# Patient Record
Sex: FEMALE | Race: WHITE | NOT HISPANIC OR LATINO | Employment: FULL TIME | URBAN - METROPOLITAN AREA
[De-identification: names, ages, dates, MRNs, and addresses within clinical notes are randomized per-mention and may not be internally consistent; named-entity substitution may affect disease eponyms.]

---

## 2017-02-23 ENCOUNTER — DOCUMENTATION ONLY (OUTPATIENT)
Dept: FAMILY MEDICINE | Facility: CLINIC | Age: 47
End: 2017-02-23

## 2017-02-23 NOTE — PROGRESS NOTES
Pre-Visit Chart Review  For Appointment Scheduled on 3-9-17    Health Maintenance Due   Topic Date Due    Foot Exam  10/08/1978    TETANUS VACCINE  10/08/1986    Hemoglobin A1c  03/16/2016    Influenza Vaccine  08/01/2016

## 2017-03-09 ENCOUNTER — OFFICE VISIT (OUTPATIENT)
Dept: FAMILY MEDICINE | Facility: CLINIC | Age: 47
End: 2017-03-09
Payer: COMMERCIAL

## 2017-03-09 VITALS
DIASTOLIC BLOOD PRESSURE: 78 MMHG | RESPIRATION RATE: 16 BRPM | WEIGHT: 167.56 LBS | SYSTOLIC BLOOD PRESSURE: 115 MMHG | OXYGEN SATURATION: 97 % | HEIGHT: 67 IN | BODY MASS INDEX: 26.3 KG/M2 | HEART RATE: 84 BPM | TEMPERATURE: 98 F

## 2017-03-09 DIAGNOSIS — G47.00 WAKEFULNESS: ICD-10-CM

## 2017-03-09 DIAGNOSIS — G47.429 NARCOLEPSY DUE TO UNDERLYING CONDITION WITHOUT CATAPLEXY: ICD-10-CM

## 2017-03-09 DIAGNOSIS — I10 GOOD HYPERTENSION CONTROL: ICD-10-CM

## 2017-03-09 DIAGNOSIS — Z76.89 ESTABLISHING CARE WITH NEW DOCTOR, ENCOUNTER FOR: Primary | ICD-10-CM

## 2017-03-09 DIAGNOSIS — E11.9 CONTROLLED TYPE 2 DIABETES MELLITUS WITHOUT COMPLICATION, WITHOUT LONG-TERM CURRENT USE OF INSULIN: ICD-10-CM

## 2017-03-09 DIAGNOSIS — T78.2XXS ANAPHYLAXIS, SEQUELA: ICD-10-CM

## 2017-03-09 DIAGNOSIS — E78.5 HYPERLIPIDEMIA, UNSPECIFIED HYPERLIPIDEMIA TYPE: ICD-10-CM

## 2017-03-09 PROCEDURE — 3074F SYST BP LT 130 MM HG: CPT | Mod: S$GLB,,, | Performed by: FAMILY MEDICINE

## 2017-03-09 PROCEDURE — 99203 OFFICE O/P NEW LOW 30 MIN: CPT | Mod: S$GLB,,, | Performed by: FAMILY MEDICINE

## 2017-03-09 PROCEDURE — 3046F HEMOGLOBIN A1C LEVEL >9.0%: CPT | Mod: S$GLB,,, | Performed by: FAMILY MEDICINE

## 2017-03-09 PROCEDURE — 3078F DIAST BP <80 MM HG: CPT | Mod: S$GLB,,, | Performed by: FAMILY MEDICINE

## 2017-03-09 PROCEDURE — 2022F DILAT RTA XM EVC RTNOPTHY: CPT | Mod: S$GLB,,, | Performed by: FAMILY MEDICINE

## 2017-03-09 PROCEDURE — 3060F POS MICROALBUMINURIA REV: CPT | Mod: S$GLB,,, | Performed by: FAMILY MEDICINE

## 2017-03-09 PROCEDURE — 99999 PR PBB SHADOW E&M-EST. PATIENT-LVL III: CPT | Mod: PBBFAC,,, | Performed by: FAMILY MEDICINE

## 2017-03-09 PROCEDURE — 1160F RVW MEDS BY RX/DR IN RCRD: CPT | Mod: S$GLB,,, | Performed by: FAMILY MEDICINE

## 2017-03-09 RX ORDER — EPINEPHRINE 0.3 MG/.3ML
1 INJECTION SUBCUTANEOUS ONCE
Qty: 2 EACH | Refills: 11 | Status: SHIPPED | OUTPATIENT
Start: 2017-03-09 | End: 2017-09-18 | Stop reason: SDUPTHER

## 2017-03-09 RX ORDER — CYCLOBENZAPRINE HCL 10 MG
10 TABLET ORAL 2 TIMES DAILY
Refills: 2 | COMMUNITY
Start: 2017-03-02 | End: 2017-05-31

## 2017-03-09 RX ORDER — MULTIVITAMIN
1 TABLET ORAL DAILY PRN
COMMUNITY
End: 2018-09-12

## 2017-03-09 RX ORDER — IBUPROFEN AND FAMOTIDINE 800; 26.6 MG/1; MG/1
1 TABLET, COATED ORAL 2 TIMES DAILY
COMMUNITY
Start: 2017-02-07 | End: 2017-05-31

## 2017-03-09 RX ORDER — OXYCODONE AND ACETAMINOPHEN 7.5; 325 MG/1; MG/1
TABLET ORAL
Refills: 0 | COMMUNITY
Start: 2017-02-20 | End: 2017-05-31

## 2017-03-09 RX ORDER — USTEKINUMAB 45 MG/.5ML
45 INJECTION, SOLUTION SUBCUTANEOUS
COMMUNITY
Start: 2016-12-12 | End: 2019-10-09

## 2017-03-09 RX ORDER — CEPHALEXIN 500 MG/1
500 CAPSULE ORAL 2 TIMES DAILY
Refills: 0 | COMMUNITY
Start: 2017-02-20 | End: 2017-03-11

## 2017-03-09 RX ORDER — ARMODAFINIL 250 MG/1
250 TABLET ORAL 2 TIMES DAILY
Qty: 60 TABLET | Refills: 5 | Status: SHIPPED | OUTPATIENT
Start: 2017-03-09 | End: 2017-09-18 | Stop reason: SDUPTHER

## 2017-03-09 RX ORDER — ONDANSETRON 4 MG/1
TABLET, ORALLY DISINTEGRATING ORAL
Refills: 0 | COMMUNITY
Start: 2017-02-20 | End: 2017-05-31

## 2017-03-09 NOTE — PROGRESS NOTES
"Subjective:       Patient ID: Holly Chicas is a 46 y.o. female.    Chief Complaint: Establish Care    HPI Comments: 46 year old female coming in to [facility].  She has a history of diabetes, hypertension, hyperlipidemia, neuropathy, sleep apnea, and narcolepsy.  She is s/p gastric bypass in  following which she lost 70 lb and after which she has had a tummy tuck, thigh lifts, liposuction, brazillian butt lift, and breast augmentation with the breast augmentation, butt lift, and thigh lift done one week ago.  She is unable to sit or lie down in the office due to removal of sutures earlier today.  She has essentially cured her diabetes, hypertension and hyperlipidemia and no longer needs to use medications for that or use bipap for sleep apnea.   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    Past Medical History:  : Abnormal Pap smear      Comment: "pre-cancer cells post hysterectomy"  No date: Anxiety  No date: Arthritis  No date: Depression  No date: Diabetes mellitus  No date: Diabetes mellitus, type 2  No date: Hyperlipidemia  No date: Hypertension  No date: Hypertriglyceridemia  No date: Meningitis  No date: Narcolepsy  No date: ALESIA (obstructive sleep apnea)  2014: Peripheral neuropathy  No date: Psoriasis  No date: Tuberculosis    Past Surgical History:  2016: BREAST SURGERY      Comment: aumentation/ reduction   2017: BREAST SURGERY      Comment: augmentation   2017: brizilian butt lift   No date:  SECTION  No date: FOOT SURGERY  2015: GASTRIC BYPASS  No date: HERNIA REPAIR  No date: HYSTERECTOMY  No date: KNEE SURGERY Left  : SHOULDER SURGERY      Comment: right should surgery   2017: SHOULDER SURGERY      Comment: left shoulder / torn labrium   2017: THIGH LIFT  No date: TONSILLECTOMY  2016: tummy tuck     Review of patient's family history indicates:    Cancer                         Mother   "                    Comment: breast bilateral/ skin     Breast cancer                  Mother                    Skin cancer                    Mother                    Diabetes                       Father                    Heart disease                  Father                      Comment: CABG    Hypertension                   Father                    Cancer                         Father                      Comment: skin    Skin cancer                    Father                    Gout                           Father                    Gout                           Brother                   No Known Problems              Daughter                  No Known Problems              Son                       Cancer                         Maternal Aunt               Comment: lung, pancrease - smoker , breast    Lung cancer                    Maternal Aunt               Comment: x2    Pancreatic cancer              Maternal Aunt             Breast cancer                  Maternal Aunt             No Known Problems              Maternal Uncle            No Known Problems              Paternal Uncle            Cancer                         Maternal Grandmother        Comment: pancrease    Pancreatic cancer              Maternal Grandmother      No Known Problems              Maternal Grandfather      Diabetes                       Paternal Grandmother      Heart disease                  Paternal Grandfather      Colon cancer                   Neg Hx                    Ovarian cancer                 Neg Hx                    Social History    Marital status:              Spouse name:                       Years of education:                 Number of children:               Social History Main Topics    Smoking status: Never Smoker                                                                Smokeless status: Never Used                        Alcohol use: No                 Comment: ALLERGIC TO  ALCOHOL(DRINKING) PER PATIENT    Drug use: No              Sexual activity: Yes               Partners with: Male       Birth control/protection: Surgical      Current Outpatient Prescriptions:     cephALEXin (KEFLEX) 500 MG capsule, Take 500 mg by mouth 2 (two) times daily., Disp: , Rfl: 0    cyclobenzaprine (FLEXERIL) 10 MG tablet, Take 10 mg by mouth 2 (two) times daily., Disp: , Rfl: 2    DUEXIS 800-26.6 mg Tab, Take 1 tablet by mouth 2 (two) times daily. , Disp: , Rfl:     epinephrine (EPIPEN) 0.3 mg/0.3 mL AtIn, Inject 0.3 mLs (0.3 mg total) into the muscle once. As directed PRN, Disp: 2 each, Rfl: 11    multivitamin (ONE DAILY MULTIVITAMIN) per tablet, Take 1 tablet by mouth once daily., Disp: , Rfl:     ondansetron (ZOFRAN-ODT) 4 MG TbDL, LET 1 TABLET DISSOLVE ON THE TONGUE EVERY 4-6 HOURS, Disp: , Rfl: 0    oxycodone-acetaminophen (PERCOCET) 7.5-325 mg per tablet, TK 1 T PO Q 4 TO 6 H, Disp: , Rfl: 0    STELARA 45 mg/0.5 mL Syrg syringe, Inject 45 mg into the muscle every 3 (three) months. , Disp: , Rfl:     armodafinil (NUVIGIL) 250 mg tablet, Take 1 tablet (250 mg total) by mouth 2 (two) times daily. Twice a day, Disp: 60 tablet, Rfl: 5    Foot Exam due on 10/08/1978  Eye Exam due on 10/08/1978-done one month ago at [facility]  TETANUS VACCINE due on 10/08/1986-DECLINES  Hemoglobin A1c due on 06/16/2016  Influenza Vaccine due on 08/01/2016-DECLINES  Lipid Panel due on 12/16/2016      Review of Systems   Constitutional: Negative for chills, diaphoresis, fatigue, fever and unexpected weight change.   HENT: Negative for congestion, ear pain, hearing loss, postnasal drip, sinus pressure, sneezing, sore throat, tinnitus and trouble swallowing.    Eyes: Negative for itching and visual disturbance.   Respiratory: Negative for cough, chest tightness, shortness of breath and wheezing.    Cardiovascular: Negative for chest pain, palpitations and leg swelling.   Gastrointestinal: Negative for  "abdominal pain, blood in stool, constipation, diarrhea, nausea and vomiting.   Genitourinary: Negative for dysuria, frequency, hematuria, menstrual problem, pelvic pain, vaginal bleeding and vaginal discharge.   Musculoskeletal: Positive for myalgias (secondary to surgery last week). Negative for arthralgias, back pain and joint swelling.   Skin: Negative for color change, pallor and rash.   Neurological: Negative for dizziness and headaches.   Hematological: Negative for adenopathy.   Psychiatric/Behavioral: Negative for sleep disturbance. The patient is not nervous/anxious.        Objective:      Physical Exam   Constitutional: She is oriented to person, place, and time. She appears well-developed and well-nourished. No distress.   -------------------------------                 03/09/17                          1513           -------------------------------   BP:            115/78           BP Location:      Left arm          Patient Position:      Standing          BP Method:      Automatic         Pulse:           84             Resp:            16             Temp:     97.9 °F (36.6 °C)     TempSrc:        Oral            SpO2:            97%            Weight: 76 kg (167 lb 8.8 oz)   Height:    5' 7" (1.702 m)     -------------------------------  Good blood pressure control  Patient is overweight with bmi of 26.2.      HENT:   Head: Normocephalic and atraumatic.   Right Ear: External ear normal.   Left Ear: External ear normal.   Nose: Nose normal.   Mouth/Throat: Oropharynx is clear and moist. No oropharyngeal exudate.   Eyes: Conjunctivae and EOM are normal. Pupils are equal, round, and reactive to light. Right eye exhibits no discharge. Left eye exhibits no discharge. No scleral icterus.   Neck: Normal range of motion. Neck supple. No JVD present. No tracheal deviation present. No thyromegaly present.   Cardiovascular: Normal rate, regular rhythm and normal " heart sounds.  Exam reveals no gallop and no friction rub.    No murmur heard.  Pulses:       Dorsalis pedis pulses are 2+ on the right side, and 2+ on the left side.        Posterior tibial pulses are 2+ on the right side, and 2+ on the left side.   Pulmonary/Chest: Effort normal and breath sounds normal. No respiratory distress. She has no wheezes. She has no rales. She exhibits no tenderness.   Abdominal: Soft. Bowel sounds are normal. She exhibits no distension and no mass. There is no tenderness. There is no rebound and no guarding. No hernia.   Musculoskeletal: Normal range of motion. She exhibits no edema, tenderness or deformity.        Right foot: There is normal range of motion. Deformity: mild bunion first mp joint with no overriding.        Left foot: There is normal range of motion and no deformity.   Feet:   Right Foot:   Protective Sensation: 10 sites tested. 10 sites sensed.   Skin Integrity: Negative for ulcer, blister, skin breakdown, erythema, warmth, callus or dry skin.   Left Foot:   Protective Sensation: 10 sites tested. 10 sites sensed.   Skin Integrity: Negative for ulcer, blister, skin breakdown, erythema, warmth, callus or dry skin.   Lymphadenopathy:     She has no cervical adenopathy.   Neurological: She is alert and oriented to person, place, and time. She has normal reflexes. She displays normal reflexes. No cranial nerve deficit. She exhibits normal muscle tone.   Skin: Skin is warm and dry. No rash noted. She is not diaphoretic. No erythema. No pallor.   Psychiatric: She has a normal mood and affect. Her behavior is normal. Judgment and thought content normal.   Nursing note and vitals reviewed.      Assessment:       1. Establishing care with new doctor, encounter for    2. Controlled type 2 diabetes mellitus without complication, without long-term current use of insulin    3. Good hypertension control    4. Hyperlipidemia, unspecified hyperlipidemia type    5. Narcolepsy due to  underlying condition without cataplexy    6. Wakefulness    7. Anaphylaxis, sequela    8. BMI 26.0-26.9,adult        Plan:       1. Establishing care with new doctor, encounter for    2. Controlled type 2 diabetes mellitus without complication, without long-term current use of insulin  On no meds at present  - Hemoglobin A1c; Future  - Lipid panel; Future  - Basic metabolic panel; Future  - Microalbumin/creatinine urine ratio; Future    3. Good hypertension control  On no meds, well controlled with weight loss alone  - Basic metabolic panel; Future  - CBC auto differential; Future    4. Hyperlipidemia, unspecified hyperlipidemia type  On no meds, lab ordered    5. Narcolepsy due to underlying condition without cataplexy  Refill nuvigil    6. Wakefulness  - armodafinil (NUVIGIL) 250 mg tablet; Take 1 tablet (250 mg total) by mouth 2 (two) times daily. Twice a day  Dispense: 60 tablet; Refill: 5    7. Anaphylaxis, sequela  - epinephrine (EPIPEN) 0.3 mg/0.3 mL AtIn; Inject 0.3 mLs (0.3 mg total) into the muscle once. As directed PRN  Dispense: 2 each; Refill: 11    8. BMI 26.0-26.9,adult

## 2017-03-09 NOTE — MR AVS SNAPSHOT
[facility]  [address]  [place] [address]  Phone: [phone]  Fax: [phone]                  Holly Chicas   3/9/2017 3:20 PM   Office Visit    Description:  Female : 1970   Provider:  Ben Tracy MD   Department:  [facility]           Reason for Visit     Establish Care           Diagnoses this Visit        Comments    Establishing care with new doctor, encounter for    -  Primary     Controlled type 2 diabetes mellitus without complication, without long-term current use of insulin         Good hypertension control         Hyperlipidemia, unspecified hyperlipidemia type         Narcolepsy due to underlying condition without cataplexy         Wakefulness         Anaphylaxis, sequela                To Do List           Future Appointments        Provider Department Dept Phone    3/10/2017 9:00 AM [facility] - Lab [phone]    3/10/2017 9:15 AM [facility], [facility] - Lab [phone]      Goals (5 Years of Data)     None       These Medications        Disp Refills Start End    epinephrine (EPIPEN) 0.3 mg/0.3 mL AtIn 2 each 11 3/9/2017 3/9/2017    Inject 0.3 mLs (0.3 mg total) into the muscle once. As directed PRN - Intramuscular    Pharmacy: [facility]/pharmacy #[id number] - [place] [address] Ph #: [phone]       armodafinil (NUVIGIL) 250 mg tablet 60 tablet 5 3/9/2017     Take 1 tablet (250 mg total) by mouth 2 (two) times daily. Twice a day - Oral    Pharmacy: [facility]/pharmacy #[id number] - [place] - [address]. Ph #: [phone]         [facility] On Call     [facility] On Call Nurse Care Line -  Assistance  Registered nurses in the [facility] provide clinical advisement, health education, appointment booking, and other advisory services.  Call for this free service at [phone].             Medications           Message regarding Medications     Verify the changes and/or additions to your  medication regime listed below are the same as discussed with your clinician today.  If any of these changes or additions are incorrect, please notify your healthcare provider.        START taking these NEW medications        Refills    armodafinil (NUVIGIL) 250 mg tablet 5    Sig: Take 1 tablet (250 mg total) by mouth 2 (two) times daily. Twice a day    Class: Normal    Route: Oral      CHANGE how you are taking these medications     Start Taking Instead of    epinephrine (EPIPEN) 0.3 mg/0.3 mL AtIn epinephrine (EPIPEN) 0.3 mg/0.3 mL PnIj    Dosage:  Inject 0.3 mLs (0.3 mg total) into the muscle once. As directed PRN Dosage:  once. As directed PRN    Reason for Change:  Reorder       STOP taking these medications     STELARA 90 mg/mL Syrg syringe Once every 3 months    blood sugar diagnostic Strp Test blood sugar 4 times daily    clotrimazole-betamethasone 1-0.05% (LOTRISONE) cream Apply topically 2 (two) times daily.    gentamicin (GARAMYCIN) 0.1 % ointment     lancets (ONE TOUCH DELICA LANCETS) 33 gauge Misc 1 lancet by Misc.(Non-Drug; Combo Route) route 4 (four) times daily.    TACLONEX external suspension            Verify that the below list of medications is an accurate representation of the medications you are currently taking.  If none reported, the list may be blank. If incorrect, please contact your healthcare provider. Carry this list with you in case of emergency.           Current Medications     cephALEXin (KEFLEX) 500 MG capsule Take 500 mg by mouth 2 (two) times daily.    cyclobenzaprine (FLEXERIL) 10 MG tablet Take 10 mg by mouth 2 (two) times daily.    DUEXIS 800-26.6 mg Tab Take 1 tablet by mouth 2 (two) times daily.     epinephrine (EPIPEN) 0.3 mg/0.3 mL AtIn Inject 0.3 mLs (0.3 mg total) into the muscle once. As directed PRN    multivitamin (ONE DAILY MULTIVITAMIN) per tablet Take 1 tablet by mouth once daily.    ondansetron (ZOFRAN-ODT) 4 MG TbDL LET 1 TABLET DISSOLVE ON THE TONGUE EVERY 4-6  "HOURS    oxycodone-acetaminophen (PERCOCET) 7.5-325 mg per tablet TK 1 T PO Q 4 TO 6 H    STELARA 45 mg/0.5 mL Syrg syringe Inject 45 mg into the muscle every 3 (three) months.     armodafinil (NUVIGIL) 250 mg tablet Take 1 tablet (250 mg total) by mouth 2 (two) times daily. Twice a day           Clinical Reference Information           Your Vitals Were     BP Pulse Temp Resp Height Weight    115/78 (BP Location: Left arm, Patient Position: Standing, BP Method: Automatic) 84 97.9 °F (36.6 °C) (Oral) 16 5' 7" (1.702 m) 76 kg (167 lb 8.8 oz)    SpO2 BMI             97% 26.24 kg/m2         Blood Pressure          Most Recent Value    BP  115/78      Allergies as of 3/9/2017     Alcohol      Immunizations Administered on Date of Encounter - 3/9/2017     None      Orders Placed During Today's Visit     Future Labs/Procedures Expected by Expires    Basic metabolic panel  3/9/2017 3/10/2018    CBC auto differential  3/9/2017 3/10/2018    Hemoglobin A1c  3/9/2017 5/8/2018    Lipid panel  3/9/2017 5/8/2018    Microalbumin/creatinine urine ratio  3/9/2017 3/9/2018      Language Assistance Services     ATTENTION: Language assistance services are available, free of charge. Please call [phone].      ATENCIÓN: Si habla español, tiene a zhong disposición servicios gratuitos de asistencia lingüística. Llame al [phone].     CHÚ Ý: N?u b?n nói Ti?ng Vi?t, có các d?ch v? h? tr? ngôn ng? mi?n phí dành cho b?n. G?i s? [phone].         [facility] complies with applicable Federal civil rights laws and does not discriminate on the basis of race, color, national origin, age, disability, or sex.        "

## 2017-03-10 ENCOUNTER — TELEPHONE (OUTPATIENT)
Dept: FAMILY MEDICINE | Facility: CLINIC | Age: 47
End: 2017-03-10

## 2017-03-29 ENCOUNTER — HOSPITAL ENCOUNTER (OUTPATIENT)
Dept: RADIOLOGY | Facility: CLINIC | Age: 47
Discharge: HOME | End: 2017-03-29
Attending: PODIATRIST
Payer: COMMERCIAL

## 2017-03-29 ENCOUNTER — PATIENT MESSAGE (OUTPATIENT)
Dept: FAMILY MEDICINE | Facility: CLINIC | Age: 47
End: 2017-03-29

## 2017-03-29 ENCOUNTER — OFFICE VISIT (OUTPATIENT)
Dept: PODIATRY | Facility: CLINIC | Age: 47
End: 2017-03-29
Payer: COMMERCIAL

## 2017-03-29 VITALS — HEIGHT: 67 IN | BODY MASS INDEX: 26.02 KG/M2 | WEIGHT: 165.81 LBS

## 2017-03-29 DIAGNOSIS — M79.674 TOE PAIN, RIGHT: Primary | ICD-10-CM

## 2017-03-29 DIAGNOSIS — S92.501A FRACTURE OF FIFTH TOE, RIGHT, CLOSED, INITIAL ENCOUNTER: ICD-10-CM

## 2017-03-29 DIAGNOSIS — S90.121A CONTUSION OF LESSER TOE OF RIGHT FOOT WITHOUT DAMAGE TO NAIL, INITIAL ENCOUNTER: ICD-10-CM

## 2017-03-29 DIAGNOSIS — M79.674 TOE PAIN, RIGHT: ICD-10-CM

## 2017-03-29 PROCEDURE — 28510 TREATMENT OF TOE FRACTURE: CPT | Mod: RT,S$GLB,, | Performed by: PODIATRIST

## 2017-03-29 PROCEDURE — 73660 X-RAY EXAM OF TOE(S): CPT | Mod: 26,,, | Performed by: RADIOLOGY

## 2017-03-29 PROCEDURE — 1160F RVW MEDS BY RX/DR IN RCRD: CPT | Mod: S$GLB,,, | Performed by: PODIATRIST

## 2017-03-29 PROCEDURE — 99999 PR PBB SHADOW E&M-EST. PATIENT-LVL III: CPT | Mod: PBBFAC,,, | Performed by: PODIATRIST

## 2017-03-29 PROCEDURE — 73660 X-RAY EXAM OF TOE(S): CPT | Mod: TC,PO

## 2017-03-29 PROCEDURE — 99203 OFFICE O/P NEW LOW 30 MIN: CPT | Mod: 57,S$GLB,, | Performed by: PODIATRIST

## 2017-03-29 RX ORDER — LIDOCAINE HYDROCHLORIDE 20 MG/ML
JELLY TOPICAL
Qty: 30 ML | Refills: 2 | Status: SHIPPED | OUTPATIENT
Start: 2017-03-29 | End: 2017-08-09

## 2017-03-29 RX ORDER — ARMODAFINIL 250 MG/1
TABLET ORAL
Refills: 5 | COMMUNITY
Start: 2017-03-10 | End: 2017-05-31 | Stop reason: SDUPTHER

## 2017-03-29 NOTE — PROGRESS NOTES
Subjective:      Patient ID: Holly Chicas is a 46 y.o. female.    Chief Complaint: Foot Pain (right)    Sharp deep pain/swelling right 5th toe.  Sudden onset stubbing yesterday afternoon without improvement since, aggravated by increased weight bearing, shoe gear, pressure.  No previous medical treatment.  OTC pain med not helping.  Current pain med from recent plastic surgery covers toe pain.      Review of Systems   Constitution: Negative for chills, diaphoresis, fever, malaise/fatigue and night sweats.   Cardiovascular: Negative for claudication, cyanosis, leg swelling and syncope.   Skin: Negative for color change, dry skin, nail changes, rash, suspicious lesions and unusual hair distribution.   Musculoskeletal: Positive for joint pain and joint swelling. Negative for falls, muscle cramps, muscle weakness and stiffness.   Gastrointestinal: Negative for constipation, diarrhea, nausea and vomiting.   Neurological: Negative for brief paralysis, disturbances in coordination, focal weakness, numbness, paresthesias, sensory change and tremors.           Objective:      Physical Exam   Constitutional: She appears well-developed and well-nourished. She is cooperative. No distress.   Cardiovascular:   Pulses:       Popliteal pulses are 2+ on the right side, and 2+ on the left side.        Dorsalis pedis pulses are 2+ on the right side, and 2+ on the left side.        Posterior tibial pulses are 2+ on the right side, and 2+ on the left side.   Capillary refill 3 seconds all toes/distal feet, all toes/both feet warm to touch.      Negative lymphadenopathy bilateral popliteal fossa and tarsal tunnel.      Negavie lower extremity edema bilateral.     Musculoskeletal:        Right ankle: Normal. She exhibits normal range of motion, no swelling, no ecchymosis, no deformity, no laceration and normal pulse. Achilles tendon normal. Achilles tendon exhibits no pain, no defect and normal Champion's test results.   Pain and  swelling right 5th toe and mtpj with ecchymosis but without deformity or loss of function.    Otherwise, Normal angle, base, station of gait. All ten toes without clubbing, cyanosis, or signs of ischemia.  No pain to palpation bilateral lower extremities.  Range of motion, stability, muscle strength, and muscle tone normal bilateral feet and legs.     Lymphadenopathy: No inguinal adenopathy noted on the right or left side.   Negative lymphadenopathy bilateral popliteal fossa and tarsal tunnel.   Neurological: She is alert. She has normal strength. She displays no atrophy and no tremor. No sensory deficit. She exhibits normal muscle tone. She displays no seizure activity. Gait normal.   Reflex Scores:       Patellar reflexes are 2+ on the right side and 2+ on the left side.       Achilles reflexes are 2+ on the right side and 2+ on the left side.  Negative tinel sign to percussion sural, superficial peroneal, deep peroneal, saphenous, and posterior tibial nerves right and left ankles and feet.      Negative moulder sign/click bilateral all intermetatarsal spaces.     Skin: Skin is warm, dry and intact. Ecchymosis noted. No abrasion, no bruising, no burn, no laceration, no lesion and no rash noted. She is not diaphoretic. No cyanosis or erythema. No pallor. Nails show no clubbing.   Ecchymosis right 5th toe pipj  without ulceration, drainage, pus, tracking, fluctuance, malodor, or cardinal signs infection.    Otherwise, Skin is normal age and health appropriate color, turgor, texture, and temperature bilateral lower extremities without ulceration, hyperpigmentation, discoloration, masses nodules or cords palpated.  No ecchymosis, erythema, edema, or cardinal signs of infection bilateral lower extremities.               Assessment:       Encounter Diagnoses   Name Primary?    Toe pain, right Yes    Contusion of lesser toe of right foot without damage to nail, initial encounter          Plan:       Holly was seen  today for foot pain.    Diagnoses and all orders for this visit:    Toe pain, right  -     X-Ray Toe 2 View; Future    Contusion of lesser toe of right foot without damage to nail, initial encounter  -     X-Ray Toe 2 View; Future    Other orders  -     lidocaine HCL 2% (XYLOCAINE) 2 % jelly; Apply topically as needed. Apply topically once nightly to affected portion right foot/toe.      I counseled the patient on her conditions, their implications and medical management.        xrays right toes    Dispense fx boot right ambulate to tolerance - wean to shoes when symptoms allow.    RIICE  protect skin from thermal damage.    rtc 5 weeks, sooner prn.    Discussed conservative treatment with shoes of adequate dimensions, material, and style to alleviate symptoms and delay or prevent surgical intervention.           No Follow-up on file.

## 2017-03-29 NOTE — MR AVS SNAPSHOT
[facility]  [address]  [place] [address]  Phone: [phone]                  Holly Chicas   3/29/2017 11:00 AM   Office Visit    Description:  Female : 1970   Provider:  Krishan Denson DPM   Department:  [facility]           Reason for Visit     Foot Pain           Diagnoses this Visit        Comments    Toe pain, right    -  Primary     Contusion of lesser toe of right foot without damage to nail, initial encounter                To Do List           Future Appointments        Provider Department Dept Phone    3/29/2017 11:30 AM LAB, [facility] Lab [phone]    3/29/2017 12:15 PM SLIC XR1 [facility] X-Ray [phone]    5/3/2017 9:00 AM Krishan Denson DPM [facility] [phone]      Goals (5 Years of Data)     None       These Medications        Disp Refills Start End    lidocaine HCL 2% (XYLOCAINE) 2 % jelly 30 mL 2 3/29/2017     Apply topically as needed. Apply topically once nightly to affected portion right foot/toe. - Topical (Top)    Pharmacy: [facility] #[id number] - [place] - [address]  #: [phone]         [facility]     [facility] On Call Nurse Care Line -  Assistance  Registered nurses in the [facility] On Call Center provide clinical advisement, health education, appointment booking, and other advisory services.  Call for this free service at [phone].             Medications           Message regarding Medications     Verify the changes and/or additions to your medication regime listed below are the same as discussed with your clinician today.  If any of these changes or additions are incorrect, please notify your healthcare provider.        START taking these NEW medications        Refills    lidocaine HCL 2% (XYLOCAINE) 2 % jelly 2    Sig: Apply topically as needed. Apply topically once nightly to affected portion right foot/toe.    Class: Normal    Route: Topical (Top)           Verify that  "the below list of medications is an accurate representation of the medications you are currently taking.  If none reported, the list may be blank. If incorrect, please contact your healthcare provider. Carry this list with you in case of emergency.           Current Medications     armodafinil (NUVIGIL) 250 mg tablet Take 1 tablet (250 mg total) by mouth 2 (two) times daily. Twice a day    cyclobenzaprine (FLEXERIL) 10 MG tablet Take 10 mg by mouth 2 (two) times daily.    DUEXIS 800-26.6 mg Tab Take 1 tablet by mouth 2 (two) times daily.     multivitamin (ONE DAILY MULTIVITAMIN) per tablet Take 1 tablet by mouth once daily.    ondansetron (ZOFRAN-ODT) 4 MG TbDL LET 1 TABLET DISSOLVE ON THE TONGUE EVERY 4-6 HOURS    oxycodone-acetaminophen (PERCOCET) 7.5-325 mg per tablet TK 1 T PO Q 4 TO 6 H    STELARA 45 mg/0.5 mL Syrg syringe Inject 45 mg into the muscle every 3 (three) months.     armodafinil (NUVIGIL) 250 mg tablet TAKE 1 TABLET (250 MG TOTAL) BY MOUTH 2 (TWO) TIMES DAILY.    epinephrine (EPIPEN) 0.3 mg/0.3 mL AtIn Inject 0.3 mLs (0.3 mg total) into the muscle once. As directed PRN    lidocaine HCL 2% (XYLOCAINE) 2 % jelly Apply topically as needed. Apply topically once nightly to affected portion right foot/toe.           Clinical Reference Information           Your Vitals Were     Height Weight BMI          5' 7" (1.702 m) 75.2 kg (165 lb 12.6 oz) 25.97 kg/m2        Allergies as of 3/29/2017     Alcohol      Immunizations Administered on Date of Encounter - 3/29/2017     None      Orders Placed During Today's Visit     Future Labs/Procedures Expected by Expires    X-Ray Toe 2 View  3/29/2017 3/29/2018      Language Assistance Services     ATTENTION: Language assistance services are available, free of charge. Please call [phone].      ATENCIÓN: Si habla tylersamuel, tiene a zhong disposición servicios gratuitos de asistencia lingüística. Llame al [phone].     CHÚ Ý: N?u b?n nói Ti?ng Vi?t, có các d?ch " v? h? tr? ngôn ng? mi?n phí aníbalh cho b?n. G?i s? [phone].         [facility] complies with applicable Federal civil rights laws and does not discriminate on the basis of race, color, national origin, age, disability, or sex.

## 2017-03-30 ENCOUNTER — PATIENT MESSAGE (OUTPATIENT)
Dept: FAMILY MEDICINE | Facility: CLINIC | Age: 47
End: 2017-03-30

## 2017-05-03 ENCOUNTER — OFFICE VISIT (OUTPATIENT)
Dept: PODIATRY | Facility: CLINIC | Age: 47
End: 2017-05-03
Payer: COMMERCIAL

## 2017-05-03 VITALS — HEIGHT: 67 IN | BODY MASS INDEX: 25.88 KG/M2 | WEIGHT: 164.88 LBS

## 2017-05-03 DIAGNOSIS — S92.501D FRACTURE OF FIFTH TOE, RIGHT, CLOSED, WITH ROUTINE HEALING, SUBSEQUENT ENCOUNTER: ICD-10-CM

## 2017-05-03 DIAGNOSIS — M79.671 FOOT PAIN, RIGHT: ICD-10-CM

## 2017-05-03 DIAGNOSIS — M20.10 HALLUX ABDUCTO VALGUS, UNSPECIFIED LATERALITY: Primary | ICD-10-CM

## 2017-05-03 DIAGNOSIS — M21.611 BUNION, RIGHT: ICD-10-CM

## 2017-05-03 PROCEDURE — 99213 OFFICE O/P EST LOW 20 MIN: CPT | Mod: 24,25,S$GLB, | Performed by: PODIATRIST

## 2017-05-03 PROCEDURE — 1160F RVW MEDS BY RX/DR IN RCRD: CPT | Mod: S$GLB,,, | Performed by: PODIATRIST

## 2017-05-03 PROCEDURE — 99999 PR PBB SHADOW E&M-EST. PATIENT-LVL III: CPT | Mod: PBBFAC,,, | Performed by: PODIATRIST

## 2017-05-03 PROCEDURE — 29540 STRAPPING ANKLE &/FOOT: CPT | Mod: 79,RT,S$GLB, | Performed by: PODIATRIST

## 2017-05-03 NOTE — PROGRESS NOTES
Subjective:      Patient ID: Holly Chicas is a 46 y.o. female.    Chief Complaint: Foot Injury    Sharp deep pain right 1st mtpj indicated with index finger.  Gradual onset, worsening over past several weeks, aggravated by increased weight bearing, shoe gear, pressure.  No previous medical treatment.  OTC pain med not helping.  Denies trauma since toe injury 5 right.    Broken 5th toe right improving well symptomatically in shoes and with rest.  Lidocaine gel gives relief at night.      Review of Systems   Constitution: Negative for chills, diaphoresis, fever, malaise/fatigue and night sweats.   Cardiovascular: Negative for claudication, cyanosis, leg swelling and syncope.   Skin: Negative for color change, dry skin, nail changes, rash, suspicious lesions and unusual hair distribution.   Musculoskeletal: Positive for joint pain. Negative for falls, joint swelling, muscle cramps, muscle weakness and stiffness.   Gastrointestinal: Negative for constipation, diarrhea, nausea and vomiting.   Neurological: Negative for brief paralysis, disturbances in coordination, focal weakness, numbness, paresthesias, sensory change and tremors.           Objective:      Physical Exam   Constitutional: She appears well-developed and well-nourished. She is cooperative. No distress.   Cardiovascular:   Pulses:       Popliteal pulses are 2+ on the right side, and 2+ on the left side.        Dorsalis pedis pulses are 2+ on the right side, and 2+ on the left side.        Posterior tibial pulses are 2+ on the right side, and 2+ on the left side.   Capillary refill 3 seconds all toes/distal feet, all toes/both feet warm to touch.      Negative lymphadenopathy bilateral popliteal fossa and tarsal tunnel.      Negavie lower extremity edema bilateral.     Musculoskeletal:        Right ankle: Normal. She exhibits normal range of motion, no swelling, no ecchymosis, no deformity, no laceration and normal pulse. Achilles tendon normal. Achilles  tendon exhibits no pain, no defect and normal Champion's test results.   Visible and palpable bunion with pain at dorsomedial 1st metatarsal head right.  Hallux abducted right partially reducible, tracks laterally without being track bound.  No ecchymosis, erythema, edema, or cardinal signs infection or signs of trauma same foot.    Ankle dorsiflexion decreased at <10 degrees bilateral with moderate increase with knee flexion bilateral.    Minimal pain to deep palpation and range of motion right 5th toe proximal end of proximal phalanx without deformity or loss of function.   Lymphadenopathy: No inguinal adenopathy noted on the right or left side.   Negative lymphadenopathy bilateral popliteal fossa and tarsal tunnel.   Neurological: She is alert. She has normal strength. She displays no atrophy and no tremor. No sensory deficit. She exhibits normal muscle tone. She displays no seizure activity. Gait normal.   Reflex Scores:       Patellar reflexes are 2+ on the right side and 2+ on the left side.       Achilles reflexes are 2+ on the right side and 2+ on the left side.  Negative tinel sign to percussion sural, superficial peroneal, deep peroneal, saphenous, and posterior tibial nerves right and left ankles and feet.    Negative allodynia both feet.   Skin: Skin is warm, dry and intact. No abrasion, no bruising, no burn, no ecchymosis, no laceration, no lesion and no rash noted. She is not diaphoretic. No cyanosis or erythema. No pallor. Nails show no clubbing.     Skin is normal age and health appropriate color, turgor, texture, and temperature bilateral lower extremities without ulceration, hyperpigmentation, discoloration, masses nodules or cords palpated.  No ecchymosis, erythema, edema, or cardinal signs of infection bilateral lower extremities.               Assessment:       Encounter Diagnoses   Name Primary?    Hallux abducto valgus, unspecified laterality Yes    Bunion, right     Fracture of fifth toe,  right, closed, with routine healing, subsequent encounter     Foot pain, right          Plan:       Holly was seen today for foot injury.    Diagnoses and all orders for this visit:    Hallux abducto valgus, unspecified laterality  -     X-Ray Foot Complete Right; Future    Bunion, right  -     X-Ray Foot Complete Right; Future    Fracture of fifth toe, right, closed, with routine healing, subsequent encounter  -     X-Ray Foot Complete Right; Future    Foot pain, right  -     X-Ray Foot Complete Right; Future      I counseled the patient on her conditions, their implications and medical management.        Patient will stretch the tendo achilles complex three times daily as demonstrated in the office.  Literature was dispensed illustrating proper stretching technique.    I applied a plantar rest strapping to the patient's right foot to offload symptomatic area, support the arch, and relieve pain.    Patient will obtain over the counter arch supports and wear them in shoes whenever possible.  Athletic shoes intended for walking or running are usually best.    The patient was advised that NSAID-type medications have two very important potential side effects: gastrointestinal irritation including hemorrhage and renal injuries. She was asked to take the medication with food and to stop if she experiences any GI upset. I asked her to call for vomiting, abdominal pain or black/bloody stools. The patient expresses understanding of these issues and questions were answered.    Discussed conservative treatment with shoes of adequate dimensions, material, and style to alleviate symptoms and delay or prevent surgical intervention.    Continue lidocaine gel prn.  Rx - xrays - clinic machine down today.    Return to clinic one month, sooner prn.            No Follow-up on file.

## 2017-05-03 NOTE — MR AVS SNAPSHOT
[facility]  [address] [place] [address]  Phone: [phone]                  Holly Chicas   5/3/2017 9:00 AM   Office Visit    Description:  Female : 1970   Provider:  Krishan Denson DPM   Department:  [facility]           Reason for Visit     Foot Injury           Diagnoses this Visit        Comments    Hallux abducto valgus, unspecified laterality    -  Primary     Bunion, right         Fracture of fifth toe, right, closed, with routine healing, subsequent encounter         Foot pain, right                To Do List           Future Appointments        Provider Department Dept Phone    2017 9:00 AM Krishan Denson DPM [facility] [phone]      Goals (5 Years of Data)     None      [facility]     [facility] On Call Nurse Care Line -  Assistance  Unless otherwise directed by your provider, please contact [facility], our nurse care line that is available for  assistance.     Registered nurses in the [facility] On [facility] Center provide: appointment scheduling, clinical advisement, health education, and other advisory services.  Call: [phone] (toll free)               Medications           Message regarding Medications     Verify the changes and/or additions to your medication regime listed below are the same as discussed with your clinician today.  If any of these changes or additions are incorrect, please notify your healthcare provider.             Verify that the below list of medications is an accurate representation of the medications you are currently taking.  If none reported, the list may be blank. If incorrect, please contact your healthcare provider. Carry this list with you in case of emergency.           Current Medications     armodafinil (NUVIGIL) 250 mg tablet Take 1 tablet (250 mg total) by mouth 2 (two) times daily. Twice a day    armodafinil (NUVIGIL) 250 mg tablet TAKE 1 TABLET (250 MG TOTAL) BY MOUTH 2 (TWO) TIMES DAILY.  "   cyclobenzaprine (FLEXERIL) 10 MG tablet Take 10 mg by mouth 2 (two) times daily.    DUEXIS 800-26.6 mg Tab Take 1 tablet by mouth 2 (two) times daily.     lidocaine HCL 2% (XYLOCAINE) 2 % jelly Apply topically as needed. Apply topically once nightly to affected portion right foot/toe.    multivitamin (ONE DAILY MULTIVITAMIN) per tablet Take 1 tablet by mouth once daily.    ondansetron (ZOFRAN-ODT) 4 MG TbDL LET 1 TABLET DISSOLVE ON THE TONGUE EVERY 4-6 HOURS    oxycodone-acetaminophen (PERCOCET) 7.5-325 mg per tablet TK 1 T PO Q 4 TO 6 H    STELARA 45 mg/0.5 mL Syrg syringe Inject 45 mg into the muscle every 3 (three) months.     epinephrine (EPIPEN) 0.3 mg/0.3 mL AtIn Inject 0.3 mLs (0.3 mg total) into the muscle once. As directed PRN           Clinical Reference Information           Your Vitals Were     Height Weight BMI          5' 7" (1.702 m) 74.8 kg (164 lb 14.5 oz) 25.83 kg/m2        Allergies as of 5/3/2017     Alcohol      Immunizations Administered on Date of Encounter - 5/3/2017     None      Orders Placed During Today's Visit     Future Labs/Procedures Expected by Expires    X-Ray Foot Complete Right  5/3/2017 5/3/2018      Language Assistance Services     ATTENTION: Language assistance services are available, free of charge. Please call [phone].      ATENCIÓN: Si habla español, tiene a zhong disposición servicios gratuitos de asistencia lingüística. Llame al [phone].     [facility] Ý: N?u b?n nói Ti?ng Vi?t, có các d?ch v? h? tr? ngôn ng? mi?n phí dành cho b?n. G?i s? [phone].         [facility] complies with applicable Federal civil rights laws and does not discriminate on the basis of race, color, national origin, age, disability, or sex.        "

## 2017-05-04 ENCOUNTER — PATIENT MESSAGE (OUTPATIENT)
Dept: PODIATRY | Facility: CLINIC | Age: 47
End: 2017-05-04

## 2017-05-05 ENCOUNTER — HOSPITAL ENCOUNTER (OUTPATIENT)
Dept: RADIOLOGY | Facility: CLINIC | Age: 47
Discharge: HOME | End: 2017-05-05
Attending: PODIATRIST
Payer: COMMERCIAL

## 2017-05-05 DIAGNOSIS — M20.10 HALLUX ABDUCTO VALGUS, UNSPECIFIED LATERALITY: ICD-10-CM

## 2017-05-05 DIAGNOSIS — M79.671 FOOT PAIN, RIGHT: ICD-10-CM

## 2017-05-05 DIAGNOSIS — M21.611 BUNION, RIGHT: ICD-10-CM

## 2017-05-05 DIAGNOSIS — S92.501D FRACTURE OF FIFTH TOE, RIGHT, CLOSED, WITH ROUTINE HEALING, SUBSEQUENT ENCOUNTER: ICD-10-CM

## 2017-05-05 PROCEDURE — 73630 X-RAY EXAM OF FOOT: CPT | Mod: 26,RT,S$GLB, | Performed by: RADIOLOGY

## 2017-05-05 PROCEDURE — 73630 X-RAY EXAM OF FOOT: CPT | Mod: TC,PO,RT

## 2017-05-31 ENCOUNTER — OFFICE VISIT (OUTPATIENT)
Dept: PODIATRY | Facility: CLINIC | Age: 47
End: 2017-05-31
Payer: COMMERCIAL

## 2017-05-31 ENCOUNTER — PATIENT MESSAGE (OUTPATIENT)
Dept: FAMILY MEDICINE | Facility: CLINIC | Age: 47
End: 2017-05-31

## 2017-05-31 VITALS — WEIGHT: 166.25 LBS | BODY MASS INDEX: 26.09 KG/M2 | HEIGHT: 67 IN

## 2017-05-31 DIAGNOSIS — M20.10 HALLUX ABDUCTO VALGUS, UNSPECIFIED LATERALITY: Primary | ICD-10-CM

## 2017-05-31 DIAGNOSIS — S92.501D FRACTURE OF FIFTH TOE, RIGHT, CLOSED, WITH ROUTINE HEALING, SUBSEQUENT ENCOUNTER: ICD-10-CM

## 2017-05-31 DIAGNOSIS — M79.671 FOOT PAIN, RIGHT: ICD-10-CM

## 2017-05-31 DIAGNOSIS — M21.611 BUNION, RIGHT: ICD-10-CM

## 2017-05-31 PROCEDURE — 99999 PR PBB SHADOW E&M-EST. PATIENT-LVL III: CPT | Mod: PBBFAC,,, | Performed by: PODIATRIST

## 2017-05-31 PROCEDURE — 99024 POSTOP FOLLOW-UP VISIT: CPT | Mod: S$GLB,,, | Performed by: PODIATRIST

## 2017-05-31 NOTE — PROGRESS NOTES
Subjective:      Patient ID: Holly Chicas is a 46 y.o. female.    Chief Complaint: Foot Pain (follow up right)    Sharp deep pain right 1st mtpj indicated with index finger.  Improved with careful shoe selection, activity change, nsaid and rest.  Xray show mild bunion and great toe deformity witout acute injury.  Broken 5th toe right without symptom in shoes and with rest.       Review of Systems   Constitution: Negative for chills, diaphoresis, fever, malaise/fatigue and night sweats.   Cardiovascular: Negative for claudication, cyanosis, leg swelling and syncope.   Skin: Negative for color change, dry skin, nail changes, rash, suspicious lesions and unusual hair distribution.   Musculoskeletal: Positive for joint pain. Negative for falls, joint swelling, muscle cramps, muscle weakness and stiffness.   Gastrointestinal: Negative for constipation, diarrhea, nausea and vomiting.   Neurological: Negative for brief paralysis, disturbances in coordination, focal weakness, numbness, paresthesias, sensory change and tremors.           Objective:      Physical Exam   Constitutional: She appears well-developed and well-nourished. She is cooperative. No distress.   Cardiovascular:   Pulses:       Popliteal pulses are 2+ on the right side, and 2+ on the left side.        Dorsalis pedis pulses are 2+ on the right side, and 2+ on the left side.        Posterior tibial pulses are 2+ on the right side, and 2+ on the left side.   Capillary refill 3 seconds all toes/distal feet, all toes/both feet warm to touch.      Negative lymphadenopathy bilateral popliteal fossa and tarsal tunnel.      Negavie lower extremity edema bilateral.     Musculoskeletal:        Right ankle: Normal. She exhibits normal range of motion, no swelling, no ecchymosis, no deformity, no laceration and normal pulse. Achilles tendon normal. Achilles tendon exhibits no pain, no defect and normal Champion's test results.   Visible and palpable bunion with pain  at dorsomedial 1st metatarsal head right.  Hallux abducted right partially reducible, tracks laterally without being track bound.  No ecchymosis, erythema, edema, or cardinal signs infection or signs of trauma same foot.    Ankle dorsiflexion decreased at <10 degrees bilateral with moderate increase with knee flexion bilateral.    No pain to deep palpation and range of motion right 5th toe proximal end of proximal phalanx without deformity or loss of function.   Lymphadenopathy:   Negative lymphadenopathy bilateral popliteal fossa and tarsal tunnel.   Neurological: She is alert. She has normal strength. She displays no atrophy and no tremor. No sensory deficit. She exhibits normal muscle tone. She displays no seizure activity. Gait normal.   Reflex Scores:       Patellar reflexes are 2+ on the right side and 2+ on the left side.       Achilles reflexes are 2+ on the right side and 2+ on the left side.  Negative tinel sign to percussion sural, superficial peroneal, deep peroneal, saphenous, and posterior tibial nerves right and left ankles and feet.    Negative allodynia both feet.   Skin: Skin is warm, dry and intact. No abrasion, no bruising, no burn, no ecchymosis, no laceration, no lesion and no rash noted. She is not diaphoretic. No cyanosis or erythema. No pallor. Nails show no clubbing.     Skin is normal age and health appropriate color, turgor, texture, and temperature bilateral lower extremities without ulceration, hyperpigmentation, discoloration, masses nodules or cords palpated.  No ecchymosis, erythema, edema, or cardinal signs of infection bilateral lower extremities.               Assessment:       Encounter Diagnoses   Name Primary?    Hallux abducto valgus, unspecified laterality Yes    Bunion, right     Fracture of fifth toe, right, closed, with routine healing, subsequent encounter     Foot pain, right          Plan:       Holly was seen today for foot pain.    Diagnoses and all orders for  this visit:    Hallux abducto valgus, unspecified laterality    Bunion, right    Fracture of fifth toe, right, closed, with routine healing, subsequent encounter    Foot pain, right      I counseled the patient on her conditions, their implications and medical management.        Continue shoes and activity to tolerance.    Counseled on conservative treatments including shoe and activity change, physical therapy, antiinflammatory, and pain medication, and sometimes injections for symptomatic relief.    Counseled on surgical correction, distal metatarsal osteotomy for bunionectomy - risks and benefits, including, but not limited to arthritis, recurrence, infection, pain, scar, poor cosmesis, loss of function, nerve pain, nerve damage, numbness, syndromes (RSD), need for future surgical procedures, and or long term use of orthotics, blood clots of leg, lung, heart, brain, death.    Consider carefully, appoint with pcp for surgical clearance, follow here prn as symptoms dictate for consent, post op Rx, and scheduling.            Return if symptoms worsen or fail to improve, for bunoin surg discussion.

## 2017-07-13 ENCOUNTER — PATIENT MESSAGE (OUTPATIENT)
Dept: OBSTETRICS AND GYNECOLOGY | Facility: CLINIC | Age: 47
End: 2017-07-13

## 2017-07-13 DIAGNOSIS — Z12.31 VISIT FOR SCREENING MAMMOGRAM: Primary | ICD-10-CM

## 2017-07-17 ENCOUNTER — TELEPHONE (OUTPATIENT)
Dept: RADIOLOGY | Facility: HOSPITAL | Age: 47
End: 2017-07-17

## 2017-07-17 ENCOUNTER — HOSPITAL ENCOUNTER (OUTPATIENT)
Dept: RADIOLOGY | Facility: CLINIC | Age: 47
Discharge: HOME | End: 2017-07-17
Attending: OBSTETRICS & GYNECOLOGY
Payer: COMMERCIAL

## 2017-07-17 DIAGNOSIS — Z12.31 VISIT FOR SCREENING MAMMOGRAM: ICD-10-CM

## 2017-07-18 ENCOUNTER — TELEPHONE (OUTPATIENT)
Dept: OBSTETRICS AND GYNECOLOGY | Facility: CLINIC | Age: 47
End: 2017-07-18

## 2017-07-18 ENCOUNTER — HOSPITAL ENCOUNTER (OUTPATIENT)
Dept: RADIOLOGY | Facility: HOSPITAL | Age: 47
Discharge: HOME | End: 2017-07-18
Attending: OBSTETRICS & GYNECOLOGY
Payer: COMMERCIAL

## 2017-07-18 VITALS — HEIGHT: 67 IN | BODY MASS INDEX: 26.06 KG/M2 | WEIGHT: 166 LBS

## 2017-07-18 DIAGNOSIS — N63.10 BREAST MASS, RIGHT: ICD-10-CM

## 2017-07-18 PROCEDURE — 77062 BREAST TOMOSYNTHESIS BI: CPT | Mod: 26,,, | Performed by: RADIOLOGY

## 2017-07-18 PROCEDURE — 77066 DX MAMMO INCL CAD BI: CPT | Mod: TC

## 2017-07-18 PROCEDURE — 77066 DX MAMMO INCL CAD BI: CPT | Mod: 26,,, | Performed by: RADIOLOGY

## 2017-07-18 PROCEDURE — 76642 ULTRASOUND BREAST LIMITED: CPT | Mod: 26,50,, | Performed by: RADIOLOGY

## 2017-07-18 PROCEDURE — 76642 ULTRASOUND BREAST LIMITED: CPT | Mod: TC,50

## 2017-07-18 PROCEDURE — 76642 ULTRASOUND BREAST LIMITED: CPT | Mod: TC,RT

## 2017-07-20 ENCOUNTER — TELEPHONE (OUTPATIENT)
Dept: RADIOLOGY | Facility: HOSPITAL | Age: 47
End: 2017-07-20

## 2017-07-20 NOTE — TELEPHONE ENCOUNTER
..Patient notified of diagnostic mammogram results Needs  Right breast biopsy appointment is scheduled with Dr. Layton on 7/27/2017.

## 2017-07-21 ENCOUNTER — PATIENT MESSAGE (OUTPATIENT)
Dept: OBSTETRICS AND GYNECOLOGY | Facility: CLINIC | Age: 47
End: 2017-07-21

## 2017-07-25 ENCOUNTER — OFFICE VISIT (OUTPATIENT)
Dept: UROLOGY | Facility: CLINIC | Age: 47
End: 2017-07-25
Payer: COMMERCIAL

## 2017-07-25 VITALS
SYSTOLIC BLOOD PRESSURE: 147 MMHG | HEIGHT: 67 IN | WEIGHT: 165.81 LBS | TEMPERATURE: 99 F | HEART RATE: 69 BPM | BODY MASS INDEX: 26.02 KG/M2 | DIASTOLIC BLOOD PRESSURE: 98 MMHG

## 2017-07-25 DIAGNOSIS — N39.0 RECURRENT UTI: ICD-10-CM

## 2017-07-25 DIAGNOSIS — R82.998 OTHER CELLS AND CASTS IN URINE: Primary | ICD-10-CM

## 2017-07-25 LAB
BILIRUB SERPL-MCNC: ABNORMAL MG/DL
BLOOD URINE, POC: ABNORMAL
COLOR, POC UA: ABNORMAL
GLUCOSE UR QL STRIP: ABNORMAL
KETONES UR QL STRIP: ABNORMAL
LEUKOCYTE ESTERASE URINE, POC: ABNORMAL
NITRITE, POC UA: ABNORMAL
PH, POC UA: 7
PROTEIN, POC: ABNORMAL
SPECIFIC GRAVITY, POC UA: 1010
UROBILINOGEN, POC UA: ABNORMAL

## 2017-07-25 PROCEDURE — 87086 URINE CULTURE/COLONY COUNT: CPT

## 2017-07-25 PROCEDURE — 99999 PR PBB SHADOW E&M-EST. PATIENT-LVL III: CPT | Mod: PBBFAC,,, | Performed by: UROLOGY

## 2017-07-25 PROCEDURE — 81002 URINALYSIS NONAUTO W/O SCOPE: CPT | Mod: S$GLB,,, | Performed by: UROLOGY

## 2017-07-25 PROCEDURE — 99244 OFF/OP CNSLTJ NEW/EST MOD 40: CPT | Mod: 25,S$GLB,, | Performed by: UROLOGY

## 2017-07-25 RX ORDER — CIPROFLOXACIN 500 MG/1
500 TABLET ORAL 2 TIMES DAILY
Qty: 14 TABLET | Refills: 0 | Status: SHIPPED | OUTPATIENT
Start: 2017-07-25 | End: 2017-08-01

## 2017-07-25 RX ORDER — IBUPROFEN AND FAMOTIDINE 800; 26.6 MG/1; MG/1
1 TABLET, COATED ORAL 3 TIMES DAILY
COMMUNITY
Start: 2017-07-03 | End: 2023-08-29 | Stop reason: SDUPTHER

## 2017-07-25 RX ORDER — NITROFURANTOIN (MACROCRYSTALS) 100 MG/1
100 CAPSULE ORAL ONCE AS NEEDED
Qty: 30 CAPSULE | Refills: 2 | Status: SHIPPED | OUTPATIENT
Start: 2017-07-25 | End: 2017-07-25

## 2017-07-25 NOTE — PROGRESS NOTES
"[facility] [place]  Staff: MD Rush    Referring provider and please cc: Dr.Adele Luna  PCP: Dr.Robert Taylor [facility]    Chief Complaint: uti, new    Subjective:        HPI: Holly Chicas is a 46 y.o. female presents with     Recurrent uti  She was going to see  for uti who then referred her to me via portal. Sx include dysuria, frequency and urgency. Sx started a month ago and sx have been present the whole time. No gross hematuria. Had a bso and hysterectomy for cysts. Not on hormone replacement. + atrophic vaginitis. +sexually active - could be related? Center 1x a week.     Last uti was about 10 years ago. +diarrhea since gastric bypass. Diarrhea a couple times day.     oab and mixed incontinence  +incontinence and plan was for "bladder lift". No pads. + chuyita and +uui, but UUI is worse. Drinks 1 cup of coffee in the day and decaf tea. Voids about 12x a day. Nocturia x 1. occ urgency.   Not that bothersome to her now. Does kegels.     Had gastric bypass 2 years ago and no longer diabetic.     ECOG Status: 0    G3, P 3,   Gross Hematuria: no  History of UTI: Yes   Sexually active?: Yes - 1x a week. Painful to have intercourse bc of dryness.    REVIEW OF SYSTEMS:  General ROS: no fevers, no chills  Psychological ROS: no depression  Endocrine ROS: no heat or cold  Respiratory ROS: no SOB  Cardiovascular ROS: no CP  Gastrointestinal ROS: no abdominal pain, no constipation, + diarrhea, noBRBPR  Musculoskeletal ROS: no muscle pain  Neurological ROS: no  headaches  Dermatological ROS: no rashes  HEENT: + glasses, no sinus   ROS: per HPI     PMHx:  Abnormal pap smear prior to hysterectomy but 2 years ago was normal  Abnormal mammogram  Kidney stones: No    PSHx:  Past Surgical History:   Procedure Laterality Date    BREAST SURGERY      aumentation/ reduction     BREAST SURGERY  2017    augmentation     brizilian butt lift   " 2017     SECTION      FOOT SURGERY      GASTRIC BYPASS  2015    HERNIA REPAIR      HYSTERECTOMY      KNEE SURGERY Left     SHOULDER SURGERY      right should surgery     SHOULDER SURGERY  2017    left shoulder / torn labrium     THIGH LIFT  2017    TONSILLECTOMY      tummy tuck   2016   knee  Urologic or Gynecologic Surgery: hysterectomy done for b9 condition    Stents/Valves/Foreign Bodies: No  Cardiac Evaluation: No    Screening Studies  Colonoscopy: none    Fam Hx:   malignancies: No , gyn malignancies: Yes - mother and grandmother with breast cancer . Mother alive a 60s, father alive a 60s  kidney stones: Yes - father     Soc Hx:  No tobacco.   No alcohol - allergic alcohol  Lives in [place]  :yes  Children: 3  Occupation:retired from taking care of in-laws, retired hairdresser    Allergies:  Alcohol    Medications: reviewed   Anticoagulation: No    Objective:     Vitals:    17 1137   BP: (!) 147/98   Pulse: 69   Temp: 98.6 °F (37 °C)       General:WDWN in NAD  Eyes: PERRLA, normal conjunctiva  Respiratory: no increased work on breathing, clear to auscultation  Cardiovascular: regular rate and rhythm. No obvious extremity edema.  GI: no palpation of masses. No tenderness. No hepatosplenomegaly to palpation.  Musculoskeletal: normal range of motion of bilateral upper extremities. Normal muscle strength and tone.  Skin: no obvious rashes or lesions. No tightening of skin noted.  Neurologic: CN grossly normal. Normal sensation.   Psychiatric: awake, alert and oriented x 3. Mood and affect normal. Cooperative.    Pelvic exam  deferred    LABS REVIEW:  UA today: 1.010/7/1+leukocyte/tr protein/tr blood - send urine for culture, sx, voided, start cipro  UCx:   12 Multiple organisms    Cr:   Lab Results   Component Value Date    CREATININE 0.7 2017       PATHOLOGY REVIEW:  none    RADIOGRAPHIC REVIEW:  ctrss 12  No stones  No  "hydro        Assessment:       1. Other cells and casts in urine    2. Recurrent UTI          Plan:     Sending culture, pt has symptoms of uti  If comes back "multiple organisms' need cath urine cultures in the futrue    Start cipro 500 twice a day for 7 days  If still having sx return for urine sample (maybe catheterized depending on culture)    macrobid 1 post intercourse or heavy bouts of diarrhea     Start a probiotic.     May need vaginal exam, stress test and estrace if sx or uti's persists (if breast bx negative, strong family hx of breast cancer)    Given info sheet today    F/u 3 months    I have routed a letter back to Dr.Adele Luna for the consult on date of service 07/25/17.       Kiana Beverly MD  "

## 2017-07-25 NOTE — LETTER
July 25, 2017      Alina Luna MD  [address] [place] [address]   [address]  [place] [address]           [place] [facility] - Urology  [address]  [place] [address]  Phone: [phone]          Patient: Holly Chicas   MR Number: [id number]   YOB: 1970   Date of Visit: 7/25/2017       Dear Dr. Alina Luna:    Thank you for referring Holly Chicas to me for evaluation. Attached you will find relevant portions of my assessment and plan of care.    If you have questions, please do not hesitate to call me. I look forward to following Holly Chicas along with you.    Sincerely,    Kiana Beverly MD    Enclosure  CC:  No Recipients    If you would like to receive this communication electronically, please contact [email or web address] or [phone] to request more information on [employer] access.    For providers and/or their staff who would like to refer a patient to [facility], please contact us through our one-stop-shop provider referral line, [facility], at [phone].    If you feel you have received this communication in error or would no longer like to receive these types of communications, please e-mail [email or web address]

## 2017-07-25 NOTE — PATIENT INSTRUCTIONS
"Sending culture  If comes back "multiple organisms' need cath urine cultures in the futrue    Start cipro 500 twice a day for 7 days  If still having sx return for urine sample (maybe catheterized)    macrobid 1 post intercourse or heavy bouts of diarrhea    Start a probiotic.   "

## 2017-07-27 ENCOUNTER — OFFICE VISIT (OUTPATIENT)
Dept: SURGERY | Facility: CLINIC | Age: 47
End: 2017-07-27
Payer: COMMERCIAL

## 2017-07-27 VITALS
HEART RATE: 68 BPM | SYSTOLIC BLOOD PRESSURE: 130 MMHG | DIASTOLIC BLOOD PRESSURE: 86 MMHG | WEIGHT: 167.56 LBS | BODY MASS INDEX: 26.24 KG/M2

## 2017-07-27 DIAGNOSIS — R92.1 BREAST CALCIFICATIONS: Primary | ICD-10-CM

## 2017-07-27 LAB — BACTERIA UR CULT: NORMAL

## 2017-07-27 PROCEDURE — 99243 OFF/OP CNSLTJ NEW/EST LOW 30: CPT | Mod: S$GLB,,, | Performed by: SURGERY

## 2017-07-27 PROCEDURE — 99999 PR PBB SHADOW E&M-EST. PATIENT-LVL III: CPT | Mod: PBBFAC,,, | Performed by: SURGERY

## 2017-07-27 NOTE — LETTER
August 3, 2017      Alina Luna MD  [address] [place] [address] Dr  [address]  [facility] [address]           [facility] - General Surgery  [address]  [facility] [address]  Phone: [phone]          Patient: Holly Chicas   MR Number: [id number]   YOB: 1970   Date of Visit: 7/27/2017       Dear Dr. Alina Luna:    Thank you for referring Holly Chicas to me for evaluation. Attached you will find relevant portions of my assessment and plan of care.    If you have questions, please do not hesitate to call me. I look forward to following Holly Chicas along with you.    Sincerely,    Yaron Layton MD    Enclosure  CC:  No Recipients    If you would like to receive this communication electronically, please contact [email or web address] or [phone] to request more information on [employer] Link access.    For providers and/or their staff who would like to refer a patient to [facility], please contact us through our one-stop-shop provider referral line, [facility], at [phone].    If you feel you have received this communication in error or would no longer like to receive these types of communications, please e-mail [email or web address]

## 2017-07-31 ENCOUNTER — PATIENT MESSAGE (OUTPATIENT)
Dept: SURGERY | Facility: CLINIC | Age: 47
End: 2017-07-31

## 2017-08-03 ENCOUNTER — HOSPITAL ENCOUNTER (OUTPATIENT)
Dept: RADIOLOGY | Facility: HOSPITAL | Age: 47
Discharge: HOME | End: 2017-08-03
Attending: SURGERY
Payer: COMMERCIAL

## 2017-08-03 DIAGNOSIS — R92.8 ABNORMAL MAMMOGRAM OF LEFT BREAST: Primary | ICD-10-CM

## 2017-08-03 DIAGNOSIS — R92.1 BREAST CALCIFICATIONS: ICD-10-CM

## 2017-08-03 DIAGNOSIS — R92.8 ABNORMAL MAMMOGRAM OF RIGHT BREAST: ICD-10-CM

## 2017-08-03 PROCEDURE — 88305 TISSUE EXAM BY PATHOLOGIST: CPT | Performed by: PATHOLOGY

## 2017-08-03 PROCEDURE — 19081 BX BREAST 1ST LESION STRTCTC: CPT | Mod: RT,,, | Performed by: SURGERY

## 2017-08-03 PROCEDURE — 76098 X-RAY EXAM SURGICAL SPECIMEN: CPT | Mod: TC

## 2017-08-03 PROCEDURE — A4648 IMPLANTABLE TISSUE MARKER: HCPCS

## 2017-08-03 NOTE — PROGRESS NOTES
Subjective:       Patient ID: Holly Chicas is a 46 y.o. female.    Chief Complaint: Consult (bilateral abnormal mammo)    HPI 47 yo female referred for eval of microcalcifications in right breast. No signidicant family history. Pt denies symptoms.     Review of Systems   Constitutional: Negative for activity change, chills, fever and unexpected weight change.   HENT: Negative for congestion, sore throat, trouble swallowing and voice change.    Eyes: Negative for redness and visual disturbance.   Respiratory: Negative for cough, shortness of breath and wheezing.    Cardiovascular: Negative for chest pain and palpitations.   Gastrointestinal: Negative for abdominal pain, blood in stool, nausea and vomiting.   Endocrine: Negative.    Genitourinary: Negative for dysuria, frequency and hematuria.   Musculoskeletal: Negative for arthralgias, back pain and neck pain.   Skin: Negative for rash and wound.   Allergic/Immunologic: Negative.    Neurological: Negative for dizziness, weakness and headaches.   Hematological: Negative for adenopathy.   Psychiatric/Behavioral: Negative for agitation and dysphoric mood. The patient is not nervous/anxious.      Objective:     Physical Exam   Constitutional: She is oriented to person, place, and time. She appears well-developed and well-nourished. No distress.   HENT:   Head: Normocephalic and atraumatic.   Mouth/Throat: Oropharynx is clear and moist. No oropharyngeal exudate.   Eyes: Conjunctivae and EOM are normal. Pupils are equal, round, and reactive to light. No scleral icterus.   Neck: Normal range of motion. No thyromegaly present.   Cardiovascular: Normal rate and regular rhythm.    No murmur heard.  Pulmonary/Chest: Effort normal and breath sounds normal. She has no wheezes. She has no rales.   Right Breast Exam:  There are no palpable dominant masses.  There are no overlying skin changes.  There is no contour change of the breast.  There is no nipple discharge.  There is no  significant breast tenderness.  There is no palpable axillary or supraclavicular adenopathy.    There is no abnormality detected on physical exam of the contralateral breast.   Abdominal: Soft. Bowel sounds are normal. She exhibits no distension and no mass. There is no tenderness. No hernia.   Musculoskeletal: Normal range of motion. She exhibits no edema.   Lymphadenopathy:     She has no cervical adenopathy.   Neurological: She is alert and oriented to person, place, and time. No cranial nerve deficit.   Skin: Skin is warm and dry. No rash noted. No erythema.   Psychiatric: She has a normal mood and affect. Her behavior is normal.     Assessment:     Encounter Diagnosis   Name Primary?    Breast calcifications Yes       Plan:      1. Plan sterotactic biopsy of right breast microcalcifications.  2. Risks and benefits of the planned procedure were discussed at length with the patient.  Risks and benefits of not proceeding with the procedure were discussed as well. All questions were answered. The patient expressed clear understanding and would like to proceed with the procedure as discussed.

## 2017-08-03 NOTE — NURSING
Patient tolerated procedure well.   Puncture site good steristrip applied.   Discharge instruction given.   Ambulatory to home

## 2017-08-07 ENCOUNTER — TELEPHONE (OUTPATIENT)
Dept: RADIOLOGY | Facility: HOSPITAL | Age: 47
End: 2017-08-07

## 2017-08-07 NOTE — TELEPHONE ENCOUNTER
..Patient notified of breast biopsy results.     FINAL PATHOLOGIC DIAGNOSIS  Breast, right with 8 clip, biopsy:  Focal foreign body giant cell reaction with associated microcalcifications surrounding small fragments of polarizable  foreign material.  Background fibrosis and fibroadenomatoid and fibrocystic change.  Negative for atypia and malignancy.    Instructed to repeat diagnostic mammogram in one  month.   Diagnostic mammogram scheduled on 9/7/2017.

## 2017-08-08 ENCOUNTER — PATIENT MESSAGE (OUTPATIENT)
Dept: OBSTETRICS AND GYNECOLOGY | Facility: CLINIC | Age: 47
End: 2017-08-08

## 2017-08-09 ENCOUNTER — OFFICE VISIT (OUTPATIENT)
Dept: OBSTETRICS AND GYNECOLOGY | Facility: CLINIC | Age: 47
End: 2017-08-09
Payer: COMMERCIAL

## 2017-08-09 VITALS
HEIGHT: 67 IN | BODY MASS INDEX: 26.47 KG/M2 | HEART RATE: 60 BPM | WEIGHT: 168.63 LBS | DIASTOLIC BLOOD PRESSURE: 88 MMHG | SYSTOLIC BLOOD PRESSURE: 129 MMHG

## 2017-08-09 DIAGNOSIS — Z12.31 ENCOUNTER FOR SCREENING MAMMOGRAM FOR BREAST CANCER: ICD-10-CM

## 2017-08-09 DIAGNOSIS — Z01.419 WELL WOMAN EXAM WITH ROUTINE GYNECOLOGICAL EXAM: Primary | ICD-10-CM

## 2017-08-09 DIAGNOSIS — N95.2 ATROPHIC VAGINITIS: ICD-10-CM

## 2017-08-09 PROCEDURE — 99999 PR PBB SHADOW E&M-EST. PATIENT-LVL III: CPT | Mod: PBBFAC,,, | Performed by: OBSTETRICS & GYNECOLOGY

## 2017-08-09 PROCEDURE — 99396 PREV VISIT EST AGE 40-64: CPT | Mod: S$GLB,,, | Performed by: OBSTETRICS & GYNECOLOGY

## 2017-08-09 RX ORDER — ESTRADIOL 0.1 MG/G
CREAM VAGINAL
Qty: 42.5 G | Refills: 6 | Status: SHIPPED | OUTPATIENT
Start: 2017-08-09 | End: 2018-03-03

## 2017-08-21 NOTE — PROGRESS NOTES
"SUBJECTIVE:   46 y.o. female   for annual routine Pap and checkup. No LMP recorded. Patient has had a hysterectomy..  She complains of painful intercourse.        Past Medical History:   Diagnosis Date    Abnormal Pap smear     "pre-cancer cells post hysterectomy"    Anxiety     Arthritis     Depression     Hyperlipidemia     Hypertension     Hypertriglyceridemia     Meningitis     Narcolepsy     ALESIA (obstructive sleep apnea)     Peripheral neuropathy 2014    Psoriasis     Tuberculosis      Past Surgical History:   Procedure Laterality Date    BREAST SURGERY      aumentation/ reduction     BREAST SURGERY  2017    augmentation     brizilian butt lift   2017     SECTION      FOOT SURGERY      GASTRIC BYPASS  2015    GASTRIC BYPASS      HERNIA REPAIR      HYSTERECTOMY      KNEE SURGERY Left     SHOULDER SURGERY      right should surgery     SHOULDER SURGERY  2017    left shoulder / torn labrium     THIGH LIFT  2017    TONSILLECTOMY      tummy tuck   2016     Social History     Social History    Marital status:      Spouse name: N/A    Number of children: N/A    Years of education: N/A     Occupational History    Not on file.     Social History Main Topics    Smoking status: Never Smoker    Smokeless tobacco: Never Used    Alcohol use No      Comment: ALLERGIC TO ALCOHOL(DRINKING) PER PATIENT    Drug use: No    Sexual activity: Yes     Partners: Male     Birth control/ protection: Surgical      Comment: SO     Other Topics Concern    Not on file     Social History Narrative    No narrative on file     Family History   Problem Relation Age of Onset    Cancer Mother      breast bilateral/ skin     Breast cancer Mother     Skin cancer Mother     Diabetes Father     Heart disease Father 65     CABG    Hypertension Father     Cancer Father      skin    Skin cancer Father     Gout Father     Gout Brother  "    No Known Problems Daughter     No Known Problems Son     Cancer Maternal Aunt      lung, pancrease - smoker , breast    Lung cancer Maternal Aunt      x2    Pancreatic cancer Maternal Aunt     Breast cancer Maternal Aunt     No Known Problems Maternal Uncle     No Known Problems Paternal Uncle     Cancer Maternal Grandmother      pancrease    Pancreatic cancer Maternal Grandmother     Breast cancer Maternal Grandmother     No Known Problems Maternal Grandfather     Diabetes Paternal Grandmother     Heart disease Paternal Grandfather     Colon cancer Neg Hx     Ovarian cancer Neg Hx      OB History    Para Term  AB Living   3 3 3 0 0 3   SAB TAB Ectopic Multiple Live Births   0 0 0 0 3      # Outcome Date GA Lbr Aris/2nd Weight Sex Delivery Anes PTL Lv   3 Term      CS-LTranv   YUMIKO   2 Term      CS-LTranv   YUMIKO   1 Term      CS-LTranv   YUMIKO            Current Outpatient Prescriptions   Medication Sig Dispense Refill    armodafinil (NUVIGIL) 250 mg tablet Take 1 tablet (250 mg total) by mouth 2 (two) times daily. Twice a day 60 tablet 5    DUEXIS 800-26.6 mg Tab       epinephrine (EPIPEN) 0.3 mg/0.3 mL AtIn Inject 0.3 mLs (0.3 mg total) into the muscle once. As directed PRN 2 each 11    multivitamin (ONE DAILY MULTIVITAMIN) per tablet Take 1 tablet by mouth once daily.      STELARA 45 mg/0.5 mL Syrg syringe Inject 45 mg into the muscle every 3 (three) months.       estradiol (ESTRACE) 0.01 % (0.1 mg/gram) vaginal cream Place a pea-sized amount inside vagina every night for 2 weeks, and then 2-3 times a week. 42.5 g 6     No current facility-administered medications for this visit.      Allergies: Alcohol     ROS:  Constitutional: no weight loss, weight gain, fever, fatigue  Eyes:  No vision changes, glasses/contacts  ENT/Mouth: No ulcers, sinus problems, ears ringing, headache  Cardiovascular: No inability to lie flat, chest pain, exercise intolerance, swelling, heart  "palpitations  Respiratory: No wheezing, coughing blood, shortness of breath, or cough  Gastrointestinal: No diarrhea, bloody stool, nausea/vomiting, constipation, gas, hemorrhoids  Genitourinary: No blood in urine, painful urination, urgency of urination, frequency of urination, incomplete emptying, incontinence, abnormal bleeding, painful periods, heavy periods, vaginal discharge, vaginal odor, painful intercourse, sexual problems, bleeding after intercourse.  Musculoskeletal: No muscle weakness  Skin/Breast: No painful breasts, nipple discharge, masses, rash, ulcers  Neurological: No passing out, seizures, numbness, headache  Endocrine: No diabetes, hypothyroid, hyperthyroid, hot flashes, hair loss, abnormal hair growth, ance  Psychiatric: No depression, crying  Hematologic: No bruises, bleeding, swollen lymph nodes, anemia.      OBJECTIVE:   The patient appears well, alert, oriented x 3, in no distress.  /88   Pulse 60   Ht 5' 7" (1.702 m)   Wt 76.5 kg (168 lb 10.4 oz)   BMI 26.41 kg/m²   NECK: no thyromegaly, trachea midline  SKIN: no acne, striae, hirsutism  BREAST EXAM: breasts appear normal, no suspicious masses, no skin or nipple changes or axillary nodes  ABDOMEN: no hernias, masses, or hepatosplenomegaly  GENITALIA: normal external genitalia, no erythema, no discharge  URETHRA: normal urethra, normal urethral meatus  VAGINA: mucosal atrophy  CERVIX:  absent  UTERUS: uterus absent  ADNEXA: no mass, fullness, tenderness    ASSESSMENT and PLAN    Well woman exam with routine gynecological exam    Atrophic vaginitis    Encounter for screening mammogram for breast cancer    Other orders  -     estradiol (ESTRACE) 0.01 % (0.1 mg/gram) vaginal cream; Place a pea-sized amount inside vagina every night for 2 weeks, and then 2-3 times a week.  Dispense: 42.5 g; Refill: 6      "

## 2017-09-07 ENCOUNTER — HOSPITAL ENCOUNTER (OUTPATIENT)
Dept: RADIOLOGY | Facility: HOSPITAL | Age: 47
Discharge: HOME | End: 2017-09-07
Attending: SURGERY
Payer: COMMERCIAL

## 2017-09-07 DIAGNOSIS — R92.8 ABNORMAL MAMMOGRAM OF RIGHT BREAST: ICD-10-CM

## 2017-09-07 PROCEDURE — 77061 BREAST TOMOSYNTHESIS UNI: CPT | Mod: TC

## 2017-09-07 PROCEDURE — 77061 BREAST TOMOSYNTHESIS UNI: CPT | Mod: 26,,, | Performed by: SURGERY

## 2017-09-07 PROCEDURE — 77065 DX MAMMO INCL CAD UNI: CPT | Mod: 26,,, | Performed by: SURGERY

## 2017-09-18 ENCOUNTER — PATIENT MESSAGE (OUTPATIENT)
Dept: SURGERY | Facility: CLINIC | Age: 47
End: 2017-09-18

## 2017-09-18 DIAGNOSIS — G47.00 WAKEFULNESS: ICD-10-CM

## 2017-09-18 DIAGNOSIS — T78.2XXS ANAPHYLAXIS, SEQUELA: ICD-10-CM

## 2017-09-18 RX ORDER — ARMODAFINIL 250 MG/1
250 TABLET ORAL 2 TIMES DAILY
Qty: 60 TABLET | Refills: 5 | OUTPATIENT
Start: 2017-09-18

## 2017-09-18 RX ORDER — ARMODAFINIL 250 MG/1
TABLET ORAL
Qty: 60 TABLET | Refills: 5 | Status: SHIPPED | OUTPATIENT
Start: 2017-09-18 | End: 2018-04-05 | Stop reason: SDUPTHER

## 2017-09-18 RX ORDER — EPINEPHRINE 0.3 MG/.3ML
1 INJECTION SUBCUTANEOUS ONCE
Qty: 2 EACH | Refills: 11 | Status: SHIPPED | OUTPATIENT
Start: 2017-09-18 | End: 2018-11-30 | Stop reason: SDUPTHER

## 2017-09-19 ENCOUNTER — PATIENT MESSAGE (OUTPATIENT)
Dept: SURGERY | Facility: CLINIC | Age: 47
End: 2017-09-19

## 2017-11-01 DIAGNOSIS — G47.00 WAKEFULNESS: ICD-10-CM

## 2017-11-01 RX ORDER — ARMODAFINIL 250 MG/1
TABLET ORAL
Qty: 60 TABLET | OUTPATIENT
Start: 2017-11-01

## 2017-11-27 ENCOUNTER — PATIENT MESSAGE (OUTPATIENT)
Dept: UROLOGY | Facility: CLINIC | Age: 47
End: 2017-11-27

## 2018-01-13 ENCOUNTER — HOSPITAL ENCOUNTER (OUTPATIENT)
Dept: RADIOLOGY | Facility: CLINIC | Age: 48
Discharge: HOME | End: 2018-01-13
Attending: OBSTETRICS & GYNECOLOGY
Payer: COMMERCIAL

## 2018-01-13 DIAGNOSIS — Z01.419 WELL WOMAN EXAM WITH ROUTINE GYNECOLOGICAL EXAM: ICD-10-CM

## 2018-01-13 DIAGNOSIS — Z12.31 ENCOUNTER FOR SCREENING MAMMOGRAM FOR BREAST CANCER: ICD-10-CM

## 2018-01-15 ENCOUNTER — OFFICE VISIT (OUTPATIENT)
Dept: PODIATRY | Facility: CLINIC | Age: 48
End: 2018-01-15
Payer: COMMERCIAL

## 2018-01-15 VITALS — BODY MASS INDEX: 26.44 KG/M2 | WEIGHT: 168.44 LBS | HEIGHT: 67 IN

## 2018-01-15 DIAGNOSIS — M79.671 FOOT PAIN, RIGHT: ICD-10-CM

## 2018-01-15 DIAGNOSIS — E11.49 TYPE II DIABETES MELLITUS WITH NEUROLOGICAL MANIFESTATIONS: Primary | ICD-10-CM

## 2018-01-15 DIAGNOSIS — M20.10 HALLUX ABDUCTO VALGUS, UNSPECIFIED LATERALITY: ICD-10-CM

## 2018-01-15 PROCEDURE — 99213 OFFICE O/P EST LOW 20 MIN: CPT | Mod: S$GLB,,, | Performed by: PODIATRIST

## 2018-01-15 PROCEDURE — 99999 PR PBB SHADOW E&M-EST. PATIENT-LVL III: CPT | Mod: PBBFAC,,, | Performed by: PODIATRIST

## 2018-01-15 RX ORDER — LIDOCAINE HYDROCHLORIDE 20 MG/ML
JELLY TOPICAL
Qty: 30 ML | Refills: 2 | Status: SHIPPED | OUTPATIENT
Start: 2018-01-15 | End: 2018-09-12

## 2018-01-15 NOTE — PROGRESS NOTES
Subjective:      Patient ID: Holly Chicas is a 47 y.o. female.    Chief Complaint: Bunions (f/u painful bunion)    Sharp deep pain right 1st mtpj indicated with index finger.  Improved with careful shoe selection, activity change, nsaid and rest.  Xray show mild bunion and great toe deformity witout acute injury.  xrays 5/3/17 negative acutei njury.      Review of Systems   Constitution: Negative for chills, diaphoresis, fever, malaise/fatigue and night sweats.   Cardiovascular: Negative for claudication, cyanosis, leg swelling and syncope.   Skin: Negative for color change, dry skin, nail changes, rash, suspicious lesions and unusual hair distribution.   Musculoskeletal: Positive for joint pain. Negative for falls, joint swelling, muscle cramps, muscle weakness and stiffness.   Gastrointestinal: Negative for constipation, diarrhea, nausea and vomiting.   Neurological: Negative for brief paralysis, disturbances in coordination, focal weakness, numbness, paresthesias, sensory change and tremors.           Objective:      Physical Exam   Constitutional: She appears well-developed and well-nourished. She is cooperative. No distress.   Cardiovascular:   Pulses:       Popliteal pulses are 2+ on the right side, and 2+ on the left side.        Dorsalis pedis pulses are 2+ on the right side, and 2+ on the left side.        Posterior tibial pulses are 2+ on the right side, and 2+ on the left side.   Capillary refill 3 seconds all toes/distal feet, all toes/both feet warm to touch.      Negative lymphadenopathy bilateral popliteal fossa and tarsal tunnel.      Negavie lower extremity edema bilateral.     Musculoskeletal:        Right ankle: Normal. She exhibits normal range of motion, no swelling, no ecchymosis, no deformity, no laceration and normal pulse. Achilles tendon normal. Achilles tendon exhibits no pain, no defect and normal Champion's test results.   Visible and palpable bunion with pain at dorsomedial 1st  metatarsal head right.  Hallux abducted right partially reducible, tracks laterally very minimally without being track bound.  No ecchymosis, erythema, edema, or cardinal signs infection or signs of trauma same foot.    Ankle dorsiflexion decreased at <10 degrees bilateral with moderate increase with knee flexion bilateral.       Lymphadenopathy:   Negative lymphadenopathy bilateral popliteal fossa and tarsal tunnel.   Neurological: She is alert. She has normal strength. She displays no atrophy and no tremor. A sensory deficit is present. She exhibits normal muscle tone. She displays no seizure activity. Gait normal.   Reflex Scores:       Patellar reflexes are 2+ on the right side and 2+ on the left side.       Achilles reflexes are 2+ on the right side and 2+ on the left side.  Negative tinel sign to percussion sural, superficial peroneal, deep peroneal, saphenous, and posterior tibial nerves right and left ankles and feet.    Negative allodynia both feet.    Decreased/absent vibratory sensation bilateral feet to 128Hz tuning fork.     Skin: Skin is warm, dry and intact. No abrasion, no bruising, no burn, no ecchymosis, no laceration, no lesion and no rash noted. She is not diaphoretic. No cyanosis or erythema. No pallor. Nails show no clubbing.     Skin is normal age and health appropriate color, turgor, texture, and temperature bilateral lower extremities without ulceration, hyperpigmentation, discoloration, masses nodules or cords palpated.  No ecchymosis, erythema, edema, or cardinal signs of infection bilateral lower extremities.               Assessment:       Encounter Diagnoses   Name Primary?    Type II diabetes mellitus with neurological manifestations Yes    Hallux abducto valgus, unspecified laterality     Foot pain, right          Plan:       Holly was seen today for bunions.    Diagnoses and all orders for this visit:    Type II diabetes mellitus with neurological manifestations  -     X-Ray Foot  Complete Right; Future  -     Ambulatory consult to Physical Therapy  -     ORTHOTIC DEVICE (DME)    Hallux abducto valgus, unspecified laterality  -     X-Ray Foot Complete Right; Future  -     Ambulatory consult to Physical Therapy  -     ORTHOTIC DEVICE (DME)    Foot pain, right  -     X-Ray Foot Complete Right; Future  -     Ambulatory consult to Physical Therapy  -     ORTHOTIC DEVICE (DME)    Other orders  -     lidocaine HCL 2% (XYLOCAINE) 2 % jelly; Apply topically as needed. Apply topically once nightly to affected area right foot.      I counseled the patient on her conditions, their implications and medical management.    The patient has received literature on basic diabetic foot care.  Patient will inspect feet daily, wear protective shoe gear when ambulatory, and apply moisturizer to skin as needed to maintain elasticity and help prevent ulceration.     Continue shoes and activity to tolerance.    Counseled on conservative treatments including shoe and activity change, physical therapy, antiinflammatory, and pain medication, and sometimes injections for symptomatic relief.    Counseled on surgical correction, distal metatarsal osteotomy for bunionectomy right - risks and benefits, including, but not limited to arthritis, recurrence, infection, pain, scar, poor cosmesis, loss of function, nerve pain, nerve damage, numbness, syndromes (RSD), need for future surgical procedures, and or long term use of orthotics, blood clots of leg, lung, heart, brain, death.    Consider carefully, appoint with pcp for surgical clearance, follow here prn as symptoms dictate for consent, post op Rx, and scheduling.            Follow-up in about 6 weeks (around 2/26/2018).

## 2018-01-18 DIAGNOSIS — E11.9 TYPE 2 DIABETES MELLITUS WITHOUT COMPLICATION: ICD-10-CM

## 2018-01-29 ENCOUNTER — HOSPITAL ENCOUNTER (OUTPATIENT)
Dept: RADIOLOGY | Facility: HOSPITAL | Age: 48
Discharge: HOME | End: 2018-01-29
Attending: PODIATRIST
Payer: COMMERCIAL

## 2018-01-29 ENCOUNTER — HOSPITAL ENCOUNTER (OUTPATIENT)
Dept: RADIOLOGY | Facility: HOSPITAL | Age: 48
Discharge: HOME | End: 2018-01-29
Attending: SURGERY
Payer: COMMERCIAL

## 2018-01-29 DIAGNOSIS — R92.8 ABNORMAL MAMMOGRAM OF RIGHT BREAST: ICD-10-CM

## 2018-01-29 DIAGNOSIS — R92.8 ABNORMAL MAMMOGRAM: ICD-10-CM

## 2018-01-29 PROCEDURE — 77065 DX MAMMO INCL CAD UNI: CPT | Mod: TC

## 2018-01-29 PROCEDURE — 77061 BREAST TOMOSYNTHESIS UNI: CPT | Mod: TC

## 2018-01-29 PROCEDURE — 76642 ULTRASOUND BREAST LIMITED: CPT | Mod: 26,RT,, | Performed by: RADIOLOGY

## 2018-01-29 PROCEDURE — 77061 BREAST TOMOSYNTHESIS UNI: CPT | Mod: 26,,, | Performed by: RADIOLOGY

## 2018-01-29 PROCEDURE — 77065 DX MAMMO INCL CAD UNI: CPT | Mod: 26,,, | Performed by: RADIOLOGY

## 2018-01-29 PROCEDURE — 76642 ULTRASOUND BREAST LIMITED: CPT | Mod: TC,RT

## 2018-02-27 ENCOUNTER — PATIENT MESSAGE (OUTPATIENT)
Dept: PODIATRY | Facility: CLINIC | Age: 48
End: 2018-02-27

## 2018-02-28 ENCOUNTER — DOCUMENTATION ONLY (OUTPATIENT)
Dept: FAMILY MEDICINE | Facility: CLINIC | Age: 48
End: 2018-02-28

## 2018-03-01 DIAGNOSIS — Z13.5 DIABETIC RETINOPATHY SCREENING: ICD-10-CM

## 2018-03-03 ENCOUNTER — HOSPITAL ENCOUNTER (OUTPATIENT)
Dept: RADIOLOGY | Facility: CLINIC | Age: 48
Discharge: HOME | End: 2018-03-03
Attending: PODIATRIST
Payer: COMMERCIAL

## 2018-03-03 ENCOUNTER — OFFICE VISIT (OUTPATIENT)
Dept: FAMILY MEDICINE | Facility: CLINIC | Age: 48
End: 2018-03-03
Attending: FAMILY MEDICINE
Payer: COMMERCIAL

## 2018-03-03 VITALS
RESPIRATION RATE: 12 BRPM | DIASTOLIC BLOOD PRESSURE: 80 MMHG | BODY MASS INDEX: 26.57 KG/M2 | HEART RATE: 56 BPM | TEMPERATURE: 98 F | HEIGHT: 67 IN | WEIGHT: 169.31 LBS | SYSTOLIC BLOOD PRESSURE: 126 MMHG | OXYGEN SATURATION: 96 %

## 2018-03-03 DIAGNOSIS — Z98.84 S/P GASTRIC BYPASS: ICD-10-CM

## 2018-03-03 DIAGNOSIS — R53.83 FATIGUE, UNSPECIFIED TYPE: ICD-10-CM

## 2018-03-03 DIAGNOSIS — E11.49 TYPE II DIABETES MELLITUS WITH NEUROLOGICAL MANIFESTATIONS: ICD-10-CM

## 2018-03-03 DIAGNOSIS — F32.A DEPRESSION, UNSPECIFIED DEPRESSION TYPE: ICD-10-CM

## 2018-03-03 DIAGNOSIS — G60.9 IDIOPATHIC PERIPHERAL NEUROPATHY: Primary | ICD-10-CM

## 2018-03-03 DIAGNOSIS — M79.671 FOOT PAIN, RIGHT: ICD-10-CM

## 2018-03-03 DIAGNOSIS — E11.40 TYPE 2 DIABETES, CONTROLLED, WITH NEUROPATHY: ICD-10-CM

## 2018-03-03 DIAGNOSIS — R14.0 ABDOMINAL DISTENSION: ICD-10-CM

## 2018-03-03 DIAGNOSIS — G47.429 NARCOLEPSY DUE TO UNDERLYING CONDITION WITHOUT CATAPLEXY: ICD-10-CM

## 2018-03-03 DIAGNOSIS — M20.10 HALLUX ABDUCTO VALGUS, UNSPECIFIED LATERALITY: ICD-10-CM

## 2018-03-03 DIAGNOSIS — K90.9 INTESTINAL MALABSORPTION, UNSPECIFIED TYPE: ICD-10-CM

## 2018-03-03 PROCEDURE — 99215 OFFICE O/P EST HI 40 MIN: CPT | Mod: S$GLB,,, | Performed by: FAMILY MEDICINE

## 2018-03-03 PROCEDURE — 73630 X-RAY EXAM OF FOOT: CPT | Mod: TC,FY,PO,RT

## 2018-03-03 PROCEDURE — 99999 PR PBB SHADOW E&M-EST. PATIENT-LVL III: CPT | Mod: PBBFAC,,, | Performed by: FAMILY MEDICINE

## 2018-03-03 PROCEDURE — 73630 X-RAY EXAM OF FOOT: CPT | Mod: 26,RT,S$GLB, | Performed by: RADIOLOGY

## 2018-03-03 RX ORDER — DULOXETIN HYDROCHLORIDE 30 MG/1
30 CAPSULE, DELAYED RELEASE ORAL DAILY
Qty: 30 CAPSULE | Refills: 11 | Status: SHIPPED | OUTPATIENT
Start: 2018-03-03 | End: 2018-09-12

## 2018-03-03 NOTE — PROGRESS NOTES
"Subjective:       Patient ID: Holly Chicas is a 47 y.o. female.    Chief Complaint: Abdominal Pain (gas) and Fatigue    47-year-old female with a history of diabetes with neuropathy that was corrected by gastric bypass surgery, [id number], comes in with a number of problems.  Her feet feel cold and hot she has painful neuropathy which had improved after her weight loss.  She's complaining of chronic fatigue.  She's complaining of abdominal distention with large amounts of flatus.  She's had some diarrhea worse when she has milk products which is very rare but has the distention and diarrhea at all times.  She has not had any extensive workup since she had her gastric bypass surgery.  In addition to the diabetes she has a history of hypertension, hyperlipidemia, anxiety and depression, arthritis, meningitis, narcolepsy, has been treated for TB and has had psoriasis.    Past Medical History:  : Abnormal Pap smear      Comment: "pre-cancer cells post hysterectomy"  No date: Anxiety  No date: Arthritis  No date: Depression  No date: Hyperlipidemia  No date: Hypertension  No date: Hypertriglyceridemia  No date: Meningitis  No date: Narcolepsy  No date: ALESIA (obstructive sleep apnea)  2014: Peripheral neuropathy  No date: Psoriasis  No date: Tuberculosis    Past Surgical History:  2016: BREAST SURGERY      Comment: aumentation/ reduction   2017: BREAST SURGERY      Comment: augmentation   2017: brizilian butt lift   No date:  SECTION  No date: FOOT SURGERY  2015: GASTRIC BYPASS  2015: GASTRIC BYPASS  No date: HERNIA REPAIR  No date: HYSTERECTOMY  No date: KNEE SURGERY Left  : SHOULDER SURGERY      Comment: right should surgery   2017: SHOULDER SURGERY      Comment: left shoulder / torn labrium   2017: THIGH LIFT  No date: TONSILLECTOMY  2016: tummy tuck       Current Outpatient Prescriptions:     armodafinil (NUVIGIL) 250 mg tablet, TAKE 1 TABLET (250 MG TOTAL) BY MOUTH " 2 (TWO) TIMES DAILY., Disp: 60 tablet, Rfl: 5    DUEXIS 800-26.6 mg Tab, Take 1 tablet by mouth 2 (two) times daily. , Disp: , Rfl:     lidocaine HCL 2% (XYLOCAINE) 2 % jelly, Apply topically as needed. Apply topically once nightly to affected area right foot., Disp: 30 mL, Rfl: 2    multivitamin (ONE DAILY MULTIVITAMIN) per tablet, Take 1 tablet by mouth daily as needed. , Disp: , Rfl:     STELARA 45 mg/0.5 mL Syrg syringe, Inject 45 mg into the muscle every 3 (three) months. , Disp: , Rfl:       Eye Exam due on 10/08/1978  TETANUS VACCINE due on 10/08/1986  Low Dose Statin due on 10/08/1989  Urine Microalbumin due on 02/28/2015  Influenza Vaccine due on 08/01/2017  Hemoglobin A1c due on 09/29/2017        Review of Systems   Constitutional: Positive for fatigue. Negative for activity change and unexpected weight change.   HENT: Negative for hearing loss, rhinorrhea and trouble swallowing.    Eyes: Positive for visual disturbance. Negative for discharge.   Respiratory: Negative for chest tightness and wheezing.    Cardiovascular: Negative for chest pain and palpitations.   Gastrointestinal: Positive for diarrhea. Negative for blood in stool, constipation and vomiting.   Endocrine: Negative for polydipsia and polyuria.   Genitourinary: Negative for difficulty urinating, dysuria, hematuria and menstrual problem.   Musculoskeletal: Positive for arthralgias. Negative for joint swelling and neck pain.   Neurological: Positive for weakness and numbness (Painful neuropathy). Negative for headaches.   Psychiatric/Behavioral: Positive for dysphoric mood. Negative for confusion.       Objective:      Physical Exam   Constitutional: She is oriented to person, place, and time. She appears well-developed and well-nourished. No distress.   Good blood pressure control  Mildly overweight with BMI 26.5 she is up 1.8 pounds since her last visit March 9, 2017   HENT:   Head: Normocephalic and atraumatic.   Right Ear: External ear  normal.   Left Ear: External ear normal.   Nose: Nose normal.   Mouth/Throat: Oropharynx is clear and moist. No oropharyngeal exudate.   Eyes: Conjunctivae are normal. Pupils are equal, round, and reactive to light. Right eye exhibits no discharge. Left eye exhibits no discharge. No scleral icterus.   Neck: Normal range of motion. Neck supple. No JVD present. No tracheal deviation present. No thyromegaly present.   Cardiovascular: Normal rate, regular rhythm and normal heart sounds.  Exam reveals no gallop and no friction rub.    No murmur heard.  Pulses:       Dorsalis pedis pulses are 2+ on the right side, and 2+ on the left side.        Posterior tibial pulses are 2+ on the right side, and 2+ on the left side.   Pulmonary/Chest: Effort normal and breath sounds normal. No respiratory distress. She has no wheezes. She has no rales. She exhibits no tenderness.   Abdominal: Soft. Bowel sounds are normal. She exhibits no distension and no mass. There is no tenderness. There is no rebound and no guarding.   Musculoskeletal: Normal range of motion. She exhibits no edema or tenderness.        Right foot: There is normal range of motion and no deformity.        Left foot: There is normal range of motion and no deformity.   Feet:   Right Foot:   Protective Sensation: 8 sites tested. 8 sites sensed.   Skin Integrity: Negative for ulcer, blister, skin breakdown, erythema, warmth, callus or dry skin.   Left Foot:   Protective Sensation: 8 sites tested. 8 sites sensed.   Skin Integrity: Negative for ulcer, blister, skin breakdown, erythema, warmth, callus or dry skin.   Lymphadenopathy:     She has no cervical adenopathy.   Neurological: She is alert and oriented to person, place, and time. She has normal reflexes. She displays normal reflexes. No cranial nerve deficit or sensory deficit. She exhibits normal muscle tone.   Skin: Skin is warm and dry. No rash noted. She is not diaphoretic. No erythema.   Psychiatric: Her speech  is normal and behavior is normal. Judgment and thought content normal. Her mood appears not anxious. Cognition and memory are normal. She exhibits a depressed mood. She is attentive.   Nursing note and vitals reviewed.      Assessment:       1. Idiopathic peripheral neuropathy    2. Abdominal distension    3. Intestinal malabsorption, unspecified type    4. Fatigue, unspecified type    5. S/P gastric bypass    6. Depression, unspecified depression type    7. 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 Type 2 diabetes, controlled, with neuropathy    9. BMI 26.0-26.9,adult        Plan:       1. Idiopathic peripheral neuropathy  - DULoxetine (CYMBALTA) 30 MG capsule; Take 1 capsule (30 mg total) by mouth once daily.  Dispense: 30 capsule; Refill: 11    2. Abdominal distension  - Amylase; Future    3. Intestinal malabsorption, unspecified type  - Comprehensive metabolic panel; Future  - CBC auto differential; Future  - Iron and TIBC; Future  - Vitamin B12; Future  - Folate; Future  - VITAMIN B1; Future  - Ferritin; Future  - Amylase; Future  - Vitamin D; Future  - lipase-protease-amylase 24,000-76,000-120,000 units (PANLIPASE) 24,000-76,000 -120,000 unit capsule; Take 1 capsule by mouth 3 (three) times daily with meals.  Dispense: 90 capsule; Refill: 11    4. Fatigue, unspecified type  - TSH; Future  - Lipid panel; Future    5. S/P gastric bypass    6. Depression, unspecified depression type  - DULoxetine (CYMBALTA) 30 MG capsule; Take 1 capsule (30 mg total) by mouth once daily.  Dispense: 30 capsule; Refill: 11    7. 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 Type 2 diabetes, controlled, with neuropathy  - Comprehensive metabolic panel; Future  - Lipid panel; Future  - Hemoglobin A1c; Future  - Microalbumin/creatinine urine ratio; Future    9. BMI 26.0-26.9,adult

## 2018-03-05 ENCOUNTER — PATIENT MESSAGE (OUTPATIENT)
Dept: FAMILY MEDICINE | Facility: CLINIC | Age: 48
End: 2018-03-05

## 2018-03-05 DIAGNOSIS — K80.13 CALCULUS OF GALLBLADDER WITH ACUTE ON CHRONIC CHOLECYSTITIS WITH OBSTRUCTION: Primary | ICD-10-CM

## 2018-03-06 ENCOUNTER — PATIENT MESSAGE (OUTPATIENT)
Dept: FAMILY MEDICINE | Facility: CLINIC | Age: 48
End: 2018-03-06

## 2018-03-06 NOTE — TELEPHONE ENCOUNTER
Yes, stay on duloxetine and pancreatic supplement.  Order in for ultrasound of liver/gallbladder area

## 2018-03-07 ENCOUNTER — HOSPITAL ENCOUNTER (OUTPATIENT)
Dept: RADIOLOGY | Facility: HOSPITAL | Age: 48
Discharge: HOME | End: 2018-03-07
Attending: FAMILY MEDICINE
Payer: COMMERCIAL

## 2018-03-07 DIAGNOSIS — K80.13 CALCULUS OF GALLBLADDER WITH ACUTE ON CHRONIC CHOLECYSTITIS WITH OBSTRUCTION: ICD-10-CM

## 2018-03-07 PROCEDURE — 76705 ECHO EXAM OF ABDOMEN: CPT | Mod: TC

## 2018-03-07 PROCEDURE — 76705 ECHO EXAM OF ABDOMEN: CPT | Mod: 26,,, | Performed by: RADIOLOGY

## 2018-03-09 ENCOUNTER — PATIENT MESSAGE (OUTPATIENT)
Dept: FAMILY MEDICINE | Facility: CLINIC | Age: 48
End: 2018-03-09

## 2018-04-02 ENCOUNTER — OFFICE VISIT (OUTPATIENT)
Dept: FAMILY MEDICINE | Facility: CLINIC | Age: 48
End: 2018-04-02
Payer: COMMERCIAL

## 2018-04-02 VITALS
DIASTOLIC BLOOD PRESSURE: 80 MMHG | HEIGHT: 67 IN | BODY MASS INDEX: 27.31 KG/M2 | TEMPERATURE: 98 F | HEART RATE: 55 BPM | SYSTOLIC BLOOD PRESSURE: 122 MMHG | WEIGHT: 174 LBS

## 2018-04-02 DIAGNOSIS — Z23 NEED FOR VACCINE FOR DT (DIPHTHERIA-TETANUS): ICD-10-CM

## 2018-04-02 DIAGNOSIS — S71.051A DOG BITE OF RIGHT HIP, INITIAL ENCOUNTER: Primary | ICD-10-CM
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
## 2018-04-02 DIAGNOSIS — R14.3 FLATULENCE: ICD-10-CM

## 2018-04-02 DIAGNOSIS — Z98.84 HISTORY OF BARIATRIC SURGERY: ICD-10-CM

## 2018-04-02 PROCEDURE — 99213 OFFICE O/P EST LOW 20 MIN: CPT | Mod: 25,S$GLB,, | Performed by: NURSE PRACTITIONER

## 2018-04-02 PROCEDURE — 90714 TD VACC NO PRESV 7 YRS+ IM: CPT | Mod: AT,S$GLB,, | Performed by: FAMILY MEDICINE

## 2018-04-02 PROCEDURE — 90471 IMMUNIZATION ADMIN: CPT | Mod: AT,S$GLB,, | Performed by: FAMILY MEDICINE

## 2018-04-02 PROCEDURE — 99999 PR PBB SHADOW E&M-EST. PATIENT-LVL IV: CPT | Mod: PBBFAC,,, | Performed by: NURSE PRACTITIONER

## 2018-04-02 RX ORDER — AMOXICILLIN AND CLAVULANATE POTASSIUM 875; 125 MG/1; MG/1
1 TABLET, FILM COATED ORAL 2 TIMES DAILY WITH MEALS
Qty: 14 TABLET | Refills: 0 | Status: SHIPPED | OUTPATIENT
Start: 2018-04-02 | End: 2018-05-10 | Stop reason: ALTCHOICE

## 2018-04-02 RX ORDER — SIMETHICONE 125 MG
125 TABLET,CHEWABLE ORAL EVERY 6 HOURS PRN
Refills: 0 | COMMUNITY
Start: 2018-04-02 | End: 2018-09-12

## 2018-04-02 NOTE — PATIENT INSTRUCTIONS
Dog Bite  A dog bite can cause a wound deep enough to break the skin. In such cases, the wound is cleaned and sometimes closed. If the wound is closed, it is usually not completely closed. This is so that fluid can drain if the wound becomes infected. Often, wounds will be left open to heal. In addition to wound care, a tetanus shot may be given, if needed.    Home care  · Wash your hands well with soap and warm water before and after caring for the wound. This helps lower the risk of infection.  · Care for the wound as directed. If a dressing was applied to the wound, be sure to change it as directed.  · If the wound bleeds, place a clean, soft cloth on the wound. Then firmly apply pressure until the bleeding stops. This may take up to 5 minutes. Do not release the pressure and look at the wound during this time.  · Most wounds heal within 10 days. But an infection can occur even with proper treatment. So be sure to check the wound daily for signs of infection (see below).  · Antibiotics may be prescribed. These help prevent or treat infection. If youre given antibiotics, take them as directed. Also be sure to complete the medicines.  Rabies prevention  Rabies is a virus that can be carried in certain animals. These can include domestic animals such as dogs and cats. Pets fully vaccinated against rabies (2 shots) are at very low risk of infection. But because human rabies is almost always fatal, any biting pet should be confined for 10 days as an extra precaution. In general, if there is a risk for rabies, the following steps may need to be taken:  · If someones pet dog has bitten you, it should be kept in a secure area for the next 10 days to watch for signs of illness. (If the pet owner wont allow this, contact your local animal control center.) If the dog becomes ill or dies during that time, contact your local animal control center at once so the animal may be tested for rabies. If the dog stays healthy  for the next 10 days, there is no danger of rabies in the animal or you.  ¨ If a stray dog bit you, contact your local animal control center. They can give information on capture, quarantine, and animal rabies testing.  ¨ If you cant find the animal that bit you in the next 2 days, and if rabies exists in your area, you may need to receive the rabies vaccine series. Call your healthcare provider right away. Or, return to the emergency department promptly.  ¨ All animal bites should be reported to the local animal control center. If you were not given a form to fill out, you can report this yourself.  Follow-up care  Follow up with your healthcare provider, or as directed.  When to seek medical advice  Call your healthcare provider right away if any of these occur:  · Signs of infection:  ¨ Spreading redness or warmth from the wound  ¨ Increased pain or swelling  ¨ Fever of 100.4ºF (38ºC) or higher, or as directed by your healthcare provider  ¨ Colored fluid or pus draining from the wound  · Signs of rabies infection:  ¨ Headache  ¨ Confusion  ¨ Strange behavior  ¨ Increased salivating and drooling  ¨ Seizure  · Decreased ability to move any body part near the wound  · Bleeding that can't be stopped after 5 minutes of firm pressure  Date Last Reviewed: 3/1/2017  © 7109-9761 [employer]. [address] [place] [address], [place] [address]. All rights reserved. This information is not intended as a substitute for professional medical care. Always follow your healthcare professional's instructions.

## 2018-04-02 NOTE — PROGRESS NOTES
"Subjective:       Patient ID: Holly Chicas is a 47 y.o. female.    Chief Complaint: Animal Bite    HPI   Chief Complaint  Chief Complaint   Patient presents with    Animal Bite       HPI  Holly Chicas is a 47 y.o. female with medical diagnoses as listed in the medical history and problem list that presents with complaints of dog bite.  States was running in neighborhood when a 61 pound dog ran up and bit her on the right hip.  Spoke with owner of the dog and was able to see proof of immunization for the dog.  Patient has some pain and bruising to right lateral hip and 2 areas where teeth punctured skin, no local cellulitis noted, no drainage from puncture wounds. Patient cleaned area with hydrogen peroxide and applied neosporin ointment.  Established patient with last clinic appointment 3/3/2018.        PAST MEDICAL HISTORY:  Past Medical History:   Diagnosis Date    Abnormal Pap smear     "pre-cancer cells post hysterectomy"    Anxiety     Arthritis     Depression     Hyperlipidemia     Hypertension     Hypertriglyceridemia     Meningitis     Narcolepsy     ALESIA (obstructive sleep apnea)     Peripheral neuropathy 2014    Psoriasis     Tuberculosis        PAST SURGICAL HISTORY:  Past Surgical History:   Procedure Laterality Date    BREAST SURGERY      aumentation/ reduction     BREAST SURGERY  2017    augmentation     brizilian butt lift   2017     SECTION      FOOT SURGERY      GASTRIC BYPASS  2015    GASTRIC BYPASS  2015    HERNIA REPAIR      HYSTERECTOMY      KNEE SURGERY Left     SHOULDER SURGERY      right should surgery     SHOULDER SURGERY  2017    left shoulder / torn labrium     THIGH LIFT  2017    TONSILLECTOMY      tummy tuck   2016       SOCIAL HISTORY:  Social History     Social History    Marital status:      Spouse name: N/A    Number of children: N/A    Years of education: N/A     Occupational History    " Not on file.     Social History Main Topics    Smoking status: Never Smoker    Smokeless tobacco: Never Used    Alcohol use No      Comment: ALLERGIC TO ALCOHOL(DRINKING) PER PATIENT    Drug use: No    Sexual activity: Yes     Partners: Male     Birth control/ protection: Surgical      Comment: SO     Other Topics Concern    Not on file     Social History Narrative    No narrative on file       FAMILY HISTORY:  Family History   Problem Relation Age of Onset    Cancer Mother      breast bilateral/ skin     Breast cancer Mother     Skin cancer Mother     Diabetes Father     Heart disease Father 65     CABG    Hypertension Father     Cancer Father      skin    Skin cancer Father     Gout Father     Gout Brother     No Known Problems Daughter     No Known Problems Son     Cancer Maternal Aunt      lung, pancrease - smoker , breast    Lung cancer Maternal Aunt      x2    Pancreatic cancer Maternal Aunt     Breast cancer Maternal Aunt     No Known Problems Maternal Uncle     No Known Problems Paternal Uncle     Cancer Maternal Grandmother      pancrease    Pancreatic cancer Maternal Grandmother     Breast cancer Maternal Grandmother     No Known Problems Maternal Grandfather     Diabetes Paternal Grandmother     Heart disease Paternal Grandfather     Colon cancer Neg Hx     Ovarian cancer Neg Hx        ALLERGIES AND MEDICATIONS: updated and reviewed.  Review of patient's allergies indicates:   Allergen Reactions    Alcohol      Other reaction(s): Unknown     Current Outpatient Prescriptions   Medication Sig Dispense Refill    armodafinil (NUVIGIL) 250 mg tablet TAKE 1 TABLET (250 MG TOTAL) BY MOUTH 2 (TWO) TIMES DAILY. 60 tablet 5    DUEXIS 800-26.6 mg Tab Take 1 tablet by mouth 2 (two) times daily.       DULoxetine (CYMBALTA) 30 MG capsule Take 1 capsule (30 mg total) by mouth once daily. 30 capsule 11    epinephrine (EPIPEN) 0.3 mg/0.3 mL AtIn Inject 0.3 mLs (0.3 mg total) into the  muscle once. As directed PRN 2 each 11    lidocaine HCL 2% (XYLOCAINE) 2 % jelly Apply topically as needed. Apply topically once nightly to affected area right foot. 30 mL 2    lipase-protease-amylase 24,000-76,000-120,000 units (PANLIPASE) 24,000-76,000 -120,000 unit capsule Take 1 capsule by mouth 3 (three) times daily with meals. 90 capsule 11    multivitamin (ONE DAILY MULTIVITAMIN) per tablet Take 1 tablet by mouth daily as needed.       STELARA 45 mg/0.5 mL Syrg syringe Inject 45 mg into the muscle every 3 (three) months.       amoxicillin-clavulanate 875-125mg (AUGMENTIN) 875-125 mg per tablet Take 1 tablet by mouth 2 (two) times daily with meals. 14 tablet 0    simethicone (MYLICON) 125 MG chewable tablet Take 1 tablet (125 mg total) by mouth every 6 (six) hours as needed for Flatulence.  0     No current facility-administered medications for this visit.      Review of Systems   Constitutional: Positive for activity change and unexpected weight change.        Patient has been exercising more, running 2 miles per day, has gained 5 pounds in a month   HENT: Negative for hearing loss, rhinorrhea and trouble swallowing.    Eyes: Negative for discharge and visual disturbance.   Respiratory: Negative for chest tightness and wheezing.    Cardiovascular: Negative for chest pain and palpitations.   Gastrointestinal: Positive for abdominal pain. Negative for blood in stool, constipation, diarrhea and vomiting.        Stools have been yuriy colored, recent US of gallbladder with sludge, dilated common bile duct, occasional abdominal pain, frequent flatulence   Endocrine: Negative for polydipsia and polyuria.   Genitourinary: Negative for difficulty urinating, dysuria, hematuria and menstrual problem.   Musculoskeletal: Negative for arthralgias, joint swelling and neck pain.   Skin: Positive for wound.        Dog bite to right hip   Neurological: Negative for weakness and headaches.   Psychiatric/Behavioral:  "Negative for confusion and dysphoric mood.       Objective:       Vitals:    04/02/18 1653   BP: 122/80   BP Location: Right arm   Patient Position: Sitting   BP Method: Large (Automatic)   Pulse: (!) 55   Temp: 98 °F (36.7 °C)   TempSrc: Oral   Weight: 78.9 kg (174 lb)   Height: 5' 7" (1.702 m)     Physical Exam   Constitutional: She is oriented to person, place, and time. Vital signs are normal. She appears well-developed and well-nourished. No distress.   HENT:   Head: Normocephalic and atraumatic.   Eyes: Conjunctivae and lids are normal. Right conjunctiva is not injected. Left conjunctiva is not injected.   Neck: Normal carotid pulses present. Carotid bruit is not present.   Cardiovascular: Normal rate, regular rhythm, S1 normal, S2 normal, normal heart sounds, intact distal pulses and normal pulses.    No murmur heard.  Pulses:       Carotid pulses are 2+ on the right side, and 2+ on the left side.  Pulmonary/Chest: Effort normal and breath sounds normal. No respiratory distress. She has no decreased breath sounds. She has no wheezes. She has no rhonchi. She has no rales.   Musculoskeletal: Normal range of motion.   Neurological: She is alert and oriented to person, place, and time. She has normal strength.   Skin: Skin is warm and dry. Lesion noted. She is not diaphoretic. No pallor.        Psychiatric: She has a normal mood and affect. Her speech is normal and behavior is normal. Thought content normal. Cognition and memory are normal.   Nursing note and vitals reviewed.      Assessment:       1. Dog bite of right hip, initial encounter    2. Need for vaccine for DT (diphtheria-tetanus)    3. History of bariatric surgery    4. Flatulence    5. BMI 26.0-26.9,adult        Plan:   Holly was seen today for animal bite.    Diagnoses and all orders for this visit:    Dog bite of right hip, initial encounter  -     amoxicillin-clavulanate 875-125mg (AUGMENTIN) 875-125 mg per tablet; Take 1 tablet by mouth 2 (two) " times daily with meals.  - Educational handouts provided. Dog bite  - Instructed to monitor bite for signs and symptoms of infection and report immediately: redness, warmth, swelling, drainage, fever >100.4, chills    Need for vaccine for DT (diphtheria-tetanus)  -     (In Office Administered) Td Vaccine - Preservative Free    History of bariatric surgery   Patient encouraged to take vitamins as recommended and to schedule appointment with bariatric surgeon to discuss recent US of abdomen results   Flatulence   simethicone (MYLICON) 125 MG chewable tablet; Take 1 tablet (125 mg total) by mouth every 6 (six) hours as needed for Flatulence.  BMI 26.0-26.9,adult   Slight overweight, exercising and eating healthy diet post bariatric surgery.    Follow-up if symptoms worsen or fail to improve.

## 2018-04-05 DIAGNOSIS — G47.00 WAKEFULNESS: ICD-10-CM

## 2018-04-06 RX ORDER — ARMODAFINIL 250 MG/1
TABLET ORAL
Qty: 60 TABLET | Refills: 5 | Status: SHIPPED | OUTPATIENT
Start: 2018-04-06 | End: 2018-09-19

## 2018-04-06 NOTE — TELEPHONE ENCOUNTER
The  (Prescription Monitoring Program) website was checked with no inappropriate activity found.

## 2018-04-10 ENCOUNTER — TELEPHONE (OUTPATIENT)
Dept: FAMILY MEDICINE | Facility: CLINIC | Age: 48
End: 2018-04-10

## 2018-04-13 ENCOUNTER — PATIENT MESSAGE (OUTPATIENT)
Dept: FAMILY MEDICINE | Facility: CLINIC | Age: 48
End: 2018-04-13

## 2018-04-16 ENCOUNTER — PATIENT MESSAGE (OUTPATIENT)
Dept: FAMILY MEDICINE | Facility: CLINIC | Age: 48
End: 2018-04-16

## 2018-04-16 DIAGNOSIS — B37.31 VAGINAL CANDIDA: Primary | ICD-10-CM

## 2018-04-16 RX ORDER — FLUCONAZOLE 150 MG/1
150 TABLET ORAL DAILY
Qty: 1 TABLET | Refills: 0 | Status: SHIPPED | OUTPATIENT
Start: 2018-04-16 | End: 2018-04-17

## 2018-04-16 NOTE — TELEPHONE ENCOUNTER
If the cymbalta is helping at all we can increase the dose to 60mg.  I'd suggest a gi follow up for the cramps since she has had bariatric surgery we could be dealing with obstruction or adhesions

## 2018-05-10 ENCOUNTER — OFFICE VISIT (OUTPATIENT)
Dept: FAMILY MEDICINE | Facility: CLINIC | Age: 48
End: 2018-05-10
Payer: COMMERCIAL

## 2018-05-10 VITALS
SYSTOLIC BLOOD PRESSURE: 139 MMHG | BODY MASS INDEX: 26.21 KG/M2 | WEIGHT: 167 LBS | TEMPERATURE: 98 F | HEART RATE: 71 BPM | DIASTOLIC BLOOD PRESSURE: 85 MMHG | HEIGHT: 67 IN

## 2018-05-10 DIAGNOSIS — E11.9 TYPE 2 DIABETES MELLITUS WITHOUT COMPLICATION, WITHOUT LONG-TERM CURRENT USE OF INSULIN: ICD-10-CM

## 2018-05-10 DIAGNOSIS — H65.92 LEFT NON-SUPPURATIVE OTITIS MEDIA: Primary | ICD-10-CM

## 2018-05-10 DIAGNOSIS — R59.1 LYMPHADENOPATHY: ICD-10-CM

## 2018-05-10 DIAGNOSIS — J02.9 ACUTE PHARYNGITIS, UNSPECIFIED ETIOLOGY: ICD-10-CM

## 2018-05-10 DIAGNOSIS — R05.9 COUGH: ICD-10-CM

## 2018-05-10 DIAGNOSIS — Z98.84 STATUS POST GASTRIC BYPASS FOR OBESITY: ICD-10-CM

## 2018-05-10 DIAGNOSIS — L40.9 PSORIASIS: ICD-10-CM

## 2018-05-10 PROCEDURE — 99214 OFFICE O/P EST MOD 30 MIN: CPT | Mod: S$GLB,,, | Performed by: NURSE PRACTITIONER

## 2018-05-10 PROCEDURE — 99999 PR PBB SHADOW E&M-EST. PATIENT-LVL IV: CPT | Mod: PBBFAC,,, | Performed by: NURSE PRACTITIONER

## 2018-05-10 RX ORDER — AZITHROMYCIN 250 MG/1
TABLET, FILM COATED ORAL
Qty: 6 TABLET | Refills: 0 | Status: SHIPPED | OUTPATIENT
Start: 2018-05-10 | End: 2018-09-12

## 2018-05-10 NOTE — PROGRESS NOTES
"Subjective:       Patient ID: Holly Chicas is a 47 y.o. female.    Chief Complaint: Sinus Problem and Cough    HPI   Chief Complaint  Chief Complaint   Patient presents with    Sinus Problem    Cough       HPI  Holly Chicas is a 47 y.o. female with medical diagnoses as listed in the medical history and problem list that presents with c/o sinus problem, cough, and sore throat.  States started with a sore throat last Friday and then other symptoms developed and worsened.  Has had fever to 102, some chills.  C/O dry cough, sneezing, some nasal congestion, runny nose. States feels very fatigued and drained.  Has tried some throat lozenges and took duexis for fever. No aggravating factors.  Symptoms are constant.  BMI today is 26.16 and patient has lost 7 pounds since last visit. Established patient with last clinic appointment 2018.        PAST MEDICAL HISTORY:  Past Medical History:   Diagnosis Date    Abnormal Pap smear     "pre-cancer cells post hysterectomy"    Anxiety     Arthritis     Depression     Hyperlipidemia     Hypertension     Hypertriglyceridemia     Meningitis     Narcolepsy     ALESIA (obstructive sleep apnea)     Peripheral neuropathy 2014    Psoriasis     Psoriasis 5/10/2018    Tuberculosis        PAST SURGICAL HISTORY:  Past Surgical History:   Procedure Laterality Date    BREAST SURGERY      aumentation/ reduction     BREAST SURGERY  2017    augmentation     brizilian butt lift   2017     SECTION      FOOT SURGERY      GASTRIC BYPASS  2015    GASTRIC BYPASS      HERNIA REPAIR      HYSTERECTOMY      KNEE SURGERY Left     SHOULDER SURGERY      right should surgery     SHOULDER SURGERY  2017    left shoulder / torn labrium     THIGH LIFT  2017    TONSILLECTOMY      tummy tuck   2016       SOCIAL HISTORY:  Social History     Social History    Marital status:      Spouse name: N/A    Number of " children: N/A    Years of education: N/A     Occupational History    Not on file.     Social History Main Topics    Smoking status: Never Smoker    Smokeless tobacco: Never Used    Alcohol use No      Comment: ALLERGIC TO ALCOHOL(DRINKING) PER PATIENT    Drug use: No    Sexual activity: Yes     Partners: Male     Birth control/ protection: Surgical      Comment: SO     Other Topics Concern    Not on file     Social History Narrative    No narrative on file       FAMILY HISTORY:  Family History   Problem Relation Age of Onset    Cancer Mother         breast bilateral/ skin     Breast cancer Mother     Skin cancer Mother     Diabetes Father     Heart disease Father 65        CABG    Hypertension Father     Cancer Father         skin    Skin cancer Father     Gout Father     Gout Brother     No Known Problems Daughter     No Known Problems Son     Cancer Maternal Aunt         lung, pancrease - smoker , breast    Lung cancer Maternal Aunt         x2    Pancreatic cancer Maternal Aunt     Breast cancer Maternal Aunt     No Known Problems Maternal Uncle     No Known Problems Paternal Uncle     Cancer Maternal Grandmother         pancrease    Pancreatic cancer Maternal Grandmother     Breast cancer Maternal Grandmother     No Known Problems Maternal Grandfather     Diabetes Paternal Grandmother     Heart disease Paternal Grandfather     Colon cancer Neg Hx     Ovarian cancer Neg Hx        ALLERGIES AND MEDICATIONS: updated and reviewed.  Review of patient's allergies indicates:   Allergen Reactions    Alcohol      Other reaction(s): Unknown     Current Outpatient Prescriptions   Medication Sig Dispense Refill    armodafinil (NUVIGIL) 250 mg tablet TAKE 1 TABLET (250 MG TOTAL) BY MOUTH 2 (TWO) TIMES DAILY. 60 tablet 5    DUEXIS 800-26.6 mg Tab Take 1 tablet by mouth 2 (two) times daily.       DULoxetine (CYMBALTA) 30 MG capsule Take 1 capsule (30 mg total) by mouth once daily. 30  capsule 11    epinephrine (EPIPEN) 0.3 mg/0.3 mL AtIn Inject 0.3 mLs (0.3 mg total) into the muscle once. As directed PRN 2 each 11    lidocaine HCL 2% (XYLOCAINE) 2 % jelly Apply topically as needed. Apply topically once nightly to affected area right foot. 30 mL 2    lipase-protease-amylase 24,000-76,000-120,000 units (PANLIPASE) 24,000-76,000 -120,000 unit capsule Take 1 capsule by mouth 3 (three) times daily with meals. 90 capsule 11    multivitamin (ONE DAILY MULTIVITAMIN) per tablet Take 1 tablet by mouth daily as needed.       simethicone (MYLICON) 125 MG chewable tablet Take 1 tablet (125 mg total) by mouth every 6 (six) hours as needed for Flatulence.  0    STELARA 45 mg/0.5 mL Syrg syringe Inject 45 mg into the muscle every 3 (three) months.       azithromycin (Z-ANGI) 250 MG tablet Take 2 tablets by mouth on day 1; Take 1 tablet by mouth on days 2-5 6 tablet 0     No current facility-administered medications for this visit.    I have reviewed the patient's medical history in detail and updated the computerized patient record.  Review of Systems   Constitutional: Positive for appetite change, chills, fatigue and fever.        Decreased appetite, chills, fever, fatigue   HENT: Positive for congestion, ear pain, sneezing, sore throat and trouble swallowing. Negative for drooling, ear discharge, rhinorrhea, sinus pain and sinus pressure.         Some nasal congestion, bilateral ear aches, sneezing, very sore throat, pain to throat, trouble swallowing due to pain   Eyes: Negative for visual disturbance.   Respiratory: Positive for cough and shortness of breath. Negative for stridor.         Dry cough, occasional feeling of air hunger   Cardiovascular: Negative for chest pain and palpitations.   Gastrointestinal: Positive for abdominal pain. Negative for diarrhea and vomiting.        LLQ abdominal pain that comes and goes   Genitourinary: Negative for difficulty urinating.   Musculoskeletal: Positive for  "neck pain.        Left sided neck pain off and on   Skin: Negative for rash and wound.   Neurological: Positive for headaches.        Headaches to different areas of head that is relieved with duexis   Hematological: Negative for adenopathy.   Psychiatric/Behavioral: Negative for sleep disturbance.       Objective:       Vitals:    05/10/18 1630   BP: 139/85   BP Location: Right arm   Patient Position: Sitting   BP Method: Large (Automatic)   Pulse: 71   Temp: 98 °F (36.7 °C)   TempSrc: Oral   Weight: 75.8 kg (167 lb)   Height: 5' 7" (1.702 m)     Physical Exam   Constitutional: She is oriented to person, place, and time. Vital signs are normal. She appears well-developed and well-nourished. No distress.   HENT:   Head: Normocephalic and atraumatic.   Right Ear: Tympanic membrane, external ear and ear canal normal.   Left Ear: External ear normal. Tympanic membrane is erythematous and bulging.   Nose: Nose normal. Right sinus exhibits no maxillary sinus tenderness and no frontal sinus tenderness. Left sinus exhibits no maxillary sinus tenderness and no frontal sinus tenderness.   Mouth/Throat: Mucous membranes are normal. Posterior oropharyngeal edema and posterior oropharyngeal erythema present.   Left TM with bulging and erythema, posterior pharynx beefy red with cobblestoning and edema noted    Eyes: Conjunctivae and lids are normal. Right conjunctiva is not injected. Left conjunctiva is not injected.   Neck: Normal range of motion. Neck supple. Normal carotid pulses present. No muscular tenderness present. Carotid bruit is not present.   Full active ROM of neck without pain   Cardiovascular: Normal rate, regular rhythm, S1 normal, S2 normal, normal heart sounds, intact distal pulses and normal pulses.    No murmur heard.  Pulses:       Carotid pulses are 2+ on the right side, and 2+ on the left side.       Radial pulses are 2+ on the right side, and 2+ on the left side.        Posterior tibial pulses are 2+ on " the right side, and 2+ on the left side.   Pulmonary/Chest: Effort normal and breath sounds normal. No respiratory distress. She has no decreased breath sounds. She has no wheezes. She has no rhonchi. She has no rales.   Musculoskeletal: Normal range of motion.   Lymphadenopathy:        Head (right side): Submandibular adenopathy present. No submental, no tonsillar, no preauricular, no posterior auricular and no occipital adenopathy present.        Head (left side): Submandibular adenopathy present. No submental, no tonsillar, no preauricular, no posterior auricular and no occipital adenopathy present.     She has no cervical adenopathy.   Bilateral submandibular lymphadenopathy   Neurological: She is alert and oriented to person, place, and time. She has normal strength.   Skin: Skin is warm, dry and intact. Capillary refill takes less than 2 seconds. She is not diaphoretic. No pallor.   Psychiatric: She has a normal mood and affect. Her speech is normal and behavior is normal. Judgment and thought content normal. Cognition and memory are normal.   Nursing note and vitals reviewed.      Assessment:       1. Left non-suppurative otitis media    2. Acute pharyngitis, unspecified etiology    3. Cough    4. Lymphadenopathy    5. Psoriasis    6. Type 2 diabetes mellitus without complication, without long-term current use of insulin    7. BMI 26.0-26.9,adult    8. Status post gastric bypass for obesity        Plan:   Holly was seen today for sinus problem and cough.    Diagnoses and all orders for this visit:    Left non-suppurative otitis media  -     azithromycin (Z-ANGI) 250 MG tablet; Take 2 tablets by mouth on day 1; Take 1 tablet by mouth on days 2-5  Acute pharyngitis, unspecified etiology   Continue throat lozenges   Warm salt water gargles for comfort  Cough   Continue throat lozenges  Lymphadenopathy   azithromycin (Z-ANGI) 250 MG tablet; Take 2 tablets by mouth on day 1; Take 1 tablet by mouth on days  2-5  Psoriasis   In remission, continue Stelara as prescribed  Type 2 diabetes mellitus without complication, without long-term current use of insulin   A1c 5.2% on 3/3/18   Patient is post gastric bypass, diet controlled diabetes  BMI 26.0-26.9,adult   Post gastric bypass   BMI 26.16   Patient has lost 7 pounds since last visit 4/2/2018   Patient exercises daily, runs  Status post gastric bypass for obesity    Stable   Having some LLQ abdominal pain off and on and plans to follow up with surgeon    Follow-up if symptoms worsen or fail to improve.

## 2018-05-14 ENCOUNTER — PATIENT MESSAGE (OUTPATIENT)
Dept: FAMILY MEDICINE | Facility: CLINIC | Age: 48
End: 2018-05-14

## 2018-08-07 ENCOUNTER — PATIENT MESSAGE (OUTPATIENT)
Dept: FAMILY MEDICINE | Facility: CLINIC | Age: 48
End: 2018-08-07

## 2018-08-07 DIAGNOSIS — N94.10 DYSPAREUNIA IN FEMALE: Primary | ICD-10-CM

## 2018-08-07 DIAGNOSIS — N95.2 ATROPHIC VAGINITIS: ICD-10-CM

## 2018-08-07 RX ORDER — ESTRADIOL 0.1 MG/G
1 CREAM VAGINAL DAILY
Qty: 30 G | Refills: 5 | Status: SHIPPED | OUTPATIENT
Start: 2018-08-07 | End: 2019-10-09

## 2018-08-15 ENCOUNTER — TELEPHONE (OUTPATIENT)
Dept: SURGERY | Facility: CLINIC | Age: 48
End: 2018-08-15

## 2018-08-15 DIAGNOSIS — R92.8 ABNORMAL MAMMOGRAM OF RIGHT BREAST: Primary | ICD-10-CM

## 2018-08-15 NOTE — TELEPHONE ENCOUNTER
----- Message from Joshua Gaviria sent at 8/15/2018  3:10 PM CDT -----  Contact: patient  Type: Needs Medical Advice    Who Called:  patient  Symptoms (please be specific):    How long has patient had these symptoms:    Pharmacy name and phone #:    Best Call Back Number: [phone]  Additional Information: requesting order for mammogram,stated  wanted her to have it done every six months

## 2018-08-27 ENCOUNTER — HOSPITAL ENCOUNTER (OUTPATIENT)
Dept: RADIOLOGY | Facility: HOSPITAL | Age: 48
Discharge: HOME | End: 2018-08-27
Attending: SURGERY
Payer: COMMERCIAL

## 2018-08-27 DIAGNOSIS — R92.8 ABNORMAL MAMMOGRAM OF RIGHT BREAST: ICD-10-CM

## 2018-08-27 PROCEDURE — 77066 DX MAMMO INCL CAD BI: CPT | Mod: 26,,, | Performed by: RADIOLOGY

## 2018-08-27 PROCEDURE — 77066 DX MAMMO INCL CAD BI: CPT | Mod: TC

## 2018-08-27 PROCEDURE — 77062 BREAST TOMOSYNTHESIS BI: CPT | Mod: 26,,, | Performed by: RADIOLOGY

## 2018-09-11 ENCOUNTER — PATIENT MESSAGE (OUTPATIENT)
Dept: FAMILY MEDICINE | Facility: CLINIC | Age: 48
End: 2018-09-11

## 2018-09-11 DIAGNOSIS — R51.9 NONINTRACTABLE HEADACHE, UNSPECIFIED CHRONICITY PATTERN, UNSPECIFIED HEADACHE TYPE: Primary | ICD-10-CM

## 2018-09-12 ENCOUNTER — OFFICE VISIT (OUTPATIENT)
Dept: FAMILY MEDICINE | Facility: CLINIC | Age: 48
End: 2018-09-12
Attending: FAMILY MEDICINE
Payer: COMMERCIAL

## 2018-09-12 ENCOUNTER — DOCUMENTATION ONLY (OUTPATIENT)
Dept: FAMILY MEDICINE | Facility: CLINIC | Age: 48
End: 2018-09-12

## 2018-09-12 VITALS
OXYGEN SATURATION: 98 % | WEIGHT: 177.25 LBS | DIASTOLIC BLOOD PRESSURE: 80 MMHG | BODY MASS INDEX: 27.82 KG/M2 | HEIGHT: 67 IN | TEMPERATURE: 98 F | SYSTOLIC BLOOD PRESSURE: 120 MMHG | HEART RATE: 65 BPM | RESPIRATION RATE: 16 BRPM

## 2018-09-12 DIAGNOSIS — G44.84 PRIMARY EXERTIONAL HEADACHE: Primary | ICD-10-CM

## 2018-09-12 DIAGNOSIS — G47.30 SLEEP APNEA, UNSPECIFIED TYPE: Chronic | ICD-10-CM

## 2018-09-12 DIAGNOSIS — I10 ACCELERATED HYPERTENSION: ICD-10-CM

## 2018-09-12 PROCEDURE — 99214 OFFICE O/P EST MOD 30 MIN: CPT | Mod: S$GLB,,, | Performed by: FAMILY MEDICINE

## 2018-09-12 PROCEDURE — 99999 PR PBB SHADOW E&M-EST. PATIENT-LVL IV: CPT | Mod: PBBFAC,,, | Performed by: FAMILY MEDICINE

## 2018-09-12 RX ORDER — AMLODIPINE BESYLATE 5 MG/1
5 TABLET ORAL DAILY
Qty: 30 TABLET | Refills: 5 | Status: SHIPPED | OUTPATIENT
Start: 2018-09-12 | End: 2019-06-24

## 2018-09-12 RX ORDER — BUTALBITAL, ACETAMINOPHEN AND CAFFEINE 300; 40; 50 MG/1; MG/1; MG/1
2 CAPSULE ORAL 4 TIMES DAILY PRN
COMMUNITY
End: 2019-02-04

## 2018-09-12 RX ORDER — AMLODIPINE BESYLATE 5 MG/1
5 TABLET ORAL DAILY
COMMUNITY
Start: 2018-09-10 | End: 2018-09-12 | Stop reason: SDUPTHER

## 2018-09-12 NOTE — PROGRESS NOTES
"Subjective:       Patient ID: Holly Chicas is a 47 y.o. female.    Chief Complaint: Headache    47-year-old female with history of hypertension, diabetes, hyperlipidemia all of which resolved after gastric bypass in 2015.  The patient has not been on any antihypertensive medications for several years.  Recently she began running for exercise in on to occurrences shortly after starting her run and shortly after taking Duexa for knee pain at the start of the run she was hit with a sudden onset excruciating pain in the frontal area and temples.  She actually used the term tight hat to describe the pain. After the 2nd occurrence she went to the emergency room at [facility] where she had blood pressure of 223/114 gradually coming down to 150/82 by the time she left.  The ER note describes the headache as gradual onset of a pounding pain so it does not match her to description at this time.  The patient has not run since the 2nd episode out of fear of causing the headaches.  The emergency room gave her Fioricet for the headaches and put her on amlodipine 5 mg for the blood pressure.  Her blood pressure today is good she has not had any headaches since the 2nd run.  They did do CT scan chest x-ray and blood work all of which were normal with no sign of any intracranial pathology.  There was no sign of any bleeding.  She saw Dr. Mike today who follows her for neuropathy.  He suggested that we worked her up for pheochromocytoma and renal artery stenosis and recommended a change in her fear is set to eliminate the caffeine but did not give it to her him self.  She is asymptomatic at this time    Past Medical History:  1996: Abnormal Pap smear      Comment:  "pre-cancer cells post hysterectomy"  No date: Anxiety  No date: Arthritis  No date: Depression  No date: Hyperlipidemia  No date: Hypertension  No date: Hypertriglyceridemia  No date: Meningitis  No date: Narcolepsy  No date: ALESIA (obstructive sleep apnea)  9/12/2014: " Peripheral neuropathy  No date: Psoriasis  5/10/2018: Psoriasis  No date: Tuberculosis    Past Surgical History:  2016: BREAST SURGERY      Comment:  aumentation/ reduction   2017: BREAST SURGERY      Comment:  augmentation   2017: brizilian butt lift   No date:  SECTION  2018: CHOLECYSTECTOMY      Comment:  Dr SEAN [facility]  No date: FOOT SURGERY  2015: GASTRIC BYPASS  2015: GASTRIC BYPASS  No date: HERNIA REPAIR  No date: HYSTERECTOMY  No date: KNEE SURGERY; Left  : SHOULDER SURGERY      Comment:  right should surgery   2017: SHOULDER SURGERY      Comment:  left shoulder / torn labrium   2017: THIGH LIFT  No date: TONSILLECTOMY  2016: tummy tuck     Current Outpatient Medications on File Prior to Visit:  armodafinil (NUVIGIL) 250 mg tablet, TAKE 1 TABLET (250 MG TOTAL) BY MOUTH 2 (TWO) TIMES DAILY., Disp: 60 tablet, Rfl: 5  butalbital-acetaminophen-caff (FIORICET) -40 mg Cap, Take 2 capsules by mouth 4 (four) times daily as needed., Disp: , Rfl:   DUEXIS 800-26.6 mg Tab, Take 1 tablet by mouth 3 (three) times daily. , Disp: , Rfl:   estradiol (ESTRACE) 0.01 % (0.1 mg/gram) vaginal cream, Place 1 g vaginally once daily. For 7 days, then 1 gram 2-3 days per week, Disp: 30 g, Rfl: 5  STELARA 45 mg/0.5 mL Syrg syringe, Inject 45 mg into the muscle every 3 (three) months. , Disp: , Rfl:   (DISCONTINUED) amLODIPine (NORVASC) 5 MG tablet, Take 5 mg by mouth once daily. , Disp: , Rfl:   epinephrine (EPIPEN) 0.3 mg/0.3 mL AtIn, Inject 0.3 mLs (0.3 mg total) into the muscle once. As directed PRN, Disp: 2 each, Rfl: 11  (DISCONTINUED) azithromycin (Z-ANGI) 250 MG tablet, Take 2 tablets by mouth on day 1; Take 1 tablet by mouth on days 2-5, Disp: 6 tablet, Rfl: 0  (DISCONTINUED) DULoxetine (CYMBALTA) 30 MG capsule, Take 1 capsule (30 mg total) by mouth once daily., Disp: 30 capsule, Rfl: 11  (DISCONTINUED) lidocaine HCL 2% (XYLOCAINE) 2 % jelly, Apply topically as  needed. Apply topically once nightly to affected area right foot., Disp: 30 mL, Rfl: 2  (DISCONTINUED) lipase-protease-amylase 24,000-76,000-120,000 units (PANLIPASE) 24,000-76,000 -120,000 unit capsule, Take 1 capsule by mouth 3 (three) times daily with meals., Disp: 90 capsule, Rfl: 11  (DISCONTINUED) multivitamin (ONE DAILY MULTIVITAMIN) per tablet, Take 1 tablet by mouth daily as needed. , Disp: , Rfl:   (DISCONTINUED) simethicone (MYLICON) 125 MG chewable tablet, Take 1 tablet (125 mg total) by mouth every 6 (six) hours as needed for Flatulence., Disp: , Rfl: 0    No current facility-administered medications on file prior to visit.             Hypertension   This is a chronic problem. The current episode started more than 1 year ago. The problem has been rapidly worsening since onset. The problem is resistant. Associated symptoms include blurred vision, headaches, orthopnea and shortness of breath. Pertinent negatives include no anxiety, chest pain, malaise/fatigue, neck pain, palpitations, peripheral edema, PND or sweats. There are no associated agents to hypertension. Risk factors for coronary artery disease include diabetes mellitus, dyslipidemia, family history and stress. Past treatments include calcium channel blockers. The current treatment provides mild improvement. There are no compliance problems.      Review of Systems   Constitutional: Negative for malaise/fatigue.   Eyes: Positive for blurred vision.   Respiratory: Positive for shortness of breath.    Cardiovascular: Positive for orthopnea. Negative for chest pain, palpitations and PND.   Musculoskeletal: Negative for neck pain.   Neurological: Positive for headaches. Negative for dizziness and light-headedness.       Objective:      Physical Exam   Constitutional: She is oriented to person, place, and time. She appears well-developed. No distress.   Good blood pressure control  Overweight with a BMI of 27.8 she is up 8 lb from her March 3, 2018  visit.       HENT:   Head: Normocephalic and atraumatic.   Right Ear: External ear normal.   Left Ear: External ear normal.   Nose: Nose normal.   Mouth/Throat: Oropharynx is clear and moist. No oropharyngeal exudate.   Eyes: EOM are normal. Pupils are equal, round, and reactive to light. No scleral icterus.   Neck: Normal range of motion. Neck supple. No JVD present. No tracheal deviation present. No thyromegaly present.   Cardiovascular: Normal rate, regular rhythm and normal heart sounds. Exam reveals no gallop and no friction rub.   No murmur heard.  Pulmonary/Chest: Effort normal and breath sounds normal. No stridor. No respiratory distress. She has no wheezes. She has no rales. She exhibits no tenderness.   Abdominal: Soft. Bowel sounds are normal. She exhibits no distension and no mass. There is no tenderness. There is no guarding.   No intra abdominal bruit audible   Lymphadenopathy:     She has no cervical adenopathy.   Neurological: She is alert and oriented to person, place, and time. She displays normal reflexes. No cranial nerve deficit or sensory deficit. She exhibits normal muscle tone. Coordination normal.   Skin: Skin is warm and dry. No rash noted. She is not diaphoretic. No erythema.   Psychiatric: She has a normal mood and affect. Her behavior is normal. Judgment and thought content normal.   Nursing note and vitals reviewed.      Assessment:       1. Primary exertional headache    2. Accelerated hypertension    3. Sleep apnea, unspecified type        Plan:       1. Primary exertional headache  I prefer to avoid if you're is set as much as possible due to the problem with withdrawal headaches.  I would like to have her see Dr. Shannon but unfortunately Dr. Shannon is not taking new patients at this time  - US Renal Artery Stenosis Hyperten (xpd); Future  - Catecholamines, fractionated, Urine 24 Hours; Future    2. Accelerated hypertension  Continue amlodipine as ordered which seems to be  controlling her blood pressure at rest.  Check renal artery ultrasound and Doppler and 24 hr urine for catecholamines  - US Renal Artery Stenosis Hyperten (xpd); Future  - Catecholamines, fractionated, Urine 24 Hours; Future  - amLODIPine (NORVASC) 5 MG tablet; Take 1 tablet (5 mg total) by mouth once daily.  Dispense: 30 tablet; Refill: 5    3. Sleep apnea, unspecified type  Her sleep study was scheduled for the time when her headache workup was being done and she did not think she would be able to sleep under the circumstances so she postponed until the end of next month.

## 2018-09-12 NOTE — PROGRESS NOTES
Pre-Visit Chart Review  For Appointment Scheduled on 9-12-18    Health Maintenance Due   Topic Date Due    Eye Exam  10[birth year]08/1978    Influenza Vaccine  08/01/2018    Hemoglobin A1c  09/03/2018

## 2018-09-14 ENCOUNTER — HOSPITAL ENCOUNTER (OUTPATIENT)
Dept: RADIOLOGY | Facility: CLINIC | Age: 48
Discharge: HOME | End: 2018-09-14
Attending: FAMILY MEDICINE
Payer: COMMERCIAL

## 2018-09-14 ENCOUNTER — PATIENT MESSAGE (OUTPATIENT)
Dept: FAMILY MEDICINE | Facility: CLINIC | Age: 48
End: 2018-09-14

## 2018-09-14 DIAGNOSIS — I10 ACCELERATED HYPERTENSION: ICD-10-CM

## 2018-09-14 DIAGNOSIS — G44.84 PRIMARY EXERTIONAL HEADACHE: ICD-10-CM

## 2018-09-14 PROCEDURE — 76770 US EXAM ABDO BACK WALL COMP: CPT | Mod: 26,59,, | Performed by: RADIOLOGY

## 2018-09-14 PROCEDURE — 93975 VASCULAR STUDY: CPT | Mod: TC,PO

## 2018-09-14 PROCEDURE — 93975 VASCULAR STUDY: CPT | Mod: 26,,, | Performed by: RADIOLOGY

## 2018-09-17 ENCOUNTER — PATIENT MESSAGE (OUTPATIENT)
Dept: FAMILY MEDICINE | Facility: CLINIC | Age: 48
End: 2018-09-17

## 2018-09-17 ENCOUNTER — PATIENT MESSAGE (OUTPATIENT)
Dept: UROLOGY | Facility: CLINIC | Age: 48
End: 2018-09-17

## 2018-09-17 DIAGNOSIS — E11.9 TYPE 2 DIABETES MELLITUS WITHOUT COMPLICATION, WITHOUT LONG-TERM CURRENT USE OF INSULIN: Primary | ICD-10-CM

## 2018-09-17 DIAGNOSIS — I12.9 HYPERTENSIVE RENAL DISEASE: Primary | ICD-10-CM

## 2018-09-18 NOTE — TELEPHONE ENCOUNTER
I put in an order for nephrology for hypertensive renal disease.  The 24 hour urine catecholamines was negative except for a mild increase in dopamine-levels less than twice the maximum normal level are considered to be negative for a neuroendocrine tumor and more likely due to other stimuli but its something to look into.  Dopamine can increase blood pressure.

## 2018-09-19 ENCOUNTER — PATIENT MESSAGE (OUTPATIENT)
Dept: FAMILY MEDICINE | Facility: CLINIC | Age: 48
End: 2018-09-19

## 2018-09-19 ENCOUNTER — TELEPHONE (OUTPATIENT)
Dept: FAMILY MEDICINE | Facility: CLINIC | Age: 48
End: 2018-09-19

## 2018-09-19 NOTE — TELEPHONE ENCOUNTER
----- Message from Ben Tracy MD sent at 9/18/2018  8:26 PM CDT -----  The dopamine level is elevated about 50% over normal.  The rest of the catecholamines are normal.  For the test to indicate a neuroendocrine tumor it should be 300% or more over the maximum level.  Below that it tends to be due to drugs or testing errors.  Nuvigil increases dopamine levels and is likely the cause of the abnormal test.  nuvigil is rarely linked to high blood pressure so I'd suggest she stop it immediately.    Results sent by email

## 2018-10-11 DIAGNOSIS — Z12.11 COLON CANCER SCREENING: ICD-10-CM

## 2018-11-30 DIAGNOSIS — T78.2XXS ANAPHYLAXIS, SEQUELA: ICD-10-CM

## 2018-11-30 RX ORDER — EPINEPHRINE 0.3 MG/.3ML
1 INJECTION SUBCUTANEOUS ONCE
Qty: 2 EACH | Refills: 11 | Status: SHIPPED | OUTPATIENT
Start: 2018-11-30 | End: 2019-03-11

## 2019-02-04 ENCOUNTER — OFFICE VISIT (OUTPATIENT)
Dept: ORTHOPEDICS | Facility: CLINIC | Age: 49
End: 2019-02-04
Payer: COMMERCIAL

## 2019-02-04 VITALS
HEART RATE: 82 BPM | WEIGHT: 170 LBS | HEIGHT: 67 IN | DIASTOLIC BLOOD PRESSURE: 88 MMHG | SYSTOLIC BLOOD PRESSURE: 140 MMHG | BODY MASS INDEX: 26.68 KG/M2

## 2019-02-04 DIAGNOSIS — M25.532 ACUTE PAIN OF LEFT WRIST: ICD-10-CM

## 2019-02-04 DIAGNOSIS — S62.002A CLOSED NONDISPLACED FRACTURE OF SCAPHOID OF LEFT WRIST, UNSPECIFIED PORTION OF SCAPHOID, INITIAL ENCOUNTER: Primary | ICD-10-CM

## 2019-02-04 PROCEDURE — 99213 PR OFFICE/OUTPT VISIT, EST, LEVL III, 20-29 MIN: ICD-10-PCS | Mod: 25,,, | Performed by: ORTHOPAEDIC SURGERY

## 2019-02-04 PROCEDURE — 73110 PR  X-RAY WRIST 3+ VW: ICD-10-PCS | Mod: LT,,, | Performed by: ORTHOPAEDIC SURGERY

## 2019-02-04 PROCEDURE — 99213 OFFICE O/P EST LOW 20 MIN: CPT | Mod: 25,,, | Performed by: ORTHOPAEDIC SURGERY

## 2019-02-04 PROCEDURE — 73110 X-RAY EXAM OF WRIST: CPT | Mod: LT,,, | Performed by: ORTHOPAEDIC SURGERY

## 2019-02-04 RX ORDER — DIAZEPAM 5 MG/1
5 TABLET ORAL
Qty: 2 TABLET | Refills: 0 | Status: SHIPPED | OUTPATIENT
Start: 2019-02-04 | End: 2019-02-15 | Stop reason: ALTCHOICE

## 2019-02-04 RX ORDER — DEXTROAMPHETAMINE SACCHARATE, AMPHETAMINE ASPARTATE, DEXTROAMPHETAMINE SULFATE AND AMPHETAMINE SULFATE 5; 5; 5; 5 MG/1; MG/1; MG/1; MG/1
TABLET ORAL
Refills: 0 | COMMUNITY
Start: 2018-12-13 | End: 2019-06-24

## 2019-02-04 RX ORDER — TOPIRAMATE 100 MG/1
1 CAPSULE, EXTENDED RELEASE ORAL DAILY
Refills: 5 | COMMUNITY
Start: 2019-01-11 | End: 2019-10-09

## 2019-02-04 RX ORDER — ARMODAFINIL 250 MG/1
250 TABLET ORAL 2 TIMES DAILY
Refills: 2 | COMMUNITY
Start: 2018-11-28 | End: 2021-11-16 | Stop reason: SDUPTHER

## 2019-02-04 NOTE — PROGRESS NOTES
"[facility], formerly [facility]    Subjective:     Chief Complaint:   Chief Complaint   Patient presents with    Left Wrist - Pain     Left wrist pain x 3 weeks. States that the pain started in the wrist and states that the pain is now radiating to the forearm. States that she fell and landed in her hands pain is worse in the left.        Past Medical History:   Diagnosis Date    Abnormal Pap smear     "pre-cancer cells post hysterectomy"    Anxiety     Arthritis     Depression     Hyperlipidemia     Hypertension     Hypertriglyceridemia     Meningitis     Narcolepsy     ALESIA (obstructive sleep apnea)     Peripheral neuropathy 2014    Psoriasis     Psoriasis 5/10/2018    Tuberculosis        Past Surgical History:   Procedure Laterality Date    BREAST SURGERY      aumentation/ reduction     BREAST SURGERY  2017    augmentation     brizilian butt lift   2017     SECTION      CHOLECYSTECTOMY  2018    Dr SEAN [facility]    FOOT SURGERY      GASTRIC BYPASS  2015    GASTRIC BYPASS      HERNIA REPAIR      HYSTERECTOMY      KNEE SURGERY Left     SHOULDER SURGERY      right should surgery     SHOULDER SURGERY  2017    left shoulder / torn labrium     THIGH LIFT  2017    TONSILLECTOMY      tummy tuck   2016       Current Outpatient Medications   Medication Sig    amLODIPine (NORVASC) 5 MG tablet Take 1 tablet (5 mg total) by mouth once daily.    armodafinil (NUVIGIL) 250 mg tablet Take 250 mg by mouth 2 (two) times daily.    dextroamphetamine-amphetamine (ADDERALL) 20 mg tablet TAKE 1 TABLET (20 MG) BY MOUTH ONCE DAILY BEFORE BREAKFAST    DUEXIS 800-26.6 mg Tab Take 1 tablet by mouth 3 (three) times daily.     estradiol (ESTRACE) 0.01 % (0.1 mg/gram) vaginal cream Place 1 g vaginally once daily. For 7 days, then 1 gram 2-3 days per week    STELARA 45 mg/0.5 mL Syrg syringe Inject 45 mg into the muscle every 3 (three) months.  "    TROKENDI  mg Cp24 Take 1 capsule by mouth once daily.    EPINEPHrine (EPIPEN) 0.3 mg/0.3 mL AtIn Inject 0.3 mLs (0.3 mg total) into the muscle once. As directed PRN for 1 dose     No current facility-administered medications for this visit.        Review of patient's allergies indicates:   Allergen Reactions    Alcohol      Other reaction(s): Unknown       Family History   Problem Relation Age of Onset    Cancer Mother         breast bilateral/ skin     Breast cancer Mother     Skin cancer Mother     Diabetes Father     Heart disease Father 65        CABG    Hypertension Father     Cancer Father         skin    Skin cancer Father     Gout Father     Gout Brother     No Known Problems Daughter     No Known Problems Son     Cancer Maternal Aunt         lung, pancrease - smoker , breast    Lung cancer Maternal Aunt         x2    Pancreatic cancer Maternal Aunt     Breast cancer Maternal Aunt     No Known Problems Maternal Uncle     No Known Problems Paternal Uncle     Cancer Maternal Grandmother         pancrease    Pancreatic cancer Maternal Grandmother     Breast cancer Maternal Grandmother     Diabetes Paternal Grandmother     Heart disease Paternal Grandfather     Colon cancer Neg Hx     Ovarian cancer Neg Hx        Social History     Socioeconomic History    Marital status:      Spouse name: Not on file    Number of children: Not on file    Years of education: Not on file    Highest education level: Not on file   Social Needs    Financial resource strain: Not on file    Food insecurity - worry: Not on file    Food insecurity - inability: Not on file    Transportation needs - medical: Not on file    Transportation needs - non-medical: Not on file   Occupational History    Not on file   Tobacco Use    Smoking status: Never Smoker    Smokeless tobacco: Never Used   Substance and Sexual Activity    Alcohol use: No     Comment: ALLERGIC TO ALCOHOL(DRINKING) PER  PATIENT    Drug use: No    Sexual activity: Yes     Partners: Male     Birth control/protection: Surgical     Comment: SO   Other Topics Concern    Not on file   Social History Narrative    Not on file       History of present illness: This lady fell about 3 weeks ago while she was running and injured her left wrist as well as her left knee. She had an abrasion left knee which is since healing up fine and she seems recovering from that but left wrist continues to hurt.      Review of Systems:    Constitution: Negative for chills, fever, and sweats.  Negative for unexplained weight loss.    HENT:  Negative for headaches and blurry vision.    Cardiovascular:Negative for chest pain or irregular heart beat. Negative for hypertension.    Respiratory:  Negative for cough and shortness of breath.    Gastrointestinal: Negative for abdominal pain, heartburn, melena, nausea, and vomitting.    Genitourinary:  Negative bladder incontinence and dysuria.    Musculoskeletal:  See HPI for details.     Neurological: Negative for numbness.    Psychiatric/Behavioral: Negative for depression.  The patient is not nervous/anxious.      Endocrine: Negative for polyuria    Hematologic/Lymphatic: Negative for bleeding problem.  Does not bruise/bleed easily.    Skin: Negative for poor would healing and rash    Objective:      Physical Examination:    Vital Signs:    Vitals:    02/04/19 1452   BP: (!) 140/88   Pulse: 82       Body mass index is 26.63 kg/m².    This a well-developed, well nourished patient in no acute distress.  They are alert and oriented and cooperative to examination.        So physical exam left wrist shows that she is not tender over the distal radius she is tender over the scaphoid. Not tender at the base of the thumb no grind test no deformity of the base of the thumb. Not much in the way of swelling. Mild loss of motion of the wrist really pain many axial load the wrist.  Pertinent New Results:    XRAY Report /  Interpretation:   AP lateral oblique of the left wrist shows a no acute changes in the scaphoid or any carpal bones. No fracture of the distal radius. Ulnar negative variant. No acute changes visible.Electronically Signed By Gilbert Velazquez JR, MD    Assessment/Plan:      So my impression is occult navicular fracture left wrist. Our plan is MRI left wrist looking for the navicular fracture. In the meantime a simple brace to cover any lesser injuries. She is right handed work not an issue. We will call her once we have MRI results to see if we need to treat an occult navicular fracture      This note was created using Dragon voice recognition software that occasionally misinterpreted phrases or words.

## 2019-02-15 ENCOUNTER — OFFICE VISIT (OUTPATIENT)
Dept: FAMILY MEDICINE | Facility: CLINIC | Age: 49
End: 2019-02-15
Payer: COMMERCIAL

## 2019-02-15 DIAGNOSIS — R05.9 COUGH: ICD-10-CM

## 2019-02-15 DIAGNOSIS — I15.2 HYPERTENSION ASSOCIATED WITH DIABETES: ICD-10-CM

## 2019-02-15 DIAGNOSIS — E78.5 HYPERLIPIDEMIA ASSOCIATED WITH TYPE 2 DIABETES MELLITUS: ICD-10-CM

## 2019-02-15 DIAGNOSIS — H66.93 BILATERAL OTITIS MEDIA, UNSPECIFIED OTITIS MEDIA TYPE: Primary | ICD-10-CM

## 2019-02-15 DIAGNOSIS — E11.59 HYPERTENSION ASSOCIATED WITH DIABETES: ICD-10-CM

## 2019-02-15 DIAGNOSIS — H92.03 EAR PAIN, BILATERAL: ICD-10-CM

## 2019-02-15 DIAGNOSIS — E11.9 TYPE 2 DIABETES MELLITUS WITHOUT COMPLICATION, WITHOUT LONG-TERM CURRENT USE OF INSULIN: ICD-10-CM

## 2019-02-15 DIAGNOSIS — E11.69 HYPERLIPIDEMIA ASSOCIATED WITH TYPE 2 DIABETES MELLITUS: ICD-10-CM

## 2019-02-15 PROCEDURE — 99214 PR OFFICE/OUTPT VISIT, EST, LEVL IV, 30-39 MIN: ICD-10-PCS | Mod: S$GLB,,, | Performed by: NURSE PRACTITIONER

## 2019-02-15 PROCEDURE — 99999 PR PBB SHADOW E&M-EST. PATIENT-LVL IV: ICD-10-PCS | Mod: PBBFAC,,, | Performed by: NURSE PRACTITIONER

## 2019-02-15 PROCEDURE — 99999 PR PBB SHADOW E&M-EST. PATIENT-LVL IV: CPT | Mod: PBBFAC,,, | Performed by: NURSE PRACTITIONER

## 2019-02-15 PROCEDURE — 99214 OFFICE O/P EST MOD 30 MIN: CPT | Mod: S$GLB,,, | Performed by: NURSE PRACTITIONER

## 2019-02-15 RX ORDER — AMOXICILLIN AND CLAVULANATE POTASSIUM 875; 125 MG/1; MG/1
1 TABLET, FILM COATED ORAL 2 TIMES DAILY WITH MEALS
Qty: 14 TABLET | Refills: 0 | Status: SHIPPED | OUTPATIENT
Start: 2019-02-15 | End: 2019-02-26 | Stop reason: ALTCHOICE

## 2019-02-15 RX ORDER — PROMETHAZINE HYDROCHLORIDE AND DEXTROMETHORPHAN HYDROBROMIDE 6.25; 15 MG/5ML; MG/5ML
5 SYRUP ORAL EVERY 6 HOURS PRN
Qty: 240 ML | Refills: 0 | Status: SHIPPED | OUTPATIENT
Start: 2019-02-15 | End: 2019-02-26 | Stop reason: SDUPTHER

## 2019-02-15 RX ORDER — FLUCONAZOLE 150 MG/1
150 TABLET ORAL EVERY OTHER DAY
Qty: 2 TABLET | Refills: 0 | Status: SHIPPED | OUTPATIENT
Start: 2019-02-15 | End: 2019-02-18

## 2019-02-15 NOTE — PROGRESS NOTES
"Subjective:       Patient ID: Holly Chicas is a 48 y.o. female.    Chief Complaint: Cough    Chief Complaint  Chief Complaint   Patient presents with    Cough       HPI  Holly Chicas is a 48 y.o. female with medical diagnoses as listed in the medical history and problem list that presents for c/o of cough. Patient states cough started 3 weeks ago. Cough is productive of sputum. Reports nothing has made it worse. Cough drops have mildly relieved symptoms. Reports taking mucinex, cough syrup, and cough drops with minimal relief.  Patient is regularly treated for Diabetes type 2, hypertension, psoriasis, hyperlipidemia, narcolepsy, and sleep apnea. Patient scheduled for a short visit today and needs to come back for a follow up appointment for her diabetes and hypertension. Her blood pressure today is 146/96 right arm, and 156/96 on her left arm. Blood pressure retaken at the end of the visit 128/80. BMI today is 26.47. Patient has lost 8lbs since 18.  Established patient with last clinic appointment 2018.        PAST MEDICAL HISTORY:  Past Medical History:   Diagnosis Date    Abnormal Pap smear     "pre-cancer cells post hysterectomy"    Anxiety     Arthritis     Depression     Good hypertension control 2014    Hyperlipidemia     Hypertension     Hypertriglyceridemia     Meningitis     Narcolepsy     ALESIA (obstructive sleep apnea)     Peripheral neuropathy 2014    Psoriasis     Psoriasis 5/10/2018    Tuberculosis        PAST SURGICAL HISTORY:  Past Surgical History:   Procedure Laterality Date    BREAST SURGERY  2016    aumentation/ reduction     BREAST SURGERY  2017    augmentation     brizilian butt lift   2017     SECTION      CHOLECYSTECTOMY  2018    Dr SEAN Christopher [facility]    FOOT SURGERY      GASTRIC BYPASS  2015    GASTRIC BYPASS  2015    HERNIA REPAIR      HYSTERECTOMY      KNEE SURGERY Left     SHOULDER SURGERY      " right should surgery     SHOULDER SURGERY  01/2017    left shoulder / torn labrium     THIGH LIFT  03/03/2017    TONSILLECTOMY      tummy tuck   02/28/2016       SOCIAL HISTORY:  Social History     Socioeconomic History    Marital status:      Spouse name: Not on file    Number of children: Not on file    Years of education: Not on file    Highest education level: Not on file   Social Needs    Financial resource strain: Not on file    Food insecurity - worry: Not on file    Food insecurity - inability: Not on file    Transportation needs - medical: Not on file    Transportation needs - non-medical: Not on file   Occupational History    Not on file   Tobacco Use    Smoking status: Never Smoker    Smokeless tobacco: Never Used   Substance and Sexual Activity    Alcohol use: No     Comment: ALLERGIC TO ALCOHOL(DRINKING) PER PATIENT    Drug use: No    Sexual activity: Yes     Partners: Male     Birth control/protection: Surgical     Comment: SO   Other Topics Concern    Not on file   Social History Narrative    Not on file       FAMILY HISTORY:  Family History   Problem Relation Age of Onset    Cancer Mother         breast bilateral/ skin     Breast cancer Mother     Skin cancer Mother     Diabetes Father     Heart disease Father 65        CABG    Hypertension Father     Cancer Father         skin    Skin cancer Father     Gout Father     Gout Brother     No Known Problems Daughter     No Known Problems Son     Cancer Maternal Aunt         lung, pancrease - smoker , breast    Lung cancer Maternal Aunt         x2    Pancreatic cancer Maternal Aunt     Breast cancer Maternal Aunt     No Known Problems Maternal Uncle     No Known Problems Paternal Uncle     Cancer Maternal Grandmother         pancrease    Pancreatic cancer Maternal Grandmother     Breast cancer Maternal Grandmother     Diabetes Paternal Grandmother     Heart disease Paternal Grandfather     Colon cancer  Neg Hx     Ovarian cancer Neg Hx        ALLERGIES AND MEDICATIONS: updated and reviewed.  Review of patient's allergies indicates:   Allergen Reactions    Alcohol      Other reaction(s): Unknown     Current Outpatient Medications   Medication Sig Dispense Refill    amLODIPine (NORVASC) 5 MG tablet Take 1 tablet (5 mg total) by mouth once daily. 30 tablet 5    armodafinil (NUVIGIL) 250 mg tablet Take 250 mg by mouth 2 (two) times daily.  2    dextroamphetamine-amphetamine (ADDERALL) 20 mg tablet TAKE 1 TABLET (20 MG) BY MOUTH ONCE DAILY BEFORE BREAKFAST  0    DUEXIS 800-26.6 mg Tab Take 1 tablet by mouth 3 (three) times daily.       EPINEPHrine (EPIPEN) 0.3 mg/0.3 mL AtIn Inject 0.3 mLs (0.3 mg total) into the muscle once. As directed PRN for 1 dose 2 each 11    estradiol (ESTRACE) 0.01 % (0.1 mg/gram) vaginal cream Place 1 g vaginally once daily. For 7 days, then 1 gram 2-3 days per week 30 g 5    STELARA 45 mg/0.5 mL Syrg syringe Inject 45 mg into the muscle every 3 (three) months.       TROKENDI  mg Cp24 Take 1 capsule by mouth once daily.  5    amoxicillin-clavulanate 875-125mg (AUGMENTIN) 875-125 mg per tablet Take 1 tablet by mouth 2 (two) times daily with meals. 14 tablet 0    fluconazole (DIFLUCAN) 150 MG Tab Take 1 tablet (150 mg total) by mouth every other day. for 2 doses 2 tablet 0    promethazine-dextromethorphan (PROMETHAZINE-DM) 6.25-15 mg/5 mL Syrp Take 5 mLs by mouth every 6 (six) hours as needed (cough). 240 mL 0     No current facility-administered medications for this visit.        I have reviewed the patient's medical history in detail and updated the computerized patient record.    Review of Systems   Constitutional: Positive for chills and fever. Negative for activity change, appetite change and fatigue.        Reports 2 nights ago had chills during the night and night sweats, thought she had fever but did not check temperature.   HENT: Positive for congestion, ear pain,  "sinus pressure and sinus pain. Negative for postnasal drip, rhinorrhea and sore throat.         Bilateral ear pain for 3 weeks.   Eyes: Negative for pain, itching and visual disturbance.   Respiratory: Positive for cough and shortness of breath. Negative for wheezing.         Reports SOB from coughing after running every other day.   Cardiovascular: Negative for chest pain, palpitations and leg swelling.   Gastrointestinal: Negative for abdominal pain, constipation, diarrhea, nausea and vomiting.   Endocrine: Negative for heat intolerance.   Genitourinary: Negative for difficulty urinating and dysuria.   Musculoskeletal: Positive for myalgias and neck pain.        Reports she was running and fell forward. Reports overall muscle pains, right wrist pain and fingers. Followed by an orthopedist.   Skin: Negative for rash and wound.   Allergic/Immunologic: Negative for environmental allergies.   Neurological: Positive for headaches. Negative for dizziness, light-headedness and numbness.        Reports history of chronic migraines, currently taking Trokendi with full relief of headaches. Followed by a neurologist.   Hematological: Does not bruise/bleed easily.   Psychiatric/Behavioral: Negative for dysphoric mood and sleep disturbance. The patient is not nervous/anxious.          Objective:      Vitals:    02/15/19 1648 02/15/19 1651 02/15/19 1716   BP: (!) 146/96 (!) 156/96 128/80   BP Location: Right arm Left arm Right arm   Patient Position: Sitting Sitting Sitting   BP Method: Large (Automatic)  Large (Manual)   Pulse: 98     Temp: 98.2 °F (36.8 °C)     TempSrc: Oral     Weight: 76.7 kg (169 lb)     Height: 5' 7" (1.702 m)       Physical Exam   Constitutional: She is oriented to person, place, and time. Vital signs are normal. She appears well-developed and well-nourished. No distress.   Blood pressure recheck at end of visit 128/80   HENT:   Head: Normocephalic and atraumatic.   Right Ear: Hearing, external ear and " ear canal normal. Tympanic membrane is erythematous and bulging.   Left Ear: Hearing, external ear and ear canal normal. Tympanic membrane is erythematous and bulging.   Nose: Mucosal edema present. No rhinorrhea. Right sinus exhibits no maxillary sinus tenderness and no frontal sinus tenderness. Left sinus exhibits no maxillary sinus tenderness and no frontal sinus tenderness.   Mouth/Throat: Uvula is midline, oropharynx is clear and moist and mucous membranes are normal. No posterior oropharyngeal edema or posterior oropharyngeal erythema. Tonsils are 1+ on the right. Tonsils are 1+ on the left. No tonsillar exudate.   Bulging and erythema of the TM noted bilaterally. Mucosal edema noted to both nostrils.   Eyes: Conjunctivae and lids are normal. Pupils are equal, round, and reactive to light. Right eye exhibits no discharge. Left eye exhibits no discharge.   Neck: Normal range of motion. Neck supple. Normal carotid pulses present. Carotid bruit is not present.   Cardiovascular: Normal rate, regular rhythm, S1 normal, S2 normal, normal heart sounds, intact distal pulses and normal pulses.   No murmur heard.  Pulses:       Carotid pulses are 2+ on the right side, and 2+ on the left side.       Radial pulses are 2+ on the right side, and 2+ on the left side.        Posterior tibial pulses are 2+ on the right side, and 2+ on the left side.   No edema   Pulmonary/Chest: Effort normal and breath sounds normal. No respiratory distress. She has no decreased breath sounds. She has no wheezes. She has no rhonchi. She has no rales.   Musculoskeletal: Normal range of motion.   Lymphadenopathy:        Head (right side): No submental, no submandibular, no tonsillar, no preauricular, no posterior auricular and no occipital adenopathy present.        Head (left side): No submental, no submandibular, no tonsillar, no preauricular, no posterior auricular and no occipital adenopathy present.     She has no cervical adenopathy.         Right: No supraclavicular adenopathy present.        Left: No supraclavicular adenopathy present.   Neurological: She is alert and oriented to person, place, and time. She has normal strength.   Skin: Skin is warm, dry and intact. She is not diaphoretic. No pallor.   Psychiatric: She has a normal mood and affect. Her speech is normal and behavior is normal. Judgment and thought content normal. Cognition and memory are normal.   Nursing note and vitals reviewed.        Assessment:       1. Bilateral otitis media, unspecified otitis media type    2. Ear pain, bilateral    3. Cough    4. Hyperlipidemia associated with type 2 diabetes mellitus    5. Hypertension associated with diabetes    6. Type 2 diabetes mellitus without complication, without long-term current use of insulin    7. BMI 26.0-26.9,adult          Plan:       Holly was seen today for cough.    Diagnoses and all orders for this visit:      Bilateral otitis media, unspecified otitis media type  -     amoxicillin-clavulanate 875-125mg (AUGMENTIN) 875-125 mg per tablet; Take 1 tablet by mouth 2 (two) times daily with meals.  - Patient should take antibiotics as instructed and complete regimen even if she feels better.    Ear pain, bilateral        - Patient given Augmentin for ear infection.        - OTC pain medicine as needed for pain.    Cough  -     promethazine-dextromethorphan (PROMETHAZINE-DM) 6.25-15 mg/5 mL Syrp; Take 5 mLs by mouth every 6 (six) hours as needed (cough).    Hyperlipemia associated with type 2 diabetes mellitus  Lab Results   Component Value Date    CHOL 236 (H) 03/03/2018    CHOL 254 (H) 03/29/2017    CHOL 200 (H) 12/16/2015     Lab Results   Component Value Date    HDL 54 03/03/2018    HDL 46 03/29/2017    HDL 50 12/16/2015     Lab Results   Component Value Date    LDLCALC 153.0 03/03/2018    LDLCALC 177.0 (H) 03/29/2017    LDLCALC 128.4 12/16/2015     Lab Results   Component Value Date    TRIG 145 03/03/2018    TRIG 155 (H)  03/29/2017    TRIG 108 12/16/2015     Lab Results   Component Value Date    CHOLHDL 22.9 03/03/2018    CHOLHDL 18.1 (L) 03/29/2017    CHOLHDL 25.0 12/16/2015    - Patient to return to the clinic for a follow up in 4 weeks.      Hypertension associated with diabetes        - Blood pressure 128/80 at then end of the visit. Continue medication regimen.         - Follow up in 4 weeks for HTN and DM follow up.    Type 2 diabetes mellitus without complication, without long-term current use of insulin     Lab Results   Component Value Date    HGBA1C 5.2 03/03/2018    diabetes well controlled, continue current medications and diabetes management plan, diabetes follow-up appointment scheduled for four weeks   Patient is due for foot exam eye exam and blood work and this will be addressed at her follow-up visit    BMI 26.0-26.9,adult        - Patient has lost 8lbs. Continue lifestyle modifications.    Other orders  -     fluconazole (DIFLUCAN) 150 MG Tab; Take 1 tablet (150 mg total) by mouth every other day. for 2 doses  - Patient reports yeast infections after antibiotic use, Fluconazole given. Instructed to take only if symptoms of yeast infection develops.    Patient readiness: acceptance and barriers:none    During the course of the visit the patient was educated and counseled about the following:     Diabetes:  Discussed general issues about diabetes pathophysiology and management.  Addressed ADA diet.  Suggested low cholesterol diet.  Encouraged aerobic exercise.  Hypertension:   Regular aerobic exercise.  Check blood pressures daily and record.    Goals: Diabetes: Maintain Hemoglobin A1C below 7 and Hypertension: Reduce Blood Pressure    Did patient meet goals/outcomes: Yes    The following self management tools provided: declined    Patient Instructions (the written plan) was given to the patient/family.     Time spent with patient: 45 minutes    Barriers to medications present (no )    Adverse reactions to current  medications (no)    Over the counter medications reviewed (Yes)        Follow-up in about 4 weeks (around 3/15/2019) for regular F/U DM HTN 40 min.        Answers for HPI/ROS submitted by the patient on 2/15/2019   Cough  Chronicity: chronic  Onset: more than 1 month ago  Progression since onset: gradually worsening  Frequency: every few minutes  Cough characteristics: productive of sputum  ear congestion: No  heartburn: No  hemoptysis: No  nasal congestion: No  sweats: Yes  weight loss: Yes  Aggravated by: nothing  asthma: Yes  bronchiectasis: No  bronchitis: Yes  COPD: No  emphysema: No  pneumonia: Yes  Treatments tried: OTC cough suppressant, body position changes, oral steroids, rest  Improvement on treatment: no relief

## 2019-02-16 VITALS
HEIGHT: 67 IN | SYSTOLIC BLOOD PRESSURE: 128 MMHG | DIASTOLIC BLOOD PRESSURE: 80 MMHG | HEART RATE: 98 BPM | BODY MASS INDEX: 26.53 KG/M2 | TEMPERATURE: 98 F | WEIGHT: 169 LBS

## 2019-02-16 PROBLEM — G47.411 PRIMARY NARCOLEPSY WITH CATAPLEXY: Status: ACTIVE | Noted: 2019-02-16

## 2019-02-18 ENCOUNTER — OFFICE VISIT (OUTPATIENT)
Dept: ORTHOPEDICS | Facility: CLINIC | Age: 49
End: 2019-02-18
Payer: COMMERCIAL

## 2019-02-18 VITALS
HEART RATE: 61 BPM | DIASTOLIC BLOOD PRESSURE: 76 MMHG | WEIGHT: 169 LBS | BODY MASS INDEX: 26.53 KG/M2 | SYSTOLIC BLOOD PRESSURE: 120 MMHG | HEIGHT: 67 IN

## 2019-02-18 DIAGNOSIS — M25.532 ACUTE PAIN OF LEFT WRIST: ICD-10-CM

## 2019-02-18 DIAGNOSIS — S62.175A CLOSED NONDISPLACED FRACTURE OF TRAPEZIUM OF LEFT WRIST, INITIAL ENCOUNTER: Primary | ICD-10-CM

## 2019-02-18 PROCEDURE — 99213 OFFICE O/P EST LOW 20 MIN: CPT | Mod: ,,, | Performed by: ORTHOPAEDIC SURGERY

## 2019-02-18 PROCEDURE — 99213 PR OFFICE/OUTPT VISIT, EST, LEVL III, 20-29 MIN: ICD-10-PCS | Mod: ,,, | Performed by: ORTHOPAEDIC SURGERY

## 2019-02-18 NOTE — PROGRESS NOTES
"[facility]    Subjective:     Chief Complaint:   Chief Complaint   Patient presents with    Left Wrist - Pain     Left wrist pain/MRI results f/u States that the wrist feels a little bit better. States that at times her thumb and index finger feels hot and numb.        Past Medical History:   Diagnosis Date    Abnormal Pap smear     "pre-cancer cells post hysterectomy"    Anxiety     Arthritis     Depression     Good hypertension control 2014    Hyperlipidemia     Hypertension     Hypertriglyceridemia     Meningitis     Narcolepsy     ALESIA (obstructive sleep apnea)     Peripheral neuropathy 2014    Psoriasis     Psoriasis 5/10/2018    Tuberculosis        Past Surgical History:   Procedure Laterality Date    BREAST SURGERY      aumentation/ reduction     BREAST SURGERY  2017    augmentation     brizilian butt lift   2017     SECTION      CHOLECYSTECTOMY  2018    Dr [facility]    FOOT SURGERY      GASTRIC BYPASS  2015    GASTRIC BYPASS      HERNIA REPAIR      HYSTERECTOMY      KNEE SURGERY Left     SHOULDER SURGERY      right should surgery     SHOULDER SURGERY  2017    left shoulder / torn labrium     THIGH LIFT  2017    TONSILLECTOMY      tummy tuck   2016       Current Outpatient Medications   Medication Sig    amLODIPine (NORVASC) 5 MG tablet Take 1 tablet (5 mg total) by mouth once daily.    amoxicillin-clavulanate 875-125mg (AUGMENTIN) 875-125 mg per tablet Take 1 tablet by mouth 2 (two) times daily with meals.    armodafinil (NUVIGIL) 250 mg tablet Take 250 mg by mouth 2 (two) times daily.    dextroamphetamine-amphetamine (ADDERALL) 20 mg tablet TAKE 1 TABLET (20 MG) BY MOUTH ONCE DAILY BEFORE BREAKFAST    DUEXIS 800-26.6 mg Tab Take 1 tablet by mouth 3 (three) times daily.     EPINEPHrine (EPIPEN) 0.3 mg/0.3 mL AtIn Inject 0.3 mLs (0.3 mg total) into the muscle once. As directed " PRN for 1 dose    estradiol (ESTRACE) 0.01 % (0.1 mg/gram) vaginal cream Place 1 g vaginally once daily. For 7 days, then 1 gram 2-3 days per week    fluconazole (DIFLUCAN) 150 MG Tab Take 1 tablet (150 mg total) by mouth every other day. for 2 doses    promethazine-dextromethorphan (PROMETHAZINE-DM) 6.25-15 mg/5 mL Syrp Take 5 mLs by mouth every 6 (six) hours as needed (cough).    STELARA 45 mg/0.5 mL Syrg syringe Inject 45 mg into the muscle every 3 (three) months.     TROKENDI  mg Cp24 Take 1 capsule by mouth once daily.     No current facility-administered medications for this visit.        Review of patient's allergies indicates:   Allergen Reactions    Alcohol      Other reaction(s): Unknown       Family History   Problem Relation Age of Onset    Cancer Mother         breast bilateral/ skin     Breast cancer Mother     Skin cancer Mother     Diabetes Father     Heart disease Father 65        CABG    Hypertension Father     Cancer Father         skin    Skin cancer Father     Gout Father     Gout Brother     No Known Problems Daughter     No Known Problems Son     Cancer Maternal Aunt         lung, pancrease - smoker , breast    Lung cancer Maternal Aunt         x2    Pancreatic cancer Maternal Aunt     Breast cancer Maternal Aunt     No Known Problems Maternal Uncle     No Known Problems Paternal Uncle     Cancer Maternal Grandmother         pancrease    Pancreatic cancer Maternal Grandmother     Breast cancer Maternal Grandmother     Diabetes Paternal Grandmother     Heart disease Paternal Grandfather     Colon cancer Neg Hx     Ovarian cancer Neg Hx        Social History     Socioeconomic History    Marital status:      Spouse name: Not on file    Number of children: Not on file    Years of education: Not on file    Highest education level: Not on file   Social Needs    Financial resource strain: Not on file    Food insecurity - worry: Not on file    Food  insecurity - inability: Not on file    Transportation needs - medical: Not on file    Transportation needs - non-medical: Not on file   Occupational History    Not on file   Tobacco Use    Smoking status: Never Smoker    Smokeless tobacco: Never Used   Substance and Sexual Activity    Alcohol use: No     Comment: ALLERGIC TO ALCOHOL(DRINKING) PER PATIENT    Drug use: No    Sexual activity: Yes     Partners: Male     Birth control/protection: Surgical     Comment: SO   Other Topics Concern    Not on file   Social History Narrative    Not on file       History of present illness: We have a left wrist MRI. We are looking for navicular fracture and we can see that there is probably a minimal fracture of the proximal volar corner of the trapezium. It does not appear to be a significant scaphoid fracture. There is not significant CMC arthritis. We are now about 5 the story he wearing a simple wrist brace which doesn't help that much but that would be explained with a trapezium injury.      Review of Systems:    Constitution: Negative for chills, fever, and sweats.  Negative for unexplained weight loss.    HENT:  Negative for headaches and blurry vision.    Cardiovascular:Negative for chest pain or irregular heart beat. Negative for hypertension.    Respiratory:  Negative for cough and shortness of breath.    Gastrointestinal: Negative for abdominal pain, heartburn, melena, nausea, and vomitting.    Genitourinary:  Negative bladder incontinence and dysuria.    Musculoskeletal:  See HPI for details.     Neurological: Negative for numbness.    Psychiatric/Behavioral: Negative for depression.  The patient is not nervous/anxious.      Endocrine: Negative for polyuria    Hematologic/Lymphatic: Negative for bleeding problem.  Does not bruise/bleed easily.    Skin: Negative for poor would healing and rash    Objective:      Physical Examination:    Vital Signs:    Vitals:    02/18/19 1525   BP: 120/76   Pulse: 61        Body mass index is 26.47 kg/m².    This a well-developed, well nourished patient in no acute distress.  They are alert and oriented and cooperative to examination.        So physical exam shows she has mild tenderness at the base of the thumb. Mild pain with grind test. No subluxation of the base of the thumb. Good range of motion of the wrist minimally tenderness snuffbox area.  Pertinent New Results:    XRAY Report / Interpretation:       Assessment/Plan:      So we have a minimal trapezius fracture trapezium not a scaphoid injury. 5 weeks into the story. I believe the wrist brace is optional not going to help that much. Did not seem like she had enough symptoms to require a thumb spica brace. 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 Brace to be optional so we left it off at this point it should improve over time and should heal fine. I don't think is any reason to expect long-term issues. She is right handed and is working. Return when necessary      This note was created using Dragon voice recognition software that occasionally misinterpreted phrases or words.

## 2019-02-19 ENCOUNTER — PATIENT MESSAGE (OUTPATIENT)
Dept: FAMILY MEDICINE | Facility: CLINIC | Age: 49
End: 2019-02-19

## 2019-02-26 ENCOUNTER — OFFICE VISIT (OUTPATIENT)
Dept: FAMILY MEDICINE | Facility: CLINIC | Age: 49
End: 2019-02-26
Payer: COMMERCIAL

## 2019-02-26 VITALS
HEART RATE: 64 BPM | BODY MASS INDEX: 26.53 KG/M2 | HEIGHT: 67 IN | TEMPERATURE: 98 F | SYSTOLIC BLOOD PRESSURE: 130 MMHG | WEIGHT: 169 LBS | DIASTOLIC BLOOD PRESSURE: 80 MMHG | OXYGEN SATURATION: 97 %

## 2019-02-26 DIAGNOSIS — R05.3 CHRONIC COUGH: Primary | ICD-10-CM

## 2019-02-26 DIAGNOSIS — E11.59 HYPERTENSION ASSOCIATED WITH DIABETES: Chronic | ICD-10-CM

## 2019-02-26 DIAGNOSIS — J40 BRONCHITIS: ICD-10-CM

## 2019-02-26 DIAGNOSIS — I15.2 HYPERTENSION ASSOCIATED WITH DIABETES: Chronic | ICD-10-CM

## 2019-02-26 DIAGNOSIS — R50.9 FEVER, UNSPECIFIED FEVER CAUSE: ICD-10-CM

## 2019-02-26 PROCEDURE — 99214 PR OFFICE/OUTPT VISIT, EST, LEVL IV, 30-39 MIN: ICD-10-PCS | Mod: S$GLB,,, | Performed by: NURSE PRACTITIONER

## 2019-02-26 PROCEDURE — 99999 PR PBB SHADOW E&M-EST. PATIENT-LVL V: ICD-10-PCS | Mod: PBBFAC,,, | Performed by: NURSE PRACTITIONER

## 2019-02-26 PROCEDURE — 99999 PR PBB SHADOW E&M-EST. PATIENT-LVL V: CPT | Mod: PBBFAC,,, | Performed by: NURSE PRACTITIONER

## 2019-02-26 PROCEDURE — 99214 OFFICE O/P EST MOD 30 MIN: CPT | Mod: S$GLB,,, | Performed by: NURSE PRACTITIONER

## 2019-02-26 RX ORDER — FLUTICASONE PROPIONATE AND SALMETEROL 100; 50 UG/1; UG/1
1 POWDER RESPIRATORY (INHALATION) 2 TIMES DAILY
Qty: 60 EACH | Refills: 0 | Status: SHIPPED | OUTPATIENT
Start: 2019-02-26 | End: 2019-06-24

## 2019-02-26 RX ORDER — PROMETHAZINE HYDROCHLORIDE AND DEXTROMETHORPHAN HYDROBROMIDE 6.25; 15 MG/5ML; MG/5ML
5 SYRUP ORAL EVERY 6 HOURS PRN
Qty: 240 ML | Refills: 0 | Status: SHIPPED | OUTPATIENT
Start: 2019-02-26 | End: 2019-02-28 | Stop reason: RX

## 2019-02-27 ENCOUNTER — HOSPITAL ENCOUNTER (OUTPATIENT)
Dept: RADIOLOGY | Facility: CLINIC | Age: 49
Discharge: HOME | End: 2019-02-27
Attending: NURSE PRACTITIONER
Payer: COMMERCIAL

## 2019-02-27 DIAGNOSIS — R50.9 FEVER, UNSPECIFIED FEVER CAUSE: ICD-10-CM

## 2019-02-27 DIAGNOSIS — R05.3 CHRONIC COUGH: ICD-10-CM

## 2019-02-27 PROCEDURE — 71046 X-RAY EXAM CHEST 2 VIEWS: CPT | Mod: 26,,, | Performed by: RADIOLOGY

## 2019-02-27 PROCEDURE — 71046 XR CHEST PA AND LATERAL: ICD-10-PCS | Mod: 26,,, | Performed by: RADIOLOGY

## 2019-02-27 PROCEDURE — 71046 X-RAY EXAM CHEST 2 VIEWS: CPT | Mod: TC,FY,PO

## 2019-02-27 NOTE — PATIENT INSTRUCTIONS
Step-by-Step  Using a Dry-Powder Diskus Inhaler    Date Last Reviewed: 3/1/2017  © 6233-8875 [email or web address]. [address] [place] [address], [place] [address]. All rights reserved. This information is not intended as a substitute for professional medical care. Always follow your healthcare professional's instructions.

## 2019-02-27 NOTE — PROGRESS NOTES
"Subjective:       Patient ID: Holly Chicas is a 48 y.o. female.    Chief Complaint: Cough    Chief Complaint  Chief Complaint   Patient presents with    Cough       HPI  Holly Chicas is a 48 y.o. female with medical diagnoses as listed in the medical history and problem list that presents continued complaints of cough.  Patient was seen in the office on 2/15/2019 and was treated at that visit for bilateral otitis media with Augmentin 875/125 mg one tablet 2 times daily for seven days.  Promethazine DM cough medicine was also provided at that visit.  Patient has finished her antibiotics.  Patient is regularly treated for hypertension, type 2 diabetes, and hyperlipidemia and is scheduled for regular diabetes follow-up visit on 3/11/2019 when her health maintenance needs will be addressed.  Blood pressure is elevated today 148/88 on the right 152/96 on the left, down to 130/80 with repeat measure at end of visit.  BMI is 26.47 weight is unchanged at 169 lb since last visit.  Established patient with last clinic appointment 2/15/2019.        PAST MEDICAL HISTORY:  Past Medical History:   Diagnosis Date    Abnormal Pap smear     "pre-cancer cells post hysterectomy"    Anxiety     Arthritis     Depression     Good hypertension control 2014    Hyperlipidemia     Hypertension     Hypertriglyceridemia     Meningitis     Narcolepsy     ALESIA (obstructive sleep apnea)     Peripheral neuropathy 2014    Psoriasis     Psoriasis 5/10/2018    Tuberculosis        PAST SURGICAL HISTORY:  Past Surgical History:   Procedure Laterality Date    BREAST SURGERY  2016    aumentation/ reduction     BREAST SURGERY  2017    augmentation     brizilian butt lift   2017     SECTION      CHOLECYSTECTOMY  2018    Dr [facility]    FOOT SURGERY      GASTRIC BYPASS  2015    GASTRIC BYPASS  2015    HERNIA REPAIR      HYSTERECTOMY      KNEE SURGERY Left     SHOULDER " SURGERY  2000    right should surgery     SHOULDER SURGERY  01/2017    left shoulder / torn labrium     THIGH LIFT  03/03/2017    TONSILLECTOMY      tummy tuck   02/28/2016       SOCIAL HISTORY:  Social History     Socioeconomic History    Marital status:      Spouse name: Not on file    Number of children: Not on file    Years of education: Not on file    Highest education level: Not on file   Social Needs    Financial resource strain: Not on file    Food insecurity - worry: Not on file    Food insecurity - inability: Not on file    Transportation needs - medical: Not on file    Transportation needs - non-medical: Not on file   Occupational History    Not on file   Tobacco Use    Smoking status: Never Smoker    Smokeless tobacco: Never Used   Substance and Sexual Activity    Alcohol use: No     Comment: ALLERGIC TO ALCOHOL(DRINKING) PER PATIENT    Drug use: No    Sexual activity: Yes     Partners: Male     Birth control/protection: Surgical     Comment: SO   Other Topics Concern    Not on file   Social History Narrative    Not on file       FAMILY HISTORY:  Family History   Problem Relation Age of Onset    Cancer Mother         breast bilateral/ skin     Breast cancer Mother     Skin cancer Mother     Diabetes Father     Heart disease Father 65        CABG    Hypertension Father     Cancer Father         skin    Skin cancer Father     Gout Father     Gout Brother     No Known Problems Daughter     No Known Problems Son     Cancer Maternal Aunt         lung, pancrease - smoker , breast    Lung cancer Maternal Aunt         x2    Pancreatic cancer Maternal Aunt     Breast cancer Maternal Aunt     No Known Problems Maternal Uncle     No Known Problems Paternal Uncle     Cancer Maternal Grandmother         pancrease    Pancreatic cancer Maternal Grandmother     Breast cancer Maternal Grandmother     Diabetes Paternal Grandmother     Heart disease Paternal Grandfather      Colon cancer Neg Hx     Ovarian cancer Neg Hx        ALLERGIES AND MEDICATIONS: updated and reviewed.  Review of patient's allergies indicates:   Allergen Reactions    Alcohol      Other reaction(s): Unknown     Current Outpatient Medications   Medication Sig Dispense Refill    amLODIPine (NORVASC) 5 MG tablet Take 1 tablet (5 mg total) by mouth once daily. (Patient taking differently: Take 10 mg by mouth once daily. ) 30 tablet 5    armodafinil (NUVIGIL) 250 mg tablet Take 250 mg by mouth 2 (two) times daily.  2    dextroamphetamine-amphetamine (ADDERALL) 20 mg tablet TAKE 1 TABLET (20 MG) BY MOUTH ONCE DAILY BEFORE BREAKFAST  0    DUEXIS 800-26.6 mg Tab Take 1 tablet by mouth 3 (three) times daily.       EPINEPHrine (EPIPEN) 0.3 mg/0.3 mL AtIn Inject 0.3 mLs (0.3 mg total) into the muscle once. As directed PRN for 1 dose 2 each 11    estradiol (ESTRACE) 0.01 % (0.1 mg/gram) vaginal cream Place 1 g vaginally once daily. For 7 days, then 1 gram 2-3 days per week 30 g 5    promethazine-dextromethorphan (PROMETHAZINE-DM) 6.25-15 mg/5 mL Syrp Take 5 mLs by mouth every 6 (six) hours as needed (cough). 240 mL 0    STELARA 45 mg/0.5 mL Syrg syringe Inject 45 mg into the muscle every 3 (three) months.       TROKENDI  mg Cp24 Take 1 capsule by mouth once daily.  5    fluticasone-salmeterol 100-50 mcg/dose (ADVAIR) 100-50 mcg/dose diskus inhaler Inhale 1 puff into the lungs 2 (two) times daily. Controller. Rinse mouth with water and spit after use. 60 each 0     No current facility-administered medications for this visit.        I have reviewed the patient's medical history in detail and updated the computerized patient record.    Review of Systems   Constitutional: Positive for fatigue and fever. Negative for activity change, appetite change and chills.        Low grade fever 100 today at work, fatigued all the time due to narcolepsy   HENT: Negative for congestion, ear pain, postnasal drip,  "rhinorrhea, sinus pressure, sinus pain and sore throat.    Eyes: Negative for visual disturbance.   Respiratory: Positive for cough. Negative for shortness of breath and wheezing.         Cough day and night, mostly  Non productive. Denies SOB and wheezing.   Cardiovascular: Positive for chest pain. Negative for palpitations and leg swelling.        Midsternal chest pain that lasted a couple of minutes on Sunday, sharp pain, no radiation, not accompanied by SOB, was resting when occurred, went away on its own.    Gastrointestinal: Positive for diarrhea. Negative for abdominal pain, constipation, nausea and vomiting.        Has had some diarrhea since taking antibiotic.    Musculoskeletal: Positive for myalgias.        Whole body feels achy.   Skin: Negative for rash.   Allergic/Immunologic: Negative for environmental allergies.   Neurological: Positive for dizziness and headaches.        Has been feeling dizzy when getting out of bed. Headache from coughing.    Psychiatric/Behavioral: Negative for sleep disturbance.        Able to sleep with cough medicine         Objective:      Vitals:    02/26/19 1818 02/26/19 1819 02/26/19 1855   BP: (!) 148/88 (!) 152/96 130/80   BP Location: Right arm Left arm Right arm   Patient Position: Sitting Sitting Sitting   BP Method: Large (Automatic) Large (Automatic) Large (Manual)   Pulse: 64     Temp: 98 °F (36.7 °C)     TempSrc: Oral     SpO2: 97%     Weight: 76.7 kg (169 lb)     Height: 5' 7" (1.702 m)       Physical Exam   Constitutional: She is oriented to person, place, and time. She appears well-developed. No distress.   HENT:   Head: Normocephalic.   Right Ear: Hearing, tympanic membrane, external ear and ear canal normal.   Left Ear: Hearing, tympanic membrane, external ear and ear canal normal.   Nose: Nose normal. No mucosal edema.   Mouth/Throat: Oropharynx is clear and moist and mucous membranes are normal.   Eyes: Conjunctivae and lids are normal. Pupils are equal, " round, and reactive to light. Right eye exhibits no discharge. Left eye exhibits no discharge.   Neck: Normal carotid pulses present. Carotid bruit is not present.   Cardiovascular: Normal rate, regular rhythm, S1 normal, S2 normal, normal heart sounds, intact distal pulses and normal pulses.   No murmur heard.  Pulses:       Carotid pulses are 2+ on the right side, and 2+ on the left side.       Radial pulses are 2+ on the right side, and 2+ on the left side.   Pulmonary/Chest: Effort normal and breath sounds normal. No respiratory distress. She has no decreased breath sounds. She has no wheezes. She has no rhonchi. She has no rales.   Dry hacking cough   Musculoskeletal: Normal range of motion.   Lymphadenopathy:        Head (right side): No submental, no submandibular, no tonsillar, no preauricular, no posterior auricular and no occipital adenopathy present.        Head (left side): No submental, no submandibular, no tonsillar, no preauricular, no posterior auricular and no occipital adenopathy present.     She has no cervical adenopathy.        Right: No supraclavicular adenopathy present.        Left: No supraclavicular adenopathy present.   Neurological: She is alert and oriented to person, place, and time. She has normal strength.   Skin: Skin is warm, dry and intact. She is not diaphoretic. No pallor.   Psychiatric: She has a normal mood and affect. Her speech is normal and behavior is normal. Judgment and thought content normal. Cognition and memory are normal.   Nursing note and vitals reviewed.        Assessment:       1. Chronic cough    2. Bronchitis    3. Fever, unspecified fever cause    4. Hypertension associated with diabetes    5. BMI 26.0-26.9,adult          Plan:       Holly was seen today for cough.    Diagnoses and all orders for this visit:    Chronic cough  -     promethazine-dextromethorphan (PROMETHAZINE-DM) 6.25-15 mg/5 mL Syrp; Take 5 mLs by mouth every 6 (six) hours as needed  (cough).  -     X-Ray Chest PA And Lateral; Future  -     fluticasone-salmeterol 100-50 mcg/dose (ADVAIR) 100-50 mcg/dose diskus inhaler; Inhale 1 puff into the lungs 2 (two) times daily. Controller. Rinse mouth with water and spit after use.   - Educational handouts provided.  Step by step using a dry powder Diskus inhaler    Bronchitis  -     fluticasone-salmeterol 100-50 mcg/dose (ADVAIR) 100-50 mcg/dose diskus inhaler; Inhale 1 puff into the lungs 2 (two) times daily. Controller. Rinse mouth with water and spit after use.  - patient is advised that chronic cough may be due to viral bronchitis and that this type of cough can sometimes last for 4-6 weeks following onset of symptoms    Fever, unspecified fever cause  -     X-Ray Chest PA And Lateral; Future  - instructed to take Tylenol or Advil over-the-counter per label instructions for fever greater than 100.4    Hypertension associated with diabetes   Blood pressure was initially elevated 148/88 on the right, 152/96 on the left.  Recheck at end of visit with manual cuff 130/80 on the right.   Patient is going to be leaving to go out of state on vacation, no change in blood pressure medications at this time, will readdress at regular scheduled follow-up appointment on 3/11/2019  BMI 26.0-26.9,adult   BMI is 26.47 today and the patient's weight is unchanged at 169 lb since last visit on 2/15/2019   Weight loss recommended with well balanced diet and portion controlled meals and increased activity.     Follow-up for keep scheduled follow up appointment 3/11/19.        Answers for HPI/ROS submitted by the patient on 2/25/2019   Cough  Chronicity: chronic  Onset: more than 1 year ago  Progression since onset: gradually worsening  Frequency: every few minutes  Cough characteristics: productive of sputum  ear congestion: No  heartburn: No  hemoptysis: No  nasal congestion: No  sweats: Yes  weight loss: Yes  Aggravated by: lying down  Risk factors for lung disease:  animal exposure  asthma: No  bronchiectasis: No  bronchitis: No  COPD: No  emphysema: No  pneumonia: No  Treatments tried: OTC cough suppressant, body position changes, prescription cough suppressant, rest  Improvement on treatment: no relief

## 2019-02-28 ENCOUNTER — PATIENT MESSAGE (OUTPATIENT)
Dept: FAMILY MEDICINE | Facility: CLINIC | Age: 49
End: 2019-02-28

## 2019-02-28 DIAGNOSIS — R05.9 COUGH: Primary | ICD-10-CM

## 2019-02-28 RX ORDER — HYDROCODONE POLISTIREX AND CHLORPHENIRAMINE POLISTIREX 10; 8 MG/5ML; MG/5ML
5 SUSPENSION, EXTENDED RELEASE ORAL EVERY 12 HOURS PRN
Qty: 120 ML | Refills: 0 | Status: SHIPPED | OUTPATIENT
Start: 2019-02-28 | End: 2019-03-11 | Stop reason: ALTCHOICE

## 2019-03-08 DIAGNOSIS — E11.9 TYPE 2 DIABETES MELLITUS WITHOUT COMPLICATION, UNSPECIFIED WHETHER LONG TERM INSULIN USE: ICD-10-CM

## 2019-03-08 DIAGNOSIS — E11.9 TYPE 2 DIABETES MELLITUS WITHOUT COMPLICATION: ICD-10-CM

## 2019-03-11 ENCOUNTER — OFFICE VISIT (OUTPATIENT)
Dept: FAMILY MEDICINE | Facility: CLINIC | Age: 49
End: 2019-03-11
Payer: COMMERCIAL

## 2019-03-11 VITALS
SYSTOLIC BLOOD PRESSURE: 126 MMHG | BODY MASS INDEX: 26.68 KG/M2 | TEMPERATURE: 99 F | WEIGHT: 170 LBS | DIASTOLIC BLOOD PRESSURE: 80 MMHG | HEART RATE: 80 BPM | HEIGHT: 67 IN

## 2019-03-11 DIAGNOSIS — E11.59 HYPERTENSION ASSOCIATED WITH DIABETES: Chronic | ICD-10-CM

## 2019-03-11 DIAGNOSIS — I15.2 HYPERTENSION ASSOCIATED WITH DIABETES: Chronic | ICD-10-CM

## 2019-03-11 DIAGNOSIS — E11.69 HYPERLIPIDEMIA ASSOCIATED WITH TYPE 2 DIABETES MELLITUS: ICD-10-CM

## 2019-03-11 DIAGNOSIS — G47.33 OBSTRUCTIVE SLEEP APNEA SYNDROME: Chronic | ICD-10-CM

## 2019-03-11 DIAGNOSIS — J20.9 ACUTE BRONCHITIS, UNSPECIFIED ORGANISM: ICD-10-CM

## 2019-03-11 DIAGNOSIS — G47.411 PRIMARY NARCOLEPSY WITH CATAPLEXY: ICD-10-CM

## 2019-03-11 DIAGNOSIS — E78.5 HYPERLIPIDEMIA ASSOCIATED WITH TYPE 2 DIABETES MELLITUS: ICD-10-CM

## 2019-03-11 DIAGNOSIS — L40.9 PSORIASIS: ICD-10-CM

## 2019-03-11 DIAGNOSIS — Z00.00 ANNUAL PHYSICAL EXAM: Primary | ICD-10-CM

## 2019-03-11 DIAGNOSIS — R53.82 CHRONIC FATIGUE: ICD-10-CM

## 2019-03-11 DIAGNOSIS — Z98.84 STATUS POST GASTRIC BYPASS FOR OBESITY: ICD-10-CM

## 2019-03-11 DIAGNOSIS — E11.9 TYPE 2 DIABETES MELLITUS WITHOUT COMPLICATION, WITHOUT LONG-TERM CURRENT USE OF INSULIN: ICD-10-CM

## 2019-03-11 DIAGNOSIS — Z12.11 COLON CANCER SCREENING: ICD-10-CM

## 2019-03-11 DIAGNOSIS — B35.6 TINEA CRURIS: ICD-10-CM

## 2019-03-11 DIAGNOSIS — G43.009 MIGRAINE WITHOUT AURA AND WITHOUT STATUS MIGRAINOSUS, NOT INTRACTABLE: ICD-10-CM

## 2019-03-11 PROCEDURE — 99999 PR PBB SHADOW E&M-EST. PATIENT-LVL V: CPT | Mod: PBBFAC,,, | Performed by: NURSE PRACTITIONER

## 2019-03-11 PROCEDURE — 99396 PREV VISIT EST AGE 40-64: CPT | Mod: S$GLB,,, | Performed by: NURSE PRACTITIONER

## 2019-03-11 PROCEDURE — 99396 PR PREVENTIVE VISIT,EST,40-64: ICD-10-PCS | Mod: S$GLB,,, | Performed by: NURSE PRACTITIONER

## 2019-03-11 PROCEDURE — 99999 PR PBB SHADOW E&M-EST. PATIENT-LVL V: ICD-10-PCS | Mod: PBBFAC,,, | Performed by: NURSE PRACTITIONER

## 2019-03-11 RX ORDER — CETIRIZINE HYDROCHLORIDE 5 MG/1
5 TABLET ORAL DAILY
COMMUNITY
End: 2019-10-09

## 2019-03-11 RX ORDER — BUTENAFINE HYDROCHLORIDE 10 MG/G
CREAM TOPICAL DAILY
Qty: 15 G | Refills: 0
Start: 2019-03-11 | End: 2019-10-09

## 2019-03-11 RX ORDER — PROMETHAZINE HYDROCHLORIDE AND DEXTROMETHORPHAN HYDROBROMIDE 6.25; 15 MG/5ML; MG/5ML
SYRUP ORAL
Refills: 0 | COMMUNITY
Start: 2019-03-04 | End: 2019-06-24

## 2019-03-11 NOTE — PATIENT INSTRUCTIONS
Prevention Guidelines, Women Ages 50 to 64  Screening tests and vaccines are an important part of managing your health. Health counseling is essential, too. Below are guidelines for these, for women ages 50 to 64. Talk with your healthcare provider to make sure youre up to date on what you need.  Screening Who needs it How often   Type 2 diabetes or prediabetes All adults beginning at age 45 and adults without symptoms at any age who are overweight or obese and have 1 or more additional risk factors for diabetes. At  least every 3 years   Alcohol misuse All women in this age group At routine exams   Blood pressure All women in this age group Every 2 years if your blood pressure is less than 120/80 mm Hg; yearly if your systolic blood pressure is 120 to 139 mm Hg, or your diastolic blood pressure reading is 80 to 89 mm Hg   Breast cancer All women in this age group Yearly mammogram and clinical breast exam1   Cervical cancer All women in this age group, except women who have had a complete hysterectomy Pap test every 3 years or Pap test with human papillomavirus (HPV) test every 5 years   Chlamydia Women at increased risk for infection At routine exams   Colorectal cancer All women in this age group Flexible sigmoidoscopy every 5 years, or colonoscopy every 10 years, or double-contrast barium enema every 5 years; yearly fecal occult blood test or fecal immunochemical test; or a stool DNA test as often as your health care provider advises; talk with your health care provider about which tests are best for you   Depression All women in this age group At routine exams   Gonorrhea Sexually active women at increased risk for infection At routine exams   Hepatitis C Anyone at increased risk; 1 time for those born between 1945 and 1965 At routine exams   High cholesterol or triglycerides All women in this age group who are at risk for coronary artery disease At least every 5 years   HIV All women At routine exams   Lung  cancer Adults age 55 to 80 who have smoked Yearly screening in smokers with 30 pack-year history of smoking or who quit within 15 years   Obesity All women in this age group At routine exams   Osteoporosis Women who are postmenopausal Ask your healthcare provider   Syphilis Women at increased risk for infection - talk with your healthcare provider At routine exams   Tuberculosis Women at increased risk for infection - talk with your healthcare provider Ask your healthcare provider   Vision All women in this age group Ask your healthcare provider   Vaccine Who needs it How often   Chickenpox (varicella) All women in this age group who have no record of this infection or vaccine 2 doses; the second dose should be given at least 4 weeks after the first dose   Hepatitis A Women at increased risk for infection - talk with your healthcare provider 2 doses given at least 6 months apart   Hepatitis B Women at increased risk for infection - talk with your healthcare provider 3 doses over 6 months; second dose should be given 1 month after the first dose; the third dose should be given at least 2 months after the second dose and at least 4 months after the first dose   Haemophilus influenzaeType B (HIB) Women at increased risk for infection - talk with your healthcare provider 1 to 3 doses   Influenza (flu) All women in this age group Once a year   Measles, mumps, rubella (MMR) Women in this age group through their late 50s who have no record of these infections or vaccines 1 dose   Meningococcal Women at increased risk for infection - talk with your healthcare provider 1 or more doses   Pneumococcal conjugate vaccine (PCV13) and pneumococcal polysaccharide vaccine (PPSV23) Women at increased risk for infection - talk with your healthcare provider PCV13: 1 dose ages 19 to 65 (protects against 13 types of pneumococcal bacteria)  PPSV23: 1 to 2 doses through age 64, or 1 dose at 65 or older (protects against 23 types of  pneumococcal bacteria)   Tetanus/diphtheria/pertussis (Td/Tdap) booster All women in this age group Td every 10 years, or a one-time dose of Tdap instead of a Td booster after age 18, then Td every 10 years   Zoster All women ages 60 and older 1 dose   Counseling Who needs it How often   BRCA gene mutation testing for breast and ovarian cancer susceptibility Women with increased risk for having gene mutation When your risk is known   Breast cancer and chemoprevention Women at high risk for breast cancer When your risk is known   Diet and exercise Women who are overweight or obese When diagnosed, and then at routine exams   Sexually transmitted infection prevention Women at increased risk for infection - talk with your healthcare provider At routine exams   Use of daily aspirin Women ages 55 and up in this age group who are at risk for cardiovascular health problems such as stroke When your risk is known   Use of tobacco and the health effects it can cause All women in this age group Every exam   1[employer]  Date Last Reviewed: 1/26/2016  © 3692-0273 [employer]. [address] [place] [address]. All rights reserved. This information is not intended as a substitute for professional medical care. Always follow your healthcare professional's instructions.        Ringworm of the Skin    Ringworm is a fungal infection of the skin. Despite the name, a worm doesn't cause it. The cause of ringworm is a fungus that infects the outer layers of the skin. It is also not caused by bed bugs, scabies, or lice. These are totally different.  The medical term for ringworm is tinea. It can affect most parts of your body, although it seems to do better in moist areas of the body and around hair. It can be on almost any part of your body, including:  · Arms, hands, legs, chest, feet, and back  · Scalp  · Beard  · Groin  · Between the toes  Depending on where it is located, sometimes the name  changes:  · Tinea capitis (scalp)  · Tinea cruris (groin)  · Tinea corporis (body)  · Tinea pedis (feet)  Causes  Ringworm is very common all over the world, including the [place] It can take less than 1 week up to 2 weeks before you develop the infection after being exposed. So, you may not figure out the exact cause.  It is spread through direct contact with:  · An infected person or animal  · Infected soil, or objects such as towels, clothing, and dorsey  Symptoms  At first you might not notice ringworm. Or you may just see a small, red, often raised itchy spot or pimple. Sometimes there may only be one spot. At other times there may be several. Ringworm can look slightly different on different parts of the body, but there are some things are always present:  · Irregular, round, oval or ring-shaped, which is why it's called ringworm  · Clearer or lighter color at the center, since it spreads from the center of the spot outward  · Red or inflamed look  · Raised  · Itchy  · Scaly, dry, or flaky  Home care  Follow these tips to help care for yourself at home:  · Leave it alone. Don't scratch at the rash or pick it. This can increase the chance of infection and scarring.  · Take medicine as prescribed. If you were prescribed a cream, apply it exactly as directed. Make sure to put the cream not just on the rash, but also on the skin 1 or 2 inches around it. Medicine by mouth is sometimes needed, particularly for ringworm on the scalp. Take it as directed and until your healthcare provider says to stop.  · Keep it from spreading to others. Untreated ringworm of the skin is contagious by skin-to-skin contact. Your child may return to school 2 days after treatment has started.  Prevention  To some degree, prevention depends on what part of your body was affected. In general, the following good hygiene can help.  · Clean up after you get dirty or sweaty, or after using a locker room.  · When possible, dont share dorsey and  brushes.  · Avoid having your skin and feet wet or damp for long periods.  · Wear clean, loose-fitting underwear.  Follow-up care  Follow up with your healthcare provider as advised by our staff if the rash does not improve after 10 days of treatment or if the rash spreads to other areas of the body.  When to seek medical advice  Call your healthcare provider right away if any of these occur:  · Redness around the rash gets worse  · Fluid drains from the rash  · Fever of 100.4ºF (38ºC) or higher, or as directed by your healthcare provider  Date Last Reviewed: 8/1/2016  © 4755-1861 [employer]. [address] [place] [address], [place] [address]. All rights reserved. This information is not intended as a substitute for professional medical care. Always follow your healthcare professional's instructions.      If rash does not resolve in 3-4 weeks, see dermatology.

## 2019-03-11 NOTE — PROGRESS NOTES
"Subjective:       Patient ID: Holly Chicas is a 48 y.o. female.    Chief Complaint: Diabetes    Chief Complaint  Chief Complaint   Patient presents with    Diabetes       HPI  Holly Chcias is a 48 y.o. female with medical diagnoses as listed in the medical history and problem list that presents for regular scheduled follow-up of type 2 diabetes, hypertension, hyperlipidemia, obstructive sleep apnea, narcolepsy with cataplexy, peripheral neuropathy, psoriasis, and proteinuria.  Patient has a history of gastric bypass surgery.  Blood pressure today is elevated 144/88 initially and down to 126/80 with manual recheck at end of visit.  BMI is 26.63 and the patient has gained 1 lb since her last visit on 2019.  The patient is due for health maintenance including foot exam, A1c, random urine microalbumin creatinine ratio, lipid panel, eye exam, and colonoscopy.        PAST MEDICAL HISTORY:  Past Medical History:   Diagnosis Date    Abnormal Pap smear     "pre-cancer cells post hysterectomy"    Anxiety     Arthritis     Depression     Good hypertension control 2014    Hyperlipidemia     Hypertension     Hypertriglyceridemia     Meningitis     Narcolepsy     ALESIA (obstructive sleep apnea)     Peripheral neuropathy 2014    Psoriasis     Psoriasis 5/10/2018    Tuberculosis        PAST SURGICAL HISTORY:  Past Surgical History:   Procedure Laterality Date    BREAST SURGERY      aumentation/ reduction     BREAST SURGERY  2017    augmentation     brizilian butt lift   2017     SECTION      CHOLECYSTECTOMY  2018    Dr [facility]    FOOT SURGERY      GASTRIC BYPASS  2015    GASTRIC BYPASS      HERNIA REPAIR      HYSTERECTOMY      KNEE SURGERY Left     SHOULDER SURGERY      right should surgery     SHOULDER SURGERY  2017    left shoulder / torn labrium     THIGH LIFT  2017    TONSILLECTOMY      tummy aylinck   2016 "       SOCIAL HISTORY:  Social History     Socioeconomic History    Marital status:      Spouse name: Not on file    Number of children: Not on file    Years of education: Not on file    Highest education level: Not on file   Social Needs    Financial resource strain: Not on file    Food insecurity - worry: Not on file    Food insecurity - inability: Not on file    Transportation needs - medical: Not on file    Transportation needs - non-medical: Not on file   Occupational History    Not on file   Tobacco Use    Smoking status: Never Smoker    Smokeless tobacco: Never Used   Substance and Sexual Activity    Alcohol use: No     Comment: ALLERGIC TO ALCOHOL(DRINKING) PER PATIENT    Drug use: No    Sexual activity: Yes     Partners: Male     Birth control/protection: Surgical     Comment: SO   Other Topics Concern    Not on file   Social History Narrative    Not on file       FAMILY HISTORY:  Family History   Problem Relation Age of Onset    Cancer Mother         breast bilateral/ skin     Breast cancer Mother     Skin cancer Mother     Diabetes Father     Heart disease Father 65        CABG    Hypertension Father     Cancer Father         skin    Skin cancer Father     Gout Father     Gout Brother     No Known Problems Daughter     No Known Problems Son     Cancer Maternal Aunt         lung, pancrease - smoker , breast    Lung cancer Maternal Aunt         x2    Pancreatic cancer Maternal Aunt     Breast cancer Maternal Aunt     No Known Problems Maternal Uncle     No Known Problems Paternal Uncle     Cancer Maternal Grandmother         pancrease    Pancreatic cancer Maternal Grandmother     Breast cancer Maternal Grandmother     Diabetes Paternal Grandmother     Heart disease Paternal Grandfather     Colon cancer Neg Hx     Ovarian cancer Neg Hx        ALLERGIES AND MEDICATIONS: updated and reviewed.  Review of patient's allergies indicates:   Allergen Reactions     Alcohol      Other reaction(s): Unknown     Current Outpatient Medications   Medication Sig Dispense Refill    amLODIPine (NORVASC) 5 MG tablet Take 1 tablet (5 mg total) by mouth once daily. (Patient taking differently: Take 10 mg by mouth once daily. ) 30 tablet 5    armodafinil (NUVIGIL) 250 mg tablet Take 250 mg by mouth 2 (two) times daily.  2    cetirizine (ZYRTEC) 5 MG tablet Take 5 mg by mouth once daily.      dextroamphetamine-amphetamine (ADDERALL) 20 mg tablet TAKE 1 TABLET (20 MG) BY MOUTH ONCE DAILY BEFORE BREAKFAST  0    DUEXIS 800-26.6 mg Tab Take 1 tablet by mouth 3 (three) times daily.       EPINEPHrine (EPIPEN) 0.3 mg/0.3 mL AtIn Inject 0.3 mLs (0.3 mg total) into the muscle once. As directed PRN for 1 dose 2 each 11    estradiol (ESTRACE) 0.01 % (0.1 mg/gram) vaginal cream Place 1 g vaginally once daily. For 7 days, then 1 gram 2-3 days per week 30 g 5    fluticasone-salmeterol 100-50 mcg/dose (ADVAIR) 100-50 mcg/dose diskus inhaler Inhale 1 puff into the lungs 2 (two) times daily. Controller. Rinse mouth with water and spit after use. 60 each 0    promethazine-dextromethorphan (PROMETHAZINE-DM) 6.25-15 mg/5 mL Syrp   0    STELARA 45 mg/0.5 mL Syrg syringe Inject 45 mg into the muscle every 3 (three) months.       TROKENDI  mg Cp24 Take 1 capsule by mouth once daily.  5    albuterol sulfate (PROAIR RESPICLICK) 90 mcg/actuation AePB Inhale 2 puffs into the lungs every 4 (four) hours as needed (shortness of breath or wheezing). Rescue 1 each 0    butenafine (LOTRIMIN ULTRA) 1 % cream Apply topically once daily. To affected area 15 g 0     No current facility-administered medications for this visit.        I have reviewed the patient's medical history in detail and updated the computerized patient record.    Review of Systems   Constitutional: Positive for fatigue. Negative for activity change, appetite change, chills and fever.        Chronically fatigued due to narcolepsy.   "  HENT: Positive for congestion, postnasal drip and rhinorrhea. Negative for ear pain, sinus pressure, sinus pain and sore throat.         Patient still c/o sinus congestion, post nasal drip and runny nose, occasional laryngitis.    Eyes: Negative for visual disturbance.        Patient had recent eye exam at [facility].   Respiratory: Positive for cough and shortness of breath. Negative for wheezing.         Patient has a previous history of sleep apnea with CPAP but is no longer using CPAP. Patient still has some shortness of breath and cough but it is much improved since last visit.    Cardiovascular: Negative for chest pain, palpitations and leg swelling.   Gastrointestinal: Negative for abdominal pain, blood in stool, constipation, diarrhea, nausea and vomiting.   Endocrine: Negative for polydipsia, polyphagia and polyuria.   Genitourinary: Negative for difficulty urinating, dysuria and menstrual problem.        Has had hysterectomy.    Musculoskeletal: Negative for arthralgias and myalgias.   Skin: Negative for pallor, rash and wound.        Spot to skin on back that she would like looked at.    Neurological: Positive for headaches. Negative for dizziness, tremors, seizures, speech difficulty, weakness and numbness.        Migraine headaches controlled with trokendi   Psychiatric/Behavioral: Negative for confusion, dysphoric mood and sleep disturbance. The patient is not nervous/anxious.          Objective:      Vitals:    03/11/19 1626 03/11/19 1724   BP: (!) 144/88 126/80   BP Location: Right arm Right arm   Patient Position: Sitting Sitting   BP Method: Large (Automatic) Large (Manual)   Pulse: 80    Temp: 98.5 °F (36.9 °C)    TempSrc: Oral    Weight: 77.1 kg (170 lb)    Height: 5' 7" (1.702 m)      Physical Exam   Constitutional: She is oriented to person, place, and time. Vital signs are normal. She appears well-developed and well-nourished. No distress.   HENT:   Head: Normocephalic and atraumatic. "   Right Ear: Hearing, tympanic membrane, external ear and ear canal normal.   Left Ear: Hearing, tympanic membrane, external ear and ear canal normal.   Nose: Nose normal. No mucosal edema.   Mouth/Throat: Oropharynx is clear and moist and mucous membranes are normal. No oropharyngeal exudate.   Eyes: Conjunctivae, EOM and lids are normal. Pupils are equal, round, and reactive to light. Right eye exhibits no discharge. Left eye exhibits no discharge. Right conjunctiva is not injected. Left conjunctiva is not injected.   Neck: Normal range of motion. Neck supple. Normal carotid pulses present. Carotid bruit is not present. No thyromegaly present.   Cardiovascular: Normal rate, regular rhythm, S1 normal, S2 normal, normal heart sounds, intact distal pulses and normal pulses.   No murmur heard.  Pulses:       Carotid pulses are 2+ on the right side, and 2+ on the left side.       Radial pulses are 2+ on the right side, and 2+ on the left side.        Dorsalis pedis pulses are 2+ on the right side, and 2+ on the left side.        Posterior tibial pulses are 2+ on the right side, and 2+ on the left side.   No edema   Pulmonary/Chest: Effort normal and breath sounds normal. No respiratory distress. She has no decreased breath sounds. She has no wheezes. She has no rhonchi. She has no rales.   Abdominal: Soft. Normal appearance, normal aorta and bowel sounds are normal. She exhibits no distension, no abdominal bruit, no pulsatile midline mass and no mass. There is no hepatosplenomegaly. There is no tenderness. No hernia.   Musculoskeletal: Normal range of motion. She exhibits no edema, tenderness or deformity.        Right foot: There is normal range of motion and no deformity.        Left foot: There is normal range of motion and no deformity.   Feet:   Right Foot:   Protective Sensation: 10 sites tested. 10 sites sensed.   Skin Integrity: Negative for ulcer, blister, skin breakdown, erythema, warmth, callus or dry skin.    Left Foot:   Protective Sensation: 10 sites tested. 10 sites sensed.   Skin Integrity: Negative for ulcer, blister, skin breakdown, erythema, warmth, callus or dry skin.   Lymphadenopathy:        Head (right side): No submental, no submandibular, no tonsillar, no preauricular, no posterior auricular and no occipital adenopathy present.        Head (left side): No submental, no submandibular, no tonsillar, no preauricular, no posterior auricular and no occipital adenopathy present.     She has no cervical adenopathy.        Right: No supraclavicular adenopathy present.        Left: No supraclavicular adenopathy present.   Neurological: She is alert and oriented to person, place, and time. She has normal strength. No sensory deficit.   Proprioception intact to all toes bilateral feet.  Monofilament exam is normal to all areas tested, bilateral feet.   Skin: Skin is warm, dry and intact. Rash noted. She is not diaphoretic. No erythema. No pallor.        Psychiatric: She has a normal mood and affect. Her speech is normal and behavior is normal. Judgment and thought content normal. Her mood appears not anxious. Cognition and memory are normal. She does not exhibit a depressed mood.   Nursing note and vitals reviewed.        Assessment:       1. Annual physical exam    2. Hyperlipidemia associated with type 2 diabetes mellitus    3. Hypertension associated with diabetes    4. Type 2 diabetes mellitus without complication, without long-term current use of insulin    5. Obstructive sleep apnea syndrome    6. Primary narcolepsy with cataplexy    7. Chronic fatigue    8. Status post gastric bypass for obesity    9. Psoriasis    10. Acute bronchitis, unspecified organism    11. Migraine without aura and without status migrainosus, not intractable    12. Tinea cruris    13. BMI 26.0-26.9,adult    14. Colon cancer screening          Plan:       Hloly was seen today for diabetes.    Diagnoses and all orders for this  visit:    Annual physical exam  -     CBC auto differential; Future  -     Comprehensive metabolic panel; Future  - Discussed healthy diet, regular exercise, necessary labs, age appropriate cancer screening, and routine vaccinations.  Patient is up-to-date with eye exam which was last completed at [facility].  Copy requested to be placed in patient record.  - Educational handouts provided.  Prevention guidelines women age 50-64    Hyperlipidemia associated with type 2 diabetes mellitus  -     Lipid panel; Future  -   Lab Results   Component Value Date    CHOL 236 (H) 03/03/2018    CHOL 254 (H) 03/29/2017    CHOL 200 (H) 12/16/2015     Lab Results   Component Value Date    HDL 54 03/03/2018    HDL 46 03/29/2017    HDL 50 12/16/2015     Lab Results   Component Value Date    LDLCALC 153.0 03/03/2018    LDLCALC 177.0 (H) 03/29/2017    LDLCALC 128.4 12/16/2015     Lab Results   Component Value Date    TRIG 145 03/03/2018    TRIG 155 (H) 03/29/2017    TRIG 108 12/16/2015     Lab Results   Component Value Date    CHOLHDL 22.9 03/03/2018    CHOLHDL 18.1 (L) 03/29/2017    CHOLHDL 25.0 12/16/2015     The 10-year ASCVD risk score (Maddisonnicki BLOCK Jr., et al., 2013) is: 4%    Values used to calculate the score:      Age: 48 years      Sex: Female      Is Non- : No      Diabetic: Yes      Tobacco smoker: No      Systolic Blood Pressure: 126 mmHg      Is BP treated: Yes      HDL Cholesterol: 54 mg/dL      Total Cholesterol: 236 mg/dL     Will reassess need for statin when new lipid panel results are available  Patient does have type 2 diabetes, however A1cs have been at non diabetic level following gastric bypass surgery    Hypertension associated with diabetes  -     CBC auto differential; Future  - Blood pressure controlled 126/80 with repeat measure at end of visit with manual cuff to right arm, continue current medication without change  -     Comprehensive metabolic panel; Future    Type 2 diabetes  mellitus without complication, without long-term current use of insulin  -     Comprehensive metabolic panel; Future  -     Microalbumin/creatinine urine ratio; Future  -     Hemoglobin A1c; Future  -   Lab Results   Component Value Date    HGBA1C 5.2 03/03/2018   - A1c is at non diabetic level following gastric bypass surgery  - type 2 diabetes is currently diet controlled, no medication required, continue as is pending results of new A1c  - diabetic foot exam completed at this visit, normal  - eye exam is up-to-date with [facility] on [address], copy of most recent eye exam to be requested and placed on patient record    Obstructive sleep apnea syndrome   Recent sleep study and CPAP titration reviewed with patient   Patient is encouraged to wear CPAP nightly  Primary narcolepsy with cataplexy   Patient to continue current medications as prescribed Nuvigil 250 mg b.i.d. and Adderall 20 mg daily   Continue follow-up with sleep management physician Dr.Srinivas Carranza as instructed    queried and shows that patient receives Nuvigil prescription in the past from Dr. Tracy, Dr. Carranza, and Suly Schmitt nurse practitioner and Adderall prescription from Suly Schmitt nurse practitioner.  There is no overlap in the Nuvigil prescriptions.  Chronic fatigue  -     CBC auto differential; Future  -     Comprehensive metabolic panel; Future  -     TSH; Future  -     Vitamin D; Future  - fatigue most likely related to sleep apnea and narcolepsy, being treated    Status post gastric bypass for obesity  -     CBC auto differential; Future  -     Comprehensive metabolic panel; Future  -     Iron and TIBC; Future  -     Vitamin B12; Future  -     Folate; Future  -     Vitamin D; Future  - patient stable, maintaining weight in the over weight range    Psoriasis   Continue current medication stelara and follow up with Dermatology, Dr. Weil as instructed  Acute bronchitis, unspecified organism  -     albuterol sulfate (PROAIR  RESPICLICK) 90 mcg/actuation AePB; Inhale 2 puffs into the lungs every 4 (four) hours as needed (shortness of breath or wheezing). Rescue  - instructions for inhaler use provided  - acute bronchitis is resolving with use of Advair inhaler that was prescribed at last visit, cough has improved with Tussionex    Migraine without aura and without status migrainosus, not intractable   Continue follow-up with Neurology Dr. Mike as instructed   Patient reports that migraine headaches are well controlled with Trokendi  mg daily  Tinea cruris  -     butenafine (LOTRIMIN ULTRA) 1 % cream; Apply topically once daily. To affected area  - Educational handouts provided.  Ringworm of the skin  - if rash does not resolve with use of Lotrimin Ultra in 3-4 weeks, patient is instructed to consult with her dermatologist Dr. Weil    BMI 26.0-26.9,adult   BMI today is 26.63 and patient has gained 1 lb since her last visit on 2/26/2019   Weight loss recommended with well balanced diet and portion controlled meals and increased activity.    Instructed to follow post gastric bypass diet as instructed by bariatric surgeon  Colon cancer screening  -     Ambulatory referral to Gastroenterology      Follow-up in about 1 year (around 3/11/2020) for ANNUAL, 40 minutes, schedule lab fasting.      Patient readiness: acceptance and barriers:none    During the course of the visit the patient was educated and counseled about the following:     Diabetes:  Discussed general issues about diabetes pathophysiology and management.  Addressed ADA diet.  Discussed foot care.  Reminded to get yearly retinal exam.  Labs: fasting blood sugar, fasting lipid panel, hemoglobin A1C and microalbuminuria.  Follow up in 1 Year or as needed.  Hypertension:   Medication: no change.  Dietary sodium restriction.  Regular aerobic exercise.  Check blood pressures 3 times weekly and record.  Follow up: 1 Year and as needed.    Goals: Diabetes: Maintain Hemoglobin A1C  below 7 and Hypertension: Reduce Blood Pressure    Did patient meet goals/outcomes: Yes    The following self management tools provided: declined    Patient Instructions (the written plan) was given to the patient/family.     Time spent with patient: 45 minutes    Barriers to medications present (no )    Adverse reactions to current medications (no)    Over the counter medications reviewed (Yes)        Answers for HPI/ROS submitted by the patient on 3/11/2019   Diabetes problem  Diabetes type: type 2  MedicAlert ID: No  Disease duration: 20 years  blurred vision: Yes  foot paresthesias: No  foot ulcerations: No  visual change: Yes  weight loss: No  Symptom course: stable  hunger: No  mood changes: No  sleepiness: Yes  sweats: No  blackouts: No  hospitalization: No  nocturnal hypoglycemia: No  required assistance: No  required glucagon: No  CVA: No  heart disease: No  impotence: No  nephropathy: No  peripheral neuropathy: No  PVD: No  retinopathy: No  autonomic neuropathy: No  CAD risks: dyslipidemia, family history, hypertension  Current treatments: none  Treatment compliance: some of the time  Monitoring compliance: no compliance  Weight trend: fluctuating minimally  Current diet: generally healthy  Meal planning: avoidance of concentrated sweets, calorie counting  Exercise: every other day  Dietitian visit: No  Eye exam current: Yes  Sees podiatrist: Yes

## 2019-03-13 ENCOUNTER — LAB VISIT (OUTPATIENT)
Dept: LAB | Facility: HOSPITAL | Age: 49
End: 2019-03-13
Attending: NURSE PRACTITIONER
Payer: COMMERCIAL

## 2019-03-13 DIAGNOSIS — Z98.84 STATUS POST GASTRIC BYPASS FOR OBESITY: ICD-10-CM

## 2019-03-13 DIAGNOSIS — Z00.00 ANNUAL PHYSICAL EXAM: ICD-10-CM

## 2019-03-13 DIAGNOSIS — E11.59 HYPERTENSION ASSOCIATED WITH DIABETES: Chronic | ICD-10-CM

## 2019-03-13 DIAGNOSIS — I15.2 HYPERTENSION ASSOCIATED WITH DIABETES: Chronic | ICD-10-CM

## 2019-03-13 DIAGNOSIS — E11.9 TYPE 2 DIABETES MELLITUS WITHOUT COMPLICATION, WITHOUT LONG-TERM CURRENT USE OF INSULIN: ICD-10-CM

## 2019-03-13 DIAGNOSIS — E78.5 HYPERLIPIDEMIA ASSOCIATED WITH TYPE 2 DIABETES MELLITUS: ICD-10-CM

## 2019-03-13 DIAGNOSIS — R53.82 CHRONIC FATIGUE: ICD-10-CM

## 2019-03-13 DIAGNOSIS — E11.69 HYPERLIPIDEMIA ASSOCIATED WITH TYPE 2 DIABETES MELLITUS: ICD-10-CM

## 2019-03-13 LAB
25(OH)D3+25(OH)D2 SERPL-MCNC: 30 NG/ML
ALBUMIN SERPL BCP-MCNC: 4.2 G/DL
ALP SERPL-CCNC: 142 U/L
ALT SERPL W/O P-5'-P-CCNC: 37 U/L
ANION GAP SERPL CALC-SCNC: 8 MMOL/L
AST SERPL-CCNC: 31 U/L
BASOPHILS # BLD AUTO: 0.02 K/UL
BASOPHILS NFR BLD: 0.4 %
BILIRUB SERPL-MCNC: 0.4 MG/DL
BUN SERPL-MCNC: 12 MG/DL
CALCIUM SERPL-MCNC: 9.6 MG/DL
CHLORIDE SERPL-SCNC: 104 MMOL/L
CHOLEST SERPL-MCNC: 245 MG/DL
CHOLEST/HDLC SERPL: 4.4 {RATIO}
CO2 SERPL-SCNC: 27 MMOL/L
CREAT SERPL-MCNC: 0.7 MG/DL
DIFFERENTIAL METHOD: ABNORMAL
EOSINOPHIL # BLD AUTO: 0.1 K/UL
EOSINOPHIL NFR BLD: 2.2 %
ERYTHROCYTE [DISTWIDTH] IN BLOOD BY AUTOMATED COUNT: 11.9 %
EST. GFR  (AFRICAN AMERICAN): >60 ML/MIN/1.73 M^2
EST. GFR  (NON AFRICAN AMERICAN): >60 ML/MIN/1.73 M^2
ESTIMATED AVG GLUCOSE: 105 MG/DL
FOLATE SERPL-MCNC: 13.9 NG/ML
GLUCOSE SERPL-MCNC: 95 MG/DL
HBA1C MFR BLD HPLC: 5.3 %
HCT VFR BLD AUTO: 40.7 %
HDLC SERPL-MCNC: 56 MG/DL
HDLC SERPL: 22.9 %
HGB BLD-MCNC: 13.2 G/DL
IMM GRANULOCYTES # BLD AUTO: 0.03 K/UL
IMM GRANULOCYTES NFR BLD AUTO: 0.7 %
IRON SERPL-MCNC: 85 UG/DL
LDLC SERPL CALC-MCNC: 155.6 MG/DL
LYMPHOCYTES # BLD AUTO: 1.3 K/UL
LYMPHOCYTES NFR BLD: 27.9 %
MCH RBC QN AUTO: 32 PG
MCHC RBC AUTO-ENTMCNC: 32.4 G/DL
MCV RBC AUTO: 99 FL
MONOCYTES # BLD AUTO: 0.4 K/UL
MONOCYTES NFR BLD: 8.6 %
NEUTROPHILS # BLD AUTO: 2.7 K/UL
NEUTROPHILS NFR BLD: 60.2 %
NONHDLC SERPL-MCNC: 189 MG/DL
NRBC BLD-RTO: 0 /100 WBC
PLATELET # BLD AUTO: 185 K/UL
PMV BLD AUTO: 11 FL
POTASSIUM SERPL-SCNC: 4.2 MMOL/L
PROT SERPL-MCNC: 7.3 G/DL
RBC # BLD AUTO: 4.12 M/UL
SATURATED IRON: 25 %
SODIUM SERPL-SCNC: 139 MMOL/L
TOTAL IRON BINDING CAPACITY: 346 UG/DL
TRANSFERRIN SERPL-MCNC: 234 MG/DL
TRIGL SERPL-MCNC: 167 MG/DL
TSH SERPL DL<=0.005 MIU/L-ACNC: 1.64 UIU/ML
VIT B12 SERPL-MCNC: 268 PG/ML
WBC # BLD AUTO: 4.52 K/UL

## 2019-03-13 PROCEDURE — 84443 ASSAY THYROID STIM HORMONE: CPT

## 2019-03-13 PROCEDURE — 80061 LIPID PANEL: CPT

## 2019-03-13 PROCEDURE — 36415 COLL VENOUS BLD VENIPUNCTURE: CPT | Mod: PO

## 2019-03-13 PROCEDURE — 83540 ASSAY OF IRON: CPT

## 2019-03-13 PROCEDURE — 82607 VITAMIN B-12: CPT

## 2019-03-13 PROCEDURE — 83036 HEMOGLOBIN GLYCOSYLATED A1C: CPT

## 2019-03-13 PROCEDURE — 82746 ASSAY OF FOLIC ACID SERUM: CPT

## 2019-03-13 PROCEDURE — 82306 VITAMIN D 25 HYDROXY: CPT

## 2019-03-13 PROCEDURE — 80053 COMPREHEN METABOLIC PANEL: CPT

## 2019-03-13 PROCEDURE — 85025 COMPLETE CBC W/AUTO DIFF WBC: CPT

## 2019-03-26 DIAGNOSIS — R05.3 CHRONIC COUGH: ICD-10-CM

## 2019-03-26 DIAGNOSIS — J40 BRONCHITIS: ICD-10-CM

## 2019-03-26 RX ORDER — CEPHALEXIN 250 MG/1
CAPSULE ORAL
Refills: 0 | OUTPATIENT
Start: 2019-03-26

## 2019-06-04 ENCOUNTER — TELEPHONE (OUTPATIENT)
Dept: FAMILY MEDICINE | Facility: CLINIC | Age: 49
End: 2019-06-04

## 2019-06-04 ENCOUNTER — PATIENT MESSAGE (OUTPATIENT)
Dept: FAMILY MEDICINE | Facility: CLINIC | Age: 49
End: 2019-06-04

## 2019-06-04 RX ORDER — ARMODAFINIL 250 MG/1
250 TABLET ORAL 2 TIMES DAILY
Qty: 60 TABLET | Refills: 5 | OUTPATIENT
Start: 2019-06-04

## 2019-06-05 NOTE — TELEPHONE ENCOUNTER
Taking double the maximum dose of new stiles and Adderall and getting both along with Valium from an outside provider.  These are all controlled drugs that should not be taken together.   the answer is no, never again

## 2019-06-24 ENCOUNTER — OFFICE VISIT (OUTPATIENT)
Dept: ORTHOPEDICS | Facility: CLINIC | Age: 49
End: 2019-06-24
Payer: COMMERCIAL

## 2019-06-24 VITALS
HEART RATE: 80 BPM | DIASTOLIC BLOOD PRESSURE: 78 MMHG | HEIGHT: 67 IN | WEIGHT: 170 LBS | BODY MASS INDEX: 26.68 KG/M2 | SYSTOLIC BLOOD PRESSURE: 118 MMHG

## 2019-06-24 DIAGNOSIS — M25.561 ACUTE PAIN OF RIGHT KNEE: ICD-10-CM

## 2019-06-24 DIAGNOSIS — S83.241A OTHER TEAR OF MEDIAL MENISCUS OF RIGHT KNEE AS CURRENT INJURY, INITIAL ENCOUNTER: Primary | ICD-10-CM

## 2019-06-24 PROCEDURE — 73562 PR  X-RAY KNEE 3 VIEW: ICD-10-PCS | Mod: RT,,, | Performed by: ORTHOPAEDIC SURGERY

## 2019-06-24 PROCEDURE — 73562 X-RAY EXAM OF KNEE 3: CPT | Mod: RT,,, | Performed by: ORTHOPAEDIC SURGERY

## 2019-06-24 PROCEDURE — 99213 OFFICE O/P EST LOW 20 MIN: CPT | Mod: 25,,, | Performed by: ORTHOPAEDIC SURGERY

## 2019-06-24 PROCEDURE — 99213 PR OFFICE/OUTPT VISIT, EST, LEVL III, 20-29 MIN: ICD-10-PCS | Mod: 25,,, | Performed by: ORTHOPAEDIC SURGERY

## 2019-06-24 RX ORDER — DEXTROAMPHETAMINE SACCHARATE, AMPHETAMINE ASPARTATE, DEXTROAMPHETAMINE SULFATE AND AMPHETAMINE SULFATE 7.5; 7.5; 7.5; 7.5 MG/1; MG/1; MG/1; MG/1
1 TABLET ORAL DAILY
Refills: 0 | COMMUNITY
Start: 2019-06-03 | End: 2021-11-16 | Stop reason: SDUPTHER

## 2019-06-24 RX ORDER — AMLODIPINE BESYLATE 10 MG/1
10 TABLET ORAL DAILY
Refills: 3 | COMMUNITY
Start: 2019-05-28 | End: 2019-10-09

## 2019-06-24 NOTE — PROGRESS NOTES
"[facility]    Subjective:     Chief Complaint:   Chief Complaint   Patient presents with    Right Knee - Pain     Right knee pain x 4 weeks. States that her knee pain has gotten worse. States that she is a runner and that she is not able to run due to the pain.        Past Medical History:   Diagnosis Date    Abnormal Pap smear     "pre-cancer cells post hysterectomy"    Anxiety     Arthritis     Depression     Good hypertension control 2014    Hyperlipidemia     Hypertension     Hypertriglyceridemia     Meningitis     Narcolepsy     ALESIA (obstructive sleep apnea)     Peripheral neuropathy 2014    Psoriasis     Psoriasis 5/10/2018    Tuberculosis        Past Surgical History:   Procedure Laterality Date    BREAST SURGERY      aumentation/ reduction     BREAST SURGERY  2017    augmentation     brizilian butt lift   2017     SECTION      CHOLECYSTECTOMY  2018    Dr SEAN Christopher [facility]    FOOT SURGERY      GASTRIC BYPASS  2015    GASTRIC BYPASS      HERNIA REPAIR      HYSTERECTOMY      KNEE SURGERY Left     SHOULDER SURGERY      right should surgery     SHOULDER SURGERY  2017    left shoulder / torn labrium     THIGH LIFT  2017    TONSILLECTOMY      tummy tuck   2016       Current Outpatient Medications   Medication Sig    albuterol sulfate (PROAIR RESPICLICK) 90 mcg/actuation AePB Inhale 2 puffs into the lungs every 4 (four) hours as needed (shortness of breath or wheezing). Rescue    amLODIPine (NORVASC) 10 MG tablet Take 10 mg by mouth once daily.    armodafinil (NUVIGIL) 250 mg tablet Take 250 mg by mouth 2 (two) times daily.    butenafine (LOTRIMIN ULTRA) 1 % cream Apply topically once daily. To affected area    cetirizine (ZYRTEC) 5 MG tablet Take 5 mg by mouth once daily.    dextroamphetamine-amphetamine 30 mg Tab Take 1 tablet by mouth once daily.    DUEXIS 800-26.6 mg Tab Take 1 tablet by " mouth 3 (three) times daily.     estradiol (ESTRACE) 0.01 % (0.1 mg/gram) vaginal cream Place 1 g vaginally once daily. For 7 days, then 1 gram 2-3 days per week    STELARA 45 mg/0.5 mL Syrg syringe Inject 45 mg into the muscle every 3 (three) months.     TROKENDI  mg Cp24 Take 1 capsule by mouth once daily.    EPINEPHrine (EPIPEN) 0.3 mg/0.3 mL AtIn Inject 0.3 mLs (0.3 mg total) into the muscle once. As directed PRN for 1 dose     No current facility-administered medications for this visit.        Review of patient's allergies indicates:   Allergen Reactions    Alcohol      Other reaction(s): Unknown       Family History   Problem Relation Age of Onset    Cancer Mother         breast bilateral/ skin     Breast cancer Mother     Skin cancer Mother     Diabetes Father     Heart disease Father 65        CABG    Hypertension Father     Cancer Father         skin    Skin cancer Father     Gout Father     Gout Brother     No Known Problems Daughter     No Known Problems Son     Cancer Maternal Aunt         lung, pancrease - smoker , breast    Lung cancer Maternal Aunt         x2    Pancreatic cancer Maternal Aunt     Breast cancer Maternal Aunt     No Known Problems Maternal Uncle     No Known Problems Paternal Uncle     Cancer Maternal Grandmother         pancrease    Pancreatic cancer Maternal Grandmother     Breast cancer Maternal Grandmother     Diabetes Paternal Grandmother     Heart disease Paternal Grandfather     Colon cancer Neg Hx     Ovarian cancer Neg Hx        Social History     Socioeconomic History    Marital status:      Spouse name: Not on file    Number of children: Not on file    Years of education: Not on file    Highest education level: Not on file   Occupational History    Not on file   Social Needs    Financial resource strain: Not on file    Food insecurity:     Worry: Not on file     Inability: Not on file    Transportation needs:     Medical:  Not on file     Non-medical: Not on file   Tobacco Use    Smoking status: Never Smoker    Smokeless tobacco: Never Used   Substance and Sexual Activity    Alcohol use: No     Comment: ALLERGIC TO ALCOHOL(DRINKING) PER PATIENT    Drug use: No    Sexual activity: Yes     Partners: Male     Birth control/protection: Surgical     Comment: SO   Lifestyle    Physical activity:     Days per week: Not on file     Minutes per session: Not on file    Stress: Not on file   Relationships    Social connections:     Talks on phone: Not on file     Gets together: Not on file     Attends Buddhist service: Not on file     Active member of club or organization: Not on file     Attends meetings of clubs or organizations: Not on file     Relationship status: Not on file   Other Topics Concern    Not on file   Social History Narrative    Not on file       History of present illness right knee pain about a month. She has been training for a half marathon and as she started to little more running knee started hurt more she's had little issues with the right knee in the past now they've had become significant issues in the right knee. On physical exam she has tenderness along the medial joint line she has mild quad atrophy. She has some pain with medial Elmer. She describes pain in the end of the day she describes pain getting in and out of a car and when standing for long time. Had an arthroscopy left knee years ago which did well there is no specific injury  Review of Systems:    Constitution: Negative for chills, fever, and sweats.  Negative for unexplained weight loss.    HENT:  Negative for headaches and blurry vision.    Cardiovascular:Negative for chest pain or irregular heart beat. Negative for hypertension.    Respiratory:  Negative for cough and shortness of breath.    Gastrointestinal: Negative for abdominal pain, heartburn, melena, nausea, and vomitting.    Genitourinary:  Negative bladder incontinence and  dysuria.    Musculoskeletal:  See HPI for details.     Neurological: Negative for numbness.    Psychiatric/Behavioral: Negative for depression.  The patient is not nervous/anxious.      Endocrine: Negative for polyuria    Hematologic/Lymphatic: Negative for bleeding problem.  Does not bruise/bleed easily.    Skin: Negative for poor would healing and rash    Objective:      Physical Examination:    Vital Signs:    Vitals:    06/24/19 1511   BP: 118/78   Pulse: 80       Body mass index is 26.63 kg/m².    This a well-developed, well nourished patient in no acute distress.  They are alert and oriented and cooperative to examination.        So physical exam mild quad atrophy right tenderness along medial joint line pain with medial Elmer right knee.  Pertinent New Results:    XRAY Report / Interpretation:   AP lateral sunrise right knee shows no significant degenerative changes no acute changes in the right knee. Signature    Assessment/Plan:      Impression is probably a degenerative medial meniscus tear right knee. Our plan is MRI right knee. She will need an arthroscopy if that is true. Meanwhile she's been a put her running on hold. She has anti-inflammatories she is okay at work      This note was created using Dragon voice recognition software that occasionally misinterpreted phrases or words.

## 2019-07-01 ENCOUNTER — OFFICE VISIT (OUTPATIENT)
Dept: ORTHOPEDICS | Facility: CLINIC | Age: 49
End: 2019-07-01
Payer: COMMERCIAL

## 2019-07-01 VITALS
DIASTOLIC BLOOD PRESSURE: 80 MMHG | SYSTOLIC BLOOD PRESSURE: 130 MMHG | HEIGHT: 67 IN | HEART RATE: 81 BPM | BODY MASS INDEX: 26.68 KG/M2 | WEIGHT: 170 LBS

## 2019-07-01 DIAGNOSIS — M84.361A STRESS FRACTURE OF RIGHT TIBIA, INITIAL ENCOUNTER: Primary | ICD-10-CM

## 2019-07-01 DIAGNOSIS — M25.561 ACUTE PAIN OF RIGHT KNEE: ICD-10-CM

## 2019-07-01 PROCEDURE — 99213 OFFICE O/P EST LOW 20 MIN: CPT | Mod: ,,, | Performed by: ORTHOPAEDIC SURGERY

## 2019-07-01 PROCEDURE — 3079F PR MOST RECENT DIASTOLIC BLOOD PRESSURE 80-89 MM HG: ICD-10-PCS | Mod: ,,, | Performed by: ORTHOPAEDIC SURGERY

## 2019-07-01 PROCEDURE — 3075F SYST BP GE 130 - 139MM HG: CPT | Mod: ,,, | Performed by: ORTHOPAEDIC SURGERY

## 2019-07-01 PROCEDURE — 99213 PR OFFICE/OUTPT VISIT, EST, LEVL III, 20-29 MIN: ICD-10-PCS | Mod: ,,, | Performed by: ORTHOPAEDIC SURGERY

## 2019-07-01 PROCEDURE — 3075F PR MOST RECENT SYSTOLIC BLOOD PRESS GE 130-139MM HG: ICD-10-PCS | Mod: ,,, | Performed by: ORTHOPAEDIC SURGERY

## 2019-07-01 PROCEDURE — 3079F DIAST BP 80-89 MM HG: CPT | Mod: ,,, | Performed by: ORTHOPAEDIC SURGERY

## 2019-07-01 PROCEDURE — 3008F PR BODY MASS INDEX (BMI) DOCUMENTED: ICD-10-PCS | Mod: ,,, | Performed by: ORTHOPAEDIC SURGERY

## 2019-07-01 PROCEDURE — 3008F BODY MASS INDEX DOCD: CPT | Mod: ,,, | Performed by: ORTHOPAEDIC SURGERY

## 2019-07-01 NOTE — PROGRESS NOTES
"[facility]    Subjective:     Chief Complaint:   Chief Complaint   Patient presents with    Right Knee - Pain     Right knee pain/MRI results 6.27.19. States that last night was a bad night.        Past Medical History:   Diagnosis Date    Abnormal Pap smear     "pre-cancer cells post hysterectomy"    Anxiety     Arthritis     Depression     Good hypertension control 2014    Hyperlipidemia     Hypertension     Hypertriglyceridemia     Meningitis     Narcolepsy     ALESIA (obstructive sleep apnea)     Peripheral neuropathy 2014    Psoriasis     Psoriasis 5/10/2018    Tuberculosis        Past Surgical History:   Procedure Laterality Date    BREAST SURGERY      aumentation/ reduction     BREAST SURGERY  2017    augmentation     brizilian butt lift   2017     SECTION      CHOLECYSTECTOMY  2018    Dr SEAN Christopher [facility]    FOOT SURGERY      GASTRIC BYPASS  2015    GASTRIC BYPASS      HERNIA REPAIR      HYSTERECTOMY      KNEE SURGERY Left     SHOULDER SURGERY      right should surgery     SHOULDER SURGERY  2017    left shoulder / torn labrium     THIGH LIFT  2017    TONSILLECTOMY      tummy tuck   2016       Current Outpatient Medications   Medication Sig    albuterol sulfate (PROAIR RESPICLICK) 90 mcg/actuation AePB Inhale 2 puffs into the lungs every 4 (four) hours as needed (shortness of breath or wheezing). Rescue    amLODIPine (NORVASC) 10 MG tablet Take 10 mg by mouth once daily.    armodafinil (NUVIGIL) 250 mg tablet Take 250 mg by mouth 2 (two) times daily.    butenafine (LOTRIMIN ULTRA) 1 % cream Apply topically once daily. To affected area    cetirizine (ZYRTEC) 5 MG tablet Take 5 mg by mouth once daily.    dextroamphetamine-amphetamine 30 mg Tab Take 1 tablet by mouth once daily.    DUEXIS 800-26.6 mg Tab Take 1 tablet by mouth 3 (three) times daily.     estradiol (ESTRACE) 0.01 % (0.1 " mg/gram) vaginal cream Place 1 g vaginally once daily. For 7 days, then 1 gram 2-3 days per week    STELARA 45 mg/0.5 mL Syrg syringe Inject 45 mg into the muscle every 3 (three) months.     TROKENDI  mg Cp24 Take 1 capsule by mouth once daily.    EPINEPHrine (EPIPEN) 0.3 mg/0.3 mL AtIn Inject 0.3 mLs (0.3 mg total) into the muscle once. As directed PRN for 1 dose     No current facility-administered medications for this visit.        Review of patient's allergies indicates:   Allergen Reactions    Alcohol      Other reaction(s): Unknown       Family History   Problem Relation Age of Onset    Cancer Mother         breast bilateral/ skin     Breast cancer Mother     Skin cancer Mother     Diabetes Father     Heart disease Father 65        CABG    Hypertension Father     Cancer Father         skin    Skin cancer Father     Gout Father     Gout Brother     No Known Problems Daughter     No Known Problems Son     Cancer Maternal Aunt         lung, pancrease - smoker , breast    Lung cancer Maternal Aunt         x2    Pancreatic cancer Maternal Aunt     Breast cancer Maternal Aunt     No Known Problems Maternal Uncle     No Known Problems Paternal Uncle     Cancer Maternal Grandmother         pancrease    Pancreatic cancer Maternal Grandmother     Breast cancer Maternal Grandmother     Diabetes Paternal Grandmother     Heart disease Paternal Grandfather     Colon cancer Neg Hx     Ovarian cancer Neg Hx        Social History     Socioeconomic History    Marital status:      Spouse name: Not on file    Number of children: Not on file    Years of education: Not on file    Highest education level: Not on file   Occupational History    Not on file   Social Needs    Financial resource strain: Not on file    Food insecurity:     Worry: Not on file     Inability: Not on file    Transportation needs:     Medical: Not on file     Non-medical: Not on file   Tobacco Use    Smoking  status: Never Smoker    Smokeless tobacco: Never Used   Substance and Sexual Activity    Alcohol use: No     Comment: ALLERGIC TO ALCOHOL(DRINKING) PER PATIENT    Drug use: No    Sexual activity: Yes     Partners: Male     Birth control/protection: Surgical     Comment: SO   Lifestyle    Physical activity:     Days per week: Not on file     Minutes per session: Not on file    Stress: Not on file   Relationships    Social connections:     Talks on phone: Not on file     Gets together: Not on file     Attends Zoroastrian service: Not on file     Active member of club or organization: Not on file     Attends meetings of clubs or organizations: Not on file     Relationship status: Not on file   Other Topics Concern    Not on file   Social History Narrative    Not on file       History of present illness: We have MRI of the right knee. And it shows a very faint metaphyseal stress fracture the tibia. No mechanical problems inside the joint. This issues about 4 weeks old. She is not limping.      Review of Systems:    Constitution: Negative for chills, fever, and sweats.  Negative for unexplained weight loss.    HENT:  Negative for headaches and blurry vision.    Cardiovascular:Negative for chest pain or irregular heart beat. Negative for hypertension.    Respiratory:  Negative for cough and shortness of breath.    Gastrointestinal: Negative for abdominal pain, heartburn, melena, nausea, and vomitting.    Genitourinary:  Negative bladder incontinence and dysuria.    Musculoskeletal:  See HPI for details.     Neurological: Negative for numbness.    Psychiatric/Behavioral: Negative for depression.  The patient is not nervous/anxious.      Endocrine: Negative for polyuria    Hematologic/Lymphatic: Negative for bleeding problem.  Does not bruise/bleed easily.    Skin: Negative for poor would healing and rash    Objective:      Physical Examination:    Vital Signs:    Vitals:    07/01/19 1034   BP: 130/80   Pulse: 81        Body mass index is 26.63 kg/m².    This a well-developed, well nourished patient in no acute distress.  They are alert and oriented and cooperative to examination.        So physical exam shows she has minimal tenderness over the proximal tibia. Good range of motion the knee. No antalgic gait.  Pertinent New Results:    XRAY Report / Interpretation:       Assessment/Plan:      So she has a stress fracture metaphyseal. We expect that to take 8-12 weeks to recover. Meanwhile she needs to change her exercise routine. She is a runner. She can do biking do swimming pool etc. See her back in a month with another x-ray. Work she has to do some walking but nothing excessive and she's perfectly comfortable at work so far. She's fit for duty.      This note was created using Dragon voice recognition software that occasionally misinterpreted phrases or words.

## 2019-07-11 DIAGNOSIS — J20.9 ACUTE BRONCHITIS, UNSPECIFIED ORGANISM: ICD-10-CM

## 2019-07-11 RX ORDER — ALBUTEROL SULFATE 90 UG/1
POWDER, METERED RESPIRATORY (INHALATION)
Qty: 1 EACH | Refills: 0 | Status: SHIPPED | OUTPATIENT
Start: 2019-07-11 | End: 2019-10-09

## 2019-09-18 LAB
LEFT EYE DM RETINOPATHY: NEGATIVE
RIGHT EYE DM RETINOPATHY: NEGATIVE

## 2019-10-04 ENCOUNTER — PATIENT OUTREACH (OUTPATIENT)
Dept: ADMINISTRATIVE | Facility: HOSPITAL | Age: 49
End: 2019-10-04

## 2019-10-04 NOTE — LETTER
AUTHORIZATION FOR RELEASE OF   CONFIDENTIAL INFORMATION    Dr Dennis    We are seeing Holly Chicas, date of birth 1970, in the clinic at [facility]. Ben Tracy MD is the patient's PCP. Holly Chicas has an outstanding lab/procedure at the time we reviewed her chart. In order to help keep her health information updated, she has authorized us to request the following medical record(s):        (  )  MAMMOGRAM                                      (  )  COLONOSCOPY      (  )  PAP SMEAR                                          (  )  OUTSIDE LAB RESULTS     (  )  DEXA SCAN                                          ( X  EYE EXAM            (  )  FOOT EXAM                                          (  )  ENTIRE RECORD     (  )  OUTSIDE IMMUNIZATIONS                 (  )  _______________         Please fax records to [facility], Robert W Taylor, MD, [phone]    Thank you in advance,      Geraldine HUBBARD  Panel Care Coordinator  [place] [facility] [address] [place] [address]  Phone [phone]  Fax [phone]          Patient Name: Holly Chicas  : 1970  Patient Phone #: [phone]

## 2019-10-09 ENCOUNTER — OFFICE VISIT (OUTPATIENT)
Dept: ORTHOPEDICS | Facility: CLINIC | Age: 49
End: 2019-10-09
Payer: COMMERCIAL

## 2019-10-09 VITALS
DIASTOLIC BLOOD PRESSURE: 78 MMHG | HEIGHT: 67 IN | SYSTOLIC BLOOD PRESSURE: 120 MMHG | HEART RATE: 80 BPM | WEIGHT: 173 LBS | BODY MASS INDEX: 27.15 KG/M2

## 2019-10-09 DIAGNOSIS — M67.51 PLICA OF KNEE, RIGHT: Primary | ICD-10-CM

## 2019-10-09 DIAGNOSIS — M25.561 ACUTE PAIN OF RIGHT KNEE: ICD-10-CM

## 2019-10-09 PROCEDURE — 3078F PR MOST RECENT DIASTOLIC BLOOD PRESSURE < 80 MM HG: ICD-10-PCS | Mod: S$GLB,,, | Performed by: ORTHOPAEDIC SURGERY

## 2019-10-09 PROCEDURE — 3008F BODY MASS INDEX DOCD: CPT | Mod: S$GLB,,, | Performed by: ORTHOPAEDIC SURGERY

## 2019-10-09 PROCEDURE — 99213 OFFICE O/P EST LOW 20 MIN: CPT | Mod: 25,S$GLB,, | Performed by: ORTHOPAEDIC SURGERY

## 2019-10-09 PROCEDURE — 3074F SYST BP LT 130 MM HG: CPT | Mod: S$GLB,,, | Performed by: ORTHOPAEDIC SURGERY

## 2019-10-09 PROCEDURE — 3078F DIAST BP <80 MM HG: CPT | Mod: S$GLB,,, | Performed by: ORTHOPAEDIC SURGERY

## 2019-10-09 PROCEDURE — 99213 PR OFFICE/OUTPT VISIT, EST, LEVL III, 20-29 MIN: ICD-10-PCS | Mod: 25,S$GLB,, | Performed by: ORTHOPAEDIC SURGERY

## 2019-10-09 PROCEDURE — 3074F PR MOST RECENT SYSTOLIC BLOOD PRESSURE < 130 MM HG: ICD-10-PCS | Mod: S$GLB,,, | Performed by: ORTHOPAEDIC SURGERY

## 2019-10-09 PROCEDURE — 20610 LARGE JOINT ASPIRATION/INJECTION: R KNEE: ICD-10-PCS | Mod: RT,S$GLB,, | Performed by: ORTHOPAEDIC SURGERY

## 2019-10-09 PROCEDURE — 3008F PR BODY MASS INDEX (BMI) DOCUMENTED: ICD-10-PCS | Mod: S$GLB,,, | Performed by: ORTHOPAEDIC SURGERY

## 2019-10-09 PROCEDURE — 20610 DRAIN/INJ JOINT/BURSA W/O US: CPT | Mod: RT,S$GLB,, | Performed by: ORTHOPAEDIC SURGERY

## 2019-10-09 RX ORDER — METHYLPREDNISOLONE ACETATE 40 MG/ML
40 INJECTION, SUSPENSION INTRA-ARTICULAR; INTRALESIONAL; INTRAMUSCULAR; SOFT TISSUE
Status: DISCONTINUED | OUTPATIENT
Start: 2019-10-09 | End: 2019-10-09 | Stop reason: HOSPADM

## 2019-10-09 RX ADMIN — METHYLPREDNISOLONE ACETATE 40 MG: 40 INJECTION, SUSPENSION INTRA-ARTICULAR; INTRALESIONAL; INTRAMUSCULAR; SOFT TISSUE at 08:10

## 2019-10-09 NOTE — PROCEDURES
Large Joint Aspiration/Injection: R knee  Date/Time: 10/9/2019 8:00 AM  Performed by: Gilbert Velazquez Jr., MD  Authorized by: Gilbert Velazquez Jr., MD     Consent Done?:  Yes (Verbal)  Indications:  Pain  Procedure site marked: Yes    Timeout: Prior to procedure the correct patient, procedure, and site was verified    Anesthesia  Local anesthesia used  Anesthetic: lidocaine 1% without epinephrine    Location:  Knee  Site:  R knee  Prep: Patient was prepped and draped in usual sterile fashion    Needle size:  25 G  Medications:  40 mg methylPREDNISolone acetate 40 mg/mL  Patient tolerance:  Patient tolerated the procedure well with no immediate complications

## 2019-10-09 NOTE — PROGRESS NOTES
"[facility]    Subjective:     Chief Complaint:   Chief Complaint   Patient presents with    Right Knee - Pain     Right knee pain x a while. States that her knee pain is getting worse and states that her pain is constant and states that it gets worse with activity and hard to sleep. Did have a stress fx.        Past Medical History:   Diagnosis Date    Abnormal Pap smear     "pre-cancer cells post hysterectomy"    Anxiety     Arthritis     Depression     Good hypertension control 2014    Hyperlipidemia     Hypertension     Hypertriglyceridemia     Meningitis     Narcolepsy     ALESIA (obstructive sleep apnea)     Peripheral neuropathy 2014    Psoriasis     Psoriasis 5/10/2018    Tuberculosis        Past Surgical History:   Procedure Laterality Date    BREAST SURGERY      aumentation/ reduction     BREAST SURGERY  2017    augmentation     brizilian butt lift   2017     SECTION      CHOLECYSTECTOMY  2018    Dr SEAN Christopher [facility]    FOOT SURGERY      GASTRIC BYPASS  2015    GASTRIC BYPASS      HERNIA REPAIR      HYSTERECTOMY      KNEE SURGERY Left     SHOULDER SURGERY      right should surgery     SHOULDER SURGERY  2017    left shoulder / torn labrium     THIGH LIFT  2017    TONSILLECTOMY      tummy tuck   2016       Current Outpatient Medications   Medication Sig    armodafinil (NUVIGIL) 250 mg tablet Take 250 mg by mouth 2 (two) times daily.    dextroamphetamine-amphetamine 30 mg Tab Take 1 tablet by mouth once daily.    DUEXIS 800-26.6 mg Tab Take 1 tablet by mouth 3 (three) times daily.     EPINEPHrine (EPIPEN) 0.3 mg/0.3 mL AtIn Inject 0.3 mLs (0.3 mg total) into the muscle once. As directed PRN for 1 dose     No current facility-administered medications for this visit.        Review of patient's allergies indicates:   Allergen Reactions    Alcohol      Other reaction(s): Unknown       Family " History   Problem Relation Age of Onset    Cancer Mother         breast bilateral/ skin     Breast cancer Mother     Skin cancer Mother     Diabetes Father     Heart disease Father 65        CABG    Hypertension Father     Cancer Father         skin    Skin cancer Father     Gout Father     Gout Brother     No Known Problems Daughter     No Known Problems Son     Cancer Maternal Aunt         lung, pancrease - smoker , breast    Lung cancer Maternal Aunt         x2    Pancreatic cancer Maternal Aunt     Breast cancer Maternal Aunt     No Known Problems Maternal Uncle     No Known Problems Paternal Uncle     Cancer Maternal Grandmother         pancrease    Pancreatic cancer Maternal Grandmother     Breast cancer Maternal Grandmother     Diabetes Paternal Grandmother     Heart disease Paternal Grandfather     Colon cancer Neg Hx     Ovarian cancer Neg Hx        Social History     Socioeconomic History    Marital status:      Spouse name: Not on file    Number of children: Not on file    Years of education: Not on file    Highest education level: Not on file   Occupational History    Not on file   Social Needs    Financial resource strain: Not on file    Food insecurity:     Worry: Not on file     Inability: Not on file    Transportation needs:     Medical: Not on file     Non-medical: Not on file   Tobacco Use    Smoking status: Never Smoker    Smokeless tobacco: Never Used   Substance and Sexual Activity    Alcohol use: No     Comment: ALLERGIC TO ALCOHOL(DRINKING) PER PATIENT    Drug use: No    Sexual activity: Yes     Partners: Male     Birth control/protection: Surgical     Comment: SO   Lifestyle    Physical activity:     Days per week: Not on file     Minutes per session: Not on file    Stress: Not on file   Relationships    Social connections:     Talks on phone: Not on file     Gets together: Not on file     Attends Anglican service: Not on file     Active  member of club or organization: Not on file     Attends meetings of clubs or organizations: Not on file     Relationship status: Not on file   Other Topics Concern    Not on file   Social History Narrative    Not on file       History of present illness: This lady 49 she has decided she likes to run last couple years last summer we diagnosed a stress fracture of her proximal tibia which happened in June we had an MRI which showed a stress fracture metaphyseal tibia with no meniscal tears etc. She stayed off it pain went away after 2-3 months and she started to run again first major run that she did into September she had pain in her right knee no injury per se she has is now limping with pain in her right knee which sounds more proximal than distal to the knee      Review of Systems:    Constitution: Negative for chills, fever, and sweats.  Negative for unexplained weight loss.    HENT:  Negative for headaches and blurry vision.    Cardiovascular:Negative for chest pain or irregular heart beat. Negative for hypertension.    Respiratory:  Negative for cough and shortness of breath.    Gastrointestinal: Negative for abdominal pain, heartburn, melena, nausea, and vomitting.    Genitourinary:  Negative bladder incontinence and dysuria.    Musculoskeletal:  See HPI for details.     Neurological: Negative for numbness.    Psychiatric/Behavioral: Negative for depression.  The patient is not nervous/anxious.      Endocrine: Negative for polyuria    Hematologic/Lymphatic: Negative for bleeding problem.  Does not bruise/bleed easily.    Skin: Negative for poor would healing and rash    Objective:      Physical Examination:    Vital Signs:    Vitals:    10/09/19 0816   BP: 120/78   Pulse: 80       Body mass index is 27.1 kg/m².    This a well-developed, well nourished patient in no acute distress.  They are alert and oriented and cooperative to examination.        Physical exam right knee shows that she scat mild effusion  she has some quad atrophy is quite tender over an enlarged plica under the kneecap. She has no specific tenderness over the medial or lateral joint line Elmer gives her mild generalized pain she does not have a drawer she does not have any tenderness to percussion around the proximal tibia. She has a mild limp.  Pertinent New Results:    XRAY Report / Interpretation:   AP lateral sunrise right knee is unremarkable. There are no acute findings. There is no malalignment. There is no significant degenerative changes.Electronically Signed By Gilbert Velazquez JR, MD    Assessment/Plan:      My impression I think her primary issue right now is plica. We have treated that with steroid injection 1 cc Depo-Medrol 5 cc lidocaine superolateral approach. Probably will improve the plica where she can begin to exercise again. She wants to run a half marathon next month I don't think that's feasible at all. She's never been a long distance runner over any long period of time. So we talked about other more appropriate exercises to keep her in shape and perhaps change her goals at least for the near future. She'll be back when necessary      This note was created using Dragon voice recognition software that occasionally misinterpreted phrases or words.

## 2019-10-15 ENCOUNTER — PATIENT OUTREACH (OUTPATIENT)
Dept: ADMINISTRATIVE | Facility: HOSPITAL | Age: 49
End: 2019-10-15

## 2019-10-25 DIAGNOSIS — Z12.11 COLON CANCER SCREENING: ICD-10-CM

## 2019-11-07 ENCOUNTER — PATIENT OUTREACH (OUTPATIENT)
Dept: ADMINISTRATIVE | Facility: HOSPITAL | Age: 49
End: 2019-11-07

## 2019-11-07 NOTE — LETTER
November 14, 2019    Holly Chicas  [address] [place] [address]             [facility]  [address]  [facility] [address]  Phone: [phone] Dear Stacey [facility] is committed to your overall health and would like to ensure that you are up to date on all of your health maintenance testing.  Our records indicate that you are overdue for your colorectal cancer screening.  After reviewing your chart, it has been documented that a FIT KIT (colorectal cancer screening kit) was given at a clinic visit or  mailed to you,  but the lab has yet to receive the kit so that we may update your chart.   This is a friendly reminder to complete this test (the instructions will tell you how) and then return your sample in the postage-paid return envelope within 24 hours of collection.  Please remember to put the collection date on your sample!    If your test results are negative, you won't need testing again for another year.  If results show you need more testing, we will call you with next steps.    Sincerely,      Ben Tracy MD and your [facility]

## 2019-11-07 NOTE — PROGRESS NOTES
Dear [unfilled],      Thank you for choosing to receive your care at [facility]. Your [facility] wants to make   sure, we help you prevent illness and make the healthiest choices possible. Our records show you are eligible   and due for a Colorectal Cancer Screening. To assist you in completing this important screening you were mailed a FitKit.     *If you have completed and returned the Kit, thank you.   *If you did not receive a Kit, please let us know.   *If you have received the Kit but have not yet completed it, please do so at your earliest convenience. Please remember   to document the collection date prior to mailing.     Thanks again for choosing [facility]. We wish you continued good health!      Sincerely,      Ben Tracy MD and your [facility]

## 2019-11-14 NOTE — PROGRESS NOTES
"Attempted to outreach patient for pre-visit via "mGenerator", no answer after a week. Sending outreach via Mail Out Letter now.    "

## 2019-12-10 ENCOUNTER — TELEPHONE (OUTPATIENT)
Dept: SURGERY | Facility: CLINIC | Age: 49
End: 2019-12-10

## 2019-12-10 ENCOUNTER — PATIENT MESSAGE (OUTPATIENT)
Dept: FAMILY MEDICINE | Facility: CLINIC | Age: 49
End: 2019-12-10

## 2019-12-10 NOTE — TELEPHONE ENCOUNTER
----- Message from Princess JHONNY Chicas sent at 12/10/2019  1:38 PM CST -----  Contact: Patient  Type: Needs Medical Advice    Who Called: Patient  Best Call Back Number:   Additional Information: Requesting a call back in regards to patient wanting a Mammogram order to be put in epic. She has a concern with one of her breast.

## 2019-12-11 ENCOUNTER — OFFICE VISIT (OUTPATIENT)
Dept: FAMILY MEDICINE | Facility: CLINIC | Age: 49
End: 2019-12-11
Payer: COMMERCIAL

## 2019-12-11 VITALS
HEART RATE: 80 BPM | TEMPERATURE: 98 F | OXYGEN SATURATION: 98 % | BODY MASS INDEX: 27.48 KG/M2 | RESPIRATION RATE: 18 BRPM | SYSTOLIC BLOOD PRESSURE: 130 MMHG | WEIGHT: 175.06 LBS | DIASTOLIC BLOOD PRESSURE: 88 MMHG | HEIGHT: 67 IN

## 2019-12-11 DIAGNOSIS — N63.0 BREAST LUMP IN FEMALE: Primary | ICD-10-CM

## 2019-12-11 DIAGNOSIS — Z12.31 BREAST CANCER SCREENING BY MAMMOGRAM: ICD-10-CM

## 2019-12-11 PROCEDURE — 99999 PR PBB SHADOW E&M-EST. PATIENT-LVL V: ICD-10-PCS | Mod: PBBFAC,,, | Performed by: NURSE PRACTITIONER

## 2019-12-11 PROCEDURE — 99213 PR OFFICE/OUTPT VISIT, EST, LEVL III, 20-29 MIN: ICD-10-PCS | Mod: S$GLB,,, | Performed by: NURSE PRACTITIONER

## 2019-12-11 PROCEDURE — 99999 PR PBB SHADOW E&M-EST. PATIENT-LVL V: CPT | Mod: PBBFAC,,, | Performed by: NURSE PRACTITIONER

## 2019-12-11 PROCEDURE — 99213 OFFICE O/P EST LOW 20 MIN: CPT | Mod: S$GLB,,, | Performed by: NURSE PRACTITIONER

## 2019-12-11 NOTE — PATIENT INSTRUCTIONS
Breast Lump, Uncertain Cause    A lump was found in your breast. Most breast lumps are not cancer. They may be caused by normal changes in the breast tissue due to hormone variations that occur with your menstrual cycle. Some women may form lumps that are painful and tender. Others may form lumps that are painless.  At this time, is not possible to be certain of the cause of your lump without further evaluation. This could include:  · Another exam by your healthcare provider or a gynecologist  · Imaging tests, such as a mammogram or ultrasound  · Biopsy (procedure to remove small tissue samples from the breast lump)  Your healthcare provider will explain any additional testing that is needed. Be sure to get answers to any questions you may have.  Home care  Until a diagnosis is made, you may be advised to do the following:  · If you are having breast pain:  ¨ Take an over-the-counter pain reliever, if directed to by your provider.  ¨ Wear a well-fitted bra or sports bra for extra support. If you have breast pain at night, try wearing the bra during sleep.  ¨ Apply a warm compress (towel soaked in warm water) to the breast. You may also use a hot water bottle.  · Check your breasts each day. Keep a log of whether the lump seems to be changing in size or tenderness with your period. This can help your healthcare provider make the correct diagnosis.  Follow-up care  Follow up with your healthcare provider, or as directed. Keep all appointments. Also, prepare for any upcoming tests as directed.  When to seek medical advice  Call your healthcare provider right away if any of these occur:  · Fever of 100.4°F (38°C) or higher  · Redness or swelling of the breast  · Discharge from the nipple  · Visible changes in the skin over the nipple or breast  · Lump grows larger, feels very hard, or has an irregular shape  · New lumps form  Date Last Reviewed: 3/1/2017  © 7800-4472 [employer]. [address] [place] [address],  [place] [address]. All rights reserved. This information is not intended as a substitute for professional medical care. Always follow your healthcare professional's instructions.

## 2019-12-11 NOTE — PROGRESS NOTES
"Subjective:       Patient ID: Holly Chicas is a 49 y.o. female.    Chief Complaint: lump on breast    Patient who is new to me presents for lump on left breast. She has breast implants since 2016. She has a "strong" family history of breast cancer. She noticed the lump about a week ago and is due for a mammogram.     Mass   This is a new problem. Episode onset: 3 days ago. The problem occurs daily. The problem has been unchanged. Pertinent negatives include no arthralgias, chest pain, headaches, joint swelling, neck pain, vomiting or weakness. Nothing aggravates the symptoms. She has tried nothing for the symptoms.     Review of Systems   Constitutional: Negative for activity change and unexpected weight change.   HENT: Negative for hearing loss, rhinorrhea and trouble swallowing.    Eyes: Negative for discharge and visual disturbance.   Respiratory: Negative for chest tightness and wheezing.    Cardiovascular: Negative for chest pain and palpitations.   Gastrointestinal: Negative for blood in stool, constipation, diarrhea and vomiting.   Endocrine: Negative for polyuria.   Genitourinary: Negative for difficulty urinating, dysuria, hematuria and menstrual problem.   Musculoskeletal: Negative for arthralgias, joint swelling and neck pain.   Neurological: Negative for weakness and headaches.   Psychiatric/Behavioral: Negative for confusion and dysphoric mood.       Objective:      Physical Exam   Constitutional: She is oriented to person, place, and time. She appears well-developed and well-nourished.   HENT:   Head: Normocephalic and atraumatic.   Right Ear: External ear normal.   Left Ear: External ear normal.   Nose: Nose normal.   Mouth/Throat: Oropharynx is clear and moist.   Eyes: Pupils are equal, round, and reactive to light.   Neck: Normal range of motion.   Cardiovascular: Normal rate, regular rhythm, normal heart sounds and intact distal pulses.   Pulmonary/Chest: Effort normal and breath sounds normal. " She exhibits mass.       Abdominal: Soft. Bowel sounds are normal.   Musculoskeletal: Normal range of motion.   Neurological: She is alert and oriented to person, place, and time.   Skin: Skin is warm and dry.   Nursing note and vitals reviewed.      Assessment:       1. Breast lump in female    2. Breast cancer screening by mammogram        Plan:       Holly was seen today for lump on breast.    Diagnoses and all orders for this visit:    Breast lump in female  -     Cancel: Mammo Digital Screening Bilat w/ [facility]; Future  -     US Breast Left Complete; Future  -     Mammo Digital Diagnostic Bilat w/ [facility]; Future    Breast cancer screening by mammogram  -     US Breast Left Complete; Future  -     Mammo Digital Diagnostic Bilat w/ [facility]; Future      Will follow up on studies.

## 2019-12-19 ENCOUNTER — HOSPITAL ENCOUNTER (OUTPATIENT)
Dept: RADIOLOGY | Facility: HOSPITAL | Age: 49
Discharge: HOME | End: 2019-12-19
Attending: NURSE PRACTITIONER
Payer: COMMERCIAL

## 2019-12-19 DIAGNOSIS — N63.0 BREAST LUMP IN FEMALE: ICD-10-CM

## 2019-12-19 DIAGNOSIS — Z12.31 BREAST CANCER SCREENING BY MAMMOGRAM: ICD-10-CM

## 2019-12-19 PROCEDURE — 77066 DX MAMMO INCL CAD BI: CPT | Mod: 26,,, | Performed by: RADIOLOGY

## 2019-12-19 PROCEDURE — 77066 DX MAMMO INCL CAD BI: CPT | Mod: TC

## 2019-12-19 PROCEDURE — 77062 BREAST TOMOSYNTHESIS BI: CPT | Mod: 26,,, | Performed by: RADIOLOGY

## 2019-12-19 PROCEDURE — 76642 ULTRASOUND BREAST LIMITED: CPT | Mod: TC,LT

## 2019-12-19 PROCEDURE — 76642 US BREAST LEFT LIMITED: ICD-10-PCS | Mod: 26,LT,, | Performed by: RADIOLOGY

## 2019-12-19 PROCEDURE — 76642 ULTRASOUND BREAST LIMITED: CPT | Mod: 26,LT,, | Performed by: RADIOLOGY

## 2019-12-19 PROCEDURE — 77062 MAMMO DIGITAL DIAGNOSTIC BILAT WITH TOMOSYNTHESIS_CAD: ICD-10-PCS | Mod: 26,,, | Performed by: RADIOLOGY

## 2019-12-19 PROCEDURE — 77066 MAMMO DIGITAL DIAGNOSTIC BILAT WITH TOMOSYNTHESIS_CAD: ICD-10-PCS | Mod: 26,,, | Performed by: RADIOLOGY

## 2020-05-05 ENCOUNTER — PATIENT MESSAGE (OUTPATIENT)
Dept: ADMINISTRATIVE | Facility: HOSPITAL | Age: 50
End: 2020-05-05

## 2020-08-11 ENCOUNTER — OFFICE VISIT (OUTPATIENT)
Dept: FAMILY MEDICINE | Facility: CLINIC | Age: 50
End: 2020-08-11
Payer: COMMERCIAL

## 2020-08-11 VITALS
DIASTOLIC BLOOD PRESSURE: 70 MMHG | OXYGEN SATURATION: 97 % | HEART RATE: 63 BPM | HEIGHT: 66 IN | WEIGHT: 170.88 LBS | TEMPERATURE: 98 F | SYSTOLIC BLOOD PRESSURE: 130 MMHG | RESPIRATION RATE: 18 BRPM | BODY MASS INDEX: 27.46 KG/M2

## 2020-08-11 DIAGNOSIS — M26.623 BILATERAL TEMPOROMANDIBULAR JOINT PAIN: ICD-10-CM

## 2020-08-11 DIAGNOSIS — J02.9 SORE THROAT: Primary | ICD-10-CM

## 2020-08-11 DIAGNOSIS — H92.01 ACUTE OTALGIA, RIGHT: ICD-10-CM

## 2020-08-11 LAB — GROUP A STREP, MOLECULAR: NEGATIVE

## 2020-08-11 PROCEDURE — 99999 PR PBB SHADOW E&M-EST. PATIENT-LVL IV: CPT | Mod: PBBFAC,,, | Performed by: PHYSICIAN ASSISTANT

## 2020-08-11 PROCEDURE — 3078F DIAST BP <80 MM HG: CPT | Mod: CPTII,S$GLB,, | Performed by: PHYSICIAN ASSISTANT

## 2020-08-11 PROCEDURE — 3075F SYST BP GE 130 - 139MM HG: CPT | Mod: CPTII,S$GLB,, | Performed by: PHYSICIAN ASSISTANT

## 2020-08-11 PROCEDURE — 99999 PR PBB SHADOW E&M-EST. PATIENT-LVL IV: ICD-10-PCS | Mod: PBBFAC,,, | Performed by: PHYSICIAN ASSISTANT

## 2020-08-11 PROCEDURE — 3008F BODY MASS INDEX DOCD: CPT | Mod: CPTII,S$GLB,, | Performed by: PHYSICIAN ASSISTANT

## 2020-08-11 PROCEDURE — 3078F PR MOST RECENT DIASTOLIC BLOOD PRESSURE < 80 MM HG: ICD-10-PCS | Mod: CPTII,S$GLB,, | Performed by: PHYSICIAN ASSISTANT

## 2020-08-11 PROCEDURE — 99214 OFFICE O/P EST MOD 30 MIN: CPT | Mod: S$GLB,,, | Performed by: PHYSICIAN ASSISTANT

## 2020-08-11 PROCEDURE — 3075F PR MOST RECENT SYSTOLIC BLOOD PRESS GE 130-139MM HG: ICD-10-PCS | Mod: CPTII,S$GLB,, | Performed by: PHYSICIAN ASSISTANT

## 2020-08-11 PROCEDURE — 87651 STREP A DNA AMP PROBE: CPT | Mod: PO

## 2020-08-11 PROCEDURE — 3008F PR BODY MASS INDEX (BMI) DOCUMENTED: ICD-10-PCS | Mod: CPTII,S$GLB,, | Performed by: PHYSICIAN ASSISTANT

## 2020-08-11 PROCEDURE — 99214 PR OFFICE/OUTPT VISIT, EST, LEVL IV, 30-39 MIN: ICD-10-PCS | Mod: S$GLB,,, | Performed by: PHYSICIAN ASSISTANT

## 2020-08-11 RX ORDER — AMOXICILLIN AND CLAVULANATE POTASSIUM 875; 125 MG/1; MG/1
1 TABLET, FILM COATED ORAL
COMMUNITY
End: 2020-08-20 | Stop reason: ALTCHOICE

## 2020-08-11 RX ORDER — METHYLPREDNISOLONE 4 MG/1
TABLET ORAL
Qty: 1 PACKAGE | Refills: 0 | Status: SHIPPED | OUTPATIENT
Start: 2020-08-11 | End: 2020-08-20 | Stop reason: ALTCHOICE

## 2020-08-11 RX ORDER — LORATADINE 10 MG/1
10 TABLET ORAL DAILY
COMMUNITY
End: 2021-07-01

## 2020-08-11 RX ORDER — SOLRIAMFETOL 150 MG/1
150 TABLET, FILM COATED ORAL
COMMUNITY
End: 2021-11-16 | Stop reason: SDUPTHER

## 2020-08-11 RX ORDER — FLUTICASONE PROPIONATE 50 MCG
1 SPRAY, SUSPENSION (ML) NASAL DAILY
COMMUNITY
End: 2020-08-20

## 2020-08-11 RX ORDER — CYCLOBENZAPRINE HCL 10 MG
10 TABLET ORAL NIGHTLY PRN
Qty: 14 TABLET | Refills: 0 | Status: SHIPPED | OUTPATIENT
Start: 2020-08-11 | End: 2020-08-20

## 2020-08-11 NOTE — PROGRESS NOTES
Subjective:       Patient ID: Holly Chicas is a 49 y.o. female.    Chief Complaint: Fever, Chills, Otalgia, Sore Throat, and Generalized Body Aches    Patient comes to clinic today with complaints of fever, chills, sore throat, and ear pain. The patient reports she is having pain with chewing and eating. The patient was seen on a virtual visit and prescribed augmentin, flonase, and claritin. She has noticed little improvement in her symptoms. Patient denies fever over the last 24 hours. The patient denies cough or shortness of breath.    Review of patient's allergies indicates:   Allergen Reactions    Alcohol      Other reaction(s): Unknown         Current Outpatient Medications:     amoxicillin-clavulanate 875-125mg (AUGMENTIN) 875-125 mg per tablet, Take 1 tablet by mouth every 12 (twelve) hours., Disp: , Rfl:     armodafinil (NUVIGIL) 250 mg tablet, Take 250 mg by mouth 2 (two) times daily., Disp: , Rfl: 2    dextroamphetamine-amphetamine 30 mg Tab, Take 1 tablet by mouth once daily., Disp: , Rfl: 0    DUEXIS 800-26.6 mg Tab, Take 1 tablet by mouth 3 (three) times daily. , Disp: , Rfl:     fluticasone propionate (FLONASE) 50 mcg/actuation nasal spray, 1 spray by Each Nostril route once daily., Disp: , Rfl:     loratadine (CLARITIN) 10 mg tablet, Take 10 mg by mouth once daily., Disp: , Rfl:     solriamfetoL (SUNOSI) 150 mg Tab, Take 150 mg by mouth., Disp: , Rfl:     cyclobenzaprine (FLEXERIL) 10 MG tablet, Take 1 tablet (10 mg total) by mouth nightly as needed for Muscle spasms., Disp: 14 tablet, Rfl: 0    EPINEPHrine (EPIPEN) 0.3 mg/0.3 mL AtIn, Inject 0.3 mLs (0.3 mg total) into the muscle once. As directed PRN for 1 dose, Disp: 2 each, Rfl: 11    methylPREDNISolone (MEDROL DOSEPACK) 4 mg tablet, use as directed, Disp: 1 Package, Rfl: 0    Lab Results   Component Value Date    WBC 4.52 03/13/2019    HGB 13.2 03/13/2019    HCT 40.7 03/13/2019     03/13/2019    CHOL 245 (H) 03/13/2019    TRIG  "167 (H) 03/13/2019    HDL 56 03/13/2019    ALT 37 03/13/2019    AST 31 03/13/2019     03/13/2019    K 4.2 03/13/2019     03/13/2019    CREATININE 0.7 03/13/2019    BUN 12 03/13/2019    CO2 27 03/13/2019    TSH 1.636 03/13/2019    HGBA1C 5.3 03/13/2019       Review of Systems   Constitutional: Positive for activity change, chills and fever. Negative for appetite change.        No current fever. Some fever in the last 2 weeks. No fever in last 24 hours   HENT: Positive for ear pain and sore throat. Negative for postnasal drip, rhinorrhea and sinus pressure.    Eyes: Negative for visual disturbance.   Respiratory: Negative for cough and shortness of breath.    Cardiovascular: Negative for chest pain.   Gastrointestinal: Positive for diarrhea. Negative for abdominal distention and abdominal pain.        "always has diarrhea since weight loss surgery"   Genitourinary: Negative for difficulty urinating and dysuria.   Musculoskeletal: Negative for arthralgias and myalgias.   Neurological: Negative for headaches.   Hematological: Negative for adenopathy.   Psychiatric/Behavioral: The patient is not nervous/anxious.        Objective:      Physical Exam  Constitutional:       Appearance: Normal appearance.   HENT:      Head: Normocephalic and atraumatic.      Jaw: Pain on movement present.      Comments: TMJ popping and TTP     Ears:      Comments: Bilateral TM mild fluid present. No significant erythema or bulging                   Mouth/Throat:      Pharynx: No oropharyngeal exudate or posterior oropharyngeal erythema.   Cardiovascular:      Rate and Rhythm: Normal rate and regular rhythm.      Heart sounds: Normal heart sounds.   Pulmonary:      Effort: Pulmonary effort is normal.      Breath sounds: Normal breath sounds. No wheezing.   Abdominal:      General: Bowel sounds are normal.      Palpations: Abdomen is soft.      Tenderness: There is no abdominal tenderness.   Lymphadenopathy:      Cervical: No " cervical adenopathy.   Skin:     Findings: No erythema.   Neurological:      Mental Status: She is alert and oriented to person, place, and time.   Psychiatric:         Behavior: Behavior normal.         Assessment:       1. Sore throat    2. Acute otalgia, right    3. Bilateral temporomandibular joint pain        Plan:       Holly was seen today for fever, chills, otalgia, sore throat and generalized body aches.    Diagnoses and all orders for this visit:    Sore throat  -     Group A Strep, Molecular    Acute otalgia, right  -     methylPREDNISolone (MEDROL DOSEPACK) 4 mg tablet; use as directed    Bilateral temporomandibular joint pain  -     cyclobenzaprine (FLEXERIL) 10 MG tablet; Take 1 tablet (10 mg total) by mouth nightly as needed for Muscle spasms.   Continue NSAID  Patient advised flexeril may cause drowsiness.    Continue and complete augmentin  Continue claritin and flonase      If no improvement, contact clinic for referral to ENT.

## 2020-08-17 ENCOUNTER — PATIENT MESSAGE (OUTPATIENT)
Dept: FAMILY MEDICINE | Facility: CLINIC | Age: 50
End: 2020-08-17

## 2020-08-17 NOTE — TELEPHONE ENCOUNTER
So sorry to hear you are in such pain. Would like patient to see PCP if she is still having a hard time. Please see if Dr. Tracy can see this patient this week.  Sounds like patient needs a rapid test. I think rapid testing is available at local urgent cares and minute clinics at [facility].

## 2020-08-18 ENCOUNTER — OFFICE VISIT (OUTPATIENT)
Dept: PRIMARY CARE CLINIC | Facility: CLINIC | Age: 50
End: 2020-08-18
Payer: COMMERCIAL

## 2020-08-18 VITALS
DIASTOLIC BLOOD PRESSURE: 89 MMHG | TEMPERATURE: 98 F | OXYGEN SATURATION: 99 % | HEART RATE: 75 BPM | SYSTOLIC BLOOD PRESSURE: 133 MMHG | RESPIRATION RATE: 16 BRPM

## 2020-08-18 DIAGNOSIS — R05.9 COUGH: ICD-10-CM

## 2020-08-18 DIAGNOSIS — Z20.822 SUSPECTED COVID-19 VIRUS INFECTION: Primary | ICD-10-CM

## 2020-08-18 LAB — SARS-COV-2 RNA RESP QL NAA+PROBE: NOT DETECTED

## 2020-08-18 PROCEDURE — 99203 PR OFFICE/OUTPT VISIT, NEW, LEVL III, 30-44 MIN: ICD-10-PCS | Mod: S$GLB,,, | Performed by: EMERGENCY MEDICINE

## 2020-08-18 PROCEDURE — 3079F PR MOST RECENT DIASTOLIC BLOOD PRESSURE 80-89 MM HG: ICD-10-PCS | Mod: CPTII,S$GLB,, | Performed by: EMERGENCY MEDICINE

## 2020-08-18 PROCEDURE — 3075F SYST BP GE 130 - 139MM HG: CPT | Mod: CPTII,S$GLB,, | Performed by: EMERGENCY MEDICINE

## 2020-08-18 PROCEDURE — 3079F DIAST BP 80-89 MM HG: CPT | Mod: CPTII,S$GLB,, | Performed by: EMERGENCY MEDICINE

## 2020-08-18 PROCEDURE — 3075F PR MOST RECENT SYSTOLIC BLOOD PRESS GE 130-139MM HG: ICD-10-PCS | Mod: CPTII,S$GLB,, | Performed by: EMERGENCY MEDICINE

## 2020-08-18 PROCEDURE — U0003 INFECTIOUS AGENT DETECTION BY NUCLEIC ACID (DNA OR RNA); SEVERE ACUTE RESPIRATORY SYNDROME CORONAVIRUS 2 (SARS-COV-2) (CORONAVIRUS DISEASE [COVID-19]), AMPLIFIED PROBE TECHNIQUE, MAKING USE OF HIGH THROUGHPUT TECHNOLOGIES AS DESCRIBED BY CMS-2020-01-R: HCPCS

## 2020-08-18 PROCEDURE — 99203 OFFICE O/P NEW LOW 30 MIN: CPT | Mod: S$GLB,,, | Performed by: EMERGENCY MEDICINE

## 2020-08-18 NOTE — PROGRESS NOTES
"Subjective:      Patient is a 49 year old female who presents for COVID testing. She reports congestion, sore throat, "low grade" fever (nothing above 100.4F) and diarrhea. She reports symptoms have been present for one week. She reports seeing "three different doctors" but has not yet been tested. She denied any shortness of breath or other symptoms.     Review of Systems   Constitutional: Negative for activity change, appetite change, chills and fever.   HENT: Positive for congestion and sore throat. Negative for rhinorrhea.    Eyes: Negative for redness and visual disturbance.   Respiratory: Negative for cough, chest tightness and shortness of breath.    Cardiovascular: Negative for chest pain.   Gastrointestinal: Positive for diarrhea. Negative for abdominal pain, nausea and vomiting.   Genitourinary: Negative for dysuria and frequency.   Musculoskeletal: Negative for back pain, neck pain and neck stiffness.   Skin: Negative for rash.   Neurological: Negative for dizziness, syncope, numbness and headaches.       Objective:      Physical Exam  Vitals signs and nursing note reviewed.   Constitutional:       General: She is not in acute distress.     Appearance: She is well-developed. She is not diaphoretic.   HENT:      Head: Normocephalic and atraumatic.      Nose: Nose normal.   Eyes:      Conjunctiva/sclera: Conjunctivae normal.   Neck:      Musculoskeletal: Normal range of motion.   Cardiovascular:      Rate and Rhythm: Normal rate and regular rhythm.      Heart sounds: Normal heart sounds. No murmur.   Pulmonary:      Effort: No respiratory distress.      Breath sounds: Normal breath sounds. No wheezing.   Musculoskeletal: Normal range of motion.   Skin:     General: Skin is warm and dry.   Neurological:      Mental Status: She is alert and oriented to person, place, and time.         Assessment and Plan:      Diagnoses and all orders for this visit:    Suspected Covid-19 Virus Infection  -     COVID-19 " Routine Screening  - Discharge home and await results.   - Return to clinic or ED for new or worsening symptoms.   - Follow-up with PCP as needed.

## 2020-08-18 NOTE — TELEPHONE ENCOUNTER
Our lab is not doing any tests without symptoms.  They have to go to the Community testing centers.  There is one on the [facility] currently at [facility] in [place] for this week.  My understanding is they have 150 tests that they can do per day and they usually have that many people in line when they open the doors at 9:00 a.m..

## 2020-08-18 NOTE — PROGRESS NOTES
Subjective:        Time seen by provider: 8:36 AM on 08/18/2020    Holly Chicas is a 49 y.o. female with PMHx of HTN and HLDwho presents for an evaluation of possible COVID-19. The patient c/o  . She He denies or any other symptoms at this time. No pertinent PSHx of tonsillectomy.     Review of Systems   Constitutional: Negative for activity change, appetite change, fatigue and fever.   HENT: Negative for congestion, rhinorrhea and sore throat.    Respiratory: Negative for cough, chest tightness, shortness of breath and wheezing.    Cardiovascular: Negative for chest pain and palpitations.   Gastrointestinal: Negative for diarrhea, nausea and vomiting.   Musculoskeletal: Negative for arthralgias and myalgias.   Skin: Negative for rash.   Neurological: Negative for weakness, light-headedness, numbness and headaches.       Objective:      Physical Exam  Vitals signs and nursing note reviewed.   Constitutional:       General: She is not in acute distress.     Appearance: She is well-developed. She is not diaphoretic.   HENT:      Head: Normocephalic and atraumatic.      Nose: Nose normal.   Eyes:      Conjunctiva/sclera: Conjunctivae normal.   Neck:      Musculoskeletal: Normal range of motion.   Cardiovascular:      Rate and Rhythm: Normal rate and regular rhythm.      Heart sounds: Normal heart sounds. No murmur.   Pulmonary:      Effort: No respiratory distress.      Breath sounds: Normal breath sounds. No wheezing.   Musculoskeletal: Normal range of motion.   Skin:     General: Skin is warm and dry.   Neurological:      Mental Status: She is alert and oriented to person, place, and time.         Assessment and Plan:      Diagnoses and all orders for this visit:    Cough  -     COVID-19 Routine Screening      - Discharge home and await results.   - Return to clinic or ED for new or worsening symptoms.   - Follow-up with PCP as needed.     Scribe Attestation:   Kathy ORELLANA am scribing for, and in the presence  of, Nanci Braswell PA-C. I performed the above scribed service and the documentation accurately describes the services I performed. I attest to the accuracy of the note.    ***

## 2020-08-19 ENCOUNTER — PATIENT MESSAGE (OUTPATIENT)
Dept: FAMILY MEDICINE | Facility: CLINIC | Age: 50
End: 2020-08-19

## 2020-08-20 ENCOUNTER — OFFICE VISIT (OUTPATIENT)
Dept: FAMILY MEDICINE | Facility: CLINIC | Age: 50
End: 2020-08-20
Attending: FAMILY MEDICINE
Payer: COMMERCIAL

## 2020-08-20 ENCOUNTER — PATIENT MESSAGE (OUTPATIENT)
Dept: FAMILY MEDICINE | Facility: CLINIC | Age: 50
End: 2020-08-20

## 2020-08-20 VITALS
HEIGHT: 66 IN | OXYGEN SATURATION: 98 % | WEIGHT: 165.56 LBS | HEART RATE: 72 BPM | BODY MASS INDEX: 26.61 KG/M2 | SYSTOLIC BLOOD PRESSURE: 132 MMHG | TEMPERATURE: 98 F | DIASTOLIC BLOOD PRESSURE: 74 MMHG

## 2020-08-20 DIAGNOSIS — M26.621 ARTHRALGIA OF RIGHT TEMPOROMANDIBULAR JOINT: Primary | ICD-10-CM

## 2020-08-20 DIAGNOSIS — M26.609 TMJ (TEMPOROMANDIBULAR JOINT DISORDER): Primary | ICD-10-CM

## 2020-08-20 PROCEDURE — 99999 PR PBB SHADOW E&M-EST. PATIENT-LVL III: CPT | Mod: PBBFAC,,, | Performed by: FAMILY MEDICINE

## 2020-08-20 PROCEDURE — 99213 PR OFFICE/OUTPT VISIT, EST, LEVL III, 20-29 MIN: ICD-10-PCS | Mod: S$GLB,,, | Performed by: FAMILY MEDICINE

## 2020-08-20 PROCEDURE — 3078F DIAST BP <80 MM HG: CPT | Mod: CPTII,S$GLB,, | Performed by: FAMILY MEDICINE

## 2020-08-20 PROCEDURE — 3008F BODY MASS INDEX DOCD: CPT | Mod: CPTII,S$GLB,, | Performed by: FAMILY MEDICINE

## 2020-08-20 PROCEDURE — 3075F PR MOST RECENT SYSTOLIC BLOOD PRESS GE 130-139MM HG: ICD-10-PCS | Mod: CPTII,S$GLB,, | Performed by: FAMILY MEDICINE

## 2020-08-20 PROCEDURE — 99999 PR PBB SHADOW E&M-EST. PATIENT-LVL III: ICD-10-PCS | Mod: PBBFAC,,, | Performed by: FAMILY MEDICINE

## 2020-08-20 PROCEDURE — 3075F SYST BP GE 130 - 139MM HG: CPT | Mod: CPTII,S$GLB,, | Performed by: FAMILY MEDICINE

## 2020-08-20 PROCEDURE — 3078F PR MOST RECENT DIASTOLIC BLOOD PRESSURE < 80 MM HG: ICD-10-PCS | Mod: CPTII,S$GLB,, | Performed by: FAMILY MEDICINE

## 2020-08-20 PROCEDURE — 99213 OFFICE O/P EST LOW 20 MIN: CPT | Mod: S$GLB,,, | Performed by: FAMILY MEDICINE

## 2020-08-20 PROCEDURE — 3008F PR BODY MASS INDEX (BMI) DOCUMENTED: ICD-10-PCS | Mod: CPTII,S$GLB,, | Performed by: FAMILY MEDICINE

## 2020-08-20 RX ORDER — TRAMADOL HYDROCHLORIDE 50 MG/1
50 TABLET ORAL EVERY 4 HOURS PRN
Qty: 42 TABLET | Refills: 0 | Status: SHIPPED | OUTPATIENT
Start: 2020-08-20 | End: 2021-07-01

## 2020-08-20 RX ORDER — ADALIMUMAB 40MG/0.4ML
KIT SUBCUTANEOUS
COMMUNITY
Start: 2020-08-11 | End: 2021-11-16

## 2020-08-20 RX ORDER — TIZANIDINE 4 MG/1
4 TABLET ORAL EVERY 6 HOURS PRN
Qty: 60 TABLET | Refills: 0 | Status: SHIPPED | OUTPATIENT
Start: 2020-08-20 | End: 2021-07-01

## 2020-08-20 RX ORDER — LORATADINE 10 MG/1
TABLET ORAL
COMMUNITY
Start: 2020-08-08 | End: 2020-08-20

## 2020-08-20 NOTE — TELEPHONE ENCOUNTER
I sent it in with instructions that she may take it every 4 hr.  She is only allowed a one week supply under [place] law for a new opioid prescription.  I would prefer that she take one 4 times a day as needed for pain but she can go to six if she needs to.

## 2020-08-20 NOTE — PROGRESS NOTES
"Subjective:       Patient ID: Holly Chicas is a 49 y.o. female.    Chief Complaint: Otalgia and Generalized Body Aches    49-year-old female coming in for follow-up on right ear pain.  She developed this several weeks ago and was seen as a virtual visit through her work place medical department where she was given Augmentin, Flonase, and Claritin.  These did not give her any relief of her intense sharp stabbing right ear pain so she came when on August 11th where she saw Julia Chase.  ENT examination was unremarkable but she did have tenderness in the right TMJ and was diagnosed with TMJ syndrome.  She was given Medrol Dosepak and Flexeril.  The Medrol was completed without significant improvement and the Flexeril cause drowsiness the next day so she only took one dose before discontinuing it.  She did go to the COVID clinic on August 18th where she was tested using a nasal swab and was COVID negative.  She also saw Dr. Perrin in ENT who confirmed the diagnosis of TMJ and told her that her ears and sinuses were unaffected.  She does not chew gum, she does not chew ice, she has had no jaw trauma and no recent dental work.  She does have an appointment with her dentist tomorrow.  She did obtain an oraguard device which is a self molding dental guard.  She has been wearing it at least intermittently with no noticeable improvement.  She is leaving for a trip to [place] in the near future and was trying to get this under control before she left.  She is taking Duexis 3 times daily.    Past Medical History:  1996: Abnormal Pap smear      Comment:  "pre-cancer cells post hysterectomy"  No date: Anxiety  No date: Arthritis  No date: Depression  9/11/2014: Good hypertension control  No date: Hyperlipidemia  No date: Hypertension  No date: Hypertriglyceridemia  No date: Meningitis  No date: Narcolepsy  No date: ALESIA (obstructive sleep apnea)  9/12/2014: Peripheral neuropathy  No date: Psoriasis  5/10/2018: Psoriasis  No " date: Tuberculosis    Past Surgical History:  No date: AUGMENTATION OF BREAST  2016: BREAST SURGERY      Comment:  aumentation/ reduction   2017: BREAST SURGERY      Comment:  augmentation   2017: brizilian butt lift   No date:  SECTION  2018: CHOLECYSTECTOMY      Comment:  Dr SEAN [facility]  No date: FOOT SURGERY  2015: GASTRIC BYPASS  2015: GASTRIC BYPASS  No date: HERNIA REPAIR  No date: HYSTERECTOMY  No date: KNEE SURGERY; Left  : SHOULDER SURGERY      Comment:  right should surgery   2017: SHOULDER SURGERY      Comment:  left shoulder / torn labrium   2017: THIGH LIFT  No date: TONSILLECTOMY  2016: tummy tuck     Current Outpatient Medications on File Prior to Visit:  armodafinil (NUVIGIL) 250 mg tablet, Take 250 mg by mouth 2 (two) times daily., Disp: , Rfl: 2  dextroamphetamine-amphetamine 30 mg Tab, Take 1 tablet by mouth once daily., Disp: , Rfl: 0  DUEXIS 800-26.6 mg Tab, Take 1 tablet by mouth 3 (three) times daily. , Disp: , Rfl:   fluticasone furoate (FLONASE SENSIMIST NASL), , Disp: , Rfl:   HUMIRA,CF, PEN 40 mg/0.4 mL PnKt, INJECT 1 SYRINGE SUBCUTANEOUSLY EVERY 2 WEEKS, Disp: , Rfl:   loratadine (CLARITIN) 10 mg tablet, Take 10 mg by mouth once daily., Disp: , Rfl:   solriamfetoL (SUNOSI) 150 mg Tab, Take 150 mg by mouth., Disp: , Rfl:   (DISCONTINUED) cyclobenzaprine (FLEXERIL) 10 MG tablet, Take 1 tablet (10 mg total) by mouth nightly as needed for Muscle spasms., Disp: 14 tablet, Rfl: 0  (DISCONTINUED) loratadine (CLARITIN) 10 mg tablet, , Disp: , Rfl:   EPINEPHrine (EPIPEN) 0.3 mg/0.3 mL AtIn, Inject 0.3 mLs (0.3 mg total) into the muscle once. As directed PRN for 1 dose, Disp: 2 each, Rfl: 11      Review of Systems   Constitutional: Negative for chills, fatigue and fever.   HENT: Positive for dental problem and ear pain. Negative for congestion, ear discharge, hearing loss, postnasal drip, rhinorrhea, sinus pressure and sinus pain.     Respiratory: Negative for cough, chest tightness and shortness of breath.    Cardiovascular: Negative for chest pain and palpitations.       Objective:      Physical Exam  Vitals signs and nursing note reviewed.   Constitutional:       Appearance: Normal appearance.      Comments: Good blood pressure control  Afebrile   HENT:      Head: Normocephalic and atraumatic.      Comments: Palpable and audible click in the right TMJ with a slight delay an closing on the right side relative to the left.  tenderness is mild to moderate with palpation on the right side     Right Ear: Tympanic membrane, ear canal and external ear normal. There is no impacted cerumen.      Left Ear: Tympanic membrane, ear canal and external ear normal. There is no impacted cerumen.      Nose: Nose normal. No congestion or rhinorrhea.      Mouth/Throat:      Mouth: Mucous membranes are moist.      Pharynx: Oropharynx is clear. No oropharyngeal exudate or posterior oropharyngeal erythema.   Neck:      Musculoskeletal: Normal range of motion and neck supple. No neck rigidity or muscular tenderness.   Lymphadenopathy:      Cervical: No cervical adenopathy.   Neurological:      Mental Status: She is alert.         Assessment:       1. TMJ (temporomandibular joint disorder)        Plan:       1. TMJ (temporomandibular joint disorder)  Try some Zanaflex to see if she can tolerate it, the shorter half-life should give her less morning drowsiness.  She may also try using some Voltaren gel 3 to 4 times daily over the right TMJ.  She should use her  daily  - tiZANidine (ZANAFLEX) 4 MG tablet; Take 1 tablet (4 mg total) by mouth every 6 (six) hours as needed.  Dispense: 60 tablet; Refill: 0

## 2020-08-21 DIAGNOSIS — E11.9 TYPE 2 DIABETES MELLITUS WITHOUT COMPLICATION: ICD-10-CM

## 2020-10-05 ENCOUNTER — PATIENT MESSAGE (OUTPATIENT)
Dept: ADMINISTRATIVE | Facility: HOSPITAL | Age: 50
End: 2020-10-05

## 2020-11-03 ENCOUNTER — LAB VISIT (OUTPATIENT)
Dept: PRIMARY CARE CLINIC | Facility: OTHER | Age: 50
End: 2020-11-03
Attending: INTERNAL MEDICINE
Payer: COMMERCIAL

## 2020-11-03 DIAGNOSIS — R06.02 SHORTNESS OF BREATH: ICD-10-CM

## 2020-11-03 PROCEDURE — U0003 INFECTIOUS AGENT DETECTION BY NUCLEIC ACID (DNA OR RNA); SEVERE ACUTE RESPIRATORY SYNDROME CORONAVIRUS 2 (SARS-COV-2) (CORONAVIRUS DISEASE [COVID-19]), AMPLIFIED PROBE TECHNIQUE, MAKING USE OF HIGH THROUGHPUT TECHNOLOGIES AS DESCRIBED BY CMS-2020-01-R: HCPCS

## 2020-11-05 LAB — SARS-COV-2 RNA RESP QL NAA+PROBE: NOT DETECTED

## 2020-12-04 ENCOUNTER — LAB VISIT (OUTPATIENT)
Dept: PRIMARY CARE CLINIC | Facility: OTHER | Age: 50
End: 2020-12-04
Attending: INTERNAL MEDICINE
Payer: COMMERCIAL

## 2020-12-04 DIAGNOSIS — R51.9 HEAD ACHE: ICD-10-CM

## 2020-12-04 DIAGNOSIS — R05.9 COUGH: ICD-10-CM

## 2020-12-04 DIAGNOSIS — Z03.818 ENCOUNTER FOR OBSERVATION FOR SUSPECTED EXPOSURE TO OTHER BIOLOGICAL AGENTS RULED OUT: ICD-10-CM

## 2020-12-04 PROCEDURE — U0003 INFECTIOUS AGENT DETECTION BY NUCLEIC ACID (DNA OR RNA); SEVERE ACUTE RESPIRATORY SYNDROME CORONAVIRUS 2 (SARS-COV-2) (CORONAVIRUS DISEASE [COVID-19]), AMPLIFIED PROBE TECHNIQUE, MAKING USE OF HIGH THROUGHPUT TECHNOLOGIES AS DESCRIBED BY CMS-2020-01-R: HCPCS

## 2020-12-07 DIAGNOSIS — U07.1 COVID-19 VIRUS DETECTED: ICD-10-CM

## 2020-12-07 LAB — SARS-COV-2 RNA RESP QL NAA+PROBE: DETECTED

## 2020-12-10 ENCOUNTER — PATIENT MESSAGE (OUTPATIENT)
Dept: FAMILY MEDICINE | Facility: CLINIC | Age: 50
End: 2020-12-10

## 2020-12-10 DIAGNOSIS — Z12.31 VISIT FOR SCREENING MAMMOGRAM: Primary | ICD-10-CM

## 2021-01-04 ENCOUNTER — PATIENT MESSAGE (OUTPATIENT)
Dept: ADMINISTRATIVE | Facility: HOSPITAL | Age: 51
End: 2021-01-04

## 2021-01-06 ENCOUNTER — HOSPITAL ENCOUNTER (OUTPATIENT)
Dept: RADIOLOGY | Facility: CLINIC | Age: 51
Discharge: HOME | End: 2021-01-06
Attending: FAMILY MEDICINE
Payer: COMMERCIAL

## 2021-01-06 DIAGNOSIS — Z12.31 VISIT FOR SCREENING MAMMOGRAM: ICD-10-CM

## 2021-01-06 PROCEDURE — 77067 SCR MAMMO BI INCL CAD: CPT | Mod: TC,PO

## 2021-01-06 PROCEDURE — 77063 MAMMO DIGITAL SCREENING BILAT WITH TOMO: ICD-10-PCS | Mod: 26,,, | Performed by: RADIOLOGY

## 2021-01-06 PROCEDURE — 77063 BREAST TOMOSYNTHESIS BI: CPT | Mod: 26,,, | Performed by: RADIOLOGY

## 2021-01-06 PROCEDURE — 77067 MAMMO DIGITAL SCREENING BILAT WITH TOMO: ICD-10-PCS | Mod: 26,,, | Performed by: RADIOLOGY

## 2021-01-06 PROCEDURE — 77067 SCR MAMMO BI INCL CAD: CPT | Mod: 26,,, | Performed by: RADIOLOGY

## 2021-01-08 ENCOUNTER — PATIENT MESSAGE (OUTPATIENT)
Dept: FAMILY MEDICINE | Facility: CLINIC | Age: 51
End: 2021-01-08

## 2021-01-29 ENCOUNTER — PATIENT MESSAGE (OUTPATIENT)
Dept: ADMINISTRATIVE | Facility: HOSPITAL | Age: 51
End: 2021-01-29

## 2021-03-05 ENCOUNTER — PATIENT OUTREACH (OUTPATIENT)
Dept: ADMINISTRATIVE | Facility: HOSPITAL | Age: 51
End: 2021-03-05

## 2021-03-05 DIAGNOSIS — Z12.11 COLON CANCER SCREENING: Primary | ICD-10-CM

## 2021-03-11 ENCOUNTER — IMMUNIZATION (OUTPATIENT)
Dept: PRIMARY CARE CLINIC | Facility: CLINIC | Age: 51
End: 2021-03-11
Payer: COMMERCIAL

## 2021-03-11 DIAGNOSIS — Z23 NEED FOR VACCINATION: Primary | ICD-10-CM

## 2021-03-11 PROCEDURE — 91300 COVID-19, MRNA, LNP-S, PF, 30 MCG/0.3 ML DOSE VACCINE: ICD-10-PCS | Mod: S$GLB,,, | Performed by: FAMILY MEDICINE

## 2021-03-11 PROCEDURE — 91300 COVID-19, MRNA, LNP-S, PF, 30 MCG/0.3 ML DOSE VACCINE: CPT | Mod: S$GLB,,, | Performed by: FAMILY MEDICINE

## 2021-03-11 PROCEDURE — 0001A COVID-19, MRNA, LNP-S, PF, 30 MCG/0.3 ML DOSE VACCINE: ICD-10-PCS | Mod: CV19,S$GLB,, | Performed by: FAMILY MEDICINE

## 2021-03-11 PROCEDURE — 0001A COVID-19, MRNA, LNP-S, PF, 30 MCG/0.3 ML DOSE VACCINE: CPT | Mod: CV19,S$GLB,, | Performed by: FAMILY MEDICINE

## 2021-04-01 ENCOUNTER — IMMUNIZATION (OUTPATIENT)
Dept: PRIMARY CARE CLINIC | Facility: CLINIC | Age: 51
End: 2021-04-01
Payer: COMMERCIAL

## 2021-04-01 DIAGNOSIS — Z23 NEED FOR VACCINATION: Primary | ICD-10-CM

## 2021-04-01 PROCEDURE — 0002A COVID-19, MRNA, LNP-S, PF, 30 MCG/0.3 ML DOSE VACCINE: ICD-10-PCS | Mod: CV19,S$GLB,, | Performed by: FAMILY MEDICINE

## 2021-04-01 PROCEDURE — 91300 COVID-19, MRNA, LNP-S, PF, 30 MCG/0.3 ML DOSE VACCINE: CPT | Mod: S$GLB,,, | Performed by: FAMILY MEDICINE

## 2021-04-01 PROCEDURE — 0002A COVID-19, MRNA, LNP-S, PF, 30 MCG/0.3 ML DOSE VACCINE: CPT | Mod: CV19,S$GLB,, | Performed by: FAMILY MEDICINE

## 2021-04-01 PROCEDURE — 91300 COVID-19, MRNA, LNP-S, PF, 30 MCG/0.3 ML DOSE VACCINE: ICD-10-PCS | Mod: S$GLB,,, | Performed by: FAMILY MEDICINE

## 2021-04-05 ENCOUNTER — PATIENT MESSAGE (OUTPATIENT)
Dept: ADMINISTRATIVE | Facility: HOSPITAL | Age: 51
End: 2021-04-05

## 2021-07-01 ENCOUNTER — HOSPITAL ENCOUNTER (OUTPATIENT)
Dept: RADIOLOGY | Facility: CLINIC | Age: 51
Discharge: HOME | End: 2021-07-01
Attending: PODIATRIST
Payer: COMMERCIAL

## 2021-07-01 ENCOUNTER — OFFICE VISIT (OUTPATIENT)
Dept: PODIATRY | Facility: CLINIC | Age: 51
End: 2021-07-01
Payer: COMMERCIAL

## 2021-07-01 VITALS
SYSTOLIC BLOOD PRESSURE: 128 MMHG | HEART RATE: 74 BPM | RESPIRATION RATE: 16 BRPM | DIASTOLIC BLOOD PRESSURE: 70 MMHG | BODY MASS INDEX: 28.61 KG/M2 | OXYGEN SATURATION: 99 % | WEIGHT: 178 LBS | HEIGHT: 66 IN

## 2021-07-01 DIAGNOSIS — M19.071 ARTHRITIS OF FOOT, RIGHT: ICD-10-CM

## 2021-07-01 DIAGNOSIS — E11.9 TYPE 2 DIABETES MELLITUS WITHOUT COMPLICATION, WITHOUT LONG-TERM CURRENT USE OF INSULIN: ICD-10-CM

## 2021-07-01 DIAGNOSIS — M84.374A STRESS FRACTURE, RIGHT FOOT, INITIAL ENCOUNTER FOR FRACTURE: Primary | ICD-10-CM

## 2021-07-01 DIAGNOSIS — M20.11 HALLUX VALGUS OF RIGHT FOOT: ICD-10-CM

## 2021-07-01 DIAGNOSIS — M79.671 RIGHT FOOT PAIN: ICD-10-CM

## 2021-07-01 PROCEDURE — 73630 XR FOOT COMPLETE 3 VIEW RIGHT: ICD-10-PCS | Mod: RT,S$GLB,, | Performed by: RADIOLOGY

## 2021-07-01 PROCEDURE — 99204 PR OFFICE/OUTPT VISIT, NEW, LEVL IV, 45-59 MIN: ICD-10-PCS | Mod: S$GLB,,, | Performed by: PODIATRIST

## 2021-07-01 PROCEDURE — 3008F BODY MASS INDEX DOCD: CPT | Mod: CPTII,S$GLB,, | Performed by: PODIATRIST

## 2021-07-01 PROCEDURE — 1125F AMNT PAIN NOTED PAIN PRSNT: CPT | Mod: S$GLB,,, | Performed by: PODIATRIST

## 2021-07-01 PROCEDURE — 3008F PR BODY MASS INDEX (BMI) DOCUMENTED: ICD-10-PCS | Mod: CPTII,S$GLB,, | Performed by: PODIATRIST

## 2021-07-01 PROCEDURE — 99204 OFFICE O/P NEW MOD 45 MIN: CPT | Mod: S$GLB,,, | Performed by: PODIATRIST

## 2021-07-01 PROCEDURE — 73630 X-RAY EXAM OF FOOT: CPT | Mod: RT,S$GLB,, | Performed by: RADIOLOGY

## 2021-07-01 PROCEDURE — 1125F PR PAIN SEVERITY QUANTIFIED, PAIN PRESENT: ICD-10-PCS | Mod: S$GLB,,, | Performed by: PODIATRIST

## 2021-07-01 RX ORDER — USTEKINUMAB 45 MG/.5ML
INJECTION, SOLUTION SUBCUTANEOUS
COMMUNITY
Start: 2021-04-23

## 2021-07-07 ENCOUNTER — PATIENT MESSAGE (OUTPATIENT)
Dept: ADMINISTRATIVE | Facility: HOSPITAL | Age: 51
End: 2021-07-07

## 2021-09-09 ENCOUNTER — PATIENT MESSAGE (OUTPATIENT)
Dept: SLEEP MEDICINE | Facility: CLINIC | Age: 51
End: 2021-09-09

## 2021-09-09 ENCOUNTER — OFFICE VISIT (OUTPATIENT)
Dept: SLEEP MEDICINE | Facility: CLINIC | Age: 51
End: 2021-09-09
Payer: COMMERCIAL

## 2021-09-09 VITALS — HEIGHT: 66 IN | WEIGHT: 170 LBS | BODY MASS INDEX: 27.32 KG/M2

## 2021-09-09 DIAGNOSIS — G47.411 PRIMARY NARCOLEPSY WITH CATAPLEXY: Primary | ICD-10-CM

## 2021-09-09 PROCEDURE — 3061F PR NEG MICROALBUMINURIA RESULT DOCUMENTED/REVIEW: ICD-10-PCS | Mod: 95,CPTII,, | Performed by: NURSE PRACTITIONER

## 2021-09-09 PROCEDURE — 3008F PR BODY MASS INDEX (BMI) DOCUMENTED: ICD-10-PCS | Mod: 95,CPTII,, | Performed by: NURSE PRACTITIONER

## 2021-09-09 PROCEDURE — 3066F PR DOCUMENTATION OF TREATMENT FOR NEPHROPATHY: ICD-10-PCS | Mod: 95,CPTII,, | Performed by: NURSE PRACTITIONER

## 2021-09-09 PROCEDURE — 3066F NEPHROPATHY DOC TX: CPT | Mod: 95,CPTII,, | Performed by: NURSE PRACTITIONER

## 2021-09-09 PROCEDURE — 3008F BODY MASS INDEX DOCD: CPT | Mod: 95,CPTII,, | Performed by: NURSE PRACTITIONER

## 2021-09-09 PROCEDURE — 3044F HG A1C LEVEL LT 7.0%: CPT | Mod: 95,CPTII,, | Performed by: NURSE PRACTITIONER

## 2021-09-09 PROCEDURE — 99204 PR OFFICE/OUTPT VISIT, NEW, LEVL IV, 45-59 MIN: ICD-10-PCS | Mod: 95,CR,, | Performed by: NURSE PRACTITIONER

## 2021-09-09 PROCEDURE — 99204 OFFICE O/P NEW MOD 45 MIN: CPT | Mod: 95,CR,, | Performed by: NURSE PRACTITIONER

## 2021-09-09 PROCEDURE — 3061F NEG MICROALBUMINURIA REV: CPT | Mod: 95,CPTII,, | Performed by: NURSE PRACTITIONER

## 2021-09-09 PROCEDURE — 3044F PR MOST RECENT HEMOGLOBIN A1C LEVEL <7.0%: ICD-10-PCS | Mod: 95,CPTII,, | Performed by: NURSE PRACTITIONER

## 2021-09-16 ENCOUNTER — PATIENT MESSAGE (OUTPATIENT)
Dept: SLEEP MEDICINE | Facility: CLINIC | Age: 51
End: 2021-09-16

## 2021-09-20 ENCOUNTER — TELEPHONE (OUTPATIENT)
Dept: SLEEP MEDICINE | Facility: CLINIC | Age: 51
End: 2021-09-20

## 2021-09-22 ENCOUNTER — PATIENT MESSAGE (OUTPATIENT)
Dept: SLEEP MEDICINE | Facility: CLINIC | Age: 51
End: 2021-09-22

## 2021-09-23 ENCOUNTER — PATIENT MESSAGE (OUTPATIENT)
Dept: SLEEP MEDICINE | Facility: CLINIC | Age: 51
End: 2021-09-23

## 2021-09-24 ENCOUNTER — PATIENT MESSAGE (OUTPATIENT)
Dept: SLEEP MEDICINE | Facility: CLINIC | Age: 51
End: 2021-09-24

## 2021-09-28 ENCOUNTER — TELEPHONE (OUTPATIENT)
Dept: SLEEP MEDICINE | Facility: CLINIC | Age: 51
End: 2021-09-28

## 2021-09-29 ENCOUNTER — LAB VISIT (OUTPATIENT)
Dept: URGENT CARE | Facility: CLINIC | Age: 51
End: 2021-09-29
Payer: COMMERCIAL

## 2021-09-29 DIAGNOSIS — Z20.822 ENCOUNTER FOR LABORATORY TESTING FOR COVID-19 VIRUS: ICD-10-CM

## 2021-09-29 PROCEDURE — U0003 INFECTIOUS AGENT DETECTION BY NUCLEIC ACID (DNA OR RNA); SEVERE ACUTE RESPIRATORY SYNDROME CORONAVIRUS 2 (SARS-COV-2) (CORONAVIRUS DISEASE [COVID-19]), AMPLIFIED PROBE TECHNIQUE, MAKING USE OF HIGH THROUGHPUT TECHNOLOGIES AS DESCRIBED BY CMS-2020-01-R: HCPCS | Performed by: EMERGENCY MEDICINE

## 2021-09-29 PROCEDURE — U0005 INFEC AGEN DETEC AMPLI PROBE: HCPCS | Performed by: EMERGENCY MEDICINE

## 2021-09-30 LAB
SARS-COV-2 RNA RESP QL NAA+PROBE: NOT DETECTED
SARS-COV-2- CYCLE NUMBER: NORMAL

## 2021-10-01 ENCOUNTER — TELEPHONE (OUTPATIENT)
Dept: SLEEP MEDICINE | Facility: CLINIC | Age: 51
End: 2021-10-01

## 2021-10-07 ENCOUNTER — PATIENT MESSAGE (OUTPATIENT)
Dept: ADMINISTRATIVE | Facility: HOSPITAL | Age: 51
End: 2021-10-07

## 2021-10-07 ENCOUNTER — PATIENT MESSAGE (OUTPATIENT)
Dept: SLEEP MEDICINE | Facility: CLINIC | Age: 51
End: 2021-10-07

## 2021-10-07 ENCOUNTER — TELEPHONE (OUTPATIENT)
Dept: FAMILY MEDICINE | Facility: CLINIC | Age: 51
End: 2021-10-07

## 2021-10-08 ENCOUNTER — TELEPHONE (OUTPATIENT)
Dept: SLEEP MEDICINE | Facility: CLINIC | Age: 51
End: 2021-10-08

## 2021-10-08 ENCOUNTER — PATIENT OUTREACH (OUTPATIENT)
Dept: ADMINISTRATIVE | Facility: HOSPITAL | Age: 51
End: 2021-10-08

## 2021-10-08 ENCOUNTER — PATIENT MESSAGE (OUTPATIENT)
Dept: ADMINISTRATIVE | Facility: HOSPITAL | Age: 51
End: 2021-10-08

## 2021-10-08 DIAGNOSIS — Z00.00 ROUTINE MEDICAL EXAM: Primary | ICD-10-CM

## 2021-10-08 DIAGNOSIS — E11.9 DIABETES MELLITUS WITHOUT COMPLICATION: ICD-10-CM

## 2021-10-11 ENCOUNTER — TELEPHONE (OUTPATIENT)
Dept: SLEEP MEDICINE | Facility: CLINIC | Age: 51
End: 2021-10-11

## 2021-10-12 ENCOUNTER — LAB VISIT (OUTPATIENT)
Dept: LAB | Facility: HOSPITAL | Age: 51
End: 2021-10-12
Attending: FAMILY MEDICINE
Payer: COMMERCIAL

## 2021-10-12 ENCOUNTER — PATIENT OUTREACH (OUTPATIENT)
Dept: ADMINISTRATIVE | Facility: HOSPITAL | Age: 51
End: 2021-10-12

## 2021-10-12 ENCOUNTER — PATIENT MESSAGE (OUTPATIENT)
Dept: FAMILY MEDICINE | Facility: CLINIC | Age: 51
End: 2021-10-12

## 2021-10-12 DIAGNOSIS — E11.9 DIABETES MELLITUS WITHOUT COMPLICATION: ICD-10-CM

## 2021-10-12 DIAGNOSIS — Z00.00 ROUTINE MEDICAL EXAM: ICD-10-CM

## 2021-10-12 LAB
ESTIMATED AVG GLUCOSE: 123 MG/DL (ref 68–131)
HBA1C MFR BLD: 5.9 % (ref 4–5.6)

## 2021-10-12 PROCEDURE — 83036 HEMOGLOBIN GLYCOSYLATED A1C: CPT | Performed by: FAMILY MEDICINE

## 2021-10-12 PROCEDURE — 86803 HEPATITIS C AB TEST: CPT | Performed by: FAMILY MEDICINE

## 2021-10-12 PROCEDURE — 36415 COLL VENOUS BLD VENIPUNCTURE: CPT | Mod: PO | Performed by: FAMILY MEDICINE

## 2021-10-13 LAB — HCV AB SERPL QL IA: NEGATIVE

## 2021-10-14 ENCOUNTER — IMMUNIZATION (OUTPATIENT)
Dept: PRIMARY CARE CLINIC | Facility: CLINIC | Age: 51
End: 2021-10-14
Payer: COMMERCIAL

## 2021-10-14 DIAGNOSIS — Z23 NEED FOR VACCINATION: Primary | ICD-10-CM

## 2021-10-14 PROCEDURE — 91300 COVID-19, MRNA, LNP-S, PF, 30 MCG/0.3 ML DOSE VACCINE: CPT | Mod: S$GLB,,, | Performed by: FAMILY MEDICINE

## 2021-10-14 PROCEDURE — 0003A COVID-19, MRNA, LNP-S, PF, 30 MCG/0.3 ML DOSE VACCINE: CPT | Mod: S$GLB,,, | Performed by: FAMILY MEDICINE

## 2021-10-14 PROCEDURE — 0003A COVID-19, MRNA, LNP-S, PF, 30 MCG/0.3 ML DOSE VACCINE: ICD-10-PCS | Mod: S$GLB,,, | Performed by: FAMILY MEDICINE

## 2021-10-14 PROCEDURE — 91300 COVID-19, MRNA, LNP-S, PF, 30 MCG/0.3 ML DOSE VACCINE: ICD-10-PCS | Mod: S$GLB,,, | Performed by: FAMILY MEDICINE

## 2021-10-29 ENCOUNTER — LAB VISIT (OUTPATIENT)
Dept: LAB | Facility: HOSPITAL | Age: 51
End: 2021-10-29
Attending: EMERGENCY MEDICINE
Payer: COMMERCIAL

## 2021-10-29 DIAGNOSIS — E78.9 DISORDER OF LIPOPROTEIN AND LIPID METABOLISM: ICD-10-CM

## 2021-10-29 DIAGNOSIS — R53.83 FATIGUE: ICD-10-CM

## 2021-10-29 DIAGNOSIS — N95.1 SYMPTOMATIC MENOPAUSAL OR FEMALE CLIMACTERIC STATES: ICD-10-CM

## 2021-10-29 DIAGNOSIS — I10 ESSENTIAL HYPERTENSION, MALIGNANT: Primary | ICD-10-CM

## 2021-10-29 LAB
ALBUMIN SERPL BCP-MCNC: 4.1 G/DL (ref 3.5–5.2)
ALP SERPL-CCNC: 154 U/L (ref 55–135)
ALT SERPL W/O P-5'-P-CCNC: 21 U/L (ref 10–44)
ANION GAP SERPL CALC-SCNC: 12 MMOL/L (ref 8–16)
AST SERPL-CCNC: 23 U/L (ref 10–40)
BASOPHILS # BLD AUTO: 0.02 K/UL (ref 0–0.2)
BASOPHILS NFR BLD: 0.4 % (ref 0–1.9)
BILIRUB SERPL-MCNC: 0.3 MG/DL (ref 0.1–1)
BUN SERPL-MCNC: 16 MG/DL (ref 6–20)
CALCIUM SERPL-MCNC: 9.3 MG/DL (ref 8.7–10.5)
CHLORIDE SERPL-SCNC: 100 MMOL/L (ref 95–110)
CHOLEST SERPL-MCNC: 240 MG/DL (ref 120–199)
CHOLEST/HDLC SERPL: 5.3 {RATIO} (ref 2–5)
CO2 SERPL-SCNC: 28 MMOL/L (ref 23–29)
CREAT SERPL-MCNC: 0.7 MG/DL (ref 0.5–1.4)
DIFFERENTIAL METHOD: ABNORMAL
EOSINOPHIL # BLD AUTO: 0.1 K/UL (ref 0–0.5)
EOSINOPHIL NFR BLD: 1.8 % (ref 0–8)
ERYTHROCYTE [DISTWIDTH] IN BLOOD BY AUTOMATED COUNT: 11.7 % (ref 11.5–14.5)
EST. GFR  (AFRICAN AMERICAN): >60 ML/MIN/1.73 M^2
EST. GFR  (NON AFRICAN AMERICAN): >60 ML/MIN/1.73 M^2
FERRITIN SERPL-MCNC: 11 NG/ML (ref 20–300)
FSH SERPL-ACNC: 49.33 MIU/ML
GLUCOSE SERPL-MCNC: 105 MG/DL (ref 70–110)
HCT VFR BLD AUTO: 37.4 % (ref 37–48.5)
HDLC SERPL-MCNC: 45 MG/DL (ref 40–75)
HDLC SERPL: 18.8 % (ref 20–50)
HGB BLD-MCNC: 11.7 G/DL (ref 12–16)
IMM GRANULOCYTES # BLD AUTO: 0.01 K/UL (ref 0–0.04)
IMM GRANULOCYTES NFR BLD AUTO: 0.2 % (ref 0–0.5)
LDLC SERPL CALC-MCNC: 165.4 MG/DL (ref 63–159)
LH SERPL-ACNC: 17.7 MIU/ML
LYMPHOCYTES # BLD AUTO: 1.6 K/UL (ref 1–4.8)
LYMPHOCYTES NFR BLD: 33.3 % (ref 18–48)
MCH RBC QN AUTO: 29.3 PG (ref 27–31)
MCHC RBC AUTO-ENTMCNC: 31.3 G/DL (ref 32–36)
MCV RBC AUTO: 94 FL (ref 82–98)
MONOCYTES # BLD AUTO: 0.5 K/UL (ref 0.3–1)
MONOCYTES NFR BLD: 10.5 % (ref 4–15)
NEUTROPHILS # BLD AUTO: 2.7 K/UL (ref 1.8–7.7)
NEUTROPHILS NFR BLD: 53.8 % (ref 38–73)
NONHDLC SERPL-MCNC: 195 MG/DL
NRBC BLD-RTO: 0 /100 WBC
PLATELET # BLD AUTO: 267 K/UL (ref 150–450)
PMV BLD AUTO: 10.5 FL (ref 9.2–12.9)
POTASSIUM SERPL-SCNC: 3.8 MMOL/L (ref 3.5–5.1)
PROGEST SERPL-MCNC: 0.1 NG/ML
PROT SERPL-MCNC: 7.5 G/DL (ref 6–8.4)
RBC # BLD AUTO: 3.99 M/UL (ref 4–5.4)
SARS-COV-2 IGG SERPL IA-ACNC: 4818.3 AU/ML
SARS-COV-2 IGG SERPL QL IA: POSITIVE
SODIUM SERPL-SCNC: 140 MMOL/L (ref 136–145)
T4 FREE SERPL-MCNC: 0.99 NG/DL (ref 0.71–1.51)
TRIGL SERPL-MCNC: 148 MG/DL (ref 30–150)
TSH SERPL DL<=0.005 MIU/L-ACNC: 1.53 UIU/ML (ref 0.4–4)
WBC # BLD AUTO: 4.93 K/UL (ref 3.9–12.7)

## 2021-10-29 PROCEDURE — 82672 ASSAY OF ESTROGEN: CPT | Performed by: INTERNAL MEDICINE

## 2021-10-29 PROCEDURE — 84144 ASSAY OF PROGESTERONE: CPT | Performed by: INTERNAL MEDICINE

## 2021-10-29 PROCEDURE — 83002 ASSAY OF GONADOTROPIN (LH): CPT | Performed by: INTERNAL MEDICINE

## 2021-10-29 PROCEDURE — 83001 ASSAY OF GONADOTROPIN (FSH): CPT | Performed by: INTERNAL MEDICINE

## 2021-10-29 PROCEDURE — 85025 COMPLETE CBC W/AUTO DIFF WBC: CPT | Performed by: INTERNAL MEDICINE

## 2021-10-29 PROCEDURE — 86769 SARS-COV-2 COVID-19 ANTIBODY: CPT | Performed by: INTERNAL MEDICINE

## 2021-10-29 PROCEDURE — 84443 ASSAY THYROID STIM HORMONE: CPT | Performed by: INTERNAL MEDICINE

## 2021-10-29 PROCEDURE — 80053 COMPREHEN METABOLIC PANEL: CPT | Performed by: INTERNAL MEDICINE

## 2021-10-29 PROCEDURE — 82728 ASSAY OF FERRITIN: CPT | Performed by: INTERNAL MEDICINE

## 2021-10-29 PROCEDURE — 80061 LIPID PANEL: CPT | Performed by: INTERNAL MEDICINE

## 2021-10-29 PROCEDURE — 84439 ASSAY OF FREE THYROXINE: CPT | Performed by: INTERNAL MEDICINE

## 2021-11-01 LAB — ESTROGEN SERPL-MCNC: 211 PG/ML

## 2021-11-15 ENCOUNTER — PATIENT OUTREACH (OUTPATIENT)
Dept: ADMINISTRATIVE | Facility: OTHER | Age: 51
End: 2021-11-15
Payer: COMMERCIAL

## 2021-11-16 ENCOUNTER — PATIENT MESSAGE (OUTPATIENT)
Dept: SLEEP MEDICINE | Facility: CLINIC | Age: 51
End: 2021-11-16

## 2021-11-16 ENCOUNTER — OFFICE VISIT (OUTPATIENT)
Dept: SLEEP MEDICINE | Facility: CLINIC | Age: 51
End: 2021-11-16
Payer: COMMERCIAL

## 2021-11-16 DIAGNOSIS — G47.411 NARCOLEPSY WITH CATAPLEXY: Primary | ICD-10-CM

## 2021-11-16 PROCEDURE — 3066F PR DOCUMENTATION OF TREATMENT FOR NEPHROPATHY: ICD-10-PCS | Mod: CPTII,95,, | Performed by: NURSE PRACTITIONER

## 2021-11-16 PROCEDURE — 3061F NEG MICROALBUMINURIA REV: CPT | Mod: CPTII,95,, | Performed by: NURSE PRACTITIONER

## 2021-11-16 PROCEDURE — 3061F PR NEG MICROALBUMINURIA RESULT DOCUMENTED/REVIEW: ICD-10-PCS | Mod: CPTII,95,, | Performed by: NURSE PRACTITIONER

## 2021-11-16 PROCEDURE — 3044F PR MOST RECENT HEMOGLOBIN A1C LEVEL <7.0%: ICD-10-PCS | Mod: CPTII,95,, | Performed by: NURSE PRACTITIONER

## 2021-11-16 PROCEDURE — 99214 PR OFFICE/OUTPT VISIT, EST, LEVL IV, 30-39 MIN: ICD-10-PCS | Mod: 95,CR,, | Performed by: NURSE PRACTITIONER

## 2021-11-16 PROCEDURE — 3066F NEPHROPATHY DOC TX: CPT | Mod: CPTII,95,, | Performed by: NURSE PRACTITIONER

## 2021-11-16 PROCEDURE — 3044F HG A1C LEVEL LT 7.0%: CPT | Mod: CPTII,95,, | Performed by: NURSE PRACTITIONER

## 2021-11-16 PROCEDURE — 99214 OFFICE O/P EST MOD 30 MIN: CPT | Mod: 95,CR,, | Performed by: NURSE PRACTITIONER

## 2021-11-16 RX ORDER — DEXTROAMPHETAMINE SACCHARATE, AMPHETAMINE ASPARTATE, DEXTROAMPHETAMINE SULFATE AND AMPHETAMINE SULFATE 7.5; 7.5; 7.5; 7.5 MG/1; MG/1; MG/1; MG/1
1 TABLET ORAL 2 TIMES DAILY PRN
Qty: 60 TABLET | Refills: 0 | Status: SHIPPED | OUTPATIENT
Start: 2021-11-16 | End: 2021-12-28 | Stop reason: SDUPTHER

## 2021-11-16 RX ORDER — ARMODAFINIL 250 MG/1
250 TABLET ORAL 2 TIMES DAILY
Qty: 60 TABLET | Refills: 3 | Status: SHIPPED | OUTPATIENT
Start: 2021-11-16 | End: 2021-12-28

## 2021-11-16 RX ORDER — SOLRIAMFETOL 150 MG/1
150 TABLET, FILM COATED ORAL DAILY
Qty: 30 TABLET | Refills: 3 | Status: SHIPPED | OUTPATIENT
Start: 2021-11-16 | End: 2022-04-13 | Stop reason: SDUPTHER

## 2021-11-17 ENCOUNTER — TELEPHONE (OUTPATIENT)
Dept: PHARMACY | Facility: CLINIC | Age: 51
End: 2021-11-17
Payer: COMMERCIAL

## 2021-12-02 ENCOUNTER — LAB VISIT (OUTPATIENT)
Dept: LAB | Facility: HOSPITAL | Age: 51
End: 2021-12-02
Payer: COMMERCIAL

## 2021-12-02 DIAGNOSIS — N95.1 SYMPTOMATIC MENOPAUSAL OR FEMALE CLIMACTERIC STATES: ICD-10-CM

## 2021-12-02 DIAGNOSIS — R53.83 FATIGUE: ICD-10-CM

## 2021-12-02 DIAGNOSIS — Z01.84 IMMUNITY STATUS TESTING: ICD-10-CM

## 2021-12-02 DIAGNOSIS — I10 ESSENTIAL HYPERTENSION, MALIGNANT: Primary | ICD-10-CM

## 2021-12-02 DIAGNOSIS — E78.81 LIPOID DERMATOARTHRITIS: ICD-10-CM

## 2021-12-02 LAB
ALBUMIN SERPL BCP-MCNC: 4.2 G/DL (ref 3.5–5.2)
ALP SERPL-CCNC: 119 U/L (ref 55–135)
ALT SERPL W/O P-5'-P-CCNC: 14 U/L (ref 10–44)
ANION GAP SERPL CALC-SCNC: 15 MMOL/L (ref 8–16)
AST SERPL-CCNC: 22 U/L (ref 10–40)
BASOPHILS # BLD AUTO: 0.02 K/UL (ref 0–0.2)
BASOPHILS NFR BLD: 0.3 % (ref 0–1.9)
BILIRUB SERPL-MCNC: 0.5 MG/DL (ref 0.1–1)
BUN SERPL-MCNC: 16 MG/DL (ref 6–20)
CALCIUM SERPL-MCNC: 9.5 MG/DL (ref 8.7–10.5)
CHLORIDE SERPL-SCNC: 103 MMOL/L (ref 95–110)
CHOLEST SERPL-MCNC: 181 MG/DL (ref 120–199)
CHOLEST/HDLC SERPL: 4 {RATIO} (ref 2–5)
CO2 SERPL-SCNC: 25 MMOL/L (ref 23–29)
CREAT SERPL-MCNC: 0.7 MG/DL (ref 0.5–1.4)
DIFFERENTIAL METHOD: NORMAL
EOSINOPHIL # BLD AUTO: 0 K/UL (ref 0–0.5)
EOSINOPHIL NFR BLD: 0.6 % (ref 0–8)
ERYTHROCYTE [DISTWIDTH] IN BLOOD BY AUTOMATED COUNT: 12.6 % (ref 11.5–14.5)
EST. GFR  (AFRICAN AMERICAN): >60 ML/MIN/1.73 M^2
EST. GFR  (NON AFRICAN AMERICAN): >60 ML/MIN/1.73 M^2
FERRITIN SERPL-MCNC: 36 NG/ML (ref 20–300)
FSH SERPL-ACNC: 49.53 MIU/ML
GLUCOSE SERPL-MCNC: 121 MG/DL (ref 70–110)
HCT VFR BLD AUTO: 39.4 % (ref 37–48.5)
HDLC SERPL-MCNC: 45 MG/DL (ref 40–75)
HDLC SERPL: 24.9 % (ref 20–50)
HGB BLD-MCNC: 12.7 G/DL (ref 12–16)
IMM GRANULOCYTES # BLD AUTO: 0.02 K/UL (ref 0–0.04)
IMM GRANULOCYTES NFR BLD AUTO: 0.3 % (ref 0–0.5)
LDLC SERPL CALC-MCNC: 115 MG/DL (ref 63–159)
LYMPHOCYTES # BLD AUTO: 1.7 K/UL (ref 1–4.8)
LYMPHOCYTES NFR BLD: 26.1 % (ref 18–48)
MCH RBC QN AUTO: 29.8 PG (ref 27–31)
MCHC RBC AUTO-ENTMCNC: 32.2 G/DL (ref 32–36)
MCV RBC AUTO: 93 FL (ref 82–98)
MONOCYTES # BLD AUTO: 0.6 K/UL (ref 0.3–1)
MONOCYTES NFR BLD: 9.3 % (ref 4–15)
NEUTROPHILS # BLD AUTO: 4 K/UL (ref 1.8–7.7)
NEUTROPHILS NFR BLD: 63.4 % (ref 38–73)
NONHDLC SERPL-MCNC: 136 MG/DL
NRBC BLD-RTO: 0 /100 WBC
PLATELET # BLD AUTO: 244 K/UL (ref 150–450)
PMV BLD AUTO: 11 FL (ref 9.2–12.9)
POTASSIUM SERPL-SCNC: 3.9 MMOL/L (ref 3.5–5.1)
PROGEST SERPL-MCNC: 0.1 NG/ML
PROT SERPL-MCNC: 7.3 G/DL (ref 6–8.4)
RBC # BLD AUTO: 4.26 M/UL (ref 4–5.4)
SARS-COV-2 IGG SERPL IA-ACNC: 2598.4 AU/ML
SARS-COV-2 IGG SERPL QL IA: POSITIVE
SODIUM SERPL-SCNC: 143 MMOL/L (ref 136–145)
T4 FREE SERPL-MCNC: 1.12 NG/DL (ref 0.71–1.51)
TRIGL SERPL-MCNC: 105 MG/DL (ref 30–150)
TSH SERPL DL<=0.005 MIU/L-ACNC: 1.1 UIU/ML (ref 0.4–4)
WBC # BLD AUTO: 6.36 K/UL (ref 3.9–12.7)

## 2021-12-02 PROCEDURE — 86769 SARS-COV-2 COVID-19 ANTIBODY: CPT | Performed by: INTERNAL MEDICINE

## 2021-12-02 PROCEDURE — 84443 ASSAY THYROID STIM HORMONE: CPT | Performed by: INTERNAL MEDICINE

## 2021-12-02 PROCEDURE — 36415 COLL VENOUS BLD VENIPUNCTURE: CPT | Mod: PO | Performed by: INTERNAL MEDICINE

## 2021-12-02 PROCEDURE — 85025 COMPLETE CBC W/AUTO DIFF WBC: CPT | Performed by: INTERNAL MEDICINE

## 2021-12-02 PROCEDURE — 82728 ASSAY OF FERRITIN: CPT | Performed by: INTERNAL MEDICINE

## 2021-12-02 PROCEDURE — 82672 ASSAY OF ESTROGEN: CPT | Performed by: INTERNAL MEDICINE

## 2021-12-02 PROCEDURE — 80053 COMPREHEN METABOLIC PANEL: CPT | Performed by: INTERNAL MEDICINE

## 2021-12-02 PROCEDURE — 80061 LIPID PANEL: CPT | Performed by: INTERNAL MEDICINE

## 2021-12-02 PROCEDURE — 84144 ASSAY OF PROGESTERONE: CPT | Performed by: INTERNAL MEDICINE

## 2021-12-02 PROCEDURE — 84439 ASSAY OF FREE THYROXINE: CPT | Performed by: INTERNAL MEDICINE

## 2021-12-02 PROCEDURE — 83001 ASSAY OF GONADOTROPIN (FSH): CPT | Performed by: INTERNAL MEDICINE

## 2021-12-03 ENCOUNTER — HOSPITAL ENCOUNTER (OUTPATIENT)
Dept: RADIOLOGY | Facility: HOSPITAL | Age: 51
Discharge: HOME | End: 2021-12-03
Attending: NURSE PRACTITIONER
Payer: COMMERCIAL

## 2021-12-03 DIAGNOSIS — R07.81 PAIN IN RIB: ICD-10-CM

## 2021-12-03 DIAGNOSIS — Z78.0 MENOPAUSE: Primary | ICD-10-CM

## 2021-12-03 DIAGNOSIS — R07.81 PAIN IN RIB: Primary | ICD-10-CM

## 2021-12-03 PROCEDURE — 71250 CT THORAX DX C-: CPT | Mod: TC,PO

## 2021-12-06 ENCOUNTER — TELEPHONE (OUTPATIENT)
Dept: ORTHOPEDICS | Facility: CLINIC | Age: 51
End: 2021-12-06
Payer: COMMERCIAL

## 2021-12-06 LAB — ESTROGEN SERPL-MCNC: 108 PG/ML

## 2021-12-07 NOTE — TELEPHONE ENCOUNTER
[facility]  OUTPATIENT CARDIOLOGY FOLLOW-UP VISIT    CHIEF COMPLAINT / REASON FOR FOLLOW-UP: CAD, HTN, dyslipidemia, former tobacco use, AGAPITO    CARDIOLOGIST: Dr. FREDDY Gunderson    I have reviewed and summarized all records as in problem list:    PROBLEM LIST:  1. Coronary artery disease:             a. coronary calcium score of 432.13 (greater than 75th percentile) in April 2010.              b. exercise MPI, 12/22/2015: No evidence of reversible ischemia or scar, exercised 7 minutes and 30 seconds (fair exercise tolerance) EF = 67%.             c. coronary calcium score of 744 (75th-90th percentile) in January 2017             d. cardiac catheterization, 04/25/2017: nonobstructive CAD, normal LVEDP  2. Strong family history of CAD  3. Essential hypertension  4. Dyslipidemia  5. Irritable bowel syndrome  6. Obstructive sleep apnea: on CPAP therapy   7. Carotid duplex, 11/15/2015: no significant stenosis bilaterally < 50%, similar to 2010.  8. Longstanding tobacco use; quit 11/2021    Diagnostics:  Nuclear stress test 12/22/2015:  1.  Normal myocardial perfusion imaging at rest and following Regadenoson stress.  2.  Normal myocardial wall thickening and motion, computer calculated ejection fraction 67%.  3.  Normal ECG with Regadenoson infusion.      HISTORY OF PRESENT ILLNESS:   Alexis Tanner is a 71 year old male who presents today for follow up visit, ambulating independently and is unaccompanied.    He notes mid sternal chest pain which has been occurring over the past six weeks, not worsening in intensity, frequency or duration. He is vague and unsure of this question. Symptoms do not radiate and he does not note any associated symptoms.    He denies shortness of breath at rest but does note continued dyspnea on exertion which is not worsening and occurs only with stair climbing. He uses his inhaler with control of symptoms.    He denies palpitations. He denies pre-syncope or syncope. He stopped  Called patient - appt made 12/11/19 for evaluation.    taking chlorthalidone due to postural lightheadedness with improvement in symptoms.    He remains active but does not regularly exercise. He denies any decrease in activity tolerance.     He denies lower extremity edema bilaterally. He sleeps well at night and denies PND or orthopnea. He is compliant with using CPAP.    His weight is up 10 pounds since October 2021.     MEDICATIONS:  Current Outpatient Medications   Medication Sig Dispense Refill   • nystatin (MYCOSTATIN) 273609 UNIT/ML suspension Rinse with 1 teaspoon (5ml)  for 2 minutes 4-5 times daily and expectorate 300 mL 2   • lisinopril (ZESTRIL) 10 MG tablet Take 2 tablets by mouth daily. 180 tablet 1   • DIAZepam (VALIUM) 10 MG tablet TAKE 1 TABLET THE NIGHT BEFORE APPT AND THE OTHER TABLET AN HOUR BEFORE APPT. 2 tablet 2   • LORazepam (ATIVAN) 1 MG tablet Take one tablet 30 to 60 minutes prior to CT scan 1 tablet 0   • ketoconazole (NIZORAL) 2 % cream Apply daily topically to rash around groin 60 g 0   • sodium fluoride (SF 5000 Plus) 1.1 % dental cream Brush for 2 minutes every night. Do not rinse after using 51 g 12   • clotrimazole-betamethasone (LOTRISONE) 1-0.05 % cream Apply 1 application topically 2 times daily. For up to 14 days 45 g 1   • atenolol (TENORMIN) 50 MG tablet Take 1 tablet by mouth daily. 90 tablet 1   • chlorthalidone (THALITONE) 25 MG tablet Take 1 tablet by mouth daily. 90 tablet 1   • Cyanocobalamin (B-12 PO)      • aspirin (ECOTRIN) 81 MG EC tablet Take 81 mg by mouth daily.     • hydrOXYzine (ATARAX) 10 MG tablet Take 1 tablet by mouth daily as needed for Anxiety. 180 tablet 2   • albuterol 108 (90 Base) MCG/ACT inhaler Inhale 2 puffs into the lungs every 6 hours as needed for Shortness of Breath or Wheezing. 8.5 g 11   • rosuvastatin (CRESTOR) 40 MG tablet Take 1 tablet by mouth daily 90 tablet 3   • VITAMIN D, ERGOCALCIFEROL, PO Take 1 tablet by mouth daily.     • sodium fluoride (SF 5000 Plus) 1.1 % dental cream Brush for 2  minutes every night. Do not rinse after using. 51 g 11   • Blood Pressure Monitoring (BLOOD PRESSURE MONITOR/M CUFF) Misc USE FOR TESTING BLOOD PRESSURE 1 each 0     No current facility-administered medications for this visit.       ALLERGIES:  ALLERGIES:   Allergen Reactions   • Penicillins RASH        SOCIAL HISTORY:  Social History     Tobacco Use   • Smoking status: Light Tobacco Smoker     Packs/day: 1.00     Years: 40.00     Pack years: 40.00     Types: Cigarettes     Start date: 1979   • Smokeless tobacco: Never Used   • Tobacco comment: Quit x 2 weeks   Substance Use Topics   • Alcohol use: Not Currently     Alcohol/week: 7.0 standard drinks     Types: 7 Cans of beer per week     Comment: couple times weekly   • Drug use: No        FAMILY HISTORY:  Family History   Problem Relation Age of Onset   • Heart disease Other         Father 49, brother 50 and sister 29  from heart attack   • Heart disease Father    • Heart disease Sister    • Heart disease Brother    • Asthma Mother         REVIEW OF SYSTEMS:  A 10 point ROS was done and is negative unless otherwise stated in the HPI. In addition, see ROS note.    PHYSICAL EXAMINATION:  Well developed male who is in no acute distress.   VITALS:   Visit Vitals  BP (!) 152/78 (BP Location: LUE - Left upper extremity, Patient Position: Sitting, Cuff Size: Regular)   Pulse 69   Resp 16   Ht 5' 11\" (1.803 m)   Wt 106.3 kg (234 lb 4.8 oz)   SpO2 99%   BMI 32.68 kg/m²      General: Cooperative, sitting comfortably.  HEAD: Normocephalic and atraumatic.   Neck: Trachea mid line.   Respiratory: Bilateral air entry present. Clear to auscultation bilaterally. Respirations regular and unlabored. No wheezes, rales, rhonchi or crackles. Good respiratory effort.   Cardiovascular: Regular rate and rhythm. Normal S1 and S2. No murmurs, rubs, or gallops. No jugular venous distension. The carotid pulses are 2+ bilaterally. No carotid bruits auscultated.   Abdominal: Abdomen  soft, nontender, nondistended. No rebound or guarding. Normal  bowel sounds.   Extremities: No cyanosis, clubbing or edema.  Lymphatic: No significant lymphadenopathy in submental, submandibular, or cervical chain.   Neurologic/psych: Alert and oriented x3. The patient is cooperative and pleasant. No gross sensory deficits noted. No apparent focal motor  deficits.   Skin/ integumentary: Warm and dry skin. No rashes.    LABS:   Cholesterol (mg/dL)   Date Value   05/25/2021 112     Triglycerides (mg/dL)   Date Value   05/25/2021 221 (H)     HDL (mg/dL)   Date Value   05/25/2021 42     LDL (mg/dL)   Date Value   05/25/2021 26      Lab Results   Component Value Date    WBC 6.2 06/15/2021    WBC 5.8 05/25/2021    WBC 6.9 12/12/2019    WBC 6.3 01/14/2019    HGB 14.6 06/15/2021    HGB 13.4 05/25/2021    HGB 13.7 12/12/2019    HGB 13.8 01/14/2019    HCT 43.5 06/15/2021    HCT 39.7 05/25/2021    HCT 39.5 12/12/2019    HCT 40.7 01/14/2019     06/15/2021     05/25/2021     12/12/2019     01/14/2019    INR 1.0 04/25/2017    POTASSIUM 3.9 06/15/2021    POTASSIUM 4.0 05/25/2021    POTASSIUM 3.7 12/16/2019    POTASSIUM 3.3 (L) 12/12/2019    BUN 9 06/15/2021    BUN 5 (L) 05/25/2021    BUN 14 12/12/2019    BUN 13 05/22/2019    CREATININE 0.76 06/15/2021    CREATININE 0.70 05/25/2021    CREATININE 0.98 12/12/2019    CREATININE 0.79 05/22/2019    TSH 1.475 06/15/2021    TSH 1.356 07/23/2015       ECG 06/15/2021  Rate:            71                       P:            51   CO:             148                      QRS:          41   QRSD:         90                       T:            34   QT:             422                                       QTc:           458                                       Normal sinus rhythm   Borderline nonspecific intra ventricular conduction delay       ASSESSMENT:  1. Coronary artery disease, non obstructive  2. Essential Hypertension  3. Dyslipidemia  4. Former tobacco  use  5. Obstructive sleep apnea    PLAN:  Coronary artery disease, non obstructive:   Coronary calcium score 04/2010: 432.   Exercise stress test 12/22/2015: no evidence of reversible ischemia or scar.   Repeat coronary calcium score 10/2017: 744.   Cardiac catheterization 04/2017: nonobstructive CAD with normal LVEDP.   12-lead EKG 01/15/2021: SR with borderline IVCD.  He notes chest pain worsening in occurrence over the past couple months, noted mostly when shoveling. He believes symptoms are not necessarily worsening in intensity, frequency or duration but was vague and unsure. He denies dyspnea at rest but continues to experience dyspnea on exertion which is unchanged as compared to previous, usually controlled with use of inhalers. He was able to walk a mile at last visit but is now unable to fatigue and tiredness.   Labs drawn 12/09/2021: NT pro-. Kidney function and electrolytes stable.  He will continue BB, statin, ASA. Continue medical management and aggressive risk factor modification. Will arrange for echo to evaluate EF, WMA and valve function, and nuclear stress test to evaluate for underlying coronary disease - orders placed.     Essential Hypertension: blood pressure 152/78 mmHg, not controlled on atenolol, lisinopril. He stopped chlorthalidone due to postural symptoms and would like to discuss other options with him. Will need some adjustment in current medications or additional medication to better control blood pressures. Advised to monitor home blood pressures and heart rates.     Dyslipidemia: LDL 26, triglyceride 221, hypertriglyceridemia. On Crestor. Advised to decrease carbohydrates and sugar intake in diet    Former tobacco use: he stopped smoking a few months ago and was advised to continue to abstain.    Obstructive sleep apnea: compliant with CPAP therapy, follows with Sleep [facility]      Patient understands he/she can return sooner if any issues should arise.     FOLLOW-UP: 6 weeks with  me, or sooner if needed.      CORTEZ Juarez  [facility]

## 2021-12-09 ENCOUNTER — HOSPITAL ENCOUNTER (OUTPATIENT)
Dept: RADIOLOGY | Facility: HOSPITAL | Age: 51
Discharge: HOME | End: 2021-12-09
Attending: SPECIALIST
Payer: COMMERCIAL

## 2021-12-09 DIAGNOSIS — Z78.0 MENOPAUSE: ICD-10-CM

## 2021-12-09 PROCEDURE — 77080 DXA BONE DENSITY AXIAL: CPT | Mod: TC,PO

## 2021-12-16 ENCOUNTER — TELEPHONE (OUTPATIENT)
Dept: SLEEP MEDICINE | Facility: CLINIC | Age: 51
End: 2021-12-16
Payer: COMMERCIAL

## 2021-12-17 ENCOUNTER — TELEPHONE (OUTPATIENT)
Dept: SLEEP MEDICINE | Facility: CLINIC | Age: 51
End: 2021-12-17
Payer: COMMERCIAL

## 2021-12-28 ENCOUNTER — OFFICE VISIT (OUTPATIENT)
Dept: SLEEP MEDICINE | Facility: CLINIC | Age: 51
End: 2021-12-28
Payer: COMMERCIAL

## 2021-12-28 DIAGNOSIS — G47.411 NARCOLEPSY WITH CATAPLEXY: ICD-10-CM

## 2021-12-28 PROCEDURE — 99214 OFFICE O/P EST MOD 30 MIN: CPT | Mod: CR,95,, | Performed by: NURSE PRACTITIONER

## 2021-12-28 PROCEDURE — 3066F PR DOCUMENTATION OF TREATMENT FOR NEPHROPATHY: ICD-10-PCS | Mod: CPTII,95,, | Performed by: NURSE PRACTITIONER

## 2021-12-28 PROCEDURE — 3044F PR MOST RECENT HEMOGLOBIN A1C LEVEL <7.0%: ICD-10-PCS | Mod: CPTII,95,, | Performed by: NURSE PRACTITIONER

## 2021-12-28 PROCEDURE — 3066F NEPHROPATHY DOC TX: CPT | Mod: CPTII,95,, | Performed by: NURSE PRACTITIONER

## 2021-12-28 PROCEDURE — 99214 PR OFFICE/OUTPT VISIT, EST, LEVL IV, 30-39 MIN: ICD-10-PCS | Mod: CR,95,, | Performed by: NURSE PRACTITIONER

## 2021-12-28 PROCEDURE — 3061F PR NEG MICROALBUMINURIA RESULT DOCUMENTED/REVIEW: ICD-10-PCS | Mod: CPTII,95,, | Performed by: NURSE PRACTITIONER

## 2021-12-28 PROCEDURE — 3061F NEG MICROALBUMINURIA REV: CPT | Mod: CPTII,95,, | Performed by: NURSE PRACTITIONER

## 2021-12-28 PROCEDURE — 3044F HG A1C LEVEL LT 7.0%: CPT | Mod: CPTII,95,, | Performed by: NURSE PRACTITIONER

## 2021-12-28 RX ORDER — DEXTROAMPHETAMINE SACCHARATE, AMPHETAMINE ASPARTATE, DEXTROAMPHETAMINE SULFATE AND AMPHETAMINE SULFATE 7.5; 7.5; 7.5; 7.5 MG/1; MG/1; MG/1; MG/1
1 TABLET ORAL 2 TIMES DAILY PRN
Qty: 60 TABLET | Refills: 0 | Status: SHIPPED | OUTPATIENT
Start: 2021-12-28 | End: 2022-01-20 | Stop reason: SDUPTHER

## 2022-01-20 DIAGNOSIS — G47.411 NARCOLEPSY WITH CATAPLEXY: ICD-10-CM

## 2022-01-20 RX ORDER — DEXTROAMPHETAMINE SACCHARATE, AMPHETAMINE ASPARTATE, DEXTROAMPHETAMINE SULFATE AND AMPHETAMINE SULFATE 7.5; 7.5; 7.5; 7.5 MG/1; MG/1; MG/1; MG/1
1 TABLET ORAL 2 TIMES DAILY PRN
Qty: 60 TABLET | Refills: 0 | Status: SHIPPED | OUTPATIENT
Start: 2022-01-20 | End: 2022-03-08 | Stop reason: SDUPTHER

## 2022-01-21 ENCOUNTER — TELEPHONE (OUTPATIENT)
Dept: PHARMACY | Facility: CLINIC | Age: 52
End: 2022-01-21
Payer: COMMERCIAL

## 2022-02-17 DIAGNOSIS — T78.2XXS ANAPHYLAXIS, SEQUELA: ICD-10-CM

## 2022-02-17 RX ORDER — EPINEPHRINE 0.3 MG/.3ML
1 INJECTION SUBCUTANEOUS ONCE
Qty: 2 EACH | Refills: 11 | Status: CANCELLED | OUTPATIENT
Start: 2022-02-17 | End: 2022-02-17

## 2022-03-08 DIAGNOSIS — G47.411 NARCOLEPSY WITH CATAPLEXY: ICD-10-CM

## 2022-03-09 RX ORDER — DEXTROAMPHETAMINE SACCHARATE, AMPHETAMINE ASPARTATE, DEXTROAMPHETAMINE SULFATE AND AMPHETAMINE SULFATE 7.5; 7.5; 7.5; 7.5 MG/1; MG/1; MG/1; MG/1
1 TABLET ORAL 2 TIMES DAILY PRN
Qty: 60 TABLET | Refills: 0 | Status: SHIPPED | OUTPATIENT
Start: 2022-03-09 | End: 2022-04-13 | Stop reason: SDUPTHER

## 2022-03-17 ENCOUNTER — IMMUNIZATION (OUTPATIENT)
Dept: PRIMARY CARE CLINIC | Facility: CLINIC | Age: 52
End: 2022-03-17
Payer: COMMERCIAL

## 2022-03-17 DIAGNOSIS — Z23 NEED FOR VACCINATION: Primary | ICD-10-CM

## 2022-03-17 PROCEDURE — 0053A COVID-19, MRNA, LNP-S, PF, 30 MCG/0.3 ML DOSE VACCINE (PFIZER): CPT | Mod: S$GLB,,, | Performed by: FAMILY MEDICINE

## 2022-03-17 PROCEDURE — 0053A COVID-19, MRNA, LNP-S, PF, 30 MCG/0.3 ML DOSE VACCINE (PFIZER): ICD-10-PCS | Mod: S$GLB,,, | Performed by: FAMILY MEDICINE

## 2022-03-17 PROCEDURE — 91305 COVID-19, MRNA, LNP-S, PF, 30 MCG/0.3 ML DOSE VACCINE (PFIZER): ICD-10-PCS | Mod: S$GLB,,, | Performed by: FAMILY MEDICINE

## 2022-03-17 PROCEDURE — 91305 COVID-19, MRNA, LNP-S, PF, 30 MCG/0.3 ML DOSE VACCINE (PFIZER): CPT | Mod: S$GLB,,, | Performed by: FAMILY MEDICINE

## 2022-03-24 ENCOUNTER — TELEPHONE (OUTPATIENT)
Dept: GASTROENTEROLOGY | Facility: CLINIC | Age: 52
End: 2022-03-24
Payer: COMMERCIAL

## 2022-03-24 NOTE — TELEPHONE ENCOUNTER
----- Message from Yolis Goodwin sent at 3/24/2022 12:11 PM CDT -----  Contact: patient  Type: Needs Medical Advice  Who Called:  patient  Best Call Back Number:[phone]  Additional Information: needs to schedule a colonoscopy , please advise.

## 2022-03-28 ENCOUNTER — OFFICE VISIT (OUTPATIENT)
Dept: GASTROENTEROLOGY | Facility: CLINIC | Age: 52
End: 2022-03-28
Payer: COMMERCIAL

## 2022-03-28 VITALS
HEIGHT: 66 IN | HEART RATE: 72 BPM | SYSTOLIC BLOOD PRESSURE: 111 MMHG | WEIGHT: 149.06 LBS | BODY MASS INDEX: 23.95 KG/M2 | DIASTOLIC BLOOD PRESSURE: 72 MMHG

## 2022-03-28 DIAGNOSIS — K92.1 BLACK STOOLS: ICD-10-CM

## 2022-03-28 DIAGNOSIS — K21.9 GASTROESOPHAGEAL REFLUX DISEASE, UNSPECIFIED WHETHER ESOPHAGITIS PRESENT: ICD-10-CM

## 2022-03-28 DIAGNOSIS — R63.4 WEIGHT LOSS: ICD-10-CM

## 2022-03-28 DIAGNOSIS — R12 HEARTBURN: ICD-10-CM

## 2022-03-28 DIAGNOSIS — R19.7 DIARRHEA, UNSPECIFIED TYPE: Primary | ICD-10-CM

## 2022-03-28 DIAGNOSIS — R19.4 CHANGE IN BOWEL HABITS: ICD-10-CM

## 2022-03-28 DIAGNOSIS — R63.0 DECREASED APPETITE: ICD-10-CM

## 2022-03-28 PROCEDURE — 1159F MED LIST DOCD IN RCRD: CPT | Mod: CPTII,S$GLB,,

## 2022-03-28 PROCEDURE — 1160F RVW MEDS BY RX/DR IN RCRD: CPT | Mod: CPTII,S$GLB,,

## 2022-03-28 PROCEDURE — 1160F PR REVIEW ALL MEDS BY PRESCRIBER/CLIN PHARMACIST DOCUMENTED: ICD-10-PCS | Mod: CPTII,S$GLB,,

## 2022-03-28 PROCEDURE — 99999 PR PBB SHADOW E&M-EST. PATIENT-LVL IV: CPT | Mod: PBBFAC,,,

## 2022-03-28 PROCEDURE — 3074F PR MOST RECENT SYSTOLIC BLOOD PRESSURE < 130 MM HG: ICD-10-PCS | Mod: CPTII,S$GLB,,

## 2022-03-28 PROCEDURE — 99999 PR PBB SHADOW E&M-EST. PATIENT-LVL IV: ICD-10-PCS | Mod: PBBFAC,,,

## 2022-03-28 PROCEDURE — 3078F DIAST BP <80 MM HG: CPT | Mod: CPTII,S$GLB,,

## 2022-03-28 PROCEDURE — 3008F BODY MASS INDEX DOCD: CPT | Mod: CPTII,S$GLB,,

## 2022-03-28 PROCEDURE — 99214 OFFICE O/P EST MOD 30 MIN: CPT | Mod: S$GLB,,,

## 2022-03-28 PROCEDURE — 99214 PR OFFICE/OUTPT VISIT, EST, LEVL IV, 30-39 MIN: ICD-10-PCS | Mod: S$GLB,,,

## 2022-03-28 PROCEDURE — 3074F SYST BP LT 130 MM HG: CPT | Mod: CPTII,S$GLB,,

## 2022-03-28 PROCEDURE — 3008F PR BODY MASS INDEX (BMI) DOCUMENTED: ICD-10-PCS | Mod: CPTII,S$GLB,,

## 2022-03-28 PROCEDURE — 1159F PR MEDICATION LIST DOCUMENTED IN MEDICAL RECORD: ICD-10-PCS | Mod: CPTII,S$GLB,,

## 2022-03-28 PROCEDURE — 3078F PR MOST RECENT DIASTOLIC BLOOD PRESSURE < 80 MM HG: ICD-10-PCS | Mod: CPTII,S$GLB,,

## 2022-03-28 RX ORDER — SODIUM OXYBATE 0.5 G/ML
SOLUTION ORAL
COMMUNITY
Start: 2022-03-21

## 2022-03-28 RX ORDER — CALCIUM CARBONATE 200(500)MG
1 TABLET,CHEWABLE ORAL DAILY PRN
COMMUNITY
End: 2022-09-06

## 2022-03-28 RX ORDER — ROSUVASTATIN CALCIUM 5 MG/1
5 TABLET, COATED ORAL DAILY
COMMUNITY
End: 2023-05-29 | Stop reason: SDUPTHER

## 2022-03-28 NOTE — PROGRESS NOTES
Subjective:       Patient ID: Holly Chicas is a 51 y.o. female Body mass index is 24.05 kg/m².    Chief Complaint: Diarrhea (weightloss)    This patient is new to me.     Diarrhea   This is a new problem. The current episode started more than 1 month ago (started in 01/2022). Episode frequency: reports having 0-3 bowel movements a day depending on if she eats. The problem has been unchanged. The stool consistency is described as watery (rated stool a 5-7 on [place] scale; stool color varies from black to white). The patient states that diarrhea does not awaken her from sleep. Associated symptoms include increased flatus and weight loss (documented weight loss of 21 pounds since 09/09/2021; reports decreased appetite and history of gastric bypass surgery in 2017). Pertinent negatives include no abdominal pain, arthralgias, bloating, chills, coughing, fever, headaches, myalgias, sweats, URI or vomiting. Exacerbated by: diarrhea occurs 30 minutes to 1 hour after eating any food; denies any specific food triggers; history of cholecystectomy. There are no known risk factors. She has tried nothing for the symptoms. There is no history of bowel resection, inflammatory bowel disease, irritable bowel syndrome, malabsorption, a recent abdominal surgery or short gut syndrome. Past history of gastric bypass in 2017.   Gastroesophageal Reflux  She complains of heartburn. She reports no abdominal pain, no belching, no chest pain, no choking, no coughing, no dysphagia, no early satiety, no globus sensation, no hoarse voice, no nausea, no sore throat or no water brash. This is a new problem. The current episode started more than 1 month ago (started a couple months ago). The problem occurs rarely. The problem has been unchanged. The heartburn duration is several minutes. The heartburn is located in the substernum. The heartburn is of mild intensity. The heartburn does not wake her from sleep. The heartburn does not limit her  activity. The heartburn doesn't change with position. The symptoms are aggravated by certain foods (worsens after eating garlic). Associated symptoms include fatigue (history of narcolepsy), melena (reports black stools started 1 month ago; denies taking oral iron, pepto bismol, or blood thinners) and weight loss (documented weight loss of 21 pounds since 09/09/2021; reports decreased appetite and history of gastric bypass surgery in 2017). Pertinent negatives include no anemia or muscle weakness. Risk factors include NSAIDs (ibuprofen OTC 1 time a week). She has tried an antacid (currently using TUMs OTC PRN) for the symptoms. The treatment provided significant relief. Past procedures do not include an abdominal ultrasound, an EGD, esophageal manometry, esophageal pH monitoring, H. pylori antibody titer or a UGI. Past invasive treatments do not include gastroplasty, gastroplication or reflux surgery.     Review of Systems   Constitutional: Positive for appetite change (decreased), fatigue (history of narcolepsy) and weight loss (documented weight loss of 21 pounds since 09/09/2021; reports decreased appetite and history of gastric bypass surgery in 2017). Negative for activity change, chills, diaphoresis, fever and unexpected weight change.   HENT: Negative for hoarse voice, sore throat and trouble swallowing.    Respiratory: Negative for cough, choking and shortness of breath.    Cardiovascular: Negative for chest pain.   Gastrointestinal: Positive for diarrhea, flatus, heartburn and melena (reports black stools started 1 month ago; denies taking oral iron, pepto bismol, or blood thinners). Negative for abdominal distention, abdominal pain, anal bleeding, bloating, blood in stool, constipation, dysphagia, nausea, rectal pain and vomiting.   Musculoskeletal: Negative for arthralgias, myalgias and muscle weakness.   Neurological: Negative for headaches.       No LMP recorded. Patient has had a hysterectomy.  Past  "Medical History:   Diagnosis Date    Abnormal Pap smear     "pre-cancer cells post hysterectomy"    Anxiety     Arthritis     Depression     Good hypertension control 2014    Hyperlipidemia     Hypertension     Hypertriglyceridemia     Meningitis     Narcolepsy     ALESIA (obstructive sleep apnea)     Peripheral neuropathy 2014    Psoriasis     Psoriasis 5/10/2018    Tuberculosis      Past Surgical History:   Procedure Laterality Date    AUGMENTATION OF BREAST      BREAST SURGERY      aumentation/ reduction     BREAST SURGERY  2017    augmentation     brizilian butt lift   2017     SECTION      CHOLECYSTECTOMY  2018    Dr SEAN [facility]    COLONOSCOPY      FOOT SURGERY      GASTRIC BYPASS  2015    GASTRIC BYPASS  2015    HERNIA REPAIR      HYSTERECTOMY      KNEE SURGERY Left     SHOULDER SURGERY      right should surgery     SHOULDER SURGERY  2017    left shoulder / torn labrium     THIGH LIFT  2017    TONSILLECTOMY      tummy tuck   2016     Family History   Problem Relation Age of Onset    Cancer Mother         breast bilateral/ skin     Breast cancer Mother     Skin cancer Mother     Diabetes Father     Heart disease Father 65        CABG    Hypertension Father     Cancer Father         skin    Skin cancer Father     Gout Father     Colon polyps Father     Gout Brother     No Known Problems Daughter     No Known Problems Son     Cancer Maternal Aunt         lung, pancrease - smoker , breast    Lung cancer Maternal Aunt         x2    Pancreatic cancer Maternal Aunt     Breast cancer Maternal Aunt     No Known Problems Maternal Uncle     No Known Problems Paternal Uncle     Cancer Maternal Grandmother         pancrease    Pancreatic cancer Maternal Grandmother     Breast cancer Maternal Grandmother     Diabetes Paternal Grandmother     Heart disease Paternal Grandfather     Colon cancer " Maternal Grandfather     Ovarian cancer Neg Hx     Ulcerative colitis Neg Hx     Stomach cancer Neg Hx     Crohn's disease Neg Hx     Rectal cancer Neg Hx      Social History     Tobacco Use    Smoking status: Never Smoker    Smokeless tobacco: Never Used   Substance Use Topics    Alcohol use: No     Comment: ALLERGIC TO ALCOHOL(DRINKING) PER PATIENT    Drug use: No     Wt Readings from Last 10 Encounters:   03/28/22 67.6 kg (149 lb 0.5 oz)   09/09/21 77.1 kg (170 lb)   07/01/21 80.7 kg (178 lb)   08/20/20 75.1 kg (165 lb 9.1 oz)   08/11/20 77.5 kg (170 lb 13.7 oz)   12/11/19 79.4 kg (175 lb 0.7 oz)   10/09/19 78.5 kg (173 lb)   07/01/19 77.1 kg (170 lb)   06/24/19 77.1 kg (170 lb)   03/11/19 77.1 kg (170 lb)     Lab Results   Component Value Date    WBC 6.36 12/02/2021    HGB 12.7 12/02/2021    HCT 39.4 12/02/2021    MCV 93 12/02/2021     12/02/2021     CMP  Sodium   Date Value Ref Range Status   12/02/2021 143 136 - 145 mmol/L Final     Potassium   Date Value Ref Range Status   12/02/2021 3.9 3.5 - 5.1 mmol/L Final     Chloride   Date Value Ref Range Status   12/02/2021 103 95 - 110 mmol/L Final     CO2   Date Value Ref Range Status   12/02/2021 25 23 - 29 mmol/L Final     Glucose   Date Value Ref Range Status   12/02/2021 121 (H) 70 - 110 mg/dL Final     BUN   Date Value Ref Range Status   12/02/2021 16 6 - 20 mg/dL Final     Creatinine   Date Value Ref Range Status   12/02/2021 0.7 0.5 - 1.4 mg/dL Final     Calcium   Date Value Ref Range Status   12/02/2021 9.5 8.7 - 10.5 mg/dL Final     Total Protein   Date Value Ref Range Status   12/02/2021 7.3 6.0 - 8.4 g/dL Final     Albumin   Date Value Ref Range Status   12/02/2021 4.2 3.5 - 5.2 g/dL Final     Total Bilirubin   Date Value Ref Range Status   12/02/2021 0.5 0.1 - 1.0 mg/dL Final     Comment:     For infants and newborns, interpretation of results should be based  on gestational age, weight and in agreement with  clinical  observations.    Premature Infant recommended reference ranges:  Up to 24 hours.............<8.0 mg/dL  Up to 48 hours............<12.0 mg/dL  3-5 days..................<15.0 mg/dL  6-29 days.................<15.0 mg/dL       Alkaline Phosphatase   Date Value Ref Range Status   12/02/2021 119 55 - 135 U/L Final     AST   Date Value Ref Range Status   12/02/2021 22 10 - 40 U/L Final     ALT   Date Value Ref Range Status   12/02/2021 14 10 - 44 U/L Final     Anion Gap   Date Value Ref Range Status   12/02/2021 15 8 - 16 mmol/L Final     eGFR if    Date Value Ref Range Status   12/02/2021 >60.0 >60 mL/min/1.73 m^2 Final     eGFR if non    Date Value Ref Range Status   12/02/2021 >60.0 >60 mL/min/1.73 m^2 Final     Comment:     Calculation used to obtain the estimated glomerular filtration  rate (eGFR) is the CKD-EPI equation.        Lab Results   Component Value Date    AMYLASE 55 03/03/2018     Lab Results   Component Value Date    LIPASE 29 06/20/2012     Lab Results   Component Value Date    TSH 1.097 12/02/2021       Reviewed prior medical records including radiology report of CT chest 12/03/2021, abdominal US 03/07/2018 & endoscopy history (see surgical history).    Objective:      Physical Exam    Assessment:       1. Diarrhea, unspecified type    2. Change in bowel habits    3. Decreased appetite    4. Gastroesophageal reflux disease, unspecified whether esophagitis present    5. Heartburn    6. Black stools    7. Weight loss        Plan:       Diarrhea, unspecified type  - schedule Colonoscopy, discussed procedure with the patient, including risks and benefits, patient verbalized understanding  - Recommended increase fiber in diet, especially soluble fiber since this can help bulk up the stool consistency and may help to slow down how fast the stool goes through the colon and can prevent diarrhea  -     Pancreatic elastase, fecal; Future; Expected date: 03/28/2022  -      Occult blood x 1, stool; Future; Expected date: 03/28/2022  -     WBC, Stool; Future; Expected date: 03/28/2022  -     Rotavirus antigen, stool; Future; Expected date: 03/28/2022  -     Adenovirus Antigen EIA, Stool; Future; Expected date: 03/28/2022  -     Giardia / Cryptosporidum, EIA; Future; Expected date: 03/28/2022  -     Stool Exam-Ova,Cysts,Parasites; Future; Expected date: 03/28/2022  -     Clostridium difficile EIA; Future; Expected date: 03/28/2022  -     Stool culture; Future; Expected date: 03/28/2022  -     Case Request Endoscopy: EGD (ESOPHAGOGASTRODUODENOSCOPY), COLONOSCOPY    Change in bowel habits  - schedule Colonoscopy, discussed procedure with the patient, including risks and benefits, patient verbalized understanding  -     Pancreatic elastase, fecal; Future; Expected date: 03/28/2022  -     Occult blood x 1, stool; Future; Expected date: 03/28/2022  -     WBC, Stool; Future; Expected date: 03/28/2022  -     Rotavirus antigen, stool; Future; Expected date: 03/28/2022  -     Adenovirus Antigen EIA, Stool; Future; Expected date: 03/28/2022  -     Giardia / Cryptosporidum, EIA; Future; Expected date: 03/28/2022  -     Stool Exam-Ova,Cysts,Parasites; Future; Expected date: 03/28/2022  -     Clostridium difficile EIA; Future; Expected date: 03/28/2022  -     Stool culture; Future; Expected date: 03/28/2022  -     Case Request Endoscopy: EGD (ESOPHAGOGASTRODUODENOSCOPY), COLONOSCOPY    Decreased appetite  - encouraged PO intake and daily calorie counts to ensure adequate nutrition is taken in, recommend at least 1,800-2,000 calories a day  - recommend nutritional drinks, such as Boost, Ensure or Glucerna, to supplement nutrition needs    Gastroesophageal reflux disease, unspecified whether esophagitis present  - schedule EGD, discussed procedure with patient, including risks and benefits, patient verbalized understanding  -discussed about the different types of medications used to treat reflux and  how to use them, antacids can be used PRN for breakthrough heartburn symptoms by reducing stomach acid that is already produced, H2 blockers work by limiting the amount acid production, & PPI's work to block acid production and are taken daily, patient verbalized understanding.  -Educated patient on lifestyle modifications to help control/reduce reflux/abdominal pain including: avoid large meals, avoid eating within 2-3 hours of bedtime (avoid late night eating & lying down soon after eating), elevate head of bed if nocturnal symptoms are present, smoking cessation (if current smoker), & weight loss (if overweight).   -Educated to avoid known foods which trigger reflux symptoms & to minimize/avoid high-fat foods, chocolate, caffeine, citrus, alcohol, & tomato products.  -Advised to avoid/limit use of NSAID's, since they can cause GI upset, bleeding, and/or ulcers. If needed, take with food.   -CONTINUE: TUMs OTC PRN  -     Case Request Endoscopy: EGD (ESOPHAGOGASTRODUODENOSCOPY), COLONOSCOPY    Heartburn  - schedule EGD, discussed procedure with patient, including risks and benefits, patient verbalized understanding  -discussed about the different types of medications used to treat reflux and how to use them, antacids can be used PRN for breakthrough heartburn symptoms by reducing stomach acid that is already produced, H2 blockers work by limiting the amount acid production, & PPI's work to block acid production and are taken daily, patient verbalized understanding.  -Educated patient on lifestyle modifications to help control/reduce reflux/abdominal pain including: avoid large meals, avoid eating within 2-3 hours of bedtime (avoid late night eating & lying down soon after eating), elevate head of bed if nocturnal symptoms are present, smoking cessation (if current smoker), & weight loss (if overweight).   -Educated to avoid known foods which trigger reflux symptoms & to minimize/avoid high-fat foods, chocolate,  caffeine, citrus, alcohol, & tomato products.  -Advised to avoid/limit use of NSAID's, since they can cause GI upset, bleeding, and/or ulcers. If needed, take with food.   -CONTINUE: TUMs OTC PRN    Black stools  - schedule Colonoscopy, discussed procedure with the patient, including risks and benefits, patient verbalized understanding  -     Case Request Endoscopy: EGD (ESOPHAGOGASTRODUODENOSCOPY), COLONOSCOPY    Weight loss  - schedule EGD, discussed procedure with patient, including risks and benefits, patient verbalized understanding  - schedule Colonoscopy, discussed procedure with the patient, including risks and benefits, patient verbalized understanding  -Consider CT scan pending results  -     Case Request Endoscopy: EGD (ESOPHAGOGASTRODUODENOSCOPY), COLONOSCOPY    Follow up in about 4 weeks (around 4/25/2022), or if symptoms worsen or fail to improve.      If no improvement in symptoms or symptoms worsen, call/follow-up at clinic or go to ER.        30 minutes of total time spent on the encounter, which includes face to face time and non-face to face time preparing to see the patient (eg, review of tests), Obtaining and/or reviewing separately obtained history, Documenting clinical information in the electronic or other health record, Independently interpreting results (not separately reported) and communicating results to the patient/family/caregiver, or Care coordination (not separately reported).

## 2022-04-12 ENCOUNTER — HOSPITAL ENCOUNTER (OUTPATIENT)
Dept: RADIOLOGY | Facility: HOSPITAL | Age: 52
Discharge: HOME | End: 2022-04-12
Attending: INTERNAL MEDICINE
Payer: COMMERCIAL

## 2022-04-12 DIAGNOSIS — Z12.31 VISIT FOR SCREENING MAMMOGRAM: ICD-10-CM

## 2022-04-12 PROCEDURE — 77067 SCR MAMMO BI INCL CAD: CPT | Mod: TC

## 2022-04-12 PROCEDURE — 77063 BREAST TOMOSYNTHESIS BI: CPT | Mod: TC

## 2022-04-12 PROCEDURE — 77063 MAMMO DIGITAL SCREENING BILAT WITH TOMO: ICD-10-PCS | Mod: 26,,, | Performed by: RADIOLOGY

## 2022-04-12 PROCEDURE — 77063 BREAST TOMOSYNTHESIS BI: CPT | Mod: 26,,, | Performed by: RADIOLOGY

## 2022-04-12 PROCEDURE — 77067 MAMMO DIGITAL SCREENING BILAT WITH TOMO: ICD-10-PCS | Mod: 26,,, | Performed by: RADIOLOGY

## 2022-04-12 PROCEDURE — 77067 SCR MAMMO BI INCL CAD: CPT | Mod: 26,,, | Performed by: RADIOLOGY

## 2022-04-13 DIAGNOSIS — G47.411 NARCOLEPSY WITH CATAPLEXY: ICD-10-CM

## 2022-04-13 RX ORDER — SOLRIAMFETOL 150 MG/1
150 TABLET, FILM COATED ORAL DAILY
Qty: 30 TABLET | Refills: 3 | Status: SHIPPED | OUTPATIENT
Start: 2022-04-13 | End: 2022-10-05 | Stop reason: SDUPTHER

## 2022-04-13 RX ORDER — DEXTROAMPHETAMINE SACCHARATE, AMPHETAMINE ASPARTATE, DEXTROAMPHETAMINE SULFATE AND AMPHETAMINE SULFATE 7.5; 7.5; 7.5; 7.5 MG/1; MG/1; MG/1; MG/1
1 TABLET ORAL 2 TIMES DAILY PRN
Qty: 60 TABLET | Refills: 0 | Status: SHIPPED | OUTPATIENT
Start: 2022-04-13 | End: 2022-06-16 | Stop reason: SDUPTHER

## 2022-04-21 ENCOUNTER — PATIENT MESSAGE (OUTPATIENT)
Dept: GASTROENTEROLOGY | Facility: CLINIC | Age: 52
End: 2022-04-21
Payer: COMMERCIAL

## 2022-04-29 ENCOUNTER — ANESTHESIA (OUTPATIENT)
Dept: ENDOSCOPY | Facility: HOSPITAL | Age: 52
End: 2022-04-29
Payer: COMMERCIAL

## 2022-04-29 ENCOUNTER — HOSPITAL ENCOUNTER (OUTPATIENT)
Facility: HOSPITAL | Age: 52
Discharge: HOME | End: 2022-04-29
Attending: INTERNAL MEDICINE | Admitting: INTERNAL MEDICINE
Payer: COMMERCIAL

## 2022-04-29 ENCOUNTER — ANESTHESIA EVENT (OUTPATIENT)
Dept: ENDOSCOPY | Facility: HOSPITAL | Age: 52
End: 2022-04-29
Payer: COMMERCIAL

## 2022-04-29 DIAGNOSIS — R19.7 DIARRHEA, UNSPECIFIED: ICD-10-CM

## 2022-04-29 PROCEDURE — D9220A PRA ANESTHESIA: Mod: CRNA,,, | Performed by: NURSE ANESTHETIST, CERTIFIED REGISTERED

## 2022-04-29 PROCEDURE — 88305 TISSUE EXAM BY PATHOLOGIST: CPT | Mod: 59 | Performed by: STUDENT IN AN ORGANIZED HEALTH CARE EDUCATION/TRAINING PROGRAM

## 2022-04-29 PROCEDURE — 43239 EGD BIOPSY SINGLE/MULTIPLE: CPT | Performed by: INTERNAL MEDICINE

## 2022-04-29 PROCEDURE — 88305 TISSUE EXAM BY PATHOLOGIST: ICD-10-PCS | Mod: 26,,, | Performed by: STUDENT IN AN ORGANIZED HEALTH CARE EDUCATION/TRAINING PROGRAM

## 2022-04-29 PROCEDURE — 43239 EGD BIOPSY SINGLE/MULTIPLE: CPT | Mod: 51,,, | Performed by: INTERNAL MEDICINE

## 2022-04-29 PROCEDURE — 25000003 PHARM REV CODE 250: Performed by: INTERNAL MEDICINE

## 2022-04-29 PROCEDURE — 43239 PR EGD, FLEX, W/BIOPSY, SGL/MULTI: ICD-10-PCS | Mod: 51,,, | Performed by: INTERNAL MEDICINE

## 2022-04-29 PROCEDURE — 27201012 HC FORCEPS, HOT/COLD, DISP: Performed by: INTERNAL MEDICINE

## 2022-04-29 PROCEDURE — 63600175 PHARM REV CODE 636 W HCPCS: Performed by: NURSE ANESTHETIST, CERTIFIED REGISTERED

## 2022-04-29 PROCEDURE — 37000008 HC ANESTHESIA 1ST 15 MINUTES: Performed by: INTERNAL MEDICINE

## 2022-04-29 PROCEDURE — 45380 PR COLONOSCOPY,BIOPSY: ICD-10-PCS | Mod: ,,, | Performed by: INTERNAL MEDICINE

## 2022-04-29 PROCEDURE — D9220A PRA ANESTHESIA: Mod: ANES,,, | Performed by: ANESTHESIOLOGY

## 2022-04-29 PROCEDURE — 45380 COLONOSCOPY AND BIOPSY: CPT | Performed by: INTERNAL MEDICINE

## 2022-04-29 PROCEDURE — 45380 COLONOSCOPY AND BIOPSY: CPT | Mod: ,,, | Performed by: INTERNAL MEDICINE

## 2022-04-29 PROCEDURE — D9220A PRA ANESTHESIA: ICD-10-PCS | Mod: CRNA,,, | Performed by: NURSE ANESTHETIST, CERTIFIED REGISTERED

## 2022-04-29 PROCEDURE — 88305 TISSUE EXAM BY PATHOLOGIST: CPT | Mod: 26,,, | Performed by: STUDENT IN AN ORGANIZED HEALTH CARE EDUCATION/TRAINING PROGRAM

## 2022-04-29 PROCEDURE — D9220A PRA ANESTHESIA: ICD-10-PCS | Mod: ANES,,, | Performed by: ANESTHESIOLOGY

## 2022-04-29 PROCEDURE — 37000009 HC ANESTHESIA EA ADD 15 MINS: Performed by: INTERNAL MEDICINE

## 2022-04-29 PROCEDURE — 25000003 PHARM REV CODE 250: Performed by: NURSE ANESTHETIST, CERTIFIED REGISTERED

## 2022-04-29 RX ORDER — MINERAL OIL
180 ENEMA (ML) RECTAL DAILY
COMMUNITY
End: 2022-09-06 | Stop reason: SDUPTHER

## 2022-04-29 RX ORDER — SODIUM CHLORIDE 9 MG/ML
INJECTION, SOLUTION INTRAVENOUS CONTINUOUS
Status: DISCONTINUED | OUTPATIENT
Start: 2022-04-29 | End: 2022-04-29 | Stop reason: HOSPADM

## 2022-04-29 RX ORDER — PROPOFOL 10 MG/ML
INJECTION, EMULSION INTRAVENOUS
Status: DISCONTINUED | OUTPATIENT
Start: 2022-04-29 | End: 2022-04-29

## 2022-04-29 RX ORDER — LIDOCAINE HCL/PF 100 MG/5ML
SYRINGE (ML) INTRAVENOUS
Status: DISCONTINUED | OUTPATIENT
Start: 2022-04-29 | End: 2022-04-29

## 2022-04-29 RX ADMIN — PROPOFOL 40 MG: 10 INJECTION, EMULSION INTRAVENOUS at 11:04

## 2022-04-29 RX ADMIN — PROPOFOL 120 MG: 10 INJECTION, EMULSION INTRAVENOUS at 11:04

## 2022-04-29 RX ADMIN — LIDOCAINE HYDROCHLORIDE 60 MG: 20 INJECTION INTRAVENOUS at 11:04

## 2022-04-29 RX ADMIN — SODIUM CHLORIDE: 0.9 INJECTION, SOLUTION INTRAVENOUS at 10:04

## 2022-04-29 NOTE — [employer]OVATION PATIENT INSTRUCTIONS
Discharge Summary/Instructions after an Endoscopic Procedure  Patient Name: Holly Chicas  Patient MRN: [id number]  Patient YOB: 1970  Friday, April 29, 2022  Kavita Yee MD  Dear patient,  As a result of recent federal legislation (The Federal Cures Act), you may   receive lab or pathology results from your procedure in your [facility]   account before your physician is able to contact you. Your physician or   their representative will relay the results to you with their   recommendations at their soonest availability.  Thank you,  RESTRICTIONS:  During your procedure today, you received medications for sedation.  These   medications may affect your judgment, balance and coordination.  Therefore,   for 24 hours, you have the following restrictions:   - DO NOT drive a car, operate machinery, make legal/financial decisions,   sign important papers or drink alcohol.    ACTIVITY:  Today: no heavy lifting, straining or running due to procedural   sedation/anesthesia.  The following day: return to full activity including work.  DIET:  Eat and drink normally unless instructed otherwise.     TREATMENT FOR COMMON SIDE EFFECTS:  - Mild abdominal pain, nausea, belching, bloating or excessive gas:  rest,   eat lightly and use a heating pad.  - Sore Throat: treat with throat lozenges and/or gargle with warm salt   water.  - Because air was used during the procedure, expelling large amounts of air   from your rectum or belching is normal.  - If a bowel prep was taken, you may not have a bowel movement for 1-3 days.    This is normal.  SYMPTOMS TO WATCH FOR AND REPORT TO YOUR PHYSICIAN:  1. Abdominal pain or bloating, other than gas cramps.  2. Chest pain.  3. Back pain.  4. Signs of infection such as: chills or fever occurring within 24 hours   after the procedure.  5. Rectal bleeding, which would show as bright red, maroon, or black stools.   (A tablespoon of blood from the rectum is not serious,  especially if   hemorrhoids are present.)  6. Vomiting.  7. Weakness or dizziness.  GO DIRECTLY TO THE NEAREST EMERGENCY ROOM IF YOU HAVE ANY OF THE FOLLOWING:      Difficulty breathing              Chills and/or fever over 101 F   Persistent vomiting and/or vomiting blood   Severe abdominal pain   Severe chest pain   Black, tarry stools   Bleeding- more than one tablespoon   Any other symptom or condition that you feel may need urgent attention  Your doctor recommends these additional instructions:  If any biopsies were taken, your doctors clinic will contact you in 1 to 2   weeks with any results.  - Discharge patient to home (with escort).   - Patient has a contact number available for emergencies.  The signs and   symptoms of potential delayed complications were discussed with the   patient.  Return to normal activities tomorrow.  Written discharge   instructions were provided to the patient.   - Resume previous diet.   - Continue present medications.   - Await pathology results.   - Repeat colonoscopy in 10 years for screening purposes.   - Return to my office PRN.  For questions, problems or results please call your physician - Kavita Yee MD at Work:  [phone].  [facility], EMERGENCY ROOM PHONE NUMBER: [phone]  IF A COMPLICATION OR EMERGENCY SITUATION ARISES AND YOU ARE UNABLE TO REACH   YOUR PHYSICIAN - GO DIRECTLY TO THE EMERGENCY ROOM.  Kavita Yee MD  4/29/2022 11:55:38 AM  This report has been verified and signed electronically.  Dear patient,  As a result of recent federal legislation (The Federal Cures Act), you may   receive lab or pathology results from your procedure in your [facility]   account before your physician is able to contact you. Your physician or   their representative will relay the results to you with their   recommendations at their soonest availability.  Thank you,  PROVATION

## 2022-04-29 NOTE — ANESTHESIA POSTPROCEDURE EVALUATION
Anesthesia Post Evaluation    Patient: Holly Chicas    Procedure(s) Performed: Procedure(s) (LRB):  EGD (ESOPHAGOGASTRODUODENOSCOPY) (N/A)  COLONOSCOPY (N/A)    Final Anesthesia Type: general      Patient location during evaluation: PACU  Patient participation: Yes- Able to Participate  Level of consciousness: awake and alert  Post-procedure vital signs: reviewed and stable  Pain management: adequate  Airway patency: patent    PONV status at discharge: No PONV  Anesthetic complications: no      Cardiovascular status: hemodynamically stable  Respiratory status: unassisted and room air  Hydration status: euvolemic  Follow-up not needed.          Vitals Value Taken Time   /73 04/29/22 1218   Temp 36.7 °C (98.1 °F) 04/29/22 1200   Pulse 63 04/29/22 1218   Resp 18 04/29/22 1218   SpO2 99 % 04/29/22 1218         No case tracking events are documented in the log.      Pain/Marielena Score: Marielena Score: 9 (4/29/2022 12:00 PM)

## 2022-04-29 NOTE — PROVATION PATIENT INSTRUCTIONS
Discharge Summary/Instructions after an Endoscopic Procedure  Patient Name: Holly Chicas  Patient MRN: [id number]  Patient YOB: 1970  Friday, April 29, 2022  Kavita Yee MD  Dear patient,  As a result of recent federal legislation (The Federal Cures Act), you may   receive lab or pathology results from your procedure in your [facility]   account before your physician is able to contact you. Your physician or   their representative will relay the results to you with their   recommendations at their soonest availability.  Thank you,  RESTRICTIONS:  During your procedure today, you received medications for sedation.  These   medications may affect your judgment, balance and coordination.  Therefore,   for 24 hours, you have the following restrictions:   - DO NOT drive a car, operate machinery, make legal/financial decisions,   sign important papers or drink alcohol.    ACTIVITY:  Today: no heavy lifting, straining or running due to procedural   sedation/anesthesia.  The following day: return to full activity including work.  DIET:  Eat and drink normally unless instructed otherwise.     TREATMENT FOR COMMON SIDE EFFECTS:  - Mild abdominal pain, nausea, belching, bloating or excessive gas:  rest,   eat lightly and use a heating pad.  - Sore Throat: treat with throat lozenges and/or gargle with warm salt   water.  - Because air was used during the procedure, expelling large amounts of air   from your rectum or belching is normal.  - If a bowel prep was taken, you may not have a bowel movement for 1-3 days.    This is normal.  SYMPTOMS TO WATCH FOR AND REPORT TO YOUR PHYSICIAN:  1. Abdominal pain or bloating, other than gas cramps.  2. Chest pain.  3. Back pain.  4. Signs of infection such as: chills or fever occurring within 24 hours   after the procedure.  5. Rectal bleeding, which would show as bright red, maroon, or black stools.   (A tablespoon of blood from the rectum is not serious,  especially if   hemorrhoids are present.)  6. Vomiting.  7. Weakness or dizziness.  GO DIRECTLY TO THE NEAREST EMERGENCY ROOM IF YOU HAVE ANY OF THE FOLLOWING:      Difficulty breathing              Chills and/or fever over 101 F   Persistent vomiting and/or vomiting blood   Severe abdominal pain   Severe chest pain   Black, tarry stools   Bleeding- more than one tablespoon   Any other symptom or condition that you feel may need urgent attention  Your doctor recommends these additional instructions:  If any biopsies were taken, your doctors clinic will contact you in 1 to 2   weeks with any results.  - Await pathology results.   - Discharge patient to home (with escort).   - Patient has a contact number available for emergencies.  The signs and   symptoms of potential delayed complications were discussed with the   patient.  Return to normal activities tomorrow.  Written discharge   instructions were provided to the patient.   - Resume previous diet.   - Continue present medications.   -No etiology for weight loss on upper or lower endoscopy, consider abdominal   imaging if biopsies unremarkable  For questions, problems or results please call your physician - Kavita Yee MD at Work:  [phone].  [facility], EMERGENCY ROOM PHONE NUMBER: [phone]  IF A COMPLICATION OR EMERGENCY SITUATION ARISES AND YOU ARE UNABLE TO REACH   YOUR PHYSICIAN - GO DIRECTLY TO THE EMERGENCY ROOM.  Kavita Yee MD  4/29/2022 11:56:49 AM  This report has been verified and signed electronically.  Dear patient,  As a result of recent federal legislation (The Federal Cures Act), you may   receive lab or pathology results from your procedure in your [facility]   account before your physician is able to contact you. Your physician or   their representative will relay the results to you with their   recommendations at their soonest availability.  Thank you,  PROVATION

## 2022-04-29 NOTE — H&P
"[facility]    CC: Diarrhea, weight loss     HPI 51 y.o. female presents for evaluation of diarrhea and weight loss    Past Medical History:   Diagnosis Date    Abnormal Pap smear 1996    "pre-cancer cells post hysterectomy"    Anxiety     Arthritis     Depression     Good hypertension control 9/11/2014    Hyperlipidemia     Hypertension     Hypertriglyceridemia     Meningitis     Narcolepsy     ALESIA (obstructive sleep apnea)     Peripheral neuropathy 9/12/2014    Psoriasis     Psoriasis 5/10/2018    Tuberculosis        Allergies and Medications reviewed     Review of Systems  General ROS: negative for - chills, fever or weight loss  Cardiovascular ROS: no chest pain or dyspnea on exertion  Gastrointestinal ROS: + diarrhea    Physical Examination  There were no vitals taken for this visit.  General appearance: alert, cooperative, no distress  HENT: Normocephalic, atraumatic, neck symmetrical, no nasal discharge, sclera anicteric   Lungs: clear to auscultation bilaterally, symmetric chest wall expansion bilaterally  Heart: regular rate and rhythm without rub; no displacement of the PMI   Abdomen: soft  Extremities: extremities symmetric; no clubbing, cyanosis, or edema        Labs:  Lab Results   Component Value Date    WBC 6.36 12/02/2021    HGB 12.7 12/02/2021    HCT 39.4 12/02/2021    MCV 93 12/02/2021     12/02/2021           Assessment:   51 y.o. female presents for evaluation of diarrhea and weight loss    Plan:  -Proceed to colonoscopy and EGD    Kavita Yee MD  [facility]  [address], [address]  [place] [address]  Office: [phone]  Fax: [phone]  "

## 2022-04-29 NOTE — ANESTHESIA PREPROCEDURE EVALUATION
04/29/2022  Holly Chicas is a 51 y.o., female.      Pre-op Assessment    I have reviewed the Patient Summary Reports.     I have reviewed the Nursing Notes. I have reviewed the NPO Status.   I have reviewed the Medications.     Review of Systems  Anesthesia Hx:  No problems with previous Anesthesia    Social:  Non-Smoker    Cardiovascular:   Hypertension hyperlipidemia    Pulmonary:   Sleep Apnea    Hepatic/GI:   Bowel Prep.    Musculoskeletal:   Arthritis     Neurological:   Neuromuscular Disease, Headaches   Peripheral Neuropathy    Endocrine:   Diabetes    Psych:   Psychiatric History depression          Physical Exam  General: Well nourished, Cooperative, Alert and Oriented    Airway:  Mallampati: II   Mouth Opening: Normal  TM Distance: Normal  Neck ROM: Normal ROM    Dental:  Intact    Chest/Lungs:  Clear to auscultation, Normal Respiratory Rate    Heart:  Rate: Normal  Rhythm: Regular Rhythm        Anesthesia Plan  Type of Anesthesia, risks & benefits discussed:    Anesthesia Type: Gen Natural Airway  Intra-op Monitoring Plan: Standard ASA Monitors  Induction:  IV  Informed Consent: Informed consent signed with the Patient and all parties understand the risks and agree with anesthesia plan.  All questions answered.   ASA Score: 3  Day of Surgery Review of History & Physical: H&P Update referred to the surgeon/provider.    Ready For Surgery From Anesthesia Perspective.     .

## 2022-04-29 NOTE — TRANSFER OF CARE
Anesthesia Transfer of Care Note    Patient: Holly Chicas    Procedure(s) Performed: Procedure(s) (LRB):  EGD (ESOPHAGOGASTRODUODENOSCOPY) (N/A)  COLONOSCOPY (N/A)    Patient location: GI    Anesthesia Type: general    Transport from OR: Transported from OR on room air with adequate spontaneous ventilation    Post pain: adequate analgesia    Post assessment: no apparent anesthetic complications and tolerated procedure well    Post vital signs: stable    Level of consciousness: sedated and responds to stimulation    Nausea/Vomiting: no nausea/vomiting    Complications: none    Transfer of care protocol was followed      Last vitals:   Visit Vitals  /80 (BP Location: Left arm, Patient Position: Lying)   Pulse 68   Temp 37.2 °C (99 °F) (Skin)   Resp 16   Wt 62.1 kg (137 lb)   SpO2 100%   Breastfeeding No   BMI 22.11 kg/m²

## 2022-04-29 NOTE — PLAN OF CARE
Patient is being driven home by her . Aaron have no additional questions. They are waiting to speak with Dr Yee.

## 2022-05-02 VITALS
BODY MASS INDEX: 22.11 KG/M2 | HEART RATE: 63 BPM | DIASTOLIC BLOOD PRESSURE: 73 MMHG | SYSTOLIC BLOOD PRESSURE: 129 MMHG | WEIGHT: 137 LBS | RESPIRATION RATE: 18 BRPM | OXYGEN SATURATION: 99 % | TEMPERATURE: 98 F

## 2022-05-03 ENCOUNTER — LAB VISIT (OUTPATIENT)
Dept: LAB | Facility: HOSPITAL | Age: 52
End: 2022-05-03
Attending: ALLERGY & IMMUNOLOGY
Payer: COMMERCIAL

## 2022-05-03 ENCOUNTER — OFFICE VISIT (OUTPATIENT)
Dept: ALLERGY | Facility: CLINIC | Age: 52
End: 2022-05-03
Payer: COMMERCIAL

## 2022-05-03 VITALS — HEART RATE: 73 BPM | OXYGEN SATURATION: 97 %

## 2022-05-03 DIAGNOSIS — J31.0 CHRONIC RHINITIS: ICD-10-CM

## 2022-05-03 DIAGNOSIS — J31.0 CHRONIC RHINITIS: Primary | ICD-10-CM

## 2022-05-03 DIAGNOSIS — R20.2 FORMICATION: ICD-10-CM

## 2022-05-03 DIAGNOSIS — H57.89 OCULAR DISCHARGE: ICD-10-CM

## 2022-05-03 PROCEDURE — 1160F PR REVIEW ALL MEDS BY PRESCRIBER/CLIN PHARMACIST DOCUMENTED: ICD-10-PCS | Mod: CPTII,S$GLB,, | Performed by: ALLERGY & IMMUNOLOGY

## 2022-05-03 PROCEDURE — 82785 ASSAY OF IGE: CPT | Performed by: ALLERGY & IMMUNOLOGY

## 2022-05-03 PROCEDURE — 86003 ALLG SPEC IGE CRUDE XTRC EA: CPT | Mod: 59 | Performed by: ALLERGY & IMMUNOLOGY

## 2022-05-03 PROCEDURE — 86003 ALLG SPEC IGE CRUDE XTRC EA: CPT | Performed by: ALLERGY & IMMUNOLOGY

## 2022-05-03 PROCEDURE — 36415 COLL VENOUS BLD VENIPUNCTURE: CPT | Mod: PO | Performed by: ALLERGY & IMMUNOLOGY

## 2022-05-03 PROCEDURE — 1159F PR MEDICATION LIST DOCUMENTED IN MEDICAL RECORD: ICD-10-PCS | Mod: CPTII,S$GLB,, | Performed by: ALLERGY & IMMUNOLOGY

## 2022-05-03 PROCEDURE — 99999 PR PBB SHADOW E&M-EST. PATIENT-LVL III: ICD-10-PCS | Mod: PBBFAC,,, | Performed by: ALLERGY & IMMUNOLOGY

## 2022-05-03 PROCEDURE — 99204 OFFICE O/P NEW MOD 45 MIN: CPT | Mod: S$GLB,,, | Performed by: ALLERGY & IMMUNOLOGY

## 2022-05-03 PROCEDURE — 99999 PR PBB SHADOW E&M-EST. PATIENT-LVL III: CPT | Mod: PBBFAC,,, | Performed by: ALLERGY & IMMUNOLOGY

## 2022-05-03 PROCEDURE — 99204 PR OFFICE/OUTPT VISIT, NEW, LEVL IV, 45-59 MIN: ICD-10-PCS | Mod: S$GLB,,, | Performed by: ALLERGY & IMMUNOLOGY

## 2022-05-03 PROCEDURE — 1160F RVW MEDS BY RX/DR IN RCRD: CPT | Mod: CPTII,S$GLB,, | Performed by: ALLERGY & IMMUNOLOGY

## 2022-05-03 PROCEDURE — 1159F MED LIST DOCD IN RCRD: CPT | Mod: CPTII,S$GLB,, | Performed by: ALLERGY & IMMUNOLOGY

## 2022-05-03 RX ORDER — ESTRADIOL 0.1 MG/G
CREAM VAGINAL
COMMUNITY
Start: 2022-03-09

## 2022-05-03 RX ORDER — BLOOD SUGAR DIAGNOSTIC
STRIP MISCELLANEOUS
COMMUNITY
Start: 2022-04-27

## 2022-05-03 RX ORDER — BLOOD-GLUCOSE METER
EACH MISCELLANEOUS
COMMUNITY
Start: 2022-01-20 | End: 2022-12-02 | Stop reason: ALTCHOICE

## 2022-05-03 RX ORDER — CHOLESTYRAMINE 4 G/5.5G
POWDER, FOR SUSPENSION ORAL
COMMUNITY
Start: 2021-11-18 | End: 2022-09-06

## 2022-05-03 RX ORDER — IPRATROPIUM BROMIDE 21 UG/1
2 SPRAY, METERED NASAL 3 TIMES DAILY PRN
Qty: 30 ML | Refills: 1 | Status: SHIPPED | OUTPATIENT
Start: 2022-05-03 | End: 2022-09-06

## 2022-05-03 RX ORDER — CROMOLYN SODIUM 40 MG/ML
1 SOLUTION/ DROPS OPHTHALMIC 4 TIMES DAILY
Qty: 10 ML | Refills: 1 | Status: SHIPPED | OUTPATIENT
Start: 2022-05-03 | End: 2022-07-04

## 2022-05-03 NOTE — PROGRESS NOTES
"ALLERGY & IMMUNOLOGY CLINIC -  INITIAL CONSULTATION      HISTORY OF PRESENT ILLNESS     Patient ID: Holly Chicas is a 51 y.o. female    CC: query allergy    HPI: 50 yo woman presents for initial evaluation. She has a history of psoriasis (?Psoriatic arthritis), narcolepsy, and a history of gastric surgery.     Symptoms started two months ago. She has sensation of worms/bugs in her eyes and nose. She feels itchy eyes, watery eyes, rhinorrhea. No sneezing. Some headaches. Symptoms occur inside and outside and have been daily for the past two months. Sense of smell is intact. No heartburn.     She is taking allegra and pataday. They work for about 4 hours, and then symptoms return. She has tried other OTC meds are not working (zyrtec xyzal). She was prescribed flonase but didn't find relief from that.     Alcohol causes tongue and lip numbness and swelling and she "goes into respiratory distress." She has been prescribed an epipen for this. She also thinks the gets this from being around perfumes and colognes that contain alcohol. This allergy started after the birth of her second child.      REVIEW OF SYSTEMS     CONST: no F/C/NS, +unintentional weight loss  NEURO: no H/A, no weakness, no paresthesias  EYES: no discharge, no pruritus, no erythema  EARS: no hearing loss, no sensation of fullness  NOSE: no congestion, no rhinorrhea, no itching, no sneezing  PULM: no SOB, no wheezing, no cough  CV: no CP, no palpitations, no leg swelling  GI: no heartburn, no pain, + diarrhea  MSK: + joint pain, no muscle pain  DERM: no rashes, no skin breaks     MEDICAL HISTORY     MedHx: active problems reviewed  SurgHx: tonsillectomy  SocHx: 4 dogs, tortoise  FamHx: no atopy  Allergies: NKDA  Medications: MAR reviewed  Vaccines: UTD    H/o Asthma: denies  H/o Eczema: denies, has psoriasis (elbows, scalp, knees, on stellara which controls it well)  H/o Rhinitis: yes  Food Allergy: denies  Venom Allergy: denies  Latex Allergy: " denies     PHYSICAL EXAM     VS: Pulse 73   SpO2 97%   GENERAL: alert, NAD, well-appearing, cooperative  EYES: PERRL, EOMI, mild conjunctival injection, no discharge, no infraorbital shiners  EARS: external auditory canals normal B/L, TM normal B/L  NOSE: NT 2 + and pink B/L, no stringing mucous, no polyps  ORAL: MMM, no ulcers, no thrush, no cobblestoning  LUNGS: CTAB, no w/r/c, no increased WOB  HEART: RRR, normal S1/S2, no m/g/r  EXTREMITIES: +2 distal pulses, no c/c/e  LYMPHATICS: no cervical/submandibular LAD  DERM: no rashes, no skin breaks, no dystrophic fingernails  NEURO: normal gait, no facial asymmetry     LABORATORY STUDIES     No eosinophilia, normal liver and renal function  Pathology from recent endoscopies pending     ALLERGEN TESTING     Never done     IMAGING & OTHER DIAGNOSTICS     CT chest 2021 normal     ASSESSMENT & PLAN     Holly Chicas is a 51 y.o. female with     Rhinitis, conjunctivitis, formication. Could be due to allergy, also could be side effect of stellara or non-allergic  Weight loss, diarrhea  History of allergy to alcohol    Plan:   Immunocaps to screen for atopy  OK to continue allegra BID  OK to continue pataday  Add cromolyn eye drops and ipratropium nasal spray as needed.  Continue to avoid alcohol    Follow up: based on results    Misty Franks MD  Allergy/Immunology

## 2022-05-04 LAB — IGE SERPL-ACNC: <35 IU/ML (ref 0–100)

## 2022-05-06 LAB
A ALTERNATA IGE QN: <0.1 KU/L
A FUMIGATUS IGE QN: <0.1 KU/L
BERMUDA GRASS IGE QN: <0.1 KU/L
CAT DANDER IGE QN: <0.1 KU/L
CEDAR IGE QN: <0.1 KU/L
D FARINAE IGE QN: 1.04 KU/L
D PTERONYSS IGE QN: 0.48 KU/L
DEPRECATED A ALTERNATA IGE RAST QL: NORMAL
DEPRECATED A FUMIGATUS IGE RAST QL: NORMAL
DEPRECATED BERMUDA GRASS IGE RAST QL: NORMAL
DEPRECATED CAT DANDER IGE RAST QL: NORMAL
DEPRECATED CEDAR IGE RAST QL: NORMAL
DEPRECATED D FARINAE IGE RAST QL: ABNORMAL
DEPRECATED D PTERONYSS IGE RAST QL: ABNORMAL
DEPRECATED DOG DANDER IGE RAST QL: NORMAL
DEPRECATED ELDER IGE RAST QL: NORMAL
DEPRECATED ENGL PLANTAIN IGE RAST QL: NORMAL
DEPRECATED PECAN/HICK TREE IGE RAST QL: NORMAL
DEPRECATED ROACH IGE RAST QL: NORMAL
DEPRECATED TIMOTHY IGE RAST QL: NORMAL
DEPRECATED WEST RAGWEED IGE RAST QL: NORMAL
DEPRECATED WHITE OAK IGE RAST QL: NORMAL
DOG DANDER IGE QN: <0.1 KU/L
ELDER IGE QN: <0.1 KU/L
ENGL PLANTAIN IGE QN: <0.1 KU/L
PECAN/HICK TREE IGE QN: <0.1 KU/L
ROACH IGE QN: <0.1 KU/L
TIMOTHY IGE QN: <0.1 KU/L
WEST RAGWEED IGE QN: <0.1 KU/L
WHITE OAK IGE QN: <0.1 KU/L

## 2022-05-09 LAB
FINAL PATHOLOGIC DIAGNOSIS: NORMAL
GROSS: NORMAL
Lab: NORMAL

## 2022-05-12 ENCOUNTER — PATIENT MESSAGE (OUTPATIENT)
Dept: ALLERGY | Facility: CLINIC | Age: 52
End: 2022-05-12
Payer: COMMERCIAL

## 2022-05-27 ENCOUNTER — OFFICE VISIT (OUTPATIENT)
Dept: SLEEP MEDICINE | Facility: CLINIC | Age: 52
End: 2022-05-27
Payer: COMMERCIAL

## 2022-05-27 ENCOUNTER — TELEPHONE (OUTPATIENT)
Dept: SLEEP MEDICINE | Facility: CLINIC | Age: 52
End: 2022-05-27
Payer: COMMERCIAL

## 2022-05-27 DIAGNOSIS — G47.411 PRIMARY NARCOLEPSY WITH CATAPLEXY: Primary | ICD-10-CM

## 2022-05-27 PROCEDURE — 99214 PR OFFICE/OUTPT VISIT, EST, LEVL IV, 30-39 MIN: ICD-10-PCS | Mod: 95,CR,, | Performed by: NURSE PRACTITIONER

## 2022-05-27 PROCEDURE — 99214 OFFICE O/P EST MOD 30 MIN: CPT | Mod: 95,CR,, | Performed by: NURSE PRACTITIONER

## 2022-05-27 NOTE — PROGRESS NOTES
The patient location is [place]  The chief complaint leading to consultation is: narcolepsy    Visit type: TELE AUDIOVISUAL:83400    Face to Face time with patient:6 minutes of total time spent on the encounter, which includes face to face time and non-face to face time preparing to see the patient (eg, review of tests), Obtaining and/or reviewing separately obtained history, Documenting clinical information in the electronic or other health record, Independently interpreting results (not separately reported) and communicating results to the patient/family/caregiver, or Care coordination (not separately reported). Each patient to whom he or she provides medical services by telemedicine is:  (1) informed of the relationship between the physician and patient and the respective role of any other health care provider with respect to management of the patient; and (2) notified that he or she may decline to receive medical services by telemedicine and may withdraw from such care at any time.      Since seen she continues to take xyrem to 4.5G 2x nightly. Very happy about beging on this. ESS=6 . She did have covid last visit and with boosters she's not retaining the antibodies. Continues to take sunosi and adderall 30mg am, may not take 2nd dose. She is keeping schedule and meal schedule which is helpful.       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 Used bipap for some time but had mouth opening and chin strap was intolerable due to claustrophobia. Had MSLT revealing narcolepsy (see epic records). She currently talking sunosi 150mg am and nuvigil 250mg qd and Adderall 30mg bid. Takes weekend drug holidays. Remains very sleepy impacting driving, could lose her license and can fall asleep at work. ESS=23!! Has lost 70# since PSG and no longer snores. Denies witnessed apneic pauses.     ?cataplexy passed out while sitting on amusement ride [place]  Legs have buckled before/fallen-not sure if with  strong emotion    12/28/21: Since seen she increased her xyrem to 4.5G 2x nightly. Significantly helping reduce daytime sleepiness. ESS=14.  Feels bad currently , thinks she might have repeat Covid. Continues to take sunosi and nuvigil am and just 1 30mg tab adderall afternoon.   Denies cataplexy      MSTL 2018 avg SL 1min, 2/5 SOREMs  SH . [place] at [facility] 1c coffee in am      ASSESSMENT:   Narcolepsy with cataplexy, symptoms improved on max therapeutic dose Xyrem and sunosi and adderall but lower dose  Hx gastric bypass  Psoriasis       PLAN:   1.Xyrem continue 4.5 2x nightly.Continue sunosi 150mg qam and adderall 30mg BID prn  2  RTC 6-mos

## 2022-05-29 NOTE — PROGRESS NOTES
Pre-Visit Chart Review  For Appointment Scheduled on 3-3-18    Health Maintenance Due   Topic Date Due    Eye Exam  10/08/1978    TETANUS VACCINE  10/08/1986    Urine Microalbumin  02/28/2015    Influenza Vaccine  08/01/2017    Hemoglobin A1c  09/29/2017                   
English

## 2022-06-09 ENCOUNTER — LAB VISIT (OUTPATIENT)
Dept: LAB | Facility: HOSPITAL | Age: 52
End: 2022-06-09
Payer: COMMERCIAL

## 2022-06-09 DIAGNOSIS — R19.7 DIARRHEA, UNSPECIFIED TYPE: ICD-10-CM

## 2022-06-09 DIAGNOSIS — R19.4 CHANGE IN BOWEL HABITS: ICD-10-CM

## 2022-06-09 PROCEDURE — 87177 OVA AND PARASITES SMEARS: CPT

## 2022-06-09 PROCEDURE — 87427 SHIGA-LIKE TOXIN AG IA: CPT

## 2022-06-09 PROCEDURE — 82272 OCCULT BLD FECES 1-3 TESTS: CPT

## 2022-06-09 PROCEDURE — 87329 GIARDIA AG IA: CPT

## 2022-06-09 PROCEDURE — 87425 ROTAVIRUS AG IA: CPT

## 2022-06-09 PROCEDURE — 89055 LEUKOCYTE ASSESSMENT FECAL: CPT

## 2022-06-09 PROCEDURE — 87209 SMEAR COMPLEX STAIN: CPT

## 2022-06-09 PROCEDURE — 87045 FECES CULTURE AEROBIC BACT: CPT

## 2022-06-09 PROCEDURE — 87046 STOOL CULTR AEROBIC BACT EA: CPT

## 2022-06-09 PROCEDURE — 87449 NOS EACH ORGANISM AG IA: CPT

## 2022-06-09 PROCEDURE — 82656 EL-1 FECAL QUAL/SEMIQ: CPT

## 2022-06-10 LAB
C DIFF GDH STL QL: NEGATIVE
C DIFF TOX A+B STL QL IA: NEGATIVE
OB PNL STL: NEGATIVE
RV AG STL QL IA.RAPID: NEGATIVE
WBC #/AREA STL HPF: NORMAL /[HPF]

## 2022-06-11 LAB
CRYPTOSP AG STL QL IA: NEGATIVE
G LAMBLIA AG STL QL IA: NEGATIVE

## 2022-06-12 LAB
E COLI SXT1 STL QL IA: NEGATIVE
E COLI SXT2 STL QL IA: NEGATIVE

## 2022-06-13 LAB
BACTERIA STL CULT: NORMAL
ELASTASE 1, FECAL: >500 MCG/G

## 2022-06-14 LAB — O+P STL MICRO: NORMAL

## 2022-06-16 DIAGNOSIS — G47.411 NARCOLEPSY WITH CATAPLEXY: ICD-10-CM

## 2022-06-16 RX ORDER — DEXTROAMPHETAMINE SACCHARATE, AMPHETAMINE ASPARTATE, DEXTROAMPHETAMINE SULFATE AND AMPHETAMINE SULFATE 7.5; 7.5; 7.5; 7.5 MG/1; MG/1; MG/1; MG/1
1 TABLET ORAL 2 TIMES DAILY PRN
Qty: 60 TABLET | Refills: 0 | Status: SHIPPED | OUTPATIENT
Start: 2022-06-16 | End: 2022-07-24 | Stop reason: SDUPTHER

## 2022-07-24 DIAGNOSIS — G47.411 NARCOLEPSY WITH CATAPLEXY: ICD-10-CM

## 2022-07-25 RX ORDER — DEXTROAMPHETAMINE SACCHARATE, AMPHETAMINE ASPARTATE, DEXTROAMPHETAMINE SULFATE AND AMPHETAMINE SULFATE 7.5; 7.5; 7.5; 7.5 MG/1; MG/1; MG/1; MG/1
1 TABLET ORAL 2 TIMES DAILY PRN
Qty: 60 TABLET | Refills: 0 | Status: SHIPPED | OUTPATIENT
Start: 2022-07-25 | End: 2022-09-01 | Stop reason: SDUPTHER

## 2022-09-01 DIAGNOSIS — G47.411 NARCOLEPSY WITH CATAPLEXY: ICD-10-CM

## 2022-09-01 RX ORDER — DEXTROAMPHETAMINE SACCHARATE, AMPHETAMINE ASPARTATE, DEXTROAMPHETAMINE SULFATE AND AMPHETAMINE SULFATE 7.5; 7.5; 7.5; 7.5 MG/1; MG/1; MG/1; MG/1
1 TABLET ORAL 2 TIMES DAILY PRN
Qty: 60 TABLET | Refills: 0 | Status: SHIPPED | OUTPATIENT
Start: 2022-09-01 | End: 2022-10-05 | Stop reason: SDUPTHER

## 2022-09-06 ENCOUNTER — OFFICE VISIT (OUTPATIENT)
Dept: ALLERGY | Facility: CLINIC | Age: 52
End: 2022-09-06
Payer: COMMERCIAL

## 2022-09-06 VITALS
BODY MASS INDEX: 23.1 KG/M2 | TEMPERATURE: 98 F | WEIGHT: 143.75 LBS | HEART RATE: 81 BPM | HEIGHT: 66 IN | OXYGEN SATURATION: 100 %

## 2022-09-06 DIAGNOSIS — Z91.89: ICD-10-CM

## 2022-09-06 DIAGNOSIS — J30.89 ALLERGIC RHINITIS DUE TO HOUSE DUST MITE: ICD-10-CM

## 2022-09-06 DIAGNOSIS — J30.9 CHRONIC ALLERGIC RHINITIS: Primary | ICD-10-CM

## 2022-09-06 PROCEDURE — 99999 PR PBB SHADOW E&M-EST. PATIENT-LVL IV: ICD-10-PCS | Mod: PBBFAC,,, | Performed by: STUDENT IN AN ORGANIZED HEALTH CARE EDUCATION/TRAINING PROGRAM

## 2022-09-06 PROCEDURE — 1160F PR REVIEW ALL MEDS BY PRESCRIBER/CLIN PHARMACIST DOCUMENTED: ICD-10-PCS | Mod: CPTII,S$GLB,, | Performed by: STUDENT IN AN ORGANIZED HEALTH CARE EDUCATION/TRAINING PROGRAM

## 2022-09-06 PROCEDURE — 1159F PR MEDICATION LIST DOCUMENTED IN MEDICAL RECORD: ICD-10-PCS | Mod: CPTII,S$GLB,, | Performed by: STUDENT IN AN ORGANIZED HEALTH CARE EDUCATION/TRAINING PROGRAM

## 2022-09-06 PROCEDURE — 99215 OFFICE O/P EST HI 40 MIN: CPT | Mod: S$GLB,,, | Performed by: STUDENT IN AN ORGANIZED HEALTH CARE EDUCATION/TRAINING PROGRAM

## 2022-09-06 PROCEDURE — 99215 PR OFFICE/OUTPT VISIT, EST, LEVL V, 40-54 MIN: ICD-10-PCS | Mod: S$GLB,,, | Performed by: STUDENT IN AN ORGANIZED HEALTH CARE EDUCATION/TRAINING PROGRAM

## 2022-09-06 PROCEDURE — 1160F RVW MEDS BY RX/DR IN RCRD: CPT | Mod: CPTII,S$GLB,, | Performed by: STUDENT IN AN ORGANIZED HEALTH CARE EDUCATION/TRAINING PROGRAM

## 2022-09-06 PROCEDURE — 99999 PR PBB SHADOW E&M-EST. PATIENT-LVL IV: CPT | Mod: PBBFAC,,, | Performed by: STUDENT IN AN ORGANIZED HEALTH CARE EDUCATION/TRAINING PROGRAM

## 2022-09-06 PROCEDURE — 3008F BODY MASS INDEX DOCD: CPT | Mod: CPTII,S$GLB,, | Performed by: STUDENT IN AN ORGANIZED HEALTH CARE EDUCATION/TRAINING PROGRAM

## 2022-09-06 PROCEDURE — 3008F PR BODY MASS INDEX (BMI) DOCUMENTED: ICD-10-PCS | Mod: CPTII,S$GLB,, | Performed by: STUDENT IN AN ORGANIZED HEALTH CARE EDUCATION/TRAINING PROGRAM

## 2022-09-06 PROCEDURE — 1159F MED LIST DOCD IN RCRD: CPT | Mod: CPTII,S$GLB,, | Performed by: STUDENT IN AN ORGANIZED HEALTH CARE EDUCATION/TRAINING PROGRAM

## 2022-09-06 RX ORDER — MINERAL OIL
180 ENEMA (ML) RECTAL DAILY
Qty: 90 TABLET | Refills: 2 | Status: SHIPPED | OUTPATIENT
Start: 2022-09-06 | End: 2023-05-29 | Stop reason: SDUPTHER

## 2022-09-06 RX ORDER — DERMATOPHAGOIDES PTERONYSSINUS AND DERMATOPHAGOIDES FARINAE 6; 6 [ARB'U]/1; [ARB'U]/1
1 TABLET SUBLINGUAL DAILY
Qty: 30 TABLET | Refills: 11 | Status: SHIPPED | OUTPATIENT
Start: 2022-09-06 | End: 2023-05-29

## 2022-09-06 NOTE — LETTER
[place] - All[facility]  [address] [address]  Phone: [phone] September 6, 2022     Patient: Holly Chicas   YOB: 1970   Date of Visit: 9/6/2022       To Whom It May Concern:    MS. Holly Chicas needs immediate to Epi-pen in carry on luggage because she is at risk for severe respiratory distress.    If you have any questions or concerns, please don't hesitate to contact my office.    Sincerely,        Krystin Toney MD

## 2022-09-06 NOTE — PATIENT INSTRUCTIONS
Testing  NONE        Treatment  Add back Allegra    Flonase 1-2 squirts once or twice daily    Continue Pataday and other eye drop    Optional:  dust mite tablets (Odactra)  Pharmacy will call you with cost information.

## 2022-09-06 NOTE — PROGRESS NOTES
"ALLERGY & IMMUNOLOGY CLINIC -  INITIAL CONSULTATION      HISTORY OF PRESENT ILLNESS     Patient ID: Holly Chicas is a 51 y.o. female    CC: query allergy    HPI: 50 yo woman with chronic rhinitis was last seen by Dr. Roque jose 05/03/2022 is here to follow-up dust allergy not adequately controlled on meds.  She is here alone, and she is a fair historian.      Related medications and other interventions   Astelin 1 squirt twice a day   Flonase 1 squirt-twice a day   Allegra-out for several months  Pataday  Unknown eyedrops        09/06/2022: Since last visit I had portal communication with client about her dust sensitivity by Immunocap testing.  Related basic dust avoidance measures.  Also since last visit she was seen at [facility].  While they had no answers for the feeling of worms in her head, she does have 2 nasal sprays as an and an eyedrop added to her medication list.    No other problems or complaints.      Rhinitis symptoms started in March of 2022.  Per Dr. Roque jose "She has sensation of worms/bugs in her eyes and nose. She feels itchy eyes, watery eyes, rhinorrhea. No sneezing. Some headaches. Symptoms occur inside and outside and have been daily for the past two months. Sense of smell is intact. No heartburn.   She is taking allegra and Pataday. They work for about 4 hours, and then symptoms return. She has tried other OTC meds are not working (zyrtec Xyzal). She was prescribed Flonase but didn't find relief from that.     Alcohol causes tongue and lip numbness and swelling and she "goes into respiratory distress." She has been prescribed an Epipen for this. She also thinks the gets this from being around perfumes and colognes that contain alcohol. This allergy started after the birth of her second child. "     REVIEW OF SYSTEMS     CONST: no F/C/NS, no unintentional weight loss  NEURO: no H/A, no weakness, no paresthesias  EYES: no discharge, no pruritus, no erythema  EARS: no hearing " "loss, no sensation of fullness, + ears popping  NOSE: +congestion, + rhinorrhea, no itching, no sneezing  PULM: no SOB, no wheezing, no cough  CV: no CP, no palpitations, no leg swelling  GI: no heartburn, no pain, + diarrhea  MSK: + joint pain, no muscle pain  DERM: no rashes, no skin breaks     MEDICAL HISTORY     MedHx:  Psoriasis, narcolepsy  SurgHx: tonsillectomy  SocHx: 4 dogs, tortoise  FamHx: no atopy  Allergies: NKDA  Medications: MAR reviewed      H/o Asthma: denies  H/o Eczema: denies, has psoriasis (elbows, scalp, knees, on Stellara which controls it well)  H/o Rhinitis: yes  Food Allergy: denies  Venom Allergy: denies  Latex Allergy: denies     PHYSICAL EXAM     VS: Pulse 81   Temp 97.6 °F (36.4 °C) (Oral)   Ht 5' 6" (1.676 m)   Wt 65.2 kg (143 lb 11.8 oz)   SpO2 100%   BMI 23.20 kg/m²   GENERAL: alert, NAD, well-appearing, cooperative  EYES: no conjunctival injection, no discharge, no infraorbital shiners  EARS: external auditory canals normal B/L,   NOSE: no discharge  LUNGS: , no increased WOB  EXTREMITIES: +2 distal pulses, no c/c/e  DERM: no rashes, no skin breaks, no dystrophic fingernails  NEURO: normal gait, no facial asymmetry     LABORATORY STUDIES     No eosinophilia, normal liver and renal function       ALLERGEN TESTING     Immunocaps (2022) class 1-2 dust mite     IMAGING & OTHER DIAGNOSTICS     CT chest 2021 normal     ASSESSMENT & PLAN     Holly Chicas is a 51 y.o. female with dust allergy not adequately controlled on meds as well as an unusual sensation in her head.  As per Dr. Krista ashley previous notes, today we discussed the possibility of sublingual immunotherapy.  I emphasized she should make decision on the basis of her rhinitis symptoms, not on the sensation in her head.  Discussed mechanism of action, regimen, and risks and benefits.  Sent prescription to [facility] to determine cost.  If patient will like to pursue, she will contact me and we will set up 1st dose in clinic.  " Otherwise she can follow-up in 1 year or sooner if needed.  Recommended adding back Allegra before making any other treatment decisions.  Also recommended that it is not necessary to use both Flonase and Astelin.  She opted for Flonase.  Please see patient instructions.    Rhinitis, conjunctivitis, formication. Could be due to allergy, also could be side effect of stellara or non-allergic  Weight loss, diarrhea  History of allergy to alcohol    Plan:   Resume Allegra  D/C Astelin  Flonase 1-2 once or twice daily  OK to continue Pataday  Consider Odactra  Continue to avoid alcohol     Possible 4 week follow-up for 1st dose Odactra  Otherwise follow-up as needed not later than 1 year    Krystin Toney MD  Allergy/Immunology      Addend:  letter given to carry EpiPen on plane.

## 2022-09-07 ENCOUNTER — PATIENT MESSAGE (OUTPATIENT)
Dept: ALLERGY | Facility: CLINIC | Age: 52
End: 2022-09-07
Payer: COMMERCIAL

## 2022-09-13 ENCOUNTER — IMMUNIZATION (OUTPATIENT)
Dept: PRIMARY CARE CLINIC | Facility: CLINIC | Age: 52
End: 2022-09-13
Payer: COMMERCIAL

## 2022-09-13 DIAGNOSIS — Z23 NEED FOR VACCINATION: Primary | ICD-10-CM

## 2022-09-13 PROCEDURE — 91312 COVID-19, MRNA, LNP-S, BIVALENT BOOSTER, PF, 30 MCG/0.3 ML DOSE: CPT | Mod: S$GLB,,, | Performed by: FAMILY MEDICINE

## 2022-09-13 PROCEDURE — 91312 COVID-19, MRNA, LNP-S, BIVALENT BOOSTER, PF, 30 MCG/0.3 ML DOSE: ICD-10-PCS | Mod: S$GLB,,, | Performed by: FAMILY MEDICINE

## 2022-09-16 ENCOUNTER — OFFICE VISIT (OUTPATIENT)
Dept: URGENT CARE | Facility: CLINIC | Age: 52
End: 2022-09-16
Payer: COMMERCIAL

## 2022-09-16 VITALS
OXYGEN SATURATION: 99 % | HEIGHT: 66 IN | WEIGHT: 143 LBS | DIASTOLIC BLOOD PRESSURE: 88 MMHG | HEART RATE: 90 BPM | BODY MASS INDEX: 22.98 KG/M2 | SYSTOLIC BLOOD PRESSURE: 138 MMHG | RESPIRATION RATE: 20 BRPM | TEMPERATURE: 98 F

## 2022-09-16 DIAGNOSIS — Z11.52 ENCOUNTER FOR SCREENING FOR COVID-19: Primary | ICD-10-CM

## 2022-09-16 LAB
CTP QC/QA: YES
SARS-COV-2 AG RESP QL IA.RAPID: NEGATIVE

## 2022-09-16 PROCEDURE — 87811 SARS CORONAVIRUS 2 ANTIGEN POCT, MANUAL READ: ICD-10-PCS | Mod: QW,S$GLB,, | Performed by: NURSE PRACTITIONER

## 2022-09-16 PROCEDURE — 3075F SYST BP GE 130 - 139MM HG: CPT | Mod: CPTII,S$GLB,, | Performed by: NURSE PRACTITIONER

## 2022-09-16 PROCEDURE — 1160F PR REVIEW ALL MEDS BY PRESCRIBER/CLIN PHARMACIST DOCUMENTED: ICD-10-PCS | Mod: CPTII,S$GLB,, | Performed by: NURSE PRACTITIONER

## 2022-09-16 PROCEDURE — 99213 PR OFFICE/OUTPT VISIT, EST, LEVL III, 20-29 MIN: ICD-10-PCS | Mod: S$GLB,,, | Performed by: NURSE PRACTITIONER

## 2022-09-16 PROCEDURE — 1160F RVW MEDS BY RX/DR IN RCRD: CPT | Mod: CPTII,S$GLB,, | Performed by: NURSE PRACTITIONER

## 2022-09-16 PROCEDURE — 99213 OFFICE O/P EST LOW 20 MIN: CPT | Mod: S$GLB,,, | Performed by: NURSE PRACTITIONER

## 2022-09-16 PROCEDURE — 3079F PR MOST RECENT DIASTOLIC BLOOD PRESSURE 80-89 MM HG: ICD-10-PCS | Mod: CPTII,S$GLB,, | Performed by: NURSE PRACTITIONER

## 2022-09-16 PROCEDURE — 3079F DIAST BP 80-89 MM HG: CPT | Mod: CPTII,S$GLB,, | Performed by: NURSE PRACTITIONER

## 2022-09-16 PROCEDURE — 3008F BODY MASS INDEX DOCD: CPT | Mod: CPTII,S$GLB,, | Performed by: NURSE PRACTITIONER

## 2022-09-16 PROCEDURE — 1159F MED LIST DOCD IN RCRD: CPT | Mod: CPTII,S$GLB,, | Performed by: NURSE PRACTITIONER

## 2022-09-16 PROCEDURE — 87811 SARS-COV-2 COVID19 W/OPTIC: CPT | Mod: QW,S$GLB,, | Performed by: NURSE PRACTITIONER

## 2022-09-16 PROCEDURE — 3075F PR MOST RECENT SYSTOLIC BLOOD PRESS GE 130-139MM HG: ICD-10-PCS | Mod: CPTII,S$GLB,, | Performed by: NURSE PRACTITIONER

## 2022-09-16 PROCEDURE — 1159F PR MEDICATION LIST DOCUMENTED IN MEDICAL RECORD: ICD-10-PCS | Mod: CPTII,S$GLB,, | Performed by: NURSE PRACTITIONER

## 2022-09-16 PROCEDURE — 3008F PR BODY MASS INDEX (BMI) DOCUMENTED: ICD-10-PCS | Mod: CPTII,S$GLB,, | Performed by: NURSE PRACTITIONER

## 2022-09-16 NOTE — PROGRESS NOTES
"Subjective:       Patient ID: Holly Chicas is a 51 y.o. female.    Vitals:  height is 5' 6" (1.676 m) and weight is 64.9 kg (143 lb). Her oral temperature is 98.3 °F (36.8 °C). Her blood pressure is 138/88 and her pulse is 90. Her respiration is 20 and oxygen saturation is 99%.     Chief Complaint: COVID-19 Concerns    Pt states: "I need a rapid antigen test for travel. We leave out Saturday morning for an [employer] cruise. No symptoms or exposure to anything."  Past Medical History:  : Abnormal Pap smear      Comment:  "pre-cancer cells post hysterectomy"  No date: Anxiety  No date: Arthritis  No date: Depression  2014: Good hypertension control  No date: Hyperlipidemia  No date: Hypertension  No date: Hypertriglyceridemia  No date: Meningitis  No date: Narcolepsy  No date: ALESIA (obstructive sleep apnea)  2014: Peripheral neuropathy  No date: Psoriasis  5/10/2018: Psoriasis  No date: Tuberculosis    Past Surgical History:  No date: AUGMENTATION OF BREAST  2016: BREAST SURGERY      Comment:  aumentation/ reduction   2017: BREAST SURGERY      Comment:  augmentation   2017: brizilian butt lift   No date:  SECTION  2018: CHOLECYSTECTOMY      Comment:  Dr SE[facility]  No date: COLONOSCOPY  2022: COLONOSCOPY; N/A      Comment:  Procedure: COLONOSCOPY;  Surgeon: Kavita Yee MD;               Location: [facility];  Service: Endoscopy;  Laterality:                N/A;  2022: ESOPHAGOGASTRODUODENOSCOPY; N/A      Comment:  Procedure: EGD (ESOPHAGOGASTRODUODENOSCOPY);  Surgeon:                Kavita Yee MD;  Location: [facility];  Service:                Endoscopy;  Laterality: N/A;  No date: FOOT SURGERY  2015: GASTRIC BYPASS  : GASTRIC BYPASS  No date: HERNIA REPAIR  No date: HYSTERECTOMY  No date: KNEE SURGERY; Left  : SHOULDER SURGERY      Comment:  right should surgery   2017: SHOULDER SURGERY      Comment:  left shoulder / torn " labrium   03/03/2017: THIGH LIFT  No date: TONSILLECTOMY  02/28/2016: tummy tuck     Review of patient's family history indicates:      Social History    Socioeconomic History      Marital status:     Tobacco Use      Smoking status: Never      Smokeless tobacco: Never    Substance and Sexual Activity      Alcohol use: No        Comment: ALLERGIC TO ALCOHOL(DRINKING) PER PATIENT      Drug use: No      Sexual activity: Yes        Partners: Male        Birth control/protection: Surgical        Comment: SO      Current Outpatient Medications:  ACCU-CHEK GUIDE GLUCOSE METER Misc, TEST 3 TIMES A DAY AS DIRECTED, Disp: , Rfl:   ACCU-CHEK GUIDE TEST STRIPS Strp, , Disp: , Rfl:   allerg xt,D.farinae-D.pteronys (ODACTRA) 12 SQ-HDM Subl, Place 1 tablet under the tongue once daily., Disp: 30 tablet, Rfl: 11  azelastine (ASTELIN) 137 mcg (0.1 %) nasal spray, 1 spray in each nosril twice a day, Disp: 30 mL, Rfl: 5  dextroamphetamine-amphetamine 30 mg Tab, Take 1 tablet (30 mg total) by mouth 2 (two) times daily as needed., Disp: 60 tablet, Rfl: 0  DUEXIS 800-26.6 mg Tab, Take 1 tablet by mouth 3 (three) times daily. , Disp: , Rfl:   EPINEPHrine (EPIPEN) 0.3 mg/0.3 mL AtIn, Inject 0.3 mLs (0.3 mg total) into the muscle once. As directed PRN for 1 dose, Disp: 2 each, Rfl: 11  estradioL (ESTRACE) 0.01 % (0.1 mg/gram) vaginal cream, USE 1/2 GRAM VAGINALLY 4 TIMES WEEKLY AS DIRECTED, Disp: , Rfl:   fexofenadine (ALLEGRA) 180 MG tablet, Take 1 tablet (180 mg total) by mouth once daily., Disp: 90 tablet, Rfl: 2  fluticasone propionate (FLONASE) 50 mcg/actuation nasal spray, 1 spray in each nostril twice a day, Disp: 16 g, Rfl: 5  rosuvastatin (CRESTOR) 5 MG tablet, Take 5 mg by mouth once daily., Disp: , Rfl:   solriamfetoL (SUNOSI) 150 mg Tab, Take 1 tablet by mouth once daily., Disp: 30 tablet, Rfl: 3  STELARA 45 mg/0.5 mL Syrg syringe, , Disp: , Rfl:   XYREM 500 mg/mL Soln, , Disp: , Rfl:     No current facility-administered  medications for this visit.      Review of patient's allergies indicates:   -- Alcohol     --  Other reaction(s): Unknown     Constitution: Negative.   HENT: Negative.     Neck: neck negative.   Cardiovascular: Negative.    Respiratory: Negative.     Gastrointestinal: Negative.    Genitourinary: Negative.    Neurological: Negative.      Objective:      Physical Exam   Constitutional: She is oriented to person, place, and time.   HENT:   Head: Normocephalic and atraumatic.   Cardiovascular: Normal rate.   Pulmonary/Chest: Effort normal.   Abdominal: Normal appearance.   Neurological: She is alert and oriented to person, place, and time.   Psychiatric: Her behavior is normal. Mood normal.       Assessment:       1. Encounter for screening for COVID-19          Plan:       Rapid Covid 19 test collected and resulted negative. Results discussed with patient, advised to follow up for any further concerns.        Encounter for screening for COVID-19  -     SARS Coronavirus 2 Antigen, POCT Manual Read

## 2022-09-30 ENCOUNTER — OFFICE VISIT (OUTPATIENT)
Dept: ORTHOPEDICS | Facility: CLINIC | Age: 52
End: 2022-09-30
Payer: COMMERCIAL

## 2022-09-30 VITALS — RESPIRATION RATE: 18 BRPM | WEIGHT: 143 LBS | HEIGHT: 66 IN | BODY MASS INDEX: 22.98 KG/M2

## 2022-09-30 DIAGNOSIS — G56.03 CARPAL TUNNEL SYNDROME, BILATERAL: Primary | ICD-10-CM

## 2022-09-30 PROCEDURE — 1159F PR MEDICATION LIST DOCUMENTED IN MEDICAL RECORD: ICD-10-PCS | Mod: CPTII,S$GLB,, | Performed by: ORTHOPAEDIC SURGERY

## 2022-09-30 PROCEDURE — 99203 OFFICE O/P NEW LOW 30 MIN: CPT | Mod: S$GLB,,, | Performed by: ORTHOPAEDIC SURGERY

## 2022-09-30 PROCEDURE — 99999 PR PBB SHADOW E&M-EST. PATIENT-LVL III: ICD-10-PCS | Mod: PBBFAC,,, | Performed by: ORTHOPAEDIC SURGERY

## 2022-09-30 PROCEDURE — 3008F BODY MASS INDEX DOCD: CPT | Mod: CPTII,S$GLB,, | Performed by: ORTHOPAEDIC SURGERY

## 2022-09-30 PROCEDURE — 99203 PR OFFICE/OUTPT VISIT, NEW, LEVL III, 30-44 MIN: ICD-10-PCS | Mod: S$GLB,,, | Performed by: ORTHOPAEDIC SURGERY

## 2022-09-30 PROCEDURE — 1159F MED LIST DOCD IN RCRD: CPT | Mod: CPTII,S$GLB,, | Performed by: ORTHOPAEDIC SURGERY

## 2022-09-30 PROCEDURE — 3008F PR BODY MASS INDEX (BMI) DOCUMENTED: ICD-10-PCS | Mod: CPTII,S$GLB,, | Performed by: ORTHOPAEDIC SURGERY

## 2022-09-30 PROCEDURE — 99999 PR PBB SHADOW E&M-EST. PATIENT-LVL III: CPT | Mod: PBBFAC,,, | Performed by: ORTHOPAEDIC SURGERY

## 2022-09-30 NOTE — PROGRESS NOTES
"Patient ID: Holly Chicas is a 51 y.o. female    Chief Complaint:   Chief Complaint   Patient presents with    Left Hand - Pain, Numbness    Right Hand - Pain, Numbness       History of Present Illness:    Holly Chicas is a pleasant 51 y.o. female who presents for evaluation of bilateral hand pain and numbness left worse than right. She  reports pain for the past 2 months or so.  She had a previous similar issue in 2019 where she was braced with night splints.  She does not endorse a history of trauma. .  Using her hands makes it worse and nothing makes it better. The pain is mostly in the palm and radial 3 digits. She does endorse nighttime pain.   She is independent with all activities of daily living.     In the past she has tried the following treatments:    Bracing  Analgesics       Right-hand-dominant    Diabetic:  No  Smoker:  No          PAST MEDICAL HISTORY:   Past Medical History:   Diagnosis Date    Abnormal Pap smear     "pre-cancer cells post hysterectomy"    Anxiety     Arthritis     Depression     Good hypertension control 2014    Hyperlipidemia     Hypertension     Hypertriglyceridemia     Meningitis     Narcolepsy     ALESIA (obstructive sleep apnea)     Peripheral neuropathy 2014    Psoriasis     Psoriasis 5/10/2018    Tuberculosis      PAST SURGICAL HISTORY:   Past Surgical History:   Procedure Laterality Date    AUGMENTATION OF BREAST      BREAST SURGERY      aumentation/ reduction     BREAST SURGERY  2017    augmentation     brizilian butt lift   2017     SECTION      CHOLECYSTECTOMY  2018    Dr SEAN Christopher [facility]    COLONOSCOPY      COLONOSCOPY N/A 2022    Procedure: COLONOSCOPY;  Surgeon: Kavita Yee MD;  Location: [facility];  Service: Endoscopy;  Laterality: N/A;    ESOPHAGOGASTRODUODENOSCOPY N/A 2022    Procedure: EGD (ESOPHAGOGASTRODUODENOSCOPY);  Surgeon: Kavita Yee MD;  Location: [facility];  Service: Endoscopy;  " Laterality: N/A;    FOOT SURGERY      GASTRIC BYPASS  04/2015    GASTRIC BYPASS  2015    HERNIA REPAIR      HYSTERECTOMY      KNEE SURGERY Left     SHOULDER SURGERY  2000    right should surgery     SHOULDER SURGERY  01/2017    left shoulder / torn labrium     THIGH LIFT  03/03/2017    TONSILLECTOMY      tummy tuck   02/28/2016     FAMILY HISTORY:   Family History   Problem Relation Age of Onset    Cancer Mother         breast bilateral/ skin     Breast cancer Mother     Skin cancer Mother     Diabetes Father     Heart disease Father 65        CABG    Hypertension Father     Cancer Father         skin    Skin cancer Father     Gout Father     Colon polyps Father     Gout Brother     No Known Problems Daughter     No Known Problems Son     Cancer Maternal Aunt         lung, pancrease - smoker , breast    Lung cancer Maternal Aunt         x2    Pancreatic cancer Maternal Aunt     Breast cancer Maternal Aunt     No Known Problems Maternal Uncle     No Known Problems Paternal Uncle     Cancer Maternal Grandmother         pancrease    Pancreatic cancer Maternal Grandmother     Breast cancer Maternal Grandmother     Diabetes Paternal Grandmother     Heart disease Paternal Grandfather     Colon cancer Maternal Grandfather     Ovarian cancer Neg Hx     Ulcerative colitis Neg Hx     Stomach cancer Neg Hx     Crohn's disease Neg Hx     Rectal cancer Neg Hx      SOCIAL HISTORY:   Social History     Occupational History    Not on file   Tobacco Use    Smoking status: Never    Smokeless tobacco: Never   Substance and Sexual Activity    Alcohol use: No     Comment: ALLERGIC TO ALCOHOL(DRINKING) PER PATIENT    Drug use: No    Sexual activity: Yes     Partners: Male     Birth control/protection: Surgical     Comment: [facility]        MEDICATIONS:   Current Outpatient Medications:     ACCU-CHEK GUIDE GLUCOSE METER Misc, TEST 3 TIMES A DAY AS DIRECTED, Disp: , Rfl:     ACCU-CHEK GUIDE TEST STRIPS Strp, , Disp: , Rfl:     allerg  xt,D.farinae-D.pteronys (ODACTRA) 12 SQ-HDM Subl, Place 1 tablet under the tongue once daily., Disp: 30 tablet, Rfl: 11    azelastine (ASTELIN) 137 mcg (0.1 %) nasal spray, 1 spray in each nosril twice a day, Disp: 30 mL, Rfl: 5    dextroamphetamine-amphetamine 30 mg Tab, Take 1 tablet (30 mg total) by mouth 2 (two) times daily as needed., Disp: 60 tablet, Rfl: 0    DUEXIS 800-26.6 mg Tab, Take 1 tablet by mouth 3 (three) times daily. , Disp: , Rfl:     estradioL (ESTRACE) 0.01 % (0.1 mg/gram) vaginal cream, USE 1/2 GRAM VAGINALLY 4 TIMES WEEKLY AS DIRECTED, Disp: , Rfl:     fexofenadine (ALLEGRA) 180 MG tablet, Take 1 tablet (180 mg total) by mouth once daily., Disp: 90 tablet, Rfl: 2    fluticasone propionate (FLONASE) 50 mcg/actuation nasal spray, 1 spray in each nostril twice a day, Disp: 16 g, Rfl: 5    rosuvastatin (CRESTOR) 5 MG tablet, Take 5 mg by mouth once daily., Disp: , Rfl:     solriamfetoL (SUNOSI) 150 mg Tab, Take 1 tablet by mouth once daily., Disp: 30 tablet, Rfl: 3    STELARA 45 mg/0.5 mL Syrg syringe, , Disp: , Rfl:     XYREM 500 mg/mL Soln, , Disp: , Rfl:     EPINEPHrine (EPIPEN) 0.3 mg/0.3 mL AtIn, Inject 0.3 mLs (0.3 mg total) into the muscle once. As directed PRN for 1 dose, Disp: 2 each, Rfl: 11  ALLERGIES:   Review of patient's allergies indicates:   Allergen Reactions    Alcohol      Other reaction(s): Unknown         Physical Exam     Vitals:    09/30/22 1153   Resp: 18     Alert and oriented to person, place and time. No acute distress. Well-groomed, not ill appearing. Pupils round and reactive, normal respiratory effort, no audible wheezing.     She has no significant swelling or ecchymosis of the bilateral hands.  Normal thenar contour without atrophy bilaterally.  There is positive carpal tunnel compression test bilaterally.  Negative Tinel's bilateral wrists.  Positive Phalen's and Durkan's test.  No Tinel's at the elbow bilaterally.  Flexor extensor tendons tested without deficit.   Warm well-perfused extremity      Imaging:     Imaging reviewed of the wrist from 2019 showing no evidence of fracture or dislocation.      Assessment & Plan    Carpal tunnel syndrome, bilateral  -     EMG W/ ULTRASOUND AND NERVE CONDUCTION TEST 2 Extremities; Future         Treatment options were discussed with her in detail.  Clinically she seems to have carpal tunnel syndrome which is recurrent.  We discussed options for this including EMG of the bilateral upper extremities to evaluate the extent of the carpal tunnel as well as chronic denervation.  She will follow-up for EMG results.    Follow up:  EMG results  X-rays next visit:  Bilateral hand

## 2022-10-05 DIAGNOSIS — G47.411 NARCOLEPSY WITH CATAPLEXY: ICD-10-CM

## 2022-10-06 RX ORDER — SOLRIAMFETOL 150 MG/1
150 TABLET, FILM COATED ORAL DAILY
Qty: 30 TABLET | Refills: 3 | Status: SHIPPED | OUTPATIENT
Start: 2022-10-06 | End: 2023-04-28 | Stop reason: SDUPTHER

## 2022-10-06 RX ORDER — DEXTROAMPHETAMINE SACCHARATE, AMPHETAMINE ASPARTATE, DEXTROAMPHETAMINE SULFATE AND AMPHETAMINE SULFATE 7.5; 7.5; 7.5; 7.5 MG/1; MG/1; MG/1; MG/1
1 TABLET ORAL 2 TIMES DAILY PRN
Qty: 60 TABLET | Refills: 0 | Status: SHIPPED | OUTPATIENT
Start: 2022-10-06 | End: 2022-11-20 | Stop reason: SDUPTHER

## 2022-10-27 ENCOUNTER — TELEPHONE (OUTPATIENT)
Dept: PHYSICAL MEDICINE AND REHAB | Facility: CLINIC | Age: 52
End: 2022-10-27
Payer: COMMERCIAL

## 2022-11-01 ENCOUNTER — TELEPHONE (OUTPATIENT)
Dept: SLEEP MEDICINE | Facility: CLINIC | Age: 52
End: 2022-11-01
Payer: COMMERCIAL

## 2022-11-10 ENCOUNTER — OFFICE VISIT (OUTPATIENT)
Dept: PHYSICAL MEDICINE AND REHAB | Facility: CLINIC | Age: 52
End: 2022-11-10
Payer: COMMERCIAL

## 2022-11-10 DIAGNOSIS — G56.03 CARPAL TUNNEL SYNDROME, BILATERAL: ICD-10-CM

## 2022-11-10 PROCEDURE — 95910 PR NERVE CONDUCTION STUDY; 7-8 STUDIES: ICD-10-PCS | Mod: S$GLB,,, | Performed by: PHYSICAL MEDICINE & REHABILITATION

## 2022-11-10 PROCEDURE — 95886 PR EMG COMPLETE, W/ NERVE CONDUCTION STUDIES, 5+ MUSCLES: ICD-10-PCS | Mod: S$GLB,,, | Performed by: PHYSICAL MEDICINE & REHABILITATION

## 2022-11-10 PROCEDURE — 95910 NRV CNDJ TEST 7-8 STUDIES: CPT | Mod: S$GLB,,, | Performed by: PHYSICAL MEDICINE & REHABILITATION

## 2022-11-10 PROCEDURE — 99499 UNLISTED E&M SERVICE: CPT | Mod: S$GLB,,, | Performed by: PHYSICAL MEDICINE & REHABILITATION

## 2022-11-10 PROCEDURE — 95886 MUSC TEST DONE W/N TEST COMP: CPT | Mod: S$GLB,,, | Performed by: PHYSICAL MEDICINE & REHABILITATION

## 2022-11-10 PROCEDURE — 99499 NO LOS: ICD-10-PCS | Mod: S$GLB,,, | Performed by: PHYSICAL MEDICINE & REHABILITATION

## 2022-11-10 NOTE — PROGRESS NOTES
[facility]  Physical Medicine and Rehabilitation   [address] [place] [address], [address]  [place] [address]             Patient: Holly Chicas   Patient ID: [id number]   Sex:     YOB: 1970   Age: 52 Years 1 Months   Notes:     Last visit date: 11/10/2022         Visit date and time: 11/10/2022 09:32   Patient Age on Visit Date: 52 Years 1 Months   Referring Physician:     Diagnoses:       Numbness      Sensory NCS      Nerve / Sites Rec. Site Onset Lat Peak Lat Ref. NP Amp Ref. PP Amp Ref. Segments Distance Velocity     ms ms ms µV µV µV µV  cm m/s   R Median - Digit II (Antidromic)      Wrist Dig II 3.02 3.59 ?3.40 14.8 ?15.0 49.6 ?20.0 Wrist - Dig II 13 43   L Median - Digit II (Antidromic)      Wrist Dig II 2.92 3.59 ?3.40 34.7 ?15.0 55.7 ?20.0 Wrist - Dig II 13 45   R Ulnar - Digit V (Antidromic)      Wrist Dig V 2.40 2.97 ?3.10 24.3 ?10.0 24.3 ?15.0 Wrist - Dig V 11 46   L Ulnar - Digit V (Antidromic)      Wrist Dig V 2.50 3.07 ?3.10 12.7 ?10.0 32.1 ?15.0 Wrist - Dig V 11 44       Motor NCS      Nerve / Sites Muscle Latency Ref. Amplitude Ref. Amp % Duration Segments Distance Lat Diff Velocity Ref.     ms ms mV mV % ms  cm ms m/s m/s   L Median - APB      Wrist APB 3.02 ?4.40 10.1 ?4.0 100 6.41 Wrist - APB 7         Elbow APB 7.40  4.2  41.6 6.46 Elbow - Wrist 21 4.38 48 ?49   R Median - APB      Wrist APB 3.18 ?4.40 12.1 ?4.0 100 7.50 Wrist - APB 7         Elbow APB 7.34  2.3  19.2 7.71 Elbow - Wrist 20 4.17 48 ?49   L Ulnar - ADM      Wrist ADM 2.34 ?3.60 7.5 ?5.0 100 8.18 Wrist - ADM 7         B.Elbow ADM 5.42  8.0  107 8.65 B.Elbow - Wrist 20 3.07 65 ?49      A.Elbow ADM 7.55  6.7  89.5 9.53 A.Elbow - B.Elbow 10 2.14 47 ?49   R Ulnar - ADM      Wrist ADM 2.55 ?3.60 9.7 ?5.0 100 5.63 Wrist - ADM 7         B.Elbow ADM 4.58  3.7  38.1 7.60 B.Elbow - Wrist 20 2.03 98 ?49      A.Elbow ADM 7.24  3.3  34.4 6.46 A.Elbow - B.Elbow 10 2.66 38 ?49       EMG Summary Table     Spontaneous MUAP  Recruitment   Muscle IA Fib PSW Fasc H.F. Amp Dur. PPP Pattern   L. Biceps brachii N None None None None N N N N   R. Biceps brachii N None None None None N N N N   L. Deltoid N None None None None N N N N   R. Deltoid N None None None None N N N N   L. Triceps brachii N None None None None N N N N   R. Triceps brachii N None None None None N N N N   L. Extensor carpi radialis brevis N None None None None N N N N   R. Extensor carpi radialis brevis N None None None None N N N N   R. First dorsal interosseous N None None None None N N N N   L. Flexor carpi ulnaris N None None None None N N N N   R. Flexor carpi ulnaris N None None None None N N N N       Summary    The motor conduction test had results outside of the specified normal range in all 4 of the tested nerves:  In the L Median - APB study  the take off velocity result was reduced for Elbow - Wrist segment  In the R Median - APB study  the take off velocity result was reduced for Elbow - Wrist segment  In the L Ulnar - ADM study  the take off velocity result was reduced for A.Elbow - B.Elbow segment  In the R Ulnar - ADM study  the take off velocity result was reduced for A.Elbow - B.Elbow segment    The sensory conduction test was performed on 4 nerve(s). The results were normal in 2 nerve(s): R Ulnar - Digit V (Antidromic), L Ulnar - Digit V (Antidromic). Results outside the specified normal range were found in 2 nerve(s), as follows:  In the R Median - Digit II (Antidromic) study  the peak latency result was increased for Wrist stimulation  the peak amplitude result was reduced for Wrist stimulation  In the L Median - Digit II (Antidromic) study  the peak latency result was increased for Wrist stimulation    The needle EMG study was normal in all 11 tested muscles: L. Biceps brachii, R. Biceps brachii, L. Deltoid, R. Deltoid, L. Triceps brachii, R. Triceps brachii, L. Extensor carpi radialis brevis, R. Extensor carpi radialis brevis, R. First dorsal  interosseous, L. Flexor carpi ulnaris, R. Flexor carpi ulnaris.        Holly Chicas [id number] 11/10/2022 09:32     1 of 1    Conclusion:     Mild to moderate bilateral carpal tunnel syndrome  Mild bilateral ulnar neuropathies at elbows          ____________________________  DELFIN Stokes [id number] 11/10/2022 09:32     1 of 1

## 2022-11-15 ENCOUNTER — OFFICE VISIT (OUTPATIENT)
Dept: ORTHOPEDICS | Facility: CLINIC | Age: 52
End: 2022-11-15
Payer: COMMERCIAL

## 2022-11-15 VITALS — BODY MASS INDEX: 22.98 KG/M2 | WEIGHT: 143 LBS | HEIGHT: 66 IN | RESPIRATION RATE: 16 BRPM

## 2022-11-15 DIAGNOSIS — G56.03 CARPAL TUNNEL SYNDROME, BILATERAL: Primary | ICD-10-CM

## 2022-11-15 DIAGNOSIS — G56.23 CUBITAL TUNNEL SYNDROME, BILATERAL: ICD-10-CM

## 2022-11-15 PROCEDURE — 99214 PR OFFICE/OUTPT VISIT, EST, LEVL IV, 30-39 MIN: ICD-10-PCS | Mod: S$GLB,,, | Performed by: ORTHOPAEDIC SURGERY

## 2022-11-15 PROCEDURE — 3008F BODY MASS INDEX DOCD: CPT | Mod: CPTII,S$GLB,, | Performed by: ORTHOPAEDIC SURGERY

## 2022-11-15 PROCEDURE — 1159F MED LIST DOCD IN RCRD: CPT | Mod: CPTII,S$GLB,, | Performed by: ORTHOPAEDIC SURGERY

## 2022-11-15 PROCEDURE — 1159F PR MEDICATION LIST DOCUMENTED IN MEDICAL RECORD: ICD-10-PCS | Mod: CPTII,S$GLB,, | Performed by: ORTHOPAEDIC SURGERY

## 2022-11-15 PROCEDURE — 99999 PR PBB SHADOW E&M-EST. PATIENT-LVL III: CPT | Mod: PBBFAC,,, | Performed by: ORTHOPAEDIC SURGERY

## 2022-11-15 PROCEDURE — 99214 OFFICE O/P EST MOD 30 MIN: CPT | Mod: S$GLB,,, | Performed by: ORTHOPAEDIC SURGERY

## 2022-11-15 PROCEDURE — 3008F PR BODY MASS INDEX (BMI) DOCUMENTED: ICD-10-PCS | Mod: CPTII,S$GLB,, | Performed by: ORTHOPAEDIC SURGERY

## 2022-11-15 PROCEDURE — 99999 PR PBB SHADOW E&M-EST. PATIENT-LVL III: ICD-10-PCS | Mod: PBBFAC,,, | Performed by: ORTHOPAEDIC SURGERY

## 2022-11-15 NOTE — PROGRESS NOTES
"Patient ID: Holly Chicas is a 52 y.o. female    Chief Complaint:   Chief Complaint   Patient presents with    Follow-up     EMG review        History of Present Illness:    Holly Chicas is a pleasant 52 y.o. female who presents for evaluation of bilateral hand pain and numbness left worse than right. She  reports pain for the past 2 months or so.  She had a previous similar issue in 2019 where she was braced with night splints.  She does not endorse a history of trauma. .  Using her hands makes it worse and nothing makes it better. The pain is mostly in the palm and radial 3 digits. She does endorse nighttime pain.   She is independent with all activities of daily living.     In the past she has tried the following treatments:    Bracing  Analgesics       Right-hand-dominant    Diabetic:  No  Smoker:  No      ____________________________________________________________________    Interval history 11/15/2022 : Patient returns today for EMG follow-up of their bilateral upper extremities.  They report no changes since I saw them last.  Still complaining of pain in the bilateral hands and all digits.      PAST MEDICAL HISTORY:   Past Medical History:   Diagnosis Date    Abnormal Pap smear     "pre-cancer cells post hysterectomy"    Anxiety     Arthritis     Depression     Good hypertension control 2014    Hyperlipidemia     Hypertension     Hypertriglyceridemia     Meningitis     Narcolepsy     ALESIA (obstructive sleep apnea)     Peripheral neuropathy 2014    Psoriasis     Psoriasis 5/10/2018    Tuberculosis      PAST SURGICAL HISTORY:   Past Surgical History:   Procedure Laterality Date    AUGMENTATION OF BREAST      BREAST SURGERY  2016    aumentation/ reduction     BREAST SURGERY  2017    augmentation     brizilian butt lift   2017     SECTION      CHOLECYSTECTOMY  2018    Dr SEAN Christopher [facility] Surgical    COLONOSCOPY      COLONOSCOPY N/A 2022    Procedure: COLONOSCOPY;  " Surgeon: Kavita Yee MD;  Location: [facility];  Service: Endoscopy;  Laterality: N/A;    ESOPHAGOGASTRODUODENOSCOPY N/A 4/29/2022    Procedure: EGD (ESOPHAGOGASTRODUODENOSCOPY);  Surgeon: Kavita Yee MD;  Location: [facility];  Service: Endoscopy;  Laterality: N/A;    FOOT SURGERY      GASTRIC BYPASS  04/2015    GASTRIC BYPASS  2015    HERNIA REPAIR      HYSTERECTOMY      KNEE SURGERY Left     SHOULDER SURGERY  2000    right should surgery     SHOULDER SURGERY  01/2017    left shoulder / torn labrium     THIGH LIFT  03/03/2017    TONSILLECTOMY      tummy tuck   02/28/2016     FAMILY HISTORY:   Family History   Problem Relation Age of Onset    Cancer Mother         breast bilateral/ skin     Breast cancer Mother     Skin cancer Mother     Diabetes Father     Heart disease Father 65        CABG    Hypertension Father     Cancer Father         skin    Skin cancer Father     Gout Father     Colon polyps Father     Gout Brother     No Known Problems Daughter     No Known Problems Son     Cancer Maternal Aunt         lung, pancrease - smoker , breast    Lung cancer Maternal Aunt         x2    Pancreatic cancer Maternal Aunt     Breast cancer Maternal Aunt     No Known Problems Maternal Uncle     No Known Problems Paternal Uncle     Cancer Maternal Grandmother         pancrease    Pancreatic cancer Maternal Grandmother     Breast cancer Maternal Grandmother     Diabetes Paternal Grandmother     Heart disease Paternal Grandfather     Colon cancer Maternal Grandfather     Ovarian cancer Neg Hx     Ulcerative colitis Neg Hx     Stomach cancer Neg Hx     Crohn's disease Neg Hx     Rectal cancer Neg Hx      SOCIAL HISTORY:   Social History     Occupational History    Not on file   Tobacco Use    Smoking status: Never    Smokeless tobacco: Never   Substance and Sexual Activity    Alcohol use: No     Comment: ALLERGIC TO ALCOHOL(DRINKING) PER PATIENT    Drug use: No    Sexual activity: Yes     Partners: Male      Birth control/protection: Surgical     Comment: [facility]        MEDICATIONS:   Current Outpatient Medications:     ACCU-CHEK GUIDE GLUCOSE METER Misc, TEST 3 TIMES A DAY AS DIRECTED, Disp: , Rfl:     ACCU-CHEK GUIDE TEST STRIPS Strp, , Disp: , Rfl:     allerg xt,D.farinae-D.pteronys (ODACTRA) 12 SQ-HDM Subl, Place 1 tablet under the tongue once daily., Disp: 30 tablet, Rfl: 11    azelastine (ASTELIN) 137 mcg (0.1 %) nasal spray, 1 spray in each nosril twice a day, Disp: 30 mL, Rfl: 5    dextroamphetamine-amphetamine 30 mg Tab, Take 1 tablet (30 mg total) by mouth 2 (two) times daily as needed., Disp: 60 tablet, Rfl: 0    DUEXIS 800-26.6 mg Tab, Take 1 tablet by mouth 3 (three) times daily. , Disp: , Rfl:     EPINEPHrine (EPIPEN) 0.3 mg/0.3 mL AtIn, Inject 0.3 mLs (0.3 mg total) into the muscle once. As directed PRN for 1 dose, Disp: 2 each, Rfl: 11    estradioL (ESTRACE) 0.01 % (0.1 mg/gram) vaginal cream, USE 1/2 GRAM VAGINALLY 4 TIMES WEEKLY AS DIRECTED, Disp: , Rfl:     fexofenadine (ALLEGRA) 180 MG tablet, Take 1 tablet (180 mg total) by mouth once daily., Disp: 90 tablet, Rfl: 2    fluticasone propionate (FLONASE) 50 mcg/actuation nasal spray, 1 spray in each nostril twice a day, Disp: 16 g, Rfl: 5    rosuvastatin (CRESTOR) 5 MG tablet, Take 5 mg by mouth once daily., Disp: , Rfl:     solriamfetoL (SUNOSI) 150 mg Tab, Take 1 tablet by mouth once daily., Disp: 30 tablet, Rfl: 3    STELARA 45 mg/0.5 mL Syrg syringe, , Disp: , Rfl:     XYREM 500 mg/mL Soln, , Disp: , Rfl:   ALLERGIES:   Review of patient's allergies indicates:   Allergen Reactions    Alcohol      Other reaction(s): Unknown         Physical Exam     Vitals:    11/15/22 1033   Resp: 16     Alert and oriented to person, place and time. No acute distress. Well-groomed, not ill appearing. Pupils round and reactive, normal respiratory effort, no audible wheezing.     She has no significant swelling or ecchymosis of the bilateral hands.  Normal thenar contour  without atrophy bilaterally.  There is positive carpal tunnel compression test bilaterally.  Negative Tinel's bilateral wrists.  Positive Phalen's and Durkan's test.  No Tinel's at the elbow bilaterally.  Flexor extensor tendons tested without deficit.  Warm well-perfused extremity      Imaging:     Imaging reviewed of the wrist from 2019 showing no evidence of fracture or dislocation.    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  I went over the EMG findings consistent with carpal tunnel and cubital tunnel.  We discussed options for her including carpal tunnel release versus carpal tunnel release and cubital tunnel release in the same setting.  She would like to discuss with her employer as well as family.  She definitely is interested in carpal tunnel but possibly would also like to have her cubital tunnel release due to persistent pain and numbness in the ulnar 2 digits clinically.  Discussed the rehabilitation protocol as well as the risks and expectations of surgery including damage to surrounding neurovascular structures and infection.    _________________________________________________________________      Diagnosis and findings were discussed with her in lay terms. I discussed the findings and treatment options with them at length. Questions were actively sought and all questions were answered to their apparent satisfaction. We discussed the risks and benefits of surgery. We reviewed the operative indications surgical technique and potential rehabilitation involved. Risks including, but not limited to pain, bleeding, infection, damage surrounding neurovascular structures, and other soft tissues, decreased range of motion, instability, poor  cosmetic result, scarring/painful scars, poor functional result, reflex sympathetic dystrophy. We discussed as well the risk of anesthesia, DVT/PE and possible need for additional surgical intervention in lay terms. Despite the risks as explained to them, they wished to proceed with surgery. She expressed understanding and agreement with the treatment plan and understand that no guarantee of outcome is implied or expressed given.       Holly Chicas will be scheduled for the following procedure(s):     Left carpal tunnel release  Possible left cubital tunnel release        Post-Op Medications to be prescribed:   Percocet 5/325mg Take 1-2 tablets every 4-6 hours PRN pain #28  Zofran 4mg oral disintegrating tablets every 8 hours PRN nausea/vomiting   Motrin 600 mg TID PRN     DME/Bracing:  None  Post-op Physical Therapy to begin:  tbd    Medical Clearance: No  Hx of DVT,PE, anesthetic complications: No    Additional notes/concerns:  Patient will call to schedule    Opioid Disclosure  Today we discussed the reasons why the prescription is necessary as well as: the risks of addiction and overdose associated with opioid drugs and the dangers of taking opioid drugs with alcohol, benzodiazepines and other central nervous system depressants; alternative treatments that may be available; the risks associated with the use of the drugs being prescribed, specifically that opioids are highly addictive, even when taken as prescribed, that there is a risk of developing a physical or psychological dependence on the controlled dangerous substance, and that the risks of taking more opioids than prescribed or mixing sedatives, benzodiazepines or alcohol with opioids can result in fatal respiratory depression.

## 2022-11-15 NOTE — H&P (VIEW-ONLY)
"Patient ID: Holly Chicas is a 52 y.o. female    Chief Complaint:   Chief Complaint   Patient presents with    Follow-up     EMG review        History of Present Illness:    Holly Chicas is a pleasant 52 y.o. female who presents for evaluation of bilateral hand pain and numbness left worse than right. She  reports pain for the past 2 months or so.  She had a previous similar issue in 2019 where she was braced with night splints.  She does not endorse a history of trauma. .  Using her hands makes it worse and nothing makes it better. The pain is mostly in the palm and radial 3 digits. She does endorse nighttime pain.   She is independent with all activities of daily living.     In the past she has tried the following treatments:    Bracing  Analgesics       Right-hand-dominant    Diabetic:  No  Smoker:  No      ____________________________________________________________________    Interval history 11/15/2022 : Patient returns today for EMG follow-up of their bilateral upper extremities.  They report no changes since I saw them last.  Still complaining of pain in the bilateral hands and all digits.      PAST MEDICAL HISTORY:   Past Medical History:   Diagnosis Date    Abnormal Pap smear     "pre-cancer cells post hysterectomy"    Anxiety     Arthritis     Depression     Good hypertension control 2014    Hyperlipidemia     Hypertension     Hypertriglyceridemia     Meningitis     Narcolepsy     ALESIA (obstructive sleep apnea)     Peripheral neuropathy 2014    Psoriasis     Psoriasis 5/10/2018    Tuberculosis      PAST SURGICAL HISTORY:   Past Surgical History:   Procedure Laterality Date    AUGMENTATION OF BREAST      BREAST SURGERY  2016    aumentation/ reduction     BREAST SURGERY  2017    augmentation     brizilian butt lift   2017     SECTION      CHOLECYSTECTOMY  2018    Dr SEAN [facility]    COLONOSCOPY      COLONOSCOPY N/A 2022    Procedure: COLONOSCOPY;  " Surgeon: Kavita Yee MD;  Location: [facility] ENDO;  Service: Endoscopy;  Laterality: N/A;    ESOPHAGOGASTRODUODENOSCOPY N/A 4/29/2022    Procedure: EGD (ESOPHAGOGASTRODUODENOSCOPY);  Surgeon: Kavita Yee MD;  Location: [facility] ENDO;  Service: Endoscopy;  Laterality: N/A;    FOOT SURGERY      GASTRIC BYPASS  04/2015    GASTRIC BYPASS  2015    HERNIA REPAIR      HYSTERECTOMY      KNEE SURGERY Left     SHOULDER SURGERY  2000    right should surgery     SHOULDER SURGERY  01/2017    left shoulder / torn labrium     THIGH LIFT  03/03/2017    TONSILLECTOMY      tummy tuck   02/28/2016     FAMILY HISTORY:   Family History   Problem Relation Age of Onset    Cancer Mother         breast bilateral/ skin     Breast cancer Mother     Skin cancer Mother     Diabetes Father     Heart disease Father 65        CABG    Hypertension Father     Cancer Father         skin    Skin cancer Father     Gout Father     Colon polyps Father     Gout Brother     No Known Problems Daughter     No Known Problems Son     Cancer Maternal Aunt         lung, pancrease - smoker , breast    Lung cancer Maternal Aunt         x2    Pancreatic cancer Maternal Aunt     Breast cancer Maternal Aunt     No Known Problems Maternal Uncle     No Known Problems Paternal Uncle     Cancer Maternal Grandmother         pancrease    Pancreatic cancer Maternal Grandmother     Breast cancer Maternal Grandmother     Diabetes Paternal Grandmother     Heart disease Paternal Grandfather     Colon cancer Maternal Grandfather     Ovarian cancer Neg Hx     Ulcerative colitis Neg Hx     Stomach cancer Neg Hx     Crohn's disease Neg Hx     Rectal cancer Neg Hx      SOCIAL HISTORY:   Social History     Occupational History    Not on file   Tobacco Use    Smoking status: Never    Smokeless tobacco: Never   Substance and Sexual Activity    Alcohol use: No     Comment: ALLERGIC TO ALCOHOL(DRINKING) PER PATIENT    Drug use: No    Sexual activity: Yes     Partners: Male      Birth control/protection: Surgical     Comment: [facility]        MEDICATIONS:   Current Outpatient Medications:     ACCU-CHEK GUIDE GLUCOSE METER Misc, TEST 3 TIMES A DAY AS DIRECTED, Disp: , Rfl:     ACCU-CHEK GUIDE TEST STRIPS Strp, , Disp: , Rfl:     allerg xt,D.farinae-D.pteronys (ODACTRA) 12 SQ-HDM Subl, Place 1 tablet under the tongue once daily., Disp: 30 tablet, Rfl: 11    azelastine (ASTELIN) 137 mcg (0.1 %) nasal spray, 1 spray in each nosril twice a day, Disp: 30 mL, Rfl: 5    dextroamphetamine-amphetamine 30 mg Tab, Take 1 tablet (30 mg total) by mouth 2 (two) times daily as needed., Disp: 60 tablet, Rfl: 0    DUEXIS 800-26.6 mg Tab, Take 1 tablet by mouth 3 (three) times daily. , Disp: , Rfl:     EPINEPHrine (EPIPEN) 0.3 mg/0.3 mL AtIn, Inject 0.3 mLs (0.3 mg total) into the muscle once. As directed PRN for 1 dose, Disp: 2 each, Rfl: 11    estradioL (ESTRACE) 0.01 % (0.1 mg/gram) vaginal cream, USE 1/2 GRAM VAGINALLY 4 TIMES WEEKLY AS DIRECTED, Disp: , Rfl:     fexofenadine (ALLEGRA) 180 MG tablet, Take 1 tablet (180 mg total) by mouth once daily., Disp: 90 tablet, Rfl: 2    fluticasone propionate (FLONASE) 50 mcg/actuation nasal spray, 1 spray in each nostril twice a day, Disp: 16 g, Rfl: 5    rosuvastatin (CRESTOR) 5 MG tablet, Take 5 mg by mouth once daily., Disp: , Rfl:     solriamfetoL (SUNOSI) 150 mg Tab, Take 1 tablet by mouth once daily., Disp: 30 tablet, Rfl: 3    STELARA 45 mg/0.5 mL Syrg syringe, , Disp: , Rfl:     XYREM 500 mg/mL Soln, , Disp: , Rfl:   ALLERGIES:   Review of patient's allergies indicates:   Allergen Reactions    Alcohol      Other reaction(s): Unknown         Physical Exam     Vitals:    11/15/22 1033   Resp: 16     Alert and oriented to person, place and time. No acute distress. Well-groomed, not ill appearing. Pupils round and reactive, normal respiratory effort, no audible wheezing.     She has no significant swelling or ecchymosis of the bilateral hands.  Normal thenar contour  without atrophy bilaterally.  There is positive carpal tunnel compression test bilaterally.  Negative Tinel's bilateral wrists.  Positive Phalen's and Durkan's test.  No Tinel's at the elbow bilaterally.  Flexor extensor tendons tested without deficit.  Warm well-perfused extremity      Imaging:     Imaging reviewed of the wrist from 2019 showing no evidence of fracture or dislocation.    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  I went over the EMG findings consistent with carpal tunnel and cubital tunnel.  We discussed options for her including carpal tunnel release versus carpal tunnel release and cubital tunnel release in the same setting.  She would like to discuss with her employer as well as family.  She definitely is interested in carpal tunnel but possibly would also like to have her cubital tunnel release due to persistent pain and numbness in the ulnar 2 digits clinically.  Discussed the rehabilitation protocol as well as the risks and expectations of surgery including damage to surrounding neurovascular structures and infection.    _________________________________________________________________      Diagnosis and findings were discussed with her in lay terms. I discussed the findings and treatment options with them at length. Questions were actively sought and all questions were answered to their apparent satisfaction. We discussed the risks and benefits of surgery. We reviewed the operative indications surgical technique and potential rehabilitation involved. Risks including, but not limited to pain, bleeding, infection, damage surrounding neurovascular structures, and other soft tissues, decreased range of motion, instability, poor  cosmetic result, scarring/painful scars, poor functional result, reflex sympathetic dystrophy. We discussed as well the risk of anesthesia, DVT/PE and possible need for additional surgical intervention in lay terms. Despite the risks as explained to them, they wished to proceed with surgery. She expressed understanding and agreement with the treatment plan and understand that no guarantee of outcome is implied or expressed given.       Holly Chicas will be scheduled for the following procedure(s):     Left carpal tunnel release  Possible left cubital tunnel release        Post-Op Medications to be prescribed:   Percocet 5/325mg Take 1-2 tablets every 4-6 hours PRN pain #28  Zofran 4mg oral disintegrating tablets every 8 hours PRN nausea/vomiting   Motrin 600 mg TID PRN     DME/Bracing:  None  Post-op Physical Therapy to begin:  tbd    Medical Clearance: No  Hx of DVT,PE, anesthetic complications: No    Additional notes/concerns:  Patient will call to schedule    Opioid Disclosure  Today we discussed the reasons why the prescription is necessary as well as: the risks of addiction and overdose associated with opioid drugs and the dangers of taking opioid drugs with alcohol, benzodiazepines and other central nervous system depressants; alternative treatments that may be available; the risks associated with the use of the drugs being prescribed, specifically that opioids are highly addictive, even when taken as prescribed, that there is a risk of developing a physical or psychological dependence on the controlled dangerous substance, and that the risks of taking more opioids than prescribed or mixing sedatives, benzodiazepines or alcohol with opioids can result in fatal respiratory depression.

## 2022-11-17 ENCOUNTER — PATIENT MESSAGE (OUTPATIENT)
Dept: ORTHOPEDICS | Facility: CLINIC | Age: 52
End: 2022-11-17
Payer: COMMERCIAL

## 2022-11-20 DIAGNOSIS — G47.411 NARCOLEPSY WITH CATAPLEXY: ICD-10-CM

## 2022-11-20 RX ORDER — DEXTROAMPHETAMINE SACCHARATE, AMPHETAMINE ASPARTATE, DEXTROAMPHETAMINE SULFATE AND AMPHETAMINE SULFATE 7.5; 7.5; 7.5; 7.5 MG/1; MG/1; MG/1; MG/1
1 TABLET ORAL 2 TIMES DAILY PRN
Qty: 60 TABLET | Refills: 0 | Status: SHIPPED | OUTPATIENT
Start: 2022-11-20 | End: 2022-11-22 | Stop reason: SDUPTHER

## 2022-11-22 ENCOUNTER — PATIENT MESSAGE (OUTPATIENT)
Dept: SLEEP MEDICINE | Facility: CLINIC | Age: 52
End: 2022-11-22
Payer: COMMERCIAL

## 2022-11-22 DIAGNOSIS — G47.411 NARCOLEPSY WITH CATAPLEXY: ICD-10-CM

## 2022-11-22 RX ORDER — DEXTROAMPHETAMINE SACCHARATE, AMPHETAMINE ASPARTATE, DEXTROAMPHETAMINE SULFATE AND AMPHETAMINE SULFATE 7.5; 7.5; 7.5; 7.5 MG/1; MG/1; MG/1; MG/1
1 TABLET ORAL 2 TIMES DAILY PRN
Qty: 60 TABLET | Refills: 0 | Status: SHIPPED | OUTPATIENT
Start: 2022-11-22 | End: 2023-03-21 | Stop reason: SDUPTHER

## 2022-11-23 ENCOUNTER — PATIENT MESSAGE (OUTPATIENT)
Dept: ORTHOPEDICS | Facility: CLINIC | Age: 52
End: 2022-11-23
Payer: COMMERCIAL

## 2022-11-28 ENCOUNTER — PATIENT MESSAGE (OUTPATIENT)
Dept: ORTHOPEDICS | Facility: CLINIC | Age: 52
End: 2022-11-28
Payer: COMMERCIAL

## 2022-11-28 DIAGNOSIS — Z01.818 PRE-OP TESTING: ICD-10-CM

## 2022-11-28 DIAGNOSIS — G56.03 CARPAL TUNNEL SYNDROME, BILATERAL: Primary | ICD-10-CM

## 2022-11-28 DIAGNOSIS — G56.23 CUBITAL TUNNEL SYNDROME, BILATERAL: ICD-10-CM

## 2022-11-28 RX ORDER — MUPIROCIN 20 MG/G
OINTMENT TOPICAL
Status: CANCELLED | OUTPATIENT
Start: 2022-11-28

## 2022-11-28 RX ORDER — CEFAZOLIN SODIUM/WATER 2 G/20 ML
2 SYRINGE (ML) INTRAVENOUS
Status: CANCELLED | OUTPATIENT
Start: 2022-11-28

## 2022-11-28 NOTE — TELEPHONE ENCOUNTER
Preference would be to schedule w dr Robin Llanos but no appts available next wk for pt and sibling together unless Thursday HFU still available by 11/30.   Discussed w dr Katey Schmitt agrees w above but sts she is willing to see these pts if dr Marian Elkins Surgery /pre op/ and post op scheduled and confirmed.

## 2022-11-30 ENCOUNTER — TELEPHONE (OUTPATIENT)
Dept: ORTHOPEDICS | Facility: CLINIC | Age: 52
End: 2022-11-30
Payer: COMMERCIAL

## 2022-11-30 NOTE — TELEPHONE ENCOUNTER
----- Message from Kamaljit Morin sent at 11/30/2022  8:33 AM CST -----  Regarding: [employer] pre auth needs info, medical records,call Dolores RAMIREZ  ext  [phone], fax   Ref # [id number]  Contact: Dolores RAMIREZ   [employer] pre auth needs info, medical records,call Dolores RAMIREZ  ext [phone], fax   Ref # [id number]      
Requested information faxed to given fax number.   
09-Feb-2019 14:16

## 2022-12-01 ENCOUNTER — PATIENT MESSAGE (OUTPATIENT)
Dept: SLEEP MEDICINE | Facility: CLINIC | Age: 52
End: 2022-12-01
Payer: COMMERCIAL

## 2022-12-01 ENCOUNTER — TELEPHONE (OUTPATIENT)
Dept: SLEEP MEDICINE | Facility: CLINIC | Age: 52
End: 2022-12-01
Payer: COMMERCIAL

## 2022-12-02 ENCOUNTER — HOSPITAL ENCOUNTER (OUTPATIENT)
Dept: PREADMISSION TESTING | Facility: HOSPITAL | Age: 52
Discharge: HOME | End: 2022-12-02
Attending: ORTHOPAEDIC SURGERY
Payer: COMMERCIAL

## 2022-12-02 VITALS — WEIGHT: 134 LBS | HEIGHT: 66 IN | BODY MASS INDEX: 21.53 KG/M2

## 2022-12-02 DIAGNOSIS — G56.03 CARPAL TUNNEL SYNDROME, BILATERAL: ICD-10-CM

## 2022-12-02 DIAGNOSIS — Z01.818 PRE-OP TESTING: ICD-10-CM

## 2022-12-02 DIAGNOSIS — Z01.818 PRE-OP EXAM: ICD-10-CM

## 2022-12-02 DIAGNOSIS — G56.23 CUBITAL TUNNEL SYNDROME, BILATERAL: ICD-10-CM

## 2022-12-02 DIAGNOSIS — Z01.818 PREOP TESTING: Primary | ICD-10-CM

## 2022-12-02 LAB
ANION GAP SERPL CALC-SCNC: 10 MMOL/L (ref 8–16)
BASOPHILS # BLD AUTO: 0.03 K/UL (ref 0–0.2)
BASOPHILS NFR BLD: 0.5 % (ref 0–1.9)
BUN SERPL-MCNC: 19 MG/DL (ref 6–20)
CALCIUM SERPL-MCNC: 8.9 MG/DL (ref 8.7–10.5)
CHLORIDE SERPL-SCNC: 104 MMOL/L (ref 95–110)
CO2 SERPL-SCNC: 26 MMOL/L (ref 23–29)
CREAT SERPL-MCNC: 0.8 MG/DL (ref 0.5–1.4)
DIFFERENTIAL METHOD: ABNORMAL
EOSINOPHIL # BLD AUTO: 0.1 K/UL (ref 0–0.5)
EOSINOPHIL NFR BLD: 0.9 % (ref 0–8)
ERYTHROCYTE [DISTWIDTH] IN BLOOD BY AUTOMATED COUNT: 11.9 % (ref 11.5–14.5)
EST. GFR  (NO RACE VARIABLE): >60 ML/MIN/1.73 M^2
GLUCOSE SERPL-MCNC: 70 MG/DL (ref 70–110)
HCT VFR BLD AUTO: 39.3 % (ref 37–48.5)
HGB BLD-MCNC: 13.3 G/DL (ref 12–16)
IMM GRANULOCYTES # BLD AUTO: 0.01 K/UL (ref 0–0.04)
IMM GRANULOCYTES NFR BLD AUTO: 0.2 % (ref 0–0.5)
LYMPHOCYTES # BLD AUTO: 2 K/UL (ref 1–4.8)
LYMPHOCYTES NFR BLD: 30.9 % (ref 18–48)
MCH RBC QN AUTO: 31.7 PG (ref 27–31)
MCHC RBC AUTO-ENTMCNC: 33.8 G/DL (ref 32–36)
MCV RBC AUTO: 94 FL (ref 82–98)
MONOCYTES # BLD AUTO: 0.8 K/UL (ref 0.3–1)
MONOCYTES NFR BLD: 12.3 % (ref 4–15)
NEUTROPHILS # BLD AUTO: 3.6 K/UL (ref 1.8–7.7)
NEUTROPHILS NFR BLD: 55.2 % (ref 38–73)
NRBC BLD-RTO: 0 /100 WBC
PLATELET # BLD AUTO: 208 K/UL (ref 150–450)
PMV BLD AUTO: 10.2 FL (ref 9.2–12.9)
POTASSIUM SERPL-SCNC: 3.8 MMOL/L (ref 3.5–5.1)
RBC # BLD AUTO: 4.2 M/UL (ref 4–5.4)
SODIUM SERPL-SCNC: 140 MMOL/L (ref 136–145)
WBC # BLD AUTO: 6.58 K/UL (ref 3.9–12.7)

## 2022-12-02 PROCEDURE — 99900104 DSU ONLY-NO CHARGE-EA ADD'L HR (STAT)

## 2022-12-02 PROCEDURE — 80048 BASIC METABOLIC PNL TOTAL CA: CPT | Performed by: ORTHOPAEDIC SURGERY

## 2022-12-02 PROCEDURE — 93010 ELECTROCARDIOGRAM REPORT: CPT | Mod: ,,, | Performed by: INTERNAL MEDICINE

## 2022-12-02 PROCEDURE — 36415 COLL VENOUS BLD VENIPUNCTURE: CPT | Performed by: ORTHOPAEDIC SURGERY

## 2022-12-02 PROCEDURE — 93005 ELECTROCARDIOGRAM TRACING: CPT

## 2022-12-02 PROCEDURE — 99900103 DSU ONLY-NO CHARGE-INITIAL HR (STAT)

## 2022-12-02 PROCEDURE — 85025 COMPLETE CBC W/AUTO DIFF WBC: CPT | Performed by: ORTHOPAEDIC SURGERY

## 2022-12-02 PROCEDURE — 93010 EKG 12-LEAD: ICD-10-PCS | Mod: ,,, | Performed by: INTERNAL MEDICINE

## 2022-12-02 NOTE — DISCHARGE INSTRUCTIONS
To confirm, Your doctor has instructed you that surgery is scheduled for: 12/7/22    Please report to [facility], Registration the morning of surgery. You must check-in and receive a wristband before going to your procedure.    Pre-Op will call the afternoon prior to surgery between 1:00 and 6:00 PM with the final arrival time.  Phone number: [phone]    PLEASE NOTE:  The surgery schedule has many variables which may affect the time of your surgery case.  Family members should be available if your surgery time changes.  Plan to be here the day of your procedure between 4-6 hours.    MEDICATIONS:  TAKE ONLY THESE MEDICATIONS WITH A SMALL SIP OF WATER THE MORNING OF YOUR PROCEDURE:      SEE MED LIST      DO NOT TAKE THESE MEDICATIONS 5-7 DAYS PRIOR to your procedure or per your surgeon's request:   ASPIRIN, ALEVE, ADVIL, IBUPROFEN, FISH OIL VITAMIN E, HERBALS  (May take Tylenol)    ONLY if you are prescribed any types of blood thinners such as:  Aspirin, Coumadin, Plavix, Pradaxa, Xarelto, Aggrenox, Effient, Eliquis, Savasya, Brilinta, or any other, ask your surgeon whether you should stop taking them and how long before surgery you should stop.  You may also need to verify with the prescribing physician if it is ok to stop your medication.      INSTRUCTIONS IMPORTANT!!  Do not eat or drink anything between midnight and the time of your procedure- this includes gum, mints, and candy.  Do not smoke or drink alcoholic beverages 24 hours prior to your procedure.  Shower the night before AND the morning of your procedure with a Chlorhexidine wash such as Hibiclens or Dial antibacterial soap from the neck down.  Do not get it on your face or in your eyes.  You may use your own shampoo and face wash. This helps your skin to be as bacteria free as possible.    If you wear contact lenses, dentures, hearing aids or glasses, bring a container to put them in during surgery and give to a family member  for safe keeping.  Please leave all jewelry, piercing's and valuables at home.   DO NOT remove hair from the surgery site.  Do not shave the incision site unless you are given specific instructions to do so.    ONLY if you have been diagnosed with sleep apnea please bring your C-PAP machine.  ONLY if you wear home oxygen please bring your portable oxygen tank the day of your procedure.  ONLY if you have a history of OPEN HEART SURGERY you will need a clearance from your Cardiologist per Anesthesia.      ONLY for patients requiring bowel prep, written instructions will be given by your doctor's office.  ONLY if you have a neuro stimulator, please bring the controller with you the morning of surgery  ONLY if a type and screen test is needed before surgery, please return:  If your doctor has scheduled you for an overnight stay, bring a small overnight bag with any personal items you need.  Make arrangements in advance for transportation home by a responsible adult.  It is not safe to drive a vehicle during the 24 hours after anesthesia.      [facility] Visitor Policy    Effective September 26, 2022    [facility] will resume routine visitation for COVID-19 negative patients, including inpatients, outpatients, and procedural areas, in accordance with local campus procedures.    All [facility] facilities and properties are tobacco free.  Smoking is NOT allowed.   If you have any questions about these instructions, call Pre-Op Admit  Nursing at [phone] or the Pre-Op Day Surgery Unit at [phone].

## 2022-12-06 ENCOUNTER — ANESTHESIA EVENT (OUTPATIENT)
Dept: SURGERY | Facility: HOSPITAL | Age: 52
End: 2022-12-06
Payer: COMMERCIAL

## 2022-12-07 ENCOUNTER — HOSPITAL ENCOUNTER (OUTPATIENT)
Facility: HOSPITAL | Age: 52
Discharge: HOME | End: 2022-12-07
Attending: ORTHOPAEDIC SURGERY | Admitting: ORTHOPAEDIC SURGERY
Payer: COMMERCIAL

## 2022-12-07 ENCOUNTER — ANESTHESIA (OUTPATIENT)
Dept: SURGERY | Facility: HOSPITAL | Age: 52
End: 2022-12-07
Payer: COMMERCIAL

## 2022-12-07 VITALS
TEMPERATURE: 98 F | SYSTOLIC BLOOD PRESSURE: 140 MMHG | HEIGHT: 66 IN | WEIGHT: 134 LBS | RESPIRATION RATE: 18 BRPM | HEART RATE: 64 BPM | DIASTOLIC BLOOD PRESSURE: 77 MMHG | OXYGEN SATURATION: 99 % | BODY MASS INDEX: 21.53 KG/M2

## 2022-12-07 DIAGNOSIS — G56.03 CARPAL TUNNEL SYNDROME, BILATERAL: Primary | ICD-10-CM

## 2022-12-07 DIAGNOSIS — G56.03 CARPAL TUNNEL SYNDROME, BILATERAL: ICD-10-CM

## 2022-12-07 DIAGNOSIS — Z01.818 PRE-OP TESTING: ICD-10-CM

## 2022-12-07 DIAGNOSIS — G56.23 CUBITAL TUNNEL SYNDROME, BILATERAL: ICD-10-CM

## 2022-12-07 PROCEDURE — 27200750 HC INSULATED NEEDLE/ STIMUPLEX: Performed by: ANESTHESIOLOGY

## 2022-12-07 PROCEDURE — D9220A PRA ANESTHESIA: ICD-10-PCS | Mod: CRNA,,, | Performed by: NURSE ANESTHETIST, CERTIFIED REGISTERED

## 2022-12-07 PROCEDURE — 25000003 PHARM REV CODE 250: Performed by: NURSE ANESTHETIST, CERTIFIED REGISTERED

## 2022-12-07 PROCEDURE — 63600175 PHARM REV CODE 636 W HCPCS

## 2022-12-07 PROCEDURE — D9220A PRA ANESTHESIA: Mod: CRNA,,, | Performed by: NURSE ANESTHETIST, CERTIFIED REGISTERED

## 2022-12-07 PROCEDURE — 63600175 PHARM REV CODE 636 W HCPCS: Performed by: ANESTHESIOLOGY

## 2022-12-07 PROCEDURE — 76942 ECHO GUIDE FOR BIOPSY: CPT | Performed by: ANESTHESIOLOGY

## 2022-12-07 PROCEDURE — 36000706: Performed by: ORTHOPAEDIC SURGERY

## 2022-12-07 PROCEDURE — 94799 UNLISTED PULMONARY SVC/PX: CPT

## 2022-12-07 PROCEDURE — 64415 PR NERVE BLOCK INJ, ANES/STEROID, BRACHIAL PLEXUS, INCL IMAG GUIDANCE: ICD-10-PCS | Mod: 59,LT,, | Performed by: ANESTHESIOLOGY

## 2022-12-07 PROCEDURE — 64721 PR REVISE MEDIAN N/CARPAL TUNNEL SURG: ICD-10-PCS | Mod: 51,LT,, | Performed by: ORTHOPAEDIC SURGERY

## 2022-12-07 PROCEDURE — 25000003 PHARM REV CODE 250: Performed by: ANESTHESIOLOGY

## 2022-12-07 PROCEDURE — 64415 NJX AA&/STRD BRCH PLXS IMG: CPT | Mod: 59,LT | Performed by: ANESTHESIOLOGY

## 2022-12-07 PROCEDURE — 64721 CARPAL TUNNEL SURGERY: CPT | Mod: 51,LT,, | Performed by: ORTHOPAEDIC SURGERY

## 2022-12-07 PROCEDURE — 36000707: Performed by: ORTHOPAEDIC SURGERY

## 2022-12-07 PROCEDURE — 64718 REVISE ULNAR NERVE AT ELBOW: CPT | Mod: LT,,, | Performed by: ORTHOPAEDIC SURGERY

## 2022-12-07 PROCEDURE — 99900104 DSU ONLY-NO CHARGE-EA ADD'L HR (STAT): Performed by: ORTHOPAEDIC SURGERY

## 2022-12-07 PROCEDURE — 71000033 HC RECOVERY, INTIAL HOUR: Performed by: ORTHOPAEDIC SURGERY

## 2022-12-07 PROCEDURE — 71000015 HC POSTOP RECOV 1ST HR: Performed by: ORTHOPAEDIC SURGERY

## 2022-12-07 PROCEDURE — 99900103 DSU ONLY-NO CHARGE-INITIAL HR (STAT): Performed by: ORTHOPAEDIC SURGERY

## 2022-12-07 PROCEDURE — 64718 PR REVISE ULNAR NERVE AT ELBOW: ICD-10-PCS | Mod: LT,,, | Performed by: ORTHOPAEDIC SURGERY

## 2022-12-07 PROCEDURE — D9220A PRA ANESTHESIA: Mod: ANES,,, | Performed by: ANESTHESIOLOGY

## 2022-12-07 PROCEDURE — 25000003 PHARM REV CODE 250: Performed by: ORTHOPAEDIC SURGERY

## 2022-12-07 PROCEDURE — 37000009 HC ANESTHESIA EA ADD 15 MINS: Performed by: ORTHOPAEDIC SURGERY

## 2022-12-07 PROCEDURE — 27200651 HC AIRWAY, LMA: Performed by: ANESTHESIOLOGY

## 2022-12-07 PROCEDURE — D9220A PRA ANESTHESIA: ICD-10-PCS | Mod: ANES,,, | Performed by: ANESTHESIOLOGY

## 2022-12-07 PROCEDURE — 71000039 HC RECOVERY, EACH ADD'L HOUR: Performed by: ORTHOPAEDIC SURGERY

## 2022-12-07 PROCEDURE — 63600175 PHARM REV CODE 636 W HCPCS: Performed by: NURSE ANESTHETIST, CERTIFIED REGISTERED

## 2022-12-07 PROCEDURE — 76942 PR U/S GUIDANCE FOR NEEDLE GUIDANCE: ICD-10-PCS | Mod: 26,,, | Performed by: ANESTHESIOLOGY

## 2022-12-07 PROCEDURE — 63600175 PHARM REV CODE 636 W HCPCS: Performed by: ORTHOPAEDIC SURGERY

## 2022-12-07 PROCEDURE — 37000008 HC ANESTHESIA 1ST 15 MINUTES: Performed by: ORTHOPAEDIC SURGERY

## 2022-12-07 PROCEDURE — 76942 ECHO GUIDE FOR BIOPSY: CPT | Mod: 26,,, | Performed by: ANESTHESIOLOGY

## 2022-12-07 PROCEDURE — 64415 NJX AA&/STRD BRCH PLXS IMG: CPT | Mod: 59,LT,, | Performed by: ANESTHESIOLOGY

## 2022-12-07 RX ORDER — LIDOCAINE HYDROCHLORIDE 10 MG/ML
1 INJECTION, SOLUTION EPIDURAL; INFILTRATION; INTRACAUDAL; PERINEURAL ONCE
Status: COMPLETED | OUTPATIENT
Start: 2022-12-07 | End: 2022-12-07

## 2022-12-07 RX ORDER — OXYCODONE HYDROCHLORIDE 10 MG/1
10 TABLET ORAL EVERY 4 HOURS PRN
Status: DISCONTINUED | OUTPATIENT
Start: 2022-12-07 | End: 2022-12-07 | Stop reason: HOSPADM

## 2022-12-07 RX ORDER — PHENYLEPHRINE HYDROCHLORIDE 10 MG/ML
INJECTION INTRAVENOUS
Status: DISCONTINUED | OUTPATIENT
Start: 2022-12-07 | End: 2022-12-07

## 2022-12-07 RX ORDER — SODIUM CHLORIDE, SODIUM LACTATE, POTASSIUM CHLORIDE, CALCIUM CHLORIDE 600; 310; 30; 20 MG/100ML; MG/100ML; MG/100ML; MG/100ML
INJECTION, SOLUTION INTRAVENOUS CONTINUOUS
Status: DISCONTINUED | OUTPATIENT
Start: 2022-12-07 | End: 2022-12-07 | Stop reason: HOSPADM

## 2022-12-07 RX ORDER — HYDROCODONE BITARTRATE AND ACETAMINOPHEN 5; 325 MG/1; MG/1
1 TABLET ORAL EVERY 4 HOURS PRN
Status: DISCONTINUED | OUTPATIENT
Start: 2022-12-07 | End: 2022-12-07 | Stop reason: HOSPADM

## 2022-12-07 RX ORDER — IBUPROFEN 600 MG/1
600 TABLET ORAL 3 TIMES DAILY
Qty: 90 TABLET | Refills: 0 | Status: SHIPPED | OUTPATIENT
Start: 2022-12-07 | End: 2023-06-19

## 2022-12-07 RX ORDER — LIDOCAINE HCL/PF 100 MG/5ML
SYRINGE (ML) INTRAVENOUS
Status: DISCONTINUED | OUTPATIENT
Start: 2022-12-07 | End: 2022-12-07

## 2022-12-07 RX ORDER — ONDANSETRON 2 MG/ML
INJECTION INTRAMUSCULAR; INTRAVENOUS
Status: DISCONTINUED | OUTPATIENT
Start: 2022-12-07 | End: 2022-12-07

## 2022-12-07 RX ORDER — FENTANYL CITRATE 50 UG/ML
25 INJECTION, SOLUTION INTRAMUSCULAR; INTRAVENOUS EVERY 5 MIN PRN
Status: COMPLETED | OUTPATIENT
Start: 2022-12-07 | End: 2022-12-07

## 2022-12-07 RX ORDER — ONDANSETRON 4 MG/1
8 TABLET, ORALLY DISINTEGRATING ORAL EVERY 8 HOURS PRN
Status: DISCONTINUED | OUTPATIENT
Start: 2022-12-07 | End: 2022-12-07 | Stop reason: HOSPADM

## 2022-12-07 RX ORDER — SODIUM CHLORIDE 0.9 % (FLUSH) 0.9 %
10 SYRINGE (ML) INJECTION
Status: DISCONTINUED | OUTPATIENT
Start: 2022-12-07 | End: 2022-12-07 | Stop reason: HOSPADM

## 2022-12-07 RX ORDER — METOCLOPRAMIDE HYDROCHLORIDE 5 MG/ML
10 INJECTION INTRAMUSCULAR; INTRAVENOUS EVERY 10 MIN PRN
Status: COMPLETED | OUTPATIENT
Start: 2022-12-07 | End: 2022-12-07

## 2022-12-07 RX ORDER — CEFAZOLIN SODIUM 2 G/50ML
2 SOLUTION INTRAVENOUS
Status: COMPLETED | OUTPATIENT
Start: 2022-12-07 | End: 2022-12-07

## 2022-12-07 RX ORDER — OXYCODONE AND ACETAMINOPHEN 5; 325 MG/1; MG/1
1 TABLET ORAL EVERY 6 HOURS PRN
Qty: 28 TABLET | Refills: 0 | Status: SHIPPED | OUTPATIENT
Start: 2022-12-07 | End: 2023-05-29

## 2022-12-07 RX ORDER — ONDANSETRON 4 MG/1
4 TABLET, ORALLY DISINTEGRATING ORAL EVERY 8 HOURS PRN
Qty: 28 TABLET | Refills: 0 | Status: SHIPPED | OUTPATIENT
Start: 2022-12-07 | End: 2023-05-29

## 2022-12-07 RX ORDER — FENTANYL CITRATE 50 UG/ML
INJECTION, SOLUTION INTRAMUSCULAR; INTRAVENOUS
Status: DISCONTINUED | OUTPATIENT
Start: 2022-12-07 | End: 2022-12-07

## 2022-12-07 RX ORDER — OXYCODONE HYDROCHLORIDE 5 MG/1
5 TABLET ORAL ONCE AS NEEDED
Status: COMPLETED | OUTPATIENT
Start: 2022-12-07 | End: 2022-12-07

## 2022-12-07 RX ORDER — PROPOFOL 10 MG/ML
VIAL (ML) INTRAVENOUS
Status: DISCONTINUED | OUTPATIENT
Start: 2022-12-07 | End: 2022-12-07

## 2022-12-07 RX ORDER — MIDAZOLAM HYDROCHLORIDE 1 MG/ML
INJECTION INTRAMUSCULAR; INTRAVENOUS
Status: DISCONTINUED | OUTPATIENT
Start: 2022-12-07 | End: 2022-12-07

## 2022-12-07 RX ORDER — DEXAMETHASONE SODIUM PHOSPHATE 4 MG/ML
INJECTION, SOLUTION INTRA-ARTICULAR; INTRALESIONAL; INTRAMUSCULAR; INTRAVENOUS; SOFT TISSUE
Status: DISCONTINUED | OUTPATIENT
Start: 2022-12-07 | End: 2022-12-07

## 2022-12-07 RX ORDER — BUPIVACAINE HYDROCHLORIDE 5 MG/ML
INJECTION, SOLUTION EPIDURAL; INTRACAUDAL
Status: COMPLETED | OUTPATIENT
Start: 2022-12-07 | End: 2022-12-07

## 2022-12-07 RX ORDER — MUPIROCIN 20 MG/G
OINTMENT TOPICAL
Status: DISCONTINUED | OUTPATIENT
Start: 2022-12-07 | End: 2022-12-07 | Stop reason: HOSPADM

## 2022-12-07 RX ADMIN — FENTANYL CITRATE 50 MCG: 50 INJECTION, SOLUTION INTRAMUSCULAR; INTRAVENOUS at 12:12

## 2022-12-07 RX ADMIN — BUPIVACAINE HYDROCHLORIDE 10 ML: 5 INJECTION, SOLUTION EPIDURAL; INTRACAUDAL; PERINEURAL at 12:12

## 2022-12-07 RX ADMIN — CEFAZOLIN SODIUM 2 G: 2 SOLUTION INTRAVENOUS at 01:12

## 2022-12-07 RX ADMIN — PHENYLEPHRINE HYDROCHLORIDE 200 MCG: 10 INJECTION INTRAVENOUS at 01:12

## 2022-12-07 RX ADMIN — ONDANSETRON 4 MG: 2 INJECTION INTRAMUSCULAR; INTRAVENOUS at 01:12

## 2022-12-07 RX ADMIN — METOCLOPRAMIDE 10 MG: 5 INJECTION, SOLUTION INTRAMUSCULAR; INTRAVENOUS at 03:12

## 2022-12-07 RX ADMIN — DEXAMETHASONE SODIUM PHOSPHATE 4 MG: 4 INJECTION, SOLUTION INTRA-ARTICULAR; INTRALESIONAL; INTRAMUSCULAR; INTRAVENOUS; SOFT TISSUE at 01:12

## 2022-12-07 RX ADMIN — LIDOCAINE HYDROCHLORIDE 100 MG: 20 INJECTION INTRAVENOUS at 01:12

## 2022-12-07 RX ADMIN — MIDAZOLAM HYDROCHLORIDE 2 MG: 1 INJECTION, SOLUTION INTRAMUSCULAR; INTRAVENOUS at 12:12

## 2022-12-07 RX ADMIN — GLYCOPYRROLATE 0.2 MG: 0.2 INJECTION, SOLUTION INTRAMUSCULAR; INTRAVITREAL at 12:12

## 2022-12-07 RX ADMIN — FENTANYL CITRATE 25 MCG: 50 INJECTION, SOLUTION INTRAMUSCULAR; INTRAVENOUS at 01:12

## 2022-12-07 RX ADMIN — FENTANYL CITRATE 25 MCG: 50 INJECTION, SOLUTION INTRAMUSCULAR; INTRAVENOUS at 02:12

## 2022-12-07 RX ADMIN — MUPIROCIN: 20 OINTMENT TOPICAL at 10:12

## 2022-12-07 RX ADMIN — PROPOFOL 150 MG: 10 INJECTION, EMULSION INTRAVENOUS at 01:12

## 2022-12-07 RX ADMIN — SODIUM CHLORIDE, SODIUM GLUCONATE, SODIUM ACETATE, POTASSIUM CHLORIDE, MAGNESIUM CHLORIDE, SODIUM PHOSPHATE, DIBASIC, AND POTASSIUM PHOSPHATE: .53; .5; .37; .037; .03; .012; .00082 INJECTION, SOLUTION INTRAVENOUS at 10:12

## 2022-12-07 RX ADMIN — FENTANYL CITRATE 25 MCG: 50 INJECTION, SOLUTION INTRAMUSCULAR; INTRAVENOUS at 03:12

## 2022-12-07 RX ADMIN — OXYCODONE 5 MG: 5 TABLET ORAL at 02:12

## 2022-12-07 RX ADMIN — LIDOCAINE HYDROCHLORIDE 10 MG: 10 INJECTION, SOLUTION EPIDURAL; INFILTRATION; INTRACAUDAL; PERINEURAL at 10:12

## 2022-12-07 NOTE — PLAN OF CARE
VSS. NAD. Resp E/U. Calm and cooperative. Speech appropriate. Tolerating clear liquids. Denies nausea. Pain controlled. IV patent. No swelling or redness. Dressing to lt arm CDI. Ice applied. Report given to Marcy BRADFORD  Family notified of patient status. All safety measures taken. Bed in low position. Bedrails up x2. Bed locked.

## 2022-12-07 NOTE — DISCHARGE INSTRUCTIONS
"Discharge Instructions: After Your Surgery/Procedure  Youve just had surgery. During surgery you were given medicine called anesthesia to keep you relaxed and free of pain. After surgery you may have some pain or nausea. This is common. Here are some tips for feeling better and getting well after surgery.     Stay on schedule with your medication.   Going home  Your doctor or nurse will show you how to take care of yourself when you go home. He or she will also answer your questions. Have an adult family member or friend drive you home.      For your safety we recommend these precaution for the first 24 hours after your procedure:  Do not drive or use heavy equipment.  Do not make important decisions or sign legal papers.  Do not drink alcohol.  Have someone stay with you, if needed. He or she can watch for problems and help keep you safe.  Your concentration, balance, coordination, and judgement may be impaired for many hours after anesthesia.  Use caution when ambulating or standing up.     You may feel weak and "washed out" after anesthesia and surgery.      Subtle residual effects of general anesthesia or sedation with regional / local anesthesia can last more than 24 hours.  Rest for the remainder of the day or longer if your Doctor/Surgeon has advised you to do so.  Although you may feel normal within the first 24 hours, your reflexes and mental ability may be impaired without you realizing it.  You may feel dizzy, lightheaded or sleepy for 24 hours or longer.      Be sure to go to all follow-up visits with your doctor. And rest after your surgery for as long as your doctor tells you to.  Coping with pain  If you have pain after surgery, pain medicine will help you feel better. Take it as told, before pain becomes severe. Also, ask your doctor or pharmacist about other ways to control pain. This might be with heat, ice, or relaxation. And follow any other instructions your surgeon or nurse gives you.  Tips " for taking pain medicine  To get the best relief possible, remember these points:  Pain medicines can upset your stomach. Taking them with a little food may help.  Most pain relievers taken by mouth need at least 20 to 30 minutes to start to work.  Taking medicine on a schedule can help you remember to take it. Try to time your medicine so that you can take it before starting an activity. This might be before you get dressed, go for a walk, or sit down for dinner.  Constipation is a common side effect of pain medicines. Call your doctor before taking any medicines such as laxatives or stool softeners to help ease constipation. Also ask if you should skip any foods. Drinking lots of fluids and eating foods such as fruits and vegetables that are high in fiber can also help. Remember, do not take laxatives unless your surgeon has prescribed them.  Drinking alcohol and taking pain medicine can cause dizziness and slow your breathing. It can even be deadly. Do not drink alcohol while taking pain medicine.  Pain medicine can make you react more slowly to things. Do not drive or run machinery while taking pain medicine.  Your health care provider may tell you to take acetaminophen to help ease your pain. Ask him or her how much you are supposed to take each day. Acetaminophen or other pain relievers may interact with your prescription medicines or other over-the-counter (OTC) drugs. Some prescription medicines have acetaminophen and other ingredients. Using both prescription and OTC acetaminophen for pain can cause you to overdose. Read the labels on your OTC medicines with care. This will help you to clearly know the list of ingredients, how much to take, and any warnings. It may also help you not take too much acetaminophen. If you have questions or do not understand the information, ask your pharmacist or health care provider to explain it to you before you take the OTC medicine.  Managing nausea  Some people have an  upset stomach after surgery. This is often because of anesthesia, pain, or pain medicine, or the stress of surgery. These tips will help you handle nausea and eat healthy foods as you get better. If you were on a special food plan before surgery, ask your doctor if you should follow it while you get better. These tips may help:  Do not push yourself to eat. Your body will tell you when to eat and how much.  Start off with clear liquids and soup. They are easier to digest.  Next try semi-solid foods, such as mashed potatoes, applesauce, and gelatin, as you feel ready.  Slowly move to solid foods. Dont eat fatty, rich, or spicy foods at first.  Do not force yourself to have 3 large meals a day. Instead eat smaller amounts more often.  Take pain medicines with a small amount of solid food, such as crackers or toast, to avoid nausea.     Call your surgeon if  You still have pain an hour after taking medicine. The medicine may not be strong enough.  You feel too sleepy, dizzy, or groggy. The medicine may be too strong.  You have side effects like nausea, vomiting, or skin changes, such as rash, itching, or hives.       If you have obstructive sleep apnea  You were given anesthesia medicine during surgery to keep you comfortable and free of pain. After surgery, you may have more apnea spells because of this medicine and other medicines you were given. The spells may last longer than usual.   At home:  Keep using the continuous positive airway pressure (CPAP) device when you sleep. Unless your health care provider tells you not to, use it when you sleep, day or night. CPAP is a common device used to treat obstructive sleep apnea.  Talk with your provider before taking any pain medicine, muscle relaxants, or sedatives. Your provider will tell you about the possible dangers of taking these medicines.  © 1450-6222 [employer]. [address] [place] [address], [place] [address]. All rights reserved. This information is  not intended as a substitute for professional medical care. Always follow your healthcare professional's instructions.   Post op instructions for prevention of DVT  What is deep vein thrombosis?  Deep vein thrombosis (DVT) is the medical term for blood clots in the deep veins of the leg.  These blood clots can be dangerous.  A DVT can block a blood vessel and keep blood from getting where it needs to go.  Another problem is that the clot can travel to other parts of the body such as the lungs.  A clot that travels to the lungs is called a pulmonary embolus (PE) and can cause serious problems with breathing which can lead to death.  Am I at risk for DVT/PE?  If you are not very active, you are at risk of DVT.  Anyone confined to bed, sitting for long periods of time, recovering from surgery, etc. increases the risk of DVT.  Other risk factors are cancer diagnosis, certain medications, estrogen replacement in any form,older age, obesity, pregnancy, smoking, history of clotting disorders, and dehydration.  How will I know if I have a DVT?  Swelling in the lower leg  Pain  Warmth, redness, hardness or bulging of the vein  If you have any of these symptoms, call your doctors office right away.  Some people will not have any symptoms until the clot moves to the lungs.  What are the symptoms of a PE?  Panting, shortness of breath, or trouble breathing  Sharp, knife-like chest pain when you breathe  Coughing or coughing up blood  Rapid heartbeat  If you have any of these symptoms or get worse quickly, call 911 for emergency treatment.  How can I prevent a DVT?  Avoid long periods of inactivity and dont cross your legs--get up and walk around every hour or so.  Stay active--walking after surgery is highly encouraged.  This means you should get out of the house and walk in the neighborhood.  Going up and down stairs will not impair healing (unless advised against such activity by your doctor).    Drink plenty of  noncaffeinated, nonalcoholic fluids each day to prevent dehydration.  Wear special support stockings, if they have been advised by your doctor.  If you travel, stop at least once an hour and walk around.  Avoid smoking (assistance with stopping is available through your healthcare provider)  Always notify your doctor if you are not able to follow the post operative instructions that are given to you at the time of discharge.  It may be necessary to prescribe one of the medications available to prevent DVT.

## 2022-12-07 NOTE — TRANSFER OF CARE
"Anesthesia Transfer of Care Note    Patient: Holly Chicas    Procedure(s) Performed: Procedure(s) (LRB):  RELEASE, CARPAL TUNNEL (Left)  RELEASE, ULNAR TUNNEL (Left)    Patient location: PACU    Anesthesia Type: general    Transport from OR: Transported from OR on 2-3 L/min O2 by NC with adequate spontaneous ventilation    Post pain: adequate analgesia    Post assessment: no apparent anesthetic complications and tolerated procedure well    Post vital signs: stable    Level of consciousness: sedated and responds to stimulation    Nausea/Vomiting: no nausea/vomiting    Complications: none    Transfer of care protocol was followed      Last vitals:   Visit Vitals  /88 (Patient Position: Sitting)   Pulse 72   Temp 36.8 °C (98.2 °F) (Skin)   Resp 18   Ht 5' 6" (1.676 m)   Wt 60.8 kg (134 lb)   SpO2 98%   Breastfeeding No   BMI 21.63 kg/m²     "

## 2022-12-07 NOTE — BRIEF OP NOTE
[facility]  Brief Operative Note    Surgery Date: 12/7/2022     Surgeon(s) and Role:     * Joce Montanez MD - Primary    Assisting Surgeon: None    Pre-op Diagnosis:  Carpal tunnel syndrome, bilateral [G56.03]  Cubital tunnel syndrome, bilateral [G56.23]  Pre-op testing [Z01.818]    Post-op Diagnosis:  Post-Op Diagnosis Codes:     * Carpal tunnel syndrome, bilateral [G56.03]     * Cubital tunnel syndrome, bilateral [G56.23]     * Pre-op testing [Z01.818]    Procedure(s) (LRB):  RELEASE, CARPAL TUNNEL (Left)  RELEASE, ULNAR TUNNEL (Left)    Anesthesia: General/Regional    Operative Findings: subluxing ulnar nerve     Estimated Blood Loss: 5 mL         Specimens:   Specimen (24h ago, onward)      None              Discharge Note    OUTCOME: Patient tolerated treatment/procedure well without complication and is now ready for discharge.    DISPOSITION: Home or Self Care    FINAL DIAGNOSIS:  <principal problem not specified>    FOLLOWUP: In clinic 2 weeks    DISCHARGE INSTRUCTIONS:  No discharge procedures on file.

## 2022-12-07 NOTE — OP NOTE
12/07/2022    PREOPERATIVE DIAGNOSIS:      Left elbow cubital tunnel syndrome  Left carpal tunnel syndrome    POSTOPERATIVE DIAGNOSIS: Same    PROCEDURE:      Ulnar nerve decompression left elbow  Carpal tunnel release left elbow      SURGEON: Joce Montanez MD    ASSISTANT: Neri Snow  _ first assist     He was present and scrubbed for the entirety of the procedure. They were required for patient positioning, retraction, insertion of implants and closure. Without him I would have been unable to safely perform the case due to only one scrub tech available.     ANESTHESIA:  General    ESTIMATED BLOOD LOSS:  5 mL    INDICATIONS:  Holly Chicas is a 52 y.o. year-old female who presented to my clinic with a chief complaint of left hand and elbow paresthesias.  EMG revealed cubital tunnel as well as carpal tunnel syndrome.  She would failed conservative treatment with night splints and was more interested in definitive options including carpal tunnel release and cubital tunnel release.    We discussed the risks and benefits of the surgery in detail including stiffness, CRPS, damage to surrounding neurovascular structures, failure of fixation as well as need for revision surgery. Despite these risks they elected to proceed.       COMPONENTS USED:      * No implants in log *      DESCRIPTION OF PROCEDURE:  The patient was seen in the pre-operative area where consents were verified and the surgical site marked. They did receive a preoperative block for intra-operative and postoperative analgesia. The patient was taken to the Operating Room where anesthesia was administered by the Anesthesia Department. She was then placed in the supine  position and all superficial neurovascular structures were well padded.  The left upper extremity  was then sterilely prepped and draped in the normal fashion.  Preoperative antibiotics were administered.  A time-out was performed verifying the procedure and laterality, all agreed  and elected to proceed as planned.    A sterile tourniquet was applied to the left upper arm and was inflated for the duration of the case which 30 minutes.  Esmarch tourniquet was used to exsanguinate the limb and the tourniquet inflated to 250 mmHg.  We began by making a curvilinear incision over the medial epicondyle.  Blunt dissection was made down to the fascia.  The fascia was incised.  The Boyd's ligament was then incised in the ulnar nerve identified.  Distally the 2 heads of the FCU were split as well as the fascia distally.  Proximally the brachial intermuscular septum was also released.  The ulnar nerve was quite anterior and was not even in the groove.  This may have been contributing to her symptoms.  We did elect to proceed with a subfascial transposition for stabilization.  We then turned our attention to the wrist.  Using Disla's cardinal line we then abdias a incision over the volar wrist.  Incision was made and the palmar fascia was identified and swept out of harm's way.  The transverse carpal ligament was then incised longitudinally and Metzenbaum scissors were used proximal and distal to release any adhesions.  Using scissors we are able to release the transverse carpal ligament in its entirety proximal and distal.  We used a [place] elevator to verify our complete release into the palm.  We then copiously irrigated with normal saline.  Distally the carpal tunnel was closed with 3-0 nylon.  Proximally at the elbow we closed the wound with 3-0 Vicryl and Monocryl as well as Dermabond.  Sterile dressing was applied.  She tolerated procedure well.    Disposition:      She will be nonweightbearing in a soft dressing.  I will see her back in 2 weeks for wound check.    Joce Montanez MD

## 2022-12-07 NOTE — ANESTHESIA PREPROCEDURE EVALUATION
12/07/2022  Holly Chicas is a 52 y.o., female.      Pre-op Assessment    I have reviewed the Patient Summary Reports.     I have reviewed the Nursing Notes. I have reviewed the NPO Status.   I have reviewed the Medications.     Review of Systems  Anesthesia Hx:  PONV     Social:  Non-Smoker    Cardiovascular:   Hypertension hyperlipidemia    Pulmonary:   Sleep Apnea    Renal/:  Renal/ Normal     Musculoskeletal:   Arthritis     Neurological:   Neuromuscular Disease, Headaches   Peripheral Neuropathy    Endocrine:   Diabetes, well controlled    Psych:   Psychiatric History anxiety          Physical Exam  General: Well nourished, Cooperative, Alert and Oriented    Airway:  Mallampati: II   Mouth Opening: Normal  TM Distance: Normal  Neck ROM: Normal ROM        Anesthesia Plan  Type of Anesthesia, risks & benefits discussed:    Anesthesia Type: Gen ETT, Gen Supraglottic Airway, Gen Natural Airway, MAC  Intra-op Monitoring Plan: Standard ASA Monitors  Post Op Pain Control Plan: multimodal analgesia  Induction:  IV  Airway Plan: Direct, Video and Fiberoptic, Post-Induction  Informed Consent: Informed consent signed with the Patient and all parties understand the risks and agree with anesthesia plan.  All questions answered.   ASA Score: 3    Ready For Surgery From Anesthesia Perspective.     .

## 2022-12-07 NOTE — ANESTHESIA PROCEDURE NOTES
Intubation    Date/Time: 12/7/2022 1:05 PM  Performed by: Urbano Sapp CRNA  Authorized by: Abhishek Mesa MD     Intubation:     Induction:  Intravenous    Intubated:  Postinduction    Mask Ventilation:  Not attempted    Attempts:  1    Attempted By:  CRNA    Difficult Airway Encountered?: No      Complications:  None    Airway Device:  Supraglottic airway/LMA    Airway Device Size:  3.0    Style/Cuff Inflation:  Cuffed (inflated to minimal occlusive pressure)    Placement Verified By:  Capnometry    Complicating Factors:  None    Findings Post-Intubation:  BS equal bilateral and atraumatic/condition of teeth unchanged

## 2022-12-07 NOTE — PLAN OF CARE
Pt ambulated in room with steady gait, states that pain is minimal and denies nausea.  Arm in sling, dressing dry and intact.  States that she is ready for discharge.

## 2022-12-08 NOTE — ANESTHESIA POSTPROCEDURE EVALUATION
Anesthesia Post Evaluation    Patient: Holly Chicas    Procedure(s) Performed: Procedure(s) (LRB):  RELEASE, CARPAL TUNNEL (Left)  RELEASE, ULNAR TUNNEL (Left)    Final Anesthesia Type: general      Patient location during evaluation: PACU  Patient participation: Yes- Able to Participate  Level of consciousness: awake and alert and oriented  Post-procedure vital signs: reviewed and stable  Pain management: adequate  Airway patency: patent    PONV status at discharge: No PONV  Anesthetic complications: no      Cardiovascular status: blood pressure returned to baseline and stable  Respiratory status: unassisted and spontaneous ventilation  Hydration status: euvolemic  Follow-up not needed.          Vitals Value Taken Time   /77 12/07/22 1556   Temp 36.8 °C (98.2 °F) 12/07/22 1556   Pulse 64 12/07/22 1556   Resp 18 12/07/22 1556   SpO2 99 % 12/07/22 1556         Event Time   Out of Recovery 15:23:00         Pain/Marielena Score: Pain Rating Prior to Med Admin: 5 (12/7/2022  2:38 PM)  Marielena Score: 10 (12/7/2022  3:55 PM)

## 2022-12-20 ENCOUNTER — OFFICE VISIT (OUTPATIENT)
Dept: ORTHOPEDICS | Facility: CLINIC | Age: 52
End: 2022-12-20
Payer: COMMERCIAL

## 2022-12-20 VITALS — BODY MASS INDEX: 21.53 KG/M2 | WEIGHT: 134 LBS | RESPIRATION RATE: 16 BRPM | HEIGHT: 66 IN

## 2022-12-20 DIAGNOSIS — G56.23 CUBITAL TUNNEL SYNDROME, BILATERAL: ICD-10-CM

## 2022-12-20 DIAGNOSIS — G56.03 CARPAL TUNNEL SYNDROME, BILATERAL: Primary | ICD-10-CM

## 2022-12-20 PROCEDURE — 3008F PR BODY MASS INDEX (BMI) DOCUMENTED: ICD-10-PCS | Mod: CPTII,S$GLB,, | Performed by: ORTHOPAEDIC SURGERY

## 2022-12-20 PROCEDURE — 3008F BODY MASS INDEX DOCD: CPT | Mod: CPTII,S$GLB,, | Performed by: ORTHOPAEDIC SURGERY

## 2022-12-20 PROCEDURE — 1159F MED LIST DOCD IN RCRD: CPT | Mod: CPTII,S$GLB,, | Performed by: ORTHOPAEDIC SURGERY

## 2022-12-20 PROCEDURE — 99024 POSTOP FOLLOW-UP VISIT: CPT | Mod: S$GLB,,, | Performed by: ORTHOPAEDIC SURGERY

## 2022-12-20 PROCEDURE — 1159F PR MEDICATION LIST DOCUMENTED IN MEDICAL RECORD: ICD-10-PCS | Mod: CPTII,S$GLB,, | Performed by: ORTHOPAEDIC SURGERY

## 2022-12-20 PROCEDURE — 99024 PR POST-OP FOLLOW-UP VISIT: ICD-10-PCS | Mod: S$GLB,,, | Performed by: ORTHOPAEDIC SURGERY

## 2022-12-20 PROCEDURE — 99999 PR PBB SHADOW E&M-EST. PATIENT-LVL III: ICD-10-PCS | Mod: PBBFAC,,, | Performed by: ORTHOPAEDIC SURGERY

## 2022-12-20 PROCEDURE — 99999 PR PBB SHADOW E&M-EST. PATIENT-LVL III: CPT | Mod: PBBFAC,,, | Performed by: ORTHOPAEDIC SURGERY

## 2022-12-20 NOTE — PROGRESS NOTES
Post-op Note    HPI    Holly Chicas is here 2 weeks s/p the following procedure:     Carpal tunnel release and ulnar nerve decompression    Overall doing well. Pain controlled on current regimen. enies any chest pain or shortness of breathe. Denies any drainage from the incision. Denies any fevers, chills or paresthesias.         Physical Exam:     Patient is alert and oriented no acute distress.       Left elbow and wrist Incision(s) are well healed.  There is no evidence of dehiscence.  There is no induration erythema or signs of infection.  Appropriate soft tissue swelling.  Compartments are soft and compressible.  Warm well-perfused extremity.  Full passive range of motion of the wrist and elbow.  Ecchymosis bruising as expected.  Compartments soft.    Assessment    Holly Chicas is 2 weeks Post-op     Plan:    Overall doing as expected.  We discussed expectations of surgery and postoperative course.     Continued postoperative pain regimen -- Tylenol and ibuprofen as needed  Discussed home exercise program with nerve glides    Follow-up: 4 weeks   X-rays next visit:  None

## 2023-01-12 ENCOUNTER — TELEPHONE (OUTPATIENT)
Dept: SLEEP MEDICINE | Facility: CLINIC | Age: 53
End: 2023-01-12
Payer: COMMERCIAL

## 2023-01-13 ENCOUNTER — OFFICE VISIT (OUTPATIENT)
Dept: SLEEP MEDICINE | Facility: CLINIC | Age: 53
End: 2023-01-13
Payer: COMMERCIAL

## 2023-01-13 ENCOUNTER — PATIENT MESSAGE (OUTPATIENT)
Dept: SLEEP MEDICINE | Facility: CLINIC | Age: 53
End: 2023-01-13

## 2023-01-13 DIAGNOSIS — G47.411 PRIMARY NARCOLEPSY WITH CATAPLEXY: Primary | ICD-10-CM

## 2023-01-13 PROCEDURE — 99499 UNLISTED E&M SERVICE: CPT | Mod: 95,,, | Performed by: NURSE PRACTITIONER

## 2023-01-13 PROCEDURE — 99499 NO LOS: ICD-10-PCS | Mod: 95,,, | Performed by: NURSE PRACTITIONER

## 2023-01-13 NOTE — PROGRESS NOTES
The patient location is [place]  The chief complaint leading to consultation is: narcolepsy    Visit type: TELE AUDIOVISUAL:[id number]    Face to Face time with patient:6 minutes of total time spent on the encounter, which includes face to face time and non-face to face time preparing to see the patient (eg, review of tests), Obtaining and/or reviewing separately obtained history, Documenting clinical information in the electronic or other health record, Independently interpreting results (not separately reported) and communicating results to the patient/family/caregiver, or Care coordination (not separately reported). Each patient to whom he or she provides medical services by telemedicine is:  (1) informed of the relationship between the physician and patient and the respective role of any other health care provider with respect to management of the patient; and (2) notified that he or she may decline to receive medical services by telemedicine and may withdraw from such care at any time.      Since seen she continues to take xyrem to 4.5G 2x nightly. Very happy about beging on this. ESS=6 . She did have covid last visit and with boosters she's not retaining the antibodies. Continues to take sunosi and adderall 30mg am, may not take 2nd dose. She is keeping schedule and meal schedule which is helpful.       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 Used bipap for some time but had mouth opening and chin strap was intolerable due to claustrophobia. Had MSLT revealing narcolepsy (see epic records). She currently talking sunosi 150mg am and nuvigil 250mg qd and Adderall 30mg bid. Takes weekend drug holidays. Remains very sleepy impacting driving, could lose her license and can fall asleep at work. ESS=23!! Has lost 70# since PSG and no longer snores. Denies witnessed apneic pauses.     ?cataplexy passed out while sitting on amusement ride [place]  Legs have buckled before/fallen-not sure if with  strong emotion    12/28/21: Since seen she increased her xyrem to 4.5G 2x nightly. Significantly helping reduce daytime sleepiness. ESS=14.  Feels bad currently , thinks she might have repeat Covid. Continues to take sunosi and nuvigil am and just 1 30mg tab adderall afternoon.   Denies cataplexy      MSTL 2018 avg SL 1min, 2/5 SOREMs  SH . [facility] at [facility]     ASSESSMENT:   Narcolepsy with cataplexy, symptoms improved on max therapeutic dose Xyrem and sunosi and adderall but lower dose  Hx gastric bypass  Psoriasis       PLAN:   1.Xyrem continue 4.5 2x nightly.Continue sunosi 150mg qam and adderall 30mg BID prn  2  RTC 6-mos

## 2023-01-19 DIAGNOSIS — G47.411 NARCOLEPSY WITH CATAPLEXY: ICD-10-CM

## 2023-01-19 RX ORDER — DEXTROAMPHETAMINE SACCHARATE, AMPHETAMINE ASPARTATE, DEXTROAMPHETAMINE SULFATE AND AMPHETAMINE SULFATE 7.5; 7.5; 7.5; 7.5 MG/1; MG/1; MG/1; MG/1
1 TABLET ORAL 2 TIMES DAILY PRN
Qty: 60 TABLET | Refills: 0 | Status: SHIPPED | OUTPATIENT
Start: 2023-01-19 | End: 2023-05-19 | Stop reason: SDUPTHER

## 2023-01-24 ENCOUNTER — OFFICE VISIT (OUTPATIENT)
Dept: ORTHOPEDICS | Facility: CLINIC | Age: 53
End: 2023-01-24
Payer: COMMERCIAL

## 2023-01-24 DIAGNOSIS — G56.23 CUBITAL TUNNEL SYNDROME, BILATERAL: ICD-10-CM

## 2023-01-24 DIAGNOSIS — G56.03 CARPAL TUNNEL SYNDROME, BILATERAL: Primary | ICD-10-CM

## 2023-01-24 PROCEDURE — 99024 PR POST-OP FOLLOW-UP VISIT: ICD-10-PCS | Mod: S$GLB,,, | Performed by: ORTHOPAEDIC SURGERY

## 2023-01-24 PROCEDURE — 99024 POSTOP FOLLOW-UP VISIT: CPT | Mod: S$GLB,,, | Performed by: ORTHOPAEDIC SURGERY

## 2023-01-24 NOTE — PROGRESS NOTES
Post-op Note    HPI    Holly Chicas is here 6 weeks s/p the following procedure:     Carpal tunnel release and ulnar nerve decompression    Overall doing well. Pain controlled on current regimen.  Occasionally has some pain on the lateral forearm radiating up the lateral shoulder.  Denies any neck pain.        Physical Exam:     Patient is alert and oriented no acute distress.       Left elbow and wrist Incision(s) are well healed.  There is no evidence of dehiscence.  There is no induration erythema or signs of infection.  No significant soft tissue swelling.  Compartments are soft and compressible.  Warm well-perfused extremity.  Full passive range of motion of the wrist and elbow.  Full active motion of the wrist and elbow.  Negative Tinel's at the elbow and wrist.    Assessment    Holly Chicas is 6 weeks Post-op     Plan:    Overall doing as expected.  We discussed expectations of surgery and postoperative course.     Continued postoperative pain regimen -- Tylenol and ibuprofen as needed  Discussed home exercise program with nerve glilanette  She would like to follow up as needed and discuss her right carpal tunnel and cubital tunnel a later date

## 2023-03-28 ENCOUNTER — TELEPHONE (OUTPATIENT)
Dept: FAMILY MEDICINE | Facility: CLINIC | Age: 53
End: 2023-03-28
Payer: COMMERCIAL

## 2023-03-28 NOTE — TELEPHONE ENCOUNTER
Tried to call to cancel appointment due to Dr. Fraser not being in office today. Left message on VM to call us back and that someone will be calling back to get her rescheduled.

## 2023-04-06 ENCOUNTER — TELEPHONE (OUTPATIENT)
Dept: SLEEP MEDICINE | Facility: CLINIC | Age: 53
End: 2023-04-06
Payer: COMMERCIAL

## 2023-04-28 DIAGNOSIS — G47.411 NARCOLEPSY WITH CATAPLEXY: ICD-10-CM

## 2023-04-28 RX ORDER — DEXTROAMPHETAMINE SACCHARATE, AMPHETAMINE ASPARTATE, DEXTROAMPHETAMINE SULFATE AND AMPHETAMINE SULFATE 7.5; 7.5; 7.5; 7.5 MG/1; MG/1; MG/1; MG/1
1 TABLET ORAL 2 TIMES DAILY PRN
Qty: 60 TABLET | Refills: 0 | Status: SHIPPED | OUTPATIENT
Start: 2023-04-28 | End: 2023-06-27 | Stop reason: SDUPTHER

## 2023-04-28 RX ORDER — SOLRIAMFETOL 150 MG/1
150 TABLET, FILM COATED ORAL DAILY
Qty: 30 TABLET | Refills: 3 | Status: SHIPPED | OUTPATIENT
Start: 2023-04-28 | End: 2023-10-28 | Stop reason: SDUPTHER

## 2023-05-09 ENCOUNTER — PATIENT MESSAGE (OUTPATIENT)
Dept: ORTHOPEDICS | Facility: CLINIC | Age: 53
End: 2023-05-09
Payer: COMMERCIAL

## 2023-05-09 LAB
LEFT EYE DM RETINOPATHY: NEGATIVE
RIGHT EYE DM RETINOPATHY: NEGATIVE

## 2023-05-12 ENCOUNTER — OFFICE VISIT (OUTPATIENT)
Dept: ORTHOPEDICS | Facility: CLINIC | Age: 53
End: 2023-05-12
Payer: COMMERCIAL

## 2023-05-12 VITALS — WEIGHT: 134 LBS | BODY MASS INDEX: 21.53 KG/M2 | RESPIRATION RATE: 16 BRPM | HEIGHT: 66 IN

## 2023-05-12 DIAGNOSIS — G56.03 CARPAL TUNNEL SYNDROME, BILATERAL UPPER LIMBS: ICD-10-CM

## 2023-05-12 DIAGNOSIS — G56.03 CARPAL TUNNEL SYNDROME, BILATERAL: Primary | ICD-10-CM

## 2023-05-12 DIAGNOSIS — G56.23 CUBITAL TUNNEL SYNDROME, BILATERAL: ICD-10-CM

## 2023-05-12 PROCEDURE — 99999 PR PBB SHADOW E&M-EST. PATIENT-LVL III: CPT | Mod: PBBFAC,,, | Performed by: ORTHOPAEDIC SURGERY

## 2023-05-12 PROCEDURE — 99214 OFFICE O/P EST MOD 30 MIN: CPT | Mod: S$GLB,,, | Performed by: ORTHOPAEDIC SURGERY

## 2023-05-12 PROCEDURE — 1159F PR MEDICATION LIST DOCUMENTED IN MEDICAL RECORD: ICD-10-PCS | Mod: CPTII,S$GLB,, | Performed by: ORTHOPAEDIC SURGERY

## 2023-05-12 PROCEDURE — 3008F BODY MASS INDEX DOCD: CPT | Mod: CPTII,S$GLB,, | Performed by: ORTHOPAEDIC SURGERY

## 2023-05-12 PROCEDURE — 1159F MED LIST DOCD IN RCRD: CPT | Mod: CPTII,S$GLB,, | Performed by: ORTHOPAEDIC SURGERY

## 2023-05-12 PROCEDURE — 99999 PR PBB SHADOW E&M-EST. PATIENT-LVL III: ICD-10-PCS | Mod: PBBFAC,,, | Performed by: ORTHOPAEDIC SURGERY

## 2023-05-12 PROCEDURE — 99214 PR OFFICE/OUTPT VISIT, EST, LEVL IV, 30-39 MIN: ICD-10-PCS | Mod: S$GLB,,, | Performed by: ORTHOPAEDIC SURGERY

## 2023-05-12 PROCEDURE — 3008F PR BODY MASS INDEX (BMI) DOCUMENTED: ICD-10-PCS | Mod: CPTII,S$GLB,, | Performed by: ORTHOPAEDIC SURGERY

## 2023-05-12 RX ORDER — MUPIROCIN 20 MG/G
OINTMENT TOPICAL
Status: CANCELLED | OUTPATIENT
Start: 2023-05-12

## 2023-05-12 NOTE — PROGRESS NOTES
"Patient ID: Holly Chicas is a 52 y.o. female    Chief Complaint:   Chief Complaint   Patient presents with    Left Forearm - Pain    Left Wrist - Pain       History of Present Illness:    Pleasant 52-year-old female here for evaluation of right upper extremity pain.  She also reports numbness.  This has been ongoing for several months.  She is status post left ulnar nerve decompression and carpal tunnel decompression by me few months back.  She states this helped a lot with her numbness and tingling but still having some burning pain which we think is neuropathy.  She has had EMG in the past which showed carpal tunnel and cubital tunnel bilaterally.  She is here to discuss possible cubital tunnel and carpal tunnel release.    PAST MEDICAL HISTORY:   Past Medical History:   Diagnosis Date    Abnormal Pap smear     "pre-cancer cells post hysterectomy"    Anxiety     Arthritis     Depression     Good hypertension control 2014    Hyperlipidemia     Hypertension     Hypertriglyceridemia     Meningitis     Narcolepsy     Peripheral neuropathy 2014    PONV (postoperative nausea and vomiting)     Psoriasis     Psoriasis 05/10/2018     PAST SURGICAL HISTORY:   Past Surgical History:   Procedure Laterality Date    AUGMENTATION OF BREAST      BREAST SURGERY      aumentation/ reduction     BREAST SURGERY  2017    augmentation     brizilian butt lift   2017    CARPAL TUNNEL RELEASE Left 2022    Procedure: RELEASE, CARPAL TUNNEL;  Surgeon: Joce Montanez MD;  Location: [facility] OR;  Service: Orthopedics;  Laterality: Left;     SECTION      CHOLECYSTECTOMY  2018    Dr SEAN [facility]    COLONOSCOPY      COLONOSCOPY N/A 2022    Procedure: COLONOSCOPY;  Surgeon: Kavita Yee MD;  Location: [facility];  Service: Endoscopy;  Laterality: N/A;    ESOPHAGOGASTRODUODENOSCOPY N/A 2022    Procedure: EGD (ESOPHAGOGASTRODUODENOSCOPY);  Surgeon: Kavita Yee" MD;  Location: [facility] ENDO;  Service: Endoscopy;  Laterality: N/A;    FOOT SURGERY Left     CYST REMOVAL    GASTRIC BYPASS  04/2015    HERNIA REPAIR      UHR    HYSTERECTOMY      KNEE SURGERY Left     SCOPE    RELEASE OF ULNAR NERVE AT CUBITAL TUNNEL Left 12/7/2022    Procedure: RELEASE, ULNAR TUNNEL;  Surgeon: Joce Montanez MD;  Location: [facility] OR;  Service: Orthopedics;  Laterality: Left;    SHOULDER SURGERY  2000    right should surgery     SHOULDER SURGERY  01/2017    left shoulder / torn labrium     THIGH LIFT  03/03/2017    TONSILLECTOMY      tummy tuck   02/28/2016     FAMILY HISTORY:   Family History   Problem Relation Age of Onset    Cancer Mother         breast bilateral/ skin     Breast cancer Mother     Skin cancer Mother     Diabetes Father     Heart disease Father 65        CABG    Hypertension Father     Cancer Father         skin    Skin cancer Father     Gout Father     Colon polyps Father     Gout Brother     No Known Problems Daughter     No Known Problems Son     Cancer Maternal Aunt         lung, pancrease - smoker , breast    Lung cancer Maternal Aunt         x2    Pancreatic cancer Maternal Aunt     Breast cancer Maternal Aunt     No Known Problems Maternal Uncle     No Known Problems Paternal Uncle     Cancer Maternal Grandmother         pancrease    Pancreatic cancer Maternal Grandmother     Breast cancer Maternal Grandmother     Diabetes Paternal Grandmother     Heart disease Paternal Grandfather     Colon cancer Maternal Grandfather     Ovarian cancer Neg Hx     Ulcerative colitis Neg Hx     Stomach cancer Neg Hx     Crohn's disease Neg Hx     Rectal cancer Neg Hx      SOCIAL HISTORY:   Social History     Occupational History    Not on file   Tobacco Use    Smoking status: Never    Smokeless tobacco: Never   Substance and Sexual Activity    Alcohol use: No     Comment: ALLERGIC TO ALCOHOL(DRINKING) PER PATIENT    Drug use: No    Sexual activity: Yes     Partners: Male     Birth  control/protection: Surgical     Comment: SO        MEDICATIONS:   Current Outpatient Medications:     ACCU-CHEK GUIDE TEST STRIPS Strp, , Disp: , Rfl:     allerg xt,D.farinae-D.pteronys (ODACTRA) 12 SQ-HDM Subl, Place 1 tablet under the tongue once daily., Disp: 30 tablet, Rfl: 11    azelastine (ASTELIN) 137 mcg (0.1 %) nasal spray, 1 spray in each nosril twice a day, Disp: 30 mL, Rfl: 5    dextroamphetamine-amphetamine 30 mg Tab, Take 1 tablet (30 mg total) by mouth 2 (two) times daily as needed., Disp: 60 tablet, Rfl: 0    DUEXIS 800-26.6 mg Tab, Take 1 tablet by mouth 3 (three) times daily. , Disp: , Rfl:     estradioL (ESTRACE) 0.01 % (0.1 mg/gram) vaginal cream, USE 1/2 GRAM VAGINALLY 4 TIMES WEEKLY AS DIRECTED, Disp: , Rfl:     fexofenadine (ALLEGRA) 180 MG tablet, Take 1 tablet (180 mg total) by mouth once daily., Disp: 90 tablet, Rfl: 2    fluticasone propionate (FLONASE) 50 mcg/actuation nasal spray, 1 spray in each nostril twice a day, Disp: 16 g, Rfl: 5    ondansetron (ZOFRAN-ODT) 4 MG TbDL, Take 1 tablet (4 mg total) by mouth every 8 (eight) hours as needed (as needed for nausea)., Disp: 28 tablet, Rfl: 0    oxyCODONE-acetaminophen (PERCOCET) 5-325 mg per tablet, Take 1 tablet by mouth every 6 (six) hours as needed for Pain., Disp: 28 tablet, Rfl: 0    rosuvastatin (CRESTOR) 5 MG tablet, Take 5 mg by mouth once daily., Disp: , Rfl:     solriamfetoL (SUNOSI) 150 mg Tab, Take 1 tablet by mouth once daily., Disp: 30 tablet, Rfl: 3    STELARA 45 mg/0.5 mL Syrg syringe, , Disp: , Rfl:     XYREM 500 mg/mL Soln, , Disp: , Rfl:     dextroamphetamine-amphetamine 30 mg Tab, Take 1 tablet (30 mg total) by mouth 2 (two) times daily as needed., Disp: 60 tablet, Rfl: 0    EPINEPHrine (EPIPEN) 0.3 mg/0.3 mL AtIn, Inject 0.3 mLs (0.3 mg total) into the muscle once. As directed PRN for 1 dose, Disp: 2 each, Rfl: 11  ALLERGIES:   Review of patient's allergies indicates:   Allergen Reactions    Alcohol Anaphylaxis      RESPIRATORY DISTRESS           Physical Exam     Vitals:    05/12/23 0918   Resp: 16     Alert and oriented to person, place and time. No acute distress. Well-groomed, not ill appearing. Pupils round and reactive, normal respiratory effort, no audible wheezing.     On exam she has no significant thenar atrophy of the right upper extremity.  She has positive Tinel's at the elbow positive carpal tunnel and cubital tunnel compression test.  Positive Phalen's and Durkan's test.  Previous well-healed ulnar nerve and carpal tunnel incision on the left upper extremity.  Negative Spurling's of the neck.        Imaging:     EMG reviewed showing right carpal tunnel and right cubital tunnel syndrome    Assessment & Plan    Carpal tunnel syndrome, bilateral    Cubital tunnel syndrome, bilateral         Treatment options discussed with her in detail.  We discussed indications for surgery.  She would like to proceed with surgery due to persistent pain and paresthesias and EMG consistent with carpal tunnel and cubital tunnel syndrome.  She understands that this may not cure her neuropathy however still wants to proceed as her left did help her a bit.    _________________________________________________________________      Diagnosis and findings were discussed with her in lay terms. I discussed the findings and treatment options with them at length. Questions were actively sought and all questions were answered to their apparent satisfaction. We discussed the risks and benefits of surgery. We reviewed the operative indications surgical technique and potential rehabilitation involved. Risks including, but not limited to pain, bleeding, infection, damage to surrounding neurovascular structures, and other soft tissues, decreased range of motion, instability, poor cosmetic result, scarring/painful scars, poor functional result, reflex sympathetic dystrophy. We discussed as well the risk of anesthesia, DVT/PE and possible need for  additional surgical intervention in lay terms. Despite the risks as explained to them, they wished to proceed with surgery. She expressed understanding and agreement with the treatment plan and understand that no guarantee of outcome is implied or expressed given.       Holly Chicas will be scheduled for the following procedure(s):     Right ulnar nerve decompression at the elbow  Right carpal tunnel release      Post-Op Medications to be prescribed:   Percocet 5/325mg Take 1-2 tablets every 4-6 hours PRN pain #28  Zofran 4mg oral disintegrating tablets every 8 hours PRN nausea/vomiting       DME/Bracing:  None      Medical Clearance: No  Hx of DVT,PE, anesthetic complications: No    Additional notes/concerns:  None    Opioid Disclosure  Today we discussed the reasons why the prescription is necessary as well as: the risks of addiction and overdose associated with opioid drugs and the dangers of taking opioid drugs with alcohol, benzodiazepines and other central nervous system depressants; alternative treatments that may be available; the risks associated with the use of the drugs being prescribed, specifically that opioids are highly addictive, even when taken as prescribed, that there is a risk of developing a physical or psychological dependence on the controlled dangerous substance, and that the risks of taking more opioids than prescribed or mixing sedatives, benzodiazepines or alcohol with opioids can result in fatal respiratory depression.

## 2023-05-12 NOTE — H&P (VIEW-ONLY)
"Patient ID: Holly Chicas is a 52 y.o. female    Chief Complaint:   Chief Complaint   Patient presents with    Left Forearm - Pain    Left Wrist - Pain       History of Present Illness:    Pleasant 52-year-old female here for evaluation of right upper extremity pain.  She also reports numbness.  This has been ongoing for several months.  She is status post left ulnar nerve decompression and carpal tunnel decompression by me few months back.  She states this helped a lot with her numbness and tingling but still having some burning pain which we think is neuropathy.  She has had EMG in the past which showed carpal tunnel and cubital tunnel bilaterally.  She is here to discuss possible cubital tunnel and carpal tunnel release.    PAST MEDICAL HISTORY:   Past Medical History:   Diagnosis Date    Abnormal Pap smear     "pre-cancer cells post hysterectomy"    Anxiety     Arthritis     Depression     Good hypertension control 2014    Hyperlipidemia     Hypertension     Hypertriglyceridemia     Meningitis     Narcolepsy     Peripheral neuropathy 2014    PONV (postoperative nausea and vomiting)     Psoriasis     Psoriasis 05/10/2018     PAST SURGICAL HISTORY:   Past Surgical History:   Procedure Laterality Date    AUGMENTATION OF BREAST      BREAST SURGERY      aumentation/ reduction     BREAST SURGERY  2017    augmentation     brizilian butt lift   2017    CARPAL TUNNEL RELEASE Left 2022    Procedure: RELEASE, CARPAL TUNNEL;  Surgeon: Joce Montanez MD;  Location: [facility] OR;  Service: Orthopedics;  Laterality: Left;     SECTION      CHOLECYSTECTOMY  2018    Dr SEAN Christopher [facility]    COLONOSCOPY      COLONOSCOPY N/A 2022    Procedure: COLONOSCOPY;  Surgeon: Kavita Yee MD;  Location: [facility];  Service: Endoscopy;  Laterality: N/A;    ESOPHAGOGASTRODUODENOSCOPY N/A 2022    Procedure: EGD (ESOPHAGOGASTRODUODENOSCOPY);  Surgeon: Kavita Yee" MD;  Location: [facility] ENDO;  Service: Endoscopy;  Laterality: N/A;    FOOT SURGERY Left     CYST REMOVAL    GASTRIC BYPASS  04/2015    HERNIA REPAIR      UHR    HYSTERECTOMY      KNEE SURGERY Left     SCOPE    RELEASE OF ULNAR NERVE AT CUBITAL TUNNEL Left 12/7/2022    Procedure: RELEASE, ULNAR TUNNEL;  Surgeon: Joce Montanez MD;  Location: [facility] OR;  Service: Orthopedics;  Laterality: Left;    SHOULDER SURGERY  2000    right should surgery     SHOULDER SURGERY  01/2017    left shoulder / torn labrium     THIGH LIFT  03/03/2017    TONSILLECTOMY      tummy tuck   02/28/2016     FAMILY HISTORY:   Family History   Problem Relation Age of Onset    Cancer Mother         breast bilateral/ skin     Breast cancer Mother     Skin cancer Mother     Diabetes Father     Heart disease Father 65        CABG    Hypertension Father     Cancer Father         skin    Skin cancer Father     Gout Father     Colon polyps Father     Gout Brother     No Known Problems Daughter     No Known Problems Son     Cancer Maternal Aunt         lung, pancrease - smoker , breast    Lung cancer Maternal Aunt         x2    Pancreatic cancer Maternal Aunt     Breast cancer Maternal Aunt     No Known Problems Maternal Uncle     No Known Problems Paternal Uncle     Cancer Maternal Grandmother         pancrease    Pancreatic cancer Maternal Grandmother     Breast cancer Maternal Grandmother     Diabetes Paternal Grandmother     Heart disease Paternal Grandfather     Colon cancer Maternal Grandfather     Ovarian cancer Neg Hx     Ulcerative colitis Neg Hx     Stomach cancer Neg Hx     Crohn's disease Neg Hx     Rectal cancer Neg Hx      SOCIAL HISTORY:   Social History     Occupational History    Not on file   Tobacco Use    Smoking status: Never    Smokeless tobacco: Never   Substance and Sexual Activity    Alcohol use: No     Comment: ALLERGIC TO ALCOHOL(DRINKING) PER PATIENT    Drug use: No    Sexual activity: Yes     Partners: Male     Birth  control/protection: Surgical     Comment: SO        MEDICATIONS:   Current Outpatient Medications:     ACCU-CHEK GUIDE TEST STRIPS Strp, , Disp: , Rfl:     allerg xt,D.farinae-D.pteronys (ODACTRA) 12 SQ-HDM Subl, Place 1 tablet under the tongue once daily., Disp: 30 tablet, Rfl: 11    azelastine (ASTELIN) 137 mcg (0.1 %) nasal spray, 1 spray in each nosril twice a day, Disp: 30 mL, Rfl: 5    dextroamphetamine-amphetamine 30 mg Tab, Take 1 tablet (30 mg total) by mouth 2 (two) times daily as needed., Disp: 60 tablet, Rfl: 0    DUEXIS 800-26.6 mg Tab, Take 1 tablet by mouth 3 (three) times daily. , Disp: , Rfl:     estradioL (ESTRACE) 0.01 % (0.1 mg/gram) vaginal cream, USE 1/2 GRAM VAGINALLY 4 TIMES WEEKLY AS DIRECTED, Disp: , Rfl:     fexofenadine (ALLEGRA) 180 MG tablet, Take 1 tablet (180 mg total) by mouth once daily., Disp: 90 tablet, Rfl: 2    fluticasone propionate (FLONASE) 50 mcg/actuation nasal spray, 1 spray in each nostril twice a day, Disp: 16 g, Rfl: 5    ondansetron (ZOFRAN-ODT) 4 MG TbDL, Take 1 tablet (4 mg total) by mouth every 8 (eight) hours as needed (as needed for nausea)., Disp: 28 tablet, Rfl: 0    oxyCODONE-acetaminophen (PERCOCET) 5-325 mg per tablet, Take 1 tablet by mouth every 6 (six) hours as needed for Pain., Disp: 28 tablet, Rfl: 0    rosuvastatin (CRESTOR) 5 MG tablet, Take 5 mg by mouth once daily., Disp: , Rfl:     solriamfetoL (SUNOSI) 150 mg Tab, Take 1 tablet by mouth once daily., Disp: 30 tablet, Rfl: 3    STELARA 45 mg/0.5 mL Syrg syringe, , Disp: , Rfl:     XYREM 500 mg/mL Soln, , Disp: , Rfl:     dextroamphetamine-amphetamine 30 mg Tab, Take 1 tablet (30 mg total) by mouth 2 (two) times daily as needed., Disp: 60 tablet, Rfl: 0    EPINEPHrine (EPIPEN) 0.3 mg/0.3 mL AtIn, Inject 0.3 mLs (0.3 mg total) into the muscle once. As directed PRN for 1 dose, Disp: 2 each, Rfl: 11  ALLERGIES:   Review of patient's allergies indicates:   Allergen Reactions    Alcohol Anaphylaxis      RESPIRATORY DISTRESS           Physical Exam     Vitals:    05/12/23 0918   Resp: 16     Alert and oriented to person, place and time. No acute distress. Well-groomed, not ill appearing. Pupils round and reactive, normal respiratory effort, no audible wheezing.     On exam she has no significant thenar atrophy of the right upper extremity.  She has positive Tinel's at the elbow positive carpal tunnel and cubital tunnel compression test.  Positive Phalen's and Durkan's test.  Previous well-healed ulnar nerve and carpal tunnel incision on the left upper extremity.  Negative Spurling's of the neck.        Imaging:     EMG reviewed showing right carpal tunnel and right cubital tunnel syndrome    Assessment & Plan    Carpal tunnel syndrome, bilateral    Cubital tunnel syndrome, bilateral         Treatment options discussed with her in detail.  We discussed indications for surgery.  She would like to proceed with surgery due to persistent pain and paresthesias and EMG consistent with carpal tunnel and cubital tunnel syndrome.  She understands that this may not cure her neuropathy however still wants to proceed as her left did help her a bit.    _________________________________________________________________      Diagnosis and findings were discussed with her in lay terms. I discussed the findings and treatment options with them at length. Questions were actively sought and all questions were answered to their apparent satisfaction. We discussed the risks and benefits of surgery. We reviewed the operative indications surgical technique and potential rehabilitation involved. Risks including, but not limited to pain, bleeding, infection, damage to surrounding neurovascular structures, and other soft tissues, decreased range of motion, instability, poor cosmetic result, scarring/painful scars, poor functional result, reflex sympathetic dystrophy. We discussed as well the risk of anesthesia, DVT/PE and possible need for  additional surgical intervention in lay terms. Despite the risks as explained to them, they wished to proceed with surgery. She expressed understanding and agreement with the treatment plan and understand that no guarantee of outcome is implied or expressed given.       Holly Chicas will be scheduled for the following procedure(s):     Right ulnar nerve decompression at the elbow  Right carpal tunnel release      Post-Op Medications to be prescribed:   Percocet 5/325mg Take 1-2 tablets every 4-6 hours PRN pain #28  Zofran 4mg oral disintegrating tablets every 8 hours PRN nausea/vomiting       DME/Bracing:  None      Medical Clearance: No  Hx of DVT,PE, anesthetic complications: No    Additional notes/concerns:  None    Opioid Disclosure  Today we discussed the reasons why the prescription is necessary as well as: the risks of addiction and overdose associated with opioid drugs and the dangers of taking opioid drugs with alcohol, benzodiazepines and other central nervous system depressants; alternative treatments that may be available; the risks associated with the use of the drugs being prescribed, specifically that opioids are highly addictive, even when taken as prescribed, that there is a risk of developing a physical or psychological dependence on the controlled dangerous substance, and that the risks of taking more opioids than prescribed or mixing sedatives, benzodiazepines or alcohol with opioids can result in fatal respiratory depression.

## 2023-05-17 ENCOUNTER — TELEPHONE (OUTPATIENT)
Dept: ALLERGY | Facility: CLINIC | Age: 53
End: 2023-05-17
Payer: COMMERCIAL

## 2023-05-19 ENCOUNTER — HOSPITAL ENCOUNTER (OUTPATIENT)
Dept: PREADMISSION TESTING | Facility: HOSPITAL | Age: 53
Discharge: HOME | End: 2023-05-19
Attending: ORTHOPAEDIC SURGERY
Payer: COMMERCIAL

## 2023-05-19 DIAGNOSIS — G56.23 CUBITAL TUNNEL SYNDROME, BILATERAL: ICD-10-CM

## 2023-05-19 DIAGNOSIS — G56.03 CARPAL TUNNEL SYNDROME, BILATERAL: ICD-10-CM

## 2023-05-19 LAB
ANION GAP SERPL CALC-SCNC: 6 MMOL/L (ref 8–16)
BASOPHILS # BLD AUTO: 0.04 K/UL (ref 0–0.2)
BASOPHILS NFR BLD: 0.9 % (ref 0–1.9)
BUN SERPL-MCNC: 12 MG/DL (ref 6–20)
CALCIUM SERPL-MCNC: 9.5 MG/DL (ref 8.7–10.5)
CHLORIDE SERPL-SCNC: 103 MMOL/L (ref 95–110)
CO2 SERPL-SCNC: 34 MMOL/L (ref 23–29)
CREAT SERPL-MCNC: 0.8 MG/DL (ref 0.5–1.4)
DIFFERENTIAL METHOD: ABNORMAL
EOSINOPHIL # BLD AUTO: 0.1 K/UL (ref 0–0.5)
EOSINOPHIL NFR BLD: 1.8 % (ref 0–8)
ERYTHROCYTE [DISTWIDTH] IN BLOOD BY AUTOMATED COUNT: 12.2 % (ref 11.5–14.5)
EST. GFR  (NO RACE VARIABLE): >60 ML/MIN/1.73 M^2
GLUCOSE SERPL-MCNC: 69 MG/DL (ref 70–110)
HCT VFR BLD AUTO: 39.6 % (ref 37–48.5)
HGB BLD-MCNC: 13.4 G/DL (ref 12–16)
IMM GRANULOCYTES # BLD AUTO: 0.01 K/UL (ref 0–0.04)
IMM GRANULOCYTES NFR BLD AUTO: 0.2 % (ref 0–0.5)
LYMPHOCYTES # BLD AUTO: 1.5 K/UL (ref 1–4.8)
LYMPHOCYTES NFR BLD: 33.7 % (ref 18–48)
MCH RBC QN AUTO: 31.5 PG (ref 27–31)
MCHC RBC AUTO-ENTMCNC: 33.8 G/DL (ref 32–36)
MCV RBC AUTO: 93 FL (ref 82–98)
MONOCYTES # BLD AUTO: 0.5 K/UL (ref 0.3–1)
MONOCYTES NFR BLD: 12.1 % (ref 4–15)
NEUTROPHILS # BLD AUTO: 2.3 K/UL (ref 1.8–7.7)
NEUTROPHILS NFR BLD: 51.3 % (ref 38–73)
NRBC BLD-RTO: 0 /100 WBC
PLATELET # BLD AUTO: 222 K/UL (ref 150–450)
PMV BLD AUTO: 10.1 FL (ref 9.2–12.9)
POTASSIUM SERPL-SCNC: 4.9 MMOL/L (ref 3.5–5.1)
RBC # BLD AUTO: 4.25 M/UL (ref 4–5.4)
SODIUM SERPL-SCNC: 143 MMOL/L (ref 136–145)
WBC # BLD AUTO: 4.48 K/UL (ref 3.9–12.7)

## 2023-05-19 PROCEDURE — 85025 COMPLETE CBC W/AUTO DIFF WBC: CPT | Performed by: ORTHOPAEDIC SURGERY

## 2023-05-19 PROCEDURE — 99900103 DSU ONLY-NO CHARGE-INITIAL HR (STAT)

## 2023-05-19 PROCEDURE — 99900104 DSU ONLY-NO CHARGE-EA ADD'L HR (STAT)

## 2023-05-19 PROCEDURE — 80048 BASIC METABOLIC PNL TOTAL CA: CPT | Performed by: ORTHOPAEDIC SURGERY

## 2023-05-19 PROCEDURE — 36415 COLL VENOUS BLD VENIPUNCTURE: CPT | Performed by: ORTHOPAEDIC SURGERY

## 2023-05-19 NOTE — DISCHARGE INSTRUCTIONS
To confirm, Your doctor has instructed you that surgery is scheduled for: 5/25/23    Please report to [facility], Registration the morning of surgery. You must check-in and receive a wristband before going to your procedure.    Pre-Op will call the afternoon prior to surgery between 1:00 and 6:00 PM with the final arrival time.  Phone number: [phone]    PLEASE NOTE:  The surgery schedule has many variables which may affect the time of your surgery case.  Family members should be available if your surgery time changes.  Plan to be here the day of your procedure between 4-6 hours.    MEDICATIONS:  TAKE ONLY THESE MEDICATIONS WITH A SMALL SIP OF WATER THE MORNING OF YOUR PROCEDURE:    SEE MED LIST        DO NOT TAKE THESE MEDICATIONS 5-7 DAYS PRIOR to your procedure or per your surgeon's request:   ASPIRIN, ALEVE, ADVIL, IBUPROFEN, FISH OIL VITAMIN E, HERBALS  (May take Tylenol)    ONLY if you are prescribed any types of blood thinners such as:  Aspirin, Coumadin, Plavix, Pradaxa, Xarelto, Aggrenox, Effient, Eliquis, Savasya, Brilinta, or any other, ask your surgeon whether you should stop taking them and how long before surgery you should stop.  You may also need to verify with the prescribing physician if it is ok to stop your medication.      INSTRUCTIONS IMPORTANT!!  Do not eat or drink anything between midnight and the time of your procedure- this includes gum, mints, and candy.  Do not smoke or drink alcoholic beverages 24 hours prior to your procedure.  Shower the night before AND the morning of your procedure with a Chlorhexidine wash such as Hibiclens or Dial antibacterial soap from the neck down.  Do not get it on your face or in your eyes.  You may use your own shampoo and face wash. This helps your skin to be as bacteria free as possible.    If you wear contact lenses, dentures, hearing aids or glasses, bring a container to put them in during surgery and give to a family member  for safe keeping.  Please leave all jewelry, piercing's and valuables at home. You must remove your false eyelashes prior to surgery.    DO NOT remove hair from the surgery site.  Do not shave the incision site unless you are given specific instructions to do so.    ONLY if you have been diagnosed with sleep apnea please bring your C-PAP machine.  ONLY if you wear home oxygen please bring your portable oxygen tank the day of your procedure.  ONLY if you have a history of OPEN HEART SURGERY you will need a clearance from your Cardiologist per Anesthesia.      ONLY for patients requiring bowel prep, written instructions will be given by your doctor's office.  ONLY if you have a neuro stimulator, please bring the controller with you the morning of surgery  ONLY if a type and screen test is needed before surgery, please return:  If your doctor has scheduled you for an overnight stay, bring a small overnight bag with any personal items you need.  Make arrangements in advance for transportation home by a responsible adult.  It is not safe to drive a vehicle during the 24 hours after anesthesia.      [facility] Visitor Policy    Effective September 26, 2022    [facility] will resume routine visitation for COVID-19 negative patients, including inpatients, outpatients, and procedural areas, in accordance with local campus procedures.    All [facility] facilities and properties are tobacco free.  Smoking is NOT allowed.   If you have any questions about these instructions, call Pre-Op Admit  Nursing at [phone] or the Pre-Op Day Surgery Unit at [phone].

## 2023-05-24 ENCOUNTER — ANESTHESIA EVENT (OUTPATIENT)
Dept: SURGERY | Facility: HOSPITAL | Age: 53
End: 2023-05-24
Payer: COMMERCIAL

## 2023-05-24 DIAGNOSIS — G89.18 POST-OP PAIN: ICD-10-CM

## 2023-05-24 DIAGNOSIS — G56.03 CARPAL TUNNEL SYNDROME, BILATERAL: Primary | ICD-10-CM

## 2023-05-24 RX ORDER — ONDANSETRON 4 MG/1
4 TABLET, FILM COATED ORAL EVERY 8 HOURS PRN
Qty: 30 TABLET | Refills: 0 | Status: SHIPPED | OUTPATIENT
Start: 2023-05-24 | End: 2023-05-29

## 2023-05-24 RX ORDER — OXYCODONE AND ACETAMINOPHEN 5; 325 MG/1; MG/1
1 TABLET ORAL EVERY 4 HOURS PRN
Qty: 28 TABLET | Refills: 0 | Status: SHIPPED | OUTPATIENT
Start: 2023-05-24 | End: 2023-08-09

## 2023-05-25 ENCOUNTER — ANESTHESIA (OUTPATIENT)
Dept: SURGERY | Facility: HOSPITAL | Age: 53
End: 2023-05-25
Payer: COMMERCIAL

## 2023-05-25 ENCOUNTER — HOSPITAL ENCOUNTER (OUTPATIENT)
Facility: HOSPITAL | Age: 53
Discharge: HOME | End: 2023-05-25
Attending: ORTHOPAEDIC SURGERY | Admitting: ORTHOPAEDIC SURGERY
Payer: COMMERCIAL

## 2023-05-25 DIAGNOSIS — G56.03 CARPAL TUNNEL SYNDROME, BILATERAL: ICD-10-CM

## 2023-05-25 DIAGNOSIS — G56.23 CUBITAL TUNNEL SYNDROME, BILATERAL: ICD-10-CM

## 2023-05-25 DIAGNOSIS — G56.03 CARPAL TUNNEL SYNDROME, BILATERAL UPPER LIMBS: ICD-10-CM

## 2023-05-25 PROCEDURE — 27200750 HC INSULATED NEEDLE/ STIMUPLEX: Performed by: ANESTHESIOLOGY

## 2023-05-25 PROCEDURE — 25000003 PHARM REV CODE 250: Performed by: ANESTHESIOLOGY

## 2023-05-25 PROCEDURE — 63600175 PHARM REV CODE 636 W HCPCS: Performed by: ORTHOPAEDIC SURGERY

## 2023-05-25 PROCEDURE — 71000039 HC RECOVERY, EACH ADD'L HOUR: Performed by: ORTHOPAEDIC SURGERY

## 2023-05-25 PROCEDURE — 64718 REVISE ULNAR NERVE AT ELBOW: CPT | Mod: RT,,, | Performed by: ORTHOPAEDIC SURGERY

## 2023-05-25 PROCEDURE — 63600175 PHARM REV CODE 636 W HCPCS: Performed by: NURSE ANESTHETIST, CERTIFIED REGISTERED

## 2023-05-25 PROCEDURE — 36000707: Performed by: ORTHOPAEDIC SURGERY

## 2023-05-25 PROCEDURE — 63600175 PHARM REV CODE 636 W HCPCS

## 2023-05-25 PROCEDURE — C1729 CATH, DRAINAGE: HCPCS | Performed by: ORTHOPAEDIC SURGERY

## 2023-05-25 PROCEDURE — D9220A PRA ANESTHESIA: ICD-10-PCS | Mod: CRNA,,, | Performed by: NURSE ANESTHETIST, CERTIFIED REGISTERED

## 2023-05-25 PROCEDURE — 25000003 PHARM REV CODE 250: Performed by: NURSE ANESTHETIST, CERTIFIED REGISTERED

## 2023-05-25 PROCEDURE — 71000033 HC RECOVERY, INTIAL HOUR: Performed by: ORTHOPAEDIC SURGERY

## 2023-05-25 PROCEDURE — D9220A PRA ANESTHESIA: Mod: ANES,,, | Performed by: ANESTHESIOLOGY

## 2023-05-25 PROCEDURE — 63600175 PHARM REV CODE 636 W HCPCS: Performed by: ANESTHESIOLOGY

## 2023-05-25 PROCEDURE — 37000008 HC ANESTHESIA 1ST 15 MINUTES: Performed by: ORTHOPAEDIC SURGERY

## 2023-05-25 PROCEDURE — 64415 NJX AA&/STRD BRCH PLXS IMG: CPT | Mod: 59,RT | Performed by: ANESTHESIOLOGY

## 2023-05-25 PROCEDURE — 99900103 DSU ONLY-NO CHARGE-INITIAL HR (STAT): Performed by: ORTHOPAEDIC SURGERY

## 2023-05-25 PROCEDURE — D9220A PRA ANESTHESIA: ICD-10-PCS | Mod: ANES,,, | Performed by: ANESTHESIOLOGY

## 2023-05-25 PROCEDURE — 64721 PR REVISE MEDIAN N/CARPAL TUNNEL SURG: ICD-10-PCS | Mod: 51,RT,, | Performed by: ORTHOPAEDIC SURGERY

## 2023-05-25 PROCEDURE — 64718 PR REVISE ULNAR NERVE AT ELBOW: ICD-10-PCS | Mod: RT,,, | Performed by: ORTHOPAEDIC SURGERY

## 2023-05-25 PROCEDURE — 99900104 DSU ONLY-NO CHARGE-EA ADD'L HR (STAT): Performed by: ORTHOPAEDIC SURGERY

## 2023-05-25 PROCEDURE — 37000009 HC ANESTHESIA EA ADD 15 MINS: Performed by: ORTHOPAEDIC SURGERY

## 2023-05-25 PROCEDURE — D9220A PRA ANESTHESIA: Mod: CRNA,,, | Performed by: NURSE ANESTHETIST, CERTIFIED REGISTERED

## 2023-05-25 PROCEDURE — C9290 INJ, BUPIVACAINE LIPOSOME: HCPCS | Performed by: ANESTHESIOLOGY

## 2023-05-25 PROCEDURE — 71000015 HC POSTOP RECOV 1ST HR: Performed by: ORTHOPAEDIC SURGERY

## 2023-05-25 PROCEDURE — 36000706: Performed by: ORTHOPAEDIC SURGERY

## 2023-05-25 PROCEDURE — 94799 UNLISTED PULMONARY SVC/PX: CPT

## 2023-05-25 PROCEDURE — 25000003 PHARM REV CODE 250: Performed by: ORTHOPAEDIC SURGERY

## 2023-05-25 PROCEDURE — 64721 CARPAL TUNNEL SURGERY: CPT | Mod: 51,RT,, | Performed by: ORTHOPAEDIC SURGERY

## 2023-05-25 RX ORDER — LIDOCAINE HYDROCHLORIDE 20 MG/ML
INJECTION INTRAVENOUS
Status: DISCONTINUED | OUTPATIENT
Start: 2023-05-25 | End: 2023-05-25

## 2023-05-25 RX ORDER — MUPIROCIN 20 MG/G
OINTMENT TOPICAL
Status: DISCONTINUED | OUTPATIENT
Start: 2023-05-25 | End: 2023-05-25 | Stop reason: HOSPADM

## 2023-05-25 RX ORDER — FENTANYL CITRATE 50 UG/ML
25 INJECTION, SOLUTION INTRAMUSCULAR; INTRAVENOUS EVERY 5 MIN PRN
Status: ACTIVE | OUTPATIENT
Start: 2023-05-25

## 2023-05-25 RX ORDER — ONDANSETRON HYDROCHLORIDE 2 MG/ML
INJECTION, SOLUTION INTRAMUSCULAR; INTRAVENOUS
Status: DISCONTINUED | OUTPATIENT
Start: 2023-05-25 | End: 2023-05-25

## 2023-05-25 RX ORDER — LIDOCAINE HYDROCHLORIDE 10 MG/ML
1 INJECTION, SOLUTION EPIDURAL; INFILTRATION; INTRACAUDAL; PERINEURAL ONCE
Status: COMPLETED | OUTPATIENT
Start: 2023-05-25 | End: 2023-05-25

## 2023-05-25 RX ORDER — ONDANSETRON 4 MG/1
8 TABLET, ORALLY DISINTEGRATING ORAL EVERY 8 HOURS PRN
Status: CANCELLED | OUTPATIENT
Start: 2023-05-25

## 2023-05-25 RX ORDER — OXYCODONE HYDROCHLORIDE 10 MG/1
10 TABLET ORAL EVERY 4 HOURS PRN
Status: CANCELLED | OUTPATIENT
Start: 2023-05-25

## 2023-05-25 RX ORDER — PHENYLEPHRINE HYDROCHLORIDE 10 MG/ML
INJECTION INTRAVENOUS
Status: DISCONTINUED | OUTPATIENT
Start: 2023-05-25 | End: 2023-05-25

## 2023-05-25 RX ORDER — EPHEDRINE SULFATE 50 MG/ML
INJECTION, SOLUTION INTRAVENOUS
Status: DISCONTINUED | OUTPATIENT
Start: 2023-05-25 | End: 2023-05-25

## 2023-05-25 RX ORDER — HYDROMORPHONE HYDROCHLORIDE 2 MG/ML
0.2 INJECTION, SOLUTION INTRAMUSCULAR; INTRAVENOUS; SUBCUTANEOUS EVERY 5 MIN PRN
Status: ACTIVE | OUTPATIENT
Start: 2023-05-25

## 2023-05-25 RX ORDER — SODIUM CHLORIDE, SODIUM LACTATE, POTASSIUM CHLORIDE, CALCIUM CHLORIDE 600; 310; 30; 20 MG/100ML; MG/100ML; MG/100ML; MG/100ML
500 INJECTION, SOLUTION INTRAVENOUS ONCE
Status: ACTIVE | OUTPATIENT
Start: 2023-05-25

## 2023-05-25 RX ORDER — DIPHENHYDRAMINE HYDROCHLORIDE 50 MG/ML
12.5 INJECTION INTRAMUSCULAR; INTRAVENOUS ONCE AS NEEDED
Status: ACTIVE | OUTPATIENT
Start: 2023-05-25 | End: 2034-10-21

## 2023-05-25 RX ORDER — ONDANSETRON 2 MG/ML
4 INJECTION INTRAMUSCULAR; INTRAVENOUS ONCE AS NEEDED
Status: ACTIVE | OUTPATIENT
Start: 2023-05-25 | End: 2034-10-21

## 2023-05-25 RX ORDER — FENTANYL CITRATE 50 UG/ML
INJECTION, SOLUTION INTRAMUSCULAR; INTRAVENOUS
Status: DISCONTINUED | OUTPATIENT
Start: 2023-05-25 | End: 2023-05-25

## 2023-05-25 RX ORDER — DEXAMETHASONE SODIUM PHOSPHATE 4 MG/ML
INJECTION, SOLUTION INTRA-ARTICULAR; INTRALESIONAL; INTRAMUSCULAR; INTRAVENOUS; SOFT TISSUE
Status: DISCONTINUED | OUTPATIENT
Start: 2023-05-25 | End: 2023-05-25

## 2023-05-25 RX ORDER — PROPOFOL 10 MG/ML
VIAL (ML) INTRAVENOUS
Status: DISCONTINUED | OUTPATIENT
Start: 2023-05-25 | End: 2023-05-25

## 2023-05-25 RX ORDER — HYDROCODONE BITARTRATE AND ACETAMINOPHEN 5; 325 MG/1; MG/1
1 TABLET ORAL EVERY 4 HOURS PRN
Status: CANCELLED | OUTPATIENT
Start: 2023-05-25

## 2023-05-25 RX ORDER — ACETAMINOPHEN 325 MG/1
650 TABLET ORAL EVERY 4 HOURS PRN
Status: CANCELLED | OUTPATIENT
Start: 2023-05-25

## 2023-05-25 RX ORDER — SCOLOPAMINE TRANSDERMAL SYSTEM 1 MG/1
1 PATCH, EXTENDED RELEASE TRANSDERMAL
Status: DISCONTINUED | OUTPATIENT
Start: 2023-05-25 | End: 2023-05-25 | Stop reason: HOSPADM

## 2023-05-25 RX ORDER — PROMETHAZINE HYDROCHLORIDE 25 MG/ML
INJECTION, SOLUTION INTRAMUSCULAR; INTRAVENOUS
Status: DISCONTINUED | OUTPATIENT
Start: 2023-05-25 | End: 2023-05-25

## 2023-05-25 RX ORDER — MIDAZOLAM HYDROCHLORIDE 1 MG/ML
INJECTION INTRAMUSCULAR; INTRAVENOUS
Status: DISCONTINUED | OUTPATIENT
Start: 2023-05-25 | End: 2023-05-25

## 2023-05-25 RX ORDER — CEFAZOLIN SODIUM 2 G/50ML
2 SOLUTION INTRAVENOUS
Status: COMPLETED | OUTPATIENT
Start: 2023-05-25 | End: 2023-05-25

## 2023-05-25 RX ORDER — SODIUM CHLORIDE, SODIUM LACTATE, POTASSIUM CHLORIDE, CALCIUM CHLORIDE 600; 310; 30; 20 MG/100ML; MG/100ML; MG/100ML; MG/100ML
10 INJECTION, SOLUTION INTRAVENOUS CONTINUOUS
Status: ACTIVE | OUTPATIENT
Start: 2023-05-25

## 2023-05-25 RX ORDER — SODIUM CHLORIDE 0.9 % (FLUSH) 0.9 %
3 SYRINGE (ML) INJECTION EVERY 8 HOURS
Status: ACTIVE | OUTPATIENT
Start: 2023-05-25

## 2023-05-25 RX ORDER — LORAZEPAM 2 MG/ML
0.25 INJECTION INTRAMUSCULAR ONCE AS NEEDED
Status: ACTIVE | OUTPATIENT
Start: 2023-05-25 | End: 2034-10-21

## 2023-05-25 RX ORDER — ACETAMINOPHEN 10 MG/ML
INJECTION, SOLUTION INTRAVENOUS
Status: DISCONTINUED | OUTPATIENT
Start: 2023-05-25 | End: 2023-05-25

## 2023-05-25 RX ORDER — PROCHLORPERAZINE EDISYLATE 5 MG/ML
5 INJECTION INTRAMUSCULAR; INTRAVENOUS EVERY 30 MIN PRN
Status: ACTIVE | OUTPATIENT
Start: 2023-05-25

## 2023-05-25 RX ORDER — OXYCODONE HYDROCHLORIDE 5 MG/1
5 TABLET ORAL ONCE AS NEEDED
Status: COMPLETED | OUTPATIENT
Start: 2023-05-25 | End: 2023-05-25

## 2023-05-25 RX ORDER — MEPERIDINE HYDROCHLORIDE 50 MG/ML
12.5 INJECTION INTRAMUSCULAR; INTRAVENOUS; SUBCUTANEOUS ONCE AS NEEDED
Status: ACTIVE | OUTPATIENT
Start: 2023-05-25 | End: 2023-05-26

## 2023-05-25 RX ORDER — BUPIVACAINE HYDROCHLORIDE 5 MG/ML
INJECTION, SOLUTION EPIDURAL; INTRACAUDAL
Status: DISCONTINUED | OUTPATIENT
Start: 2023-05-25 | End: 2023-05-25

## 2023-05-25 RX ORDER — SODIUM CHLORIDE 0.9 % (FLUSH) 0.9 %
10 SYRINGE (ML) INJECTION
Status: DISCONTINUED | OUTPATIENT
Start: 2023-05-25 | End: 2023-05-25 | Stop reason: HOSPADM

## 2023-05-25 RX ADMIN — EPHEDRINE SULFATE 20 MG: 50 INJECTION, SOLUTION INTRAMUSCULAR; INTRAVENOUS; SUBCUTANEOUS at 08:05

## 2023-05-25 RX ADMIN — PHENYLEPHRINE HYDROCHLORIDE 200 MCG: 10 INJECTION INTRAVENOUS at 08:05

## 2023-05-25 RX ADMIN — SODIUM CHLORIDE, SODIUM GLUCONATE, SODIUM ACETATE, POTASSIUM CHLORIDE AND MAGNESIUM CHLORIDE: 526; 502; 368; 37; 30 INJECTION, SOLUTION INTRAVENOUS at 07:05

## 2023-05-25 RX ADMIN — GLYCOPYRROLATE 0.2 MG: 0.2 INJECTION, SOLUTION INTRAMUSCULAR; INTRAVITREAL at 08:05

## 2023-05-25 RX ADMIN — FENTANYL CITRATE 100 MCG: 50 INJECTION, SOLUTION INTRAMUSCULAR; INTRAVENOUS at 08:05

## 2023-05-25 RX ADMIN — PROMETHAZINE HYDROCHLORIDE 6.25 MG: 25 INJECTION INTRAMUSCULAR; INTRAVENOUS at 08:05

## 2023-05-25 RX ADMIN — LIDOCAINE HYDROCHLORIDE 100 MG: 20 INJECTION, SOLUTION INTRAVENOUS at 08:05

## 2023-05-25 RX ADMIN — DEXAMETHASONE SODIUM PHOSPHATE 4 MG: 4 INJECTION, SOLUTION INTRA-ARTICULAR; INTRALESIONAL; INTRAMUSCULAR; INTRAVENOUS; SOFT TISSUE at 08:05

## 2023-05-25 RX ADMIN — LIDOCAINE HYDROCHLORIDE 10 MG: 10 INJECTION, SOLUTION EPIDURAL; INFILTRATION; INTRACAUDAL; PERINEURAL at 07:05

## 2023-05-25 RX ADMIN — BUPIVACAINE 12.5 ML: 13.3 INJECTION, SUSPENSION, LIPOSOMAL INFILTRATION at 08:05

## 2023-05-25 RX ADMIN — SCOPALAMINE 1 PATCH: 1 PATCH, EXTENDED RELEASE TRANSDERMAL at 07:05

## 2023-05-25 RX ADMIN — BUPIVACAINE HYDROCHLORIDE 12.5 ML: 5 INJECTION, SOLUTION EPIDURAL; INTRACAUDAL; PERINEURAL at 08:05

## 2023-05-25 RX ADMIN — PROPOFOL 160 MG: 10 INJECTION, EMULSION INTRAVENOUS at 08:05

## 2023-05-25 RX ADMIN — PHENYLEPHRINE HYDROCHLORIDE 100 MCG: 10 INJECTION INTRAVENOUS at 08:05

## 2023-05-25 RX ADMIN — ONDANSETRON 8 MG: 2 INJECTION INTRAMUSCULAR; INTRAVENOUS at 08:05

## 2023-05-25 RX ADMIN — OXYCODONE HYDROCHLORIDE 5 MG: 5 TABLET ORAL at 09:05

## 2023-05-25 RX ADMIN — MUPIROCIN: 20 OINTMENT TOPICAL at 07:05

## 2023-05-25 RX ADMIN — MIDAZOLAM HYDROCHLORIDE 2 MG: 1 INJECTION, SOLUTION INTRAMUSCULAR; INTRAVENOUS at 08:05

## 2023-05-25 RX ADMIN — CEFAZOLIN SODIUM 2 G: 2 SOLUTION INTRAVENOUS at 08:05

## 2023-05-25 RX ADMIN — EPHEDRINE SULFATE 10 MG: 50 INJECTION, SOLUTION INTRAMUSCULAR; INTRAVENOUS; SUBCUTANEOUS at 08:05

## 2023-05-25 RX ADMIN — ACETAMINOPHEN 1000 MG: 10 INJECTION, SOLUTION INTRAVENOUS at 08:05

## 2023-05-25 RX ADMIN — SODIUM CHLORIDE, SODIUM GLUCONATE, SODIUM ACETATE, POTASSIUM CHLORIDE AND MAGNESIUM CHLORIDE: 526; 502; 368; 37; 30 INJECTION, SOLUTION INTRAVENOUS at 08:05

## 2023-05-25 NOTE — BRIEF OP NOTE
[facility]  Brief Operative Note    Surgery Date: 5/25/2023     Surgeon(s) and Role:     * Joce Montanez MD - Primary    Assisting Surgeon: None    Pre-op Diagnosis:  Carpal tunnel syndrome, bilateral [G56.03]  Cubital tunnel syndrome, bilateral [G56.23]    Post-op Diagnosis:  Post-Op Diagnosis Codes:     * Carpal tunnel syndrome, bilateral [G56.03]     * Cubital tunnel syndrome, bilateral [G56.23]    Procedure(s) (LRB):  RELEASE, CARPAL TUNNEL (Right)  RELEASE, ULNAR TUNNEL (Right)    Anesthesia: General    Operative Findings: see op note     Estimated Blood Loss: * No values recorded between 5/25/2023  8:36 AM and 5/25/2023  9:07 AM *         Specimens:   Specimen (24h ago, onward)      None              Discharge Note    OUTCOME: Patient tolerated treatment/procedure well without complication and is now ready for discharge.    DISPOSITION: Home or Self Care    FINAL DIAGNOSIS:  <principal problem not specified>    FOLLOWUP: In clinic    DISCHARGE INSTRUCTIONS:  No discharge procedures on file.

## 2023-05-25 NOTE — ANESTHESIA POSTPROCEDURE EVALUATION
Anesthesia Post Evaluation    Patient: Holly Chicas    Procedure(s) Performed: Procedure(s) (LRB):  RELEASE, CARPAL TUNNEL (Right)  RELEASE, ULNAR TUNNEL (Right)    Final Anesthesia Type: general      Patient location during evaluation: PACU  Patient participation: Yes- Able to Participate  Level of consciousness: awake and alert  Post-procedure vital signs: reviewed and stable  Pain management: adequate  Airway patency: patent    PONV status at discharge: No PONV  Anesthetic complications: no      Cardiovascular status: hemodynamically stable  Respiratory status: unassisted and room air  Hydration status: euvolemic  Follow-up not needed.          Vitals Value Taken Time   /83 05/25/23 1030   Temp 36.7 °C (98 °F) 05/25/23 1015   Pulse 75 05/25/23 1030   Resp 20 05/25/23 1030   SpO2 100 % 05/25/23 1030         Event Time   Out of Recovery 10:17:00         Pain/Marielena Score: Pain Rating Prior to Med Admin: 2 (5/25/2023  9:43 AM)  Pain Rating Post Med Admin: 0 (5/25/2023 10:17 AM)  Marielena Score: 10 (5/25/2023 10:10 AM)  Modified Marielena Score: 20 (5/25/2023 10:27 AM)

## 2023-05-25 NOTE — ANESTHESIA PROCEDURE NOTES
Peripheral Block    Patient location during procedure: pre-op   Block not for primary anesthetic.  Reason for block: at surgeon's request and post-op pain management   Post-op Pain Location: right wrist   Start time: 5/25/2023 8:00 AM  Timeout: 5/25/2023 8:00 AM   End time: 5/25/2023 8:04 AM    Staffing  Authorizing Provider: Pedro Pablo Miranda MD  Performing Provider: Pedro Pablo Miranda MD    Preanesthetic Checklist  Completed: patient identified, IV checked, site marked, risks and benefits discussed, surgical consent, monitors and equipment checked, pre-op evaluation and timeout performed  Peripheral Block  Patient position: supine  Prep: ChloraPrep  Patient monitoring: heart rate, cardiac monitor, continuous pulse ox, continuous capnometry and frequent blood pressure checks  Block type: supraclavicular  Laterality: right  Injection technique: single shot  Needle  Needle type: Stimuplex   Needle gauge: 22 G  Needle length: 2 in  Needle localization: anatomical landmarks and ultrasound guidance   -ultrasound image captured on disc.  Assessment  Injection assessment: negative aspiration, negative parasthesia and local visualized surrounding nerve  Paresthesia pain: none  Heart rate change: no  Slow fractionated injection: yes  Pain Tolerance: comfortable throughout block and no complaints  Medications:    Medications: bupivacaine (pf) (MARCAINE) injection 0.5% - Perineural   12.5 mL - 5/25/2023 8:04:00 AM    Additional Notes  VSS.  [facility] RN monitoring vitals throughout procedure.  Patient tolerated procedure well.

## 2023-05-25 NOTE — TRANSFER OF CARE
"Anesthesia Transfer of Care Note    Patient: Holly Chicas    Procedure(s) Performed: Procedure(s) (LRB):  RELEASE, CARPAL TUNNEL (Right)  RELEASE, ULNAR TUNNEL (Right)    Patient location: PACU    Anesthesia Type: general    Transport from OR: Transported from OR on 2-3 L/min O2 by NC with adequate spontaneous ventilation    Post pain: adequate analgesia    Post assessment: no apparent anesthetic complications    Post vital signs: stable    Level of consciousness: responds to stimulation    Nausea/Vomiting: no nausea/vomiting    Complications: none    Transfer of care protocol was followed      Last vitals:   Visit Vitals  /83 (BP Location: Left arm, Patient Position: Lying)   Pulse 68   Temp 36.5 °C (97.7 °F) (Oral)   Resp 16   Ht 5' 6" (1.676 m)   Wt 60.8 kg (134 lb)   SpO2 99%   Breastfeeding No   BMI 21.63 kg/m²     "

## 2023-05-25 NOTE — PLAN OF CARE
VSS. NAD. Resp E/U. Calm and cooperative. Speech appropriate. Tolerating clear liquids. Denies nausea. Pain controlled. IV patent. No swelling or redness. Dressing to Rt arm CDI. Sling and ice in place. Pt cleared by anesthesia for next phase of care. Family notified of patient status. All safety measures taken. Bed in low position. Bedrails up x2. Bed locked.

## 2023-05-25 NOTE — ANESTHESIA PROCEDURE NOTES
Intubation    Date/Time: 5/25/2023 8:18 AM  Performed by: Carroll Yao CRNA  Authorized by: Pedro Pablo Miranda MD     Intubation:     Induction:  Intravenous    Intubated:  Postinduction    Mask Ventilation:  Not attempted    Attempts:  1    Attempted By:  CRNA    Difficult Airway Encountered?: No      Complications:  None    Airway Device:  Supraglottic airway/LMA    Airway Device Size:  3.0    Style/Cuff Inflation:  Cuffed    Secured at:  The lips    Placement Verified By:  Capnometry    Complicating Factors:  None    Findings Post-Intubation:  BS equal bilateral

## 2023-05-25 NOTE — ANESTHESIA PREPROCEDURE EVALUATION
05/25/2023  Holly Chicas is a 52 y.o., female.      Pre-op Assessment    I have reviewed the Patient Summary Reports.     I have reviewed the Nursing Notes. I have reviewed the NPO Status.   I have reviewed the Medications.     Review of Systems  Cardiovascular:   Hypertension hyperlipidemia    Musculoskeletal:   Right hand carpal tunnel syndrome    Neurological:   Neuromuscular Disease, Headaches    Endocrine:   Diabetes, type 2    Psych:   Psychiatric History depression          Physical Exam  General: Well nourished, Cooperative, Alert and Oriented    Airway:  Mallampati: II   Mouth Opening: Normal  TM Distance: Normal  Tongue: Normal  Neck ROM: Normal ROM    Dental:  Intact        Anesthesia Plan  Type of Anesthesia, risks & benefits discussed:    Anesthesia Type: Gen Supraglottic Airway  Intra-op Monitoring Plan: Standard ASA Monitors  Post Op Pain Control Plan: multimodal analgesia, peripheral nerve block and IV/PO Opioids PRN  Induction:  IV  Informed Consent: Informed consent signed with the Patient and all parties understand the risks and agree with anesthesia plan.  All questions answered.   ASA Score: 2    Ready For Surgery From Anesthesia Perspective.     .

## 2023-05-25 NOTE — OP NOTE
05/25/2023    PREOPERATIVE DIAGNOSIS:      Right carpal tunnel syndrome  Right cubital tunnel syndrome    POSTOPERATIVE DIAGNOSIS: Same    PROCEDURE:      Right ulnar nerve decompression at the elbow  Carpal tunnel release right wrist      SURGEON: Joce Montanez MD    ASSISTANT: Hunter Elise _ PAC    He was present and scrubbed for the entirety of the procedure. They were required for patient positioning, retraction, insertion of implants and closure. Without him I would have been unable to safely perform the case due to only one scrub tech available.     ANESTHESIA:  General     ESTIMATED BLOOD LOSS:  5 mL    INDICATIONS:  Holly Chicas is a 52 y.o. year-old female who presented to my clinic with a chief complaint of bilateral hand pain and numbness.  EMG consistent with carpal tunnel and cubital tunnel syndrome.  Previously underwent left carpal tunnel and cubital tunnel release by me.  Was interested in having her right upper extremity also addressed.    We discussed the risks and benefits of the surgery in detail including stiffness, CRPS, damage to surrounding neurovascular structures, failure of fixation as well as need for revision surgery. Despite these risks they elected to proceed.       COMPONENTS USED:      * No implants in log *      DESCRIPTION OF PROCEDURE:  The patient was seen in the pre-operative area where consents were verified and the surgical site marked. They did receive a preoperative block for intra-operative and postoperative analgesia. The patient was taken to the Operating Room where anesthesia was administered by the Anesthesia Department. She was then placed in the supine  position and all superficial neurovascular structures were well padded.  The right upper extremity  was then sterilely prepped and draped in the normal fashion.  Preoperative antibiotics were administered.  A time-out was performed verifying the procedure and laterality, all agreed and elected to proceed as  planned.    Esmarch tourniquet was used to exsanguinate the limb and the tourniquet inflated for the duration of the case which was 30 minutes at 250 mmHg.  We began at the elbow.  Curvilinear incision was made over the medial elbow between the medial epicondyle and olecranon.  Blunt dissection was carried down to the deep fascia.  Boyd ligament was then incised and the ulnar nerve was identified in the cubital tunnel.  Distally we released the fascia and the 2 heads of the FCU fascia.  Proximally released the intermuscular septum and made sure that we were completely released proximal and distal using a Palm elevator to confirm our decompression.  I took the elbow through range of motion and did not appreciate any subluxation.  We elected to continue with an insight shoe decompression.  We then turned our attention distally at the carpal tunnel.  Using Disla's cardinal lines we marked out our incision over the volar wrist.  Incision was then made and the deep fascial and palmar fascia was retracted out of harm's way.  The transverse carpal ligament was incised longitudinally with a knife and a hemostat was then used proximal and distal to release any adhesions.  We then used straight Metzenbaum scissors distal and proximal to ensure complete release of the transverse carpal ligament.  Palm elevator was used to verify decompression.  We then irrigated copiously with normal saline.  We closed our wrist wound with 3-0 nylon.  Proximally we closed the elbow with 2-0 Vicryl and Monocryl with Dermabond glue.  Patient tolerated the procedure well.    Disposition:      She was placed in a soft dressing postoperatively.  She will be nonweightbearing without any heavy gripping.  Passive range of motion as tolerated.  Follow up 2 weeks    Joce Montanez MD

## 2023-05-26 VITALS
HEIGHT: 66 IN | OXYGEN SATURATION: 100 % | RESPIRATION RATE: 20 BRPM | DIASTOLIC BLOOD PRESSURE: 83 MMHG | WEIGHT: 134 LBS | TEMPERATURE: 98 F | SYSTOLIC BLOOD PRESSURE: 147 MMHG | BODY MASS INDEX: 21.53 KG/M2 | HEART RATE: 75 BPM

## 2023-05-26 NOTE — PROGRESS NOTES
"SUBJECTIVE:    CHIEF COMPLAINT:   Chief Complaint   Patient presents with    Establish Care     Not fasting, burning, numbness, and tingling in both hands (rash on left arm)           274}    HISTORY OF PRESENT ILLNESS:  Holly Chicas is a 52 y.o. female with a PMHx significant for Gastric bypass,Type 2 Diabetes Mellitus, hypertension, HLD, narcolepsy, and CTS s/p carpal tunnel release who presents to the clinic today to establish care. She has expressed some concerns about a numbness  and tingling in her b/l Upper extremities, for the past 6mos, and left forearm rash for the past 3 days. Denies contact with infectious agent, changes in detergent, insect sting, fevers, chils or prior occurrence of the same.This is the extent of the patient's complaints at this present time.         PAST MEDICAL HISTORY:     274}  Past Medical History:   Diagnosis Date    Abnormal Pap smear     "pre-cancer cells post hysterectomy"    Anxiety     Arthritis     Depression     Good hypertension control 2014    Hyperlipidemia     Hypertension     Hypertriglyceridemia     Meningitis     Narcolepsy     Peripheral neuropathy 2014    PONV (postoperative nausea and vomiting)     Psoriasis     Psoriasis 05/10/2018       PAST SURGICAL HISTORY:  Past Surgical History:   Procedure Laterality Date    AUGMENTATION OF BREAST      BREAST SURGERY      aumentation/ reduction     BREAST SURGERY  2017    augmentation     brizilian butt lift   2017    CARPAL TUNNEL RELEASE Left 2022    Procedure: RELEASE, CARPAL TUNNEL;  Surgeon: Joce Montanez MD;  Location: [facility] OR;  Service: Orthopedics;  Laterality: Left;    CARPAL TUNNEL RELEASE Right 2023    Procedure: RELEASE, CARPAL TUNNEL;  Surgeon: Joce Montanez MD;  Location: [facility] OR;  Service: Orthopedics;  Laterality: Right;     SECTION      CHOLECYSTECTOMY  2018    Dr SEAN [facility]    COLONOSCOPY      COLONOSCOPY N/A 2022    " Procedure: COLONOSCOPY;  Surgeon: Kavita Yee MD;  Location: [facility] ENDO;  Service: Endoscopy;  Laterality: N/A;    ESOPHAGOGASTRODUODENOSCOPY N/A 04/29/2022    Procedure: EGD (ESOPHAGOGASTRODUODENOSCOPY);  Surgeon: Kavita Yee MD;  Location: [facility] ENDO;  Service: Endoscopy;  Laterality: N/A;    FOOT SURGERY Left     CYST REMOVAL    GASTRIC BYPASS  04/2015    HERNIA REPAIR      UHR    HYSTERECTOMY      KNEE SURGERY Left     SCOPE    RELEASE OF ULNAR NERVE AT CUBITAL TUNNEL Left 12/7/2022    Procedure: RELEASE, ULNAR TUNNEL;  Surgeon: Joce Montanez MD;  Location: [facility] OR;  Service: Orthopedics;  Laterality: Left;    RELEASE OF ULNAR NERVE AT CUBITAL TUNNEL Right 5/25/2023    Procedure: RELEASE, ULNAR TUNNEL;  Surgeon: Joce Montanez MD;  Location: [facility] OR;  Service: Orthopedics;  Laterality: Right;    SHOULDER SURGERY  2000    right should surgery     SHOULDER SURGERY  01/2017    left shoulder / torn labrium     THIGH LIFT  03/03/2017    TONSILLECTOMY      tummy tuck   02/28/2016       SOCIAL HISTORY:  Social History     Socioeconomic History    Marital status:    Tobacco Use    Smoking status: Never    Smokeless tobacco: Never   Substance and Sexual Activity    Alcohol use: No     Comment: ALLERGIC TO ALCOHOL(DRINKING) PER PATIENT    Drug use: No    Sexual activity: Yes     Partners: Male     Birth control/protection: Surgical     Comment: SO       FAMILY HISTORY:       Family History   Problem Relation Age of Onset    Cancer Mother         breast bilateral/ skin     Breast cancer Mother     Skin cancer Mother     Diabetes Father     Heart disease Father 65        CABG    Hypertension Father     Cancer Father         skin    Skin cancer Father     Gout Father     Colon polyps Father     Gout Brother     No Known Problems Daughter     No Known Problems Son     Cancer Maternal Aunt         lung, pancrease - smoker , breast    Lung cancer Maternal Aunt         x2    Pancreatic cancer  Maternal Aunt     Breast cancer Maternal Aunt     No Known Problems Maternal Uncle     No Known Problems Paternal Uncle     Cancer Maternal Grandmother         pancrease    Pancreatic cancer Maternal Grandmother     Breast cancer Maternal Grandmother     Diabetes Paternal Grandmother     Heart disease Paternal Grandfather     Colon cancer Maternal Grandfather     Ovarian cancer Neg Hx     Ulcerative colitis Neg Hx     Stomach cancer Neg Hx     Crohn's disease Neg Hx     Rectal cancer Neg Hx        ALLERGIES AND MEDICATIONS: updated and reviewed.      274}  Review of patient's allergies indicates:   Allergen Reactions    Alcohol Anaphylaxis     Drinking alcohol, RESPIRATORY DISTRESS       Medication List with Changes/Refills   New Medications    GABAPENTIN (NEURONTIN) 300 MG CAPSULE    Take 1 capsule (300 mg total) by mouth 3 (three) times daily.   Current Medications    ACCU-CHEK GUIDE TEST STRIPS STRP        DEXTROAMPHETAMINE-AMPHETAMINE 30 MG TAB    Take 1 tablet (30 mg total) by mouth 2 (two) times daily as needed.    DUEXIS 800-26.6 MG TAB    Take 1 tablet by mouth 3 (three) times daily.     EPINEPHRINE (EPIPEN) 0.3 MG/0.3 ML ATIN    Inject 0.3 mLs (0.3 mg total) into the muscle once. As directed PRN for 1 dose    EPINEPHRINE (EPIPEN) 0.3 MG/0.3 ML ATIN    Inject into the muscle once.    ESTRADIOL (ESTRACE) 0.01 % (0.1 MG/GRAM) VAGINAL CREAM    USE 1/2 GRAM VAGINALLY 4 TIMES WEEKLY AS DIRECTED    OXYCODONE-ACETAMINOPHEN (PERCOCET) 5-325 MG PER TABLET    Take 1 tablet by mouth every 4 (four) hours as needed for Pain.    SOLRIAMFETOL (SUNOSI) 150 MG TAB    Take 1 tablet by mouth once daily.    STELARA 45 MG/0.5 ML SYRG SYRINGE        XYREM 500 MG/ML SOLN       Changed and/or Refilled Medications    Modified Medication Previous Medication    AZELASTINE (ASTELIN) 137 MCG (0.1 %) NASAL SPRAY azelastine (ASTELIN) 137 mcg (0.1 %) nasal spray       1 spray in each nosril twice a day    1 spray in each nosril twice a day  "   FEXOFENADINE (ALLEGRA) 180 MG TABLET fexofenadine (ALLEGRA) 180 MG tablet       Take 1 tablet (180 mg total) by mouth once daily.    Take 1 tablet (180 mg total) by mouth once daily.    FLUTICASONE PROPIONATE (FLONASE) 50 MCG/ACTUATION NASAL SPRAY fluticasone propionate (FLONASE) 50 mcg/actuation nasal spray       1 spray in each nostril twice a day    1 spray in each nostril twice a day    ROSUVASTATIN (CRESTOR) 5 MG TABLET rosuvastatin (CRESTOR) 5 MG tablet       Take 1 tablet (5 mg total) by mouth once daily.    Take 5 mg by mouth once daily.   Discontinued Medications    ALLERG XT,D.FARINAE-D.PTERONYS (ODACTRA) 12 SQ-HDM SUBL    Place 1 tablet under the tongue once daily.    ONDANSETRON (ZOFRAN) 4 MG TABLET    Take 1 tablet (4 mg total) by mouth every 8 (eight) hours as needed for Nausea (nausea).    ONDANSETRON (ZOFRAN-ODT) 4 MG TBDL    Take 1 tablet (4 mg total) by mouth every 8 (eight) hours as needed (as needed for nausea).    OXYCODONE-ACETAMINOPHEN (PERCOCET) 5-325 MG PER TABLET    Take 1 tablet by mouth every 6 (six) hours as needed for Pain.       SCREENING HISTORY:    274}  Health Maintenance         Date Due Completion Date    HIV Screening Never done ---    Pneumococcal Vaccines (Age 0-64) (3 - PCV) 05/23/2015 5/23/2014    Hemoglobin A1c 10/12/2022 10/12/2021    Mammogram 04/12/2023 4/12/2022    Lipid Panel 12/02/2026 12/2/2021    TETANUS VACCINE 04/02/2028 4/2/2018    Colorectal Cancer Screening 04/29/2032 4/29/2022            REVIEW OF SYSTEMS:   ROS    PHYSICAL EXAM:      274}  /82 (BP Location: Left arm, Patient Position: Sitting, BP Method: Medium (Manual))   Pulse 83   Temp 97.8 °F (36.6 °C)   Ht 5' 6" (1.676 m)   Wt 62.1 kg (136 lb 14.5 oz)   SpO2 98%   BMI 22.10 kg/m²   Wt Readings from Last 3 Encounters:   05/29/23 62.1 kg (136 lb 14.5 oz)   05/25/23 60.8 kg (134 lb)   05/12/23 60.8 kg (134 lb)     BP Readings from Last 3 Encounters:   05/29/23 128/82   05/25/23 (!) 147/83 " "  12/07/22 (!) 140/77     Estimated body mass index is 22.1 kg/m² as calculated from the following:    Height as of this encounter: 5' 6" (1.676 m).    Weight as of this encounter: 62.1 kg (136 lb 14.5 oz).     Physical Exam    LABS:   274}  I have reviewed old labs below:  Lab Results   Component Value Date    WBC 4.48 05/19/2023    HGB 13.4 05/19/2023    HCT 39.6 05/19/2023    MCV 93 05/19/2023     05/19/2023     05/19/2023    K 4.9 05/19/2023     05/19/2023    CALCIUM 9.5 05/19/2023    CO2 34 (H) 05/19/2023    GLU 69 (L) 05/19/2023    BUN 12 05/19/2023    CREATININE 0.8 05/19/2023    ANIONGAP 6 (L) 05/19/2023    ESTGFRAFRICA >60.0 12/02/2021    EGFRNONAA >60.0 12/02/2021    PROT 7.3 12/02/2021    ALBUMIN 4.2 12/02/2021    BILITOT 0.5 12/02/2021    ALKPHOS 119 12/02/2021    ALT 14 12/02/2021    AST 22 12/02/2021    CHOL 181 12/02/2021    TRIG 105 12/02/2021    HDL 45 12/02/2021    LDLCALC 115.0 12/02/2021    TSH 1.097 12/02/2021    HGBA1C 5.9 (H) 10/12/2021       ASSESSMENT AND PLAN:  274}  1. Encounter for wellness examination in adult    - Hepatitis C Antibody; Future  - Hepatitis B Surface Ab, Qualitative; Future  - HIV 1/2 Ag/Ab (4th Gen); Future  - TSH; Future  - Mammo Digital Screening Bilat w/ Darvin; Standing  - FOLATE; Future    2. Radiculopathy of arm  Bilateral, ongoing for 6 mos. Cervical Xray deferred at this time. Will check labs  - VITAMIN B12; Future  - gabapentin (NEURONTIN) 300 MG capsule; Take 1 capsule (300 mg total) by mouth 3 (three) times daily.  Dispense: 90 capsule; Refill: 2    3. Rash and nonspecific skin eruption  Localized to the right UE.   Recommended benadryl 50mg PO daily until rash resolves.     4. Hyperlipidemia associated with type 2 diabetes mellitus    - Lipid Panel; Future  - Hemoglobin A1C; Future  - Diabetic Eye Screening Photo; Future  - rosuvastatin (CRESTOR) 5 MG tablet; Take 1 tablet (5 mg total) by mouth once daily.  Dispense: 90 tablet; Refill: 3 " (refilled)  - HEPATIC FUNCTION PANEL; Future    5. Hypertension associated with diabetes  Recommended to avoid/limit use of NSAIDS. The patient was advised that NSAID-type medications have two very important potential side effects: gastrointestinal irritation including hemorrhage and renal injuries. She was asked to take the medication with food and to stop if she experiences any GI upset. I asked her to call for vomiting, abdominal pain or black/bloody stools. The patient expresses understanding of these issues and questions were answered.           Orders Placed This Encounter   Procedures    Mammo Digital Screening Bilat w/ Darvin    (In Office Administered) Pneumococcal Conjugate Vaccine (20 Valent) (IM)    Hepatitis C Antibody    Hepatitis B Surface Ab, Qualitative    HIV 1/2 Ag/Ab (4th Gen)    TSH    Lipid Panel    Hemoglobin A1C    VITAMIN B12    FOLATE    HEPATIC FUNCTION PANEL    Diabetic Eye Screening Photo       Follow up in about 3 months (around 8/29/2023). or sooner as needed.

## 2023-05-29 ENCOUNTER — OFFICE VISIT (OUTPATIENT)
Dept: FAMILY MEDICINE | Facility: CLINIC | Age: 53
End: 2023-05-29
Payer: COMMERCIAL

## 2023-05-29 VITALS
SYSTOLIC BLOOD PRESSURE: 128 MMHG | TEMPERATURE: 98 F | HEIGHT: 66 IN | OXYGEN SATURATION: 98 % | BODY MASS INDEX: 22 KG/M2 | HEART RATE: 83 BPM | WEIGHT: 136.88 LBS | DIASTOLIC BLOOD PRESSURE: 82 MMHG

## 2023-05-29 DIAGNOSIS — E78.5 HYPERLIPIDEMIA ASSOCIATED WITH TYPE 2 DIABETES MELLITUS: ICD-10-CM

## 2023-05-29 DIAGNOSIS — R21 RASH AND NONSPECIFIC SKIN ERUPTION: ICD-10-CM

## 2023-05-29 DIAGNOSIS — E11.59 HYPERTENSION ASSOCIATED WITH DIABETES: Chronic | ICD-10-CM

## 2023-05-29 DIAGNOSIS — Z00.00 ENCOUNTER FOR WELLNESS EXAMINATION IN ADULT: Primary | ICD-10-CM

## 2023-05-29 DIAGNOSIS — E11.69 HYPERLIPIDEMIA ASSOCIATED WITH TYPE 2 DIABETES MELLITUS: ICD-10-CM

## 2023-05-29 DIAGNOSIS — I15.2 HYPERTENSION ASSOCIATED WITH DIABETES: Chronic | ICD-10-CM

## 2023-05-29 DIAGNOSIS — M54.10 RADICULOPATHY OF ARM: ICD-10-CM

## 2023-05-29 PROCEDURE — 1159F MED LIST DOCD IN RCRD: CPT | Mod: CPTII,S$GLB,, | Performed by: STUDENT IN AN ORGANIZED HEALTH CARE EDUCATION/TRAINING PROGRAM

## 2023-05-29 PROCEDURE — 3079F PR MOST RECENT DIASTOLIC BLOOD PRESSURE 80-89 MM HG: ICD-10-PCS | Mod: CPTII,S$GLB,, | Performed by: STUDENT IN AN ORGANIZED HEALTH CARE EDUCATION/TRAINING PROGRAM

## 2023-05-29 PROCEDURE — 99396 PR PREVENTIVE VISIT,EST,40-64: ICD-10-PCS | Mod: S$GLB,,, | Performed by: STUDENT IN AN ORGANIZED HEALTH CARE EDUCATION/TRAINING PROGRAM

## 2023-05-29 PROCEDURE — 3008F PR BODY MASS INDEX (BMI) DOCUMENTED: ICD-10-PCS | Mod: CPTII,S$GLB,, | Performed by: STUDENT IN AN ORGANIZED HEALTH CARE EDUCATION/TRAINING PROGRAM

## 2023-05-29 PROCEDURE — 99396 PREV VISIT EST AGE 40-64: CPT | Mod: S$GLB,,, | Performed by: STUDENT IN AN ORGANIZED HEALTH CARE EDUCATION/TRAINING PROGRAM

## 2023-05-29 PROCEDURE — 3074F PR MOST RECENT SYSTOLIC BLOOD PRESSURE < 130 MM HG: ICD-10-PCS | Mod: CPTII,S$GLB,, | Performed by: STUDENT IN AN ORGANIZED HEALTH CARE EDUCATION/TRAINING PROGRAM

## 2023-05-29 PROCEDURE — 3074F SYST BP LT 130 MM HG: CPT | Mod: CPTII,S$GLB,, | Performed by: STUDENT IN AN ORGANIZED HEALTH CARE EDUCATION/TRAINING PROGRAM

## 2023-05-29 PROCEDURE — 3079F DIAST BP 80-89 MM HG: CPT | Mod: CPTII,S$GLB,, | Performed by: STUDENT IN AN ORGANIZED HEALTH CARE EDUCATION/TRAINING PROGRAM

## 2023-05-29 PROCEDURE — 3008F BODY MASS INDEX DOCD: CPT | Mod: CPTII,S$GLB,, | Performed by: STUDENT IN AN ORGANIZED HEALTH CARE EDUCATION/TRAINING PROGRAM

## 2023-05-29 PROCEDURE — 99999 PR PBB SHADOW E&M-EST. PATIENT-LVL V: ICD-10-PCS | Mod: PBBFAC,,, | Performed by: STUDENT IN AN ORGANIZED HEALTH CARE EDUCATION/TRAINING PROGRAM

## 2023-05-29 PROCEDURE — 1159F PR MEDICATION LIST DOCUMENTED IN MEDICAL RECORD: ICD-10-PCS | Mod: CPTII,S$GLB,, | Performed by: STUDENT IN AN ORGANIZED HEALTH CARE EDUCATION/TRAINING PROGRAM

## 2023-05-29 PROCEDURE — 99999 PR PBB SHADOW E&M-EST. PATIENT-LVL V: CPT | Mod: PBBFAC,,, | Performed by: STUDENT IN AN ORGANIZED HEALTH CARE EDUCATION/TRAINING PROGRAM

## 2023-05-29 RX ORDER — EPINEPHRINE 0.3 MG/.3ML
INJECTION SUBCUTANEOUS ONCE
COMMUNITY
End: 2023-08-29

## 2023-05-29 RX ORDER — GABAPENTIN 300 MG/1
300 CAPSULE ORAL 3 TIMES DAILY
Qty: 90 CAPSULE | Refills: 2 | Status: SHIPPED | OUTPATIENT
Start: 2023-05-29 | End: 2023-07-20 | Stop reason: SDUPTHER

## 2023-05-29 RX ORDER — MINERAL OIL
180 ENEMA (ML) RECTAL DAILY
Qty: 90 TABLET | Refills: 2 | Status: SHIPPED | OUTPATIENT
Start: 2023-05-29 | End: 2024-02-27

## 2023-05-29 RX ORDER — FLUTICASONE PROPIONATE 50 MCG
SPRAY, SUSPENSION (ML) NASAL
Qty: 16 G | Refills: 5 | Status: SHIPPED | OUTPATIENT
Start: 2023-05-29

## 2023-05-29 RX ORDER — AZELASTINE 1 MG/ML
SPRAY, METERED NASAL
Qty: 30 ML | Refills: 5 | Status: SHIPPED | OUTPATIENT
Start: 2023-05-29

## 2023-05-29 RX ORDER — ROSUVASTATIN CALCIUM 5 MG/1
5 TABLET, COATED ORAL DAILY
Qty: 90 TABLET | Refills: 3 | Status: SHIPPED | OUTPATIENT
Start: 2023-05-29 | End: 2023-07-20 | Stop reason: SDUPTHER

## 2023-05-29 NOTE — ASSESSMENT & PLAN NOTE
Bilateral x6 mos. Offered Cervical Xray, deferred at this time. Willing to try Gabapentin 300mg PO TID. Discussed risks and benefits associated with the medication given concurrent use of opioids. Recommended to alternate use of opioids, and decrease in use of opioid therapy. Suggested Tylenol 500mg PO TID. Patient verbalized an understanding of care plan.

## 2023-05-29 NOTE — PATIENT INSTRUCTIONS
Sudeep Barrera,     If you are due for any health screening(s) below please notify me so we can arrange them to be ordered and scheduled to maintain your health. Most healthy patients complete it. Don't lose out on improving your health.     Tests to Keep You Healthy    Mammogram: ORDERED BUT NOT SCHEDULED  Eye Exam: SCHEDULED  Colon Cancer Screening: Met on 4/29/2022  Last Blood Pressure <= 139/89 (5/29/2023): Yes  Last HbA1c < 8 (10/12/2021): NO      Breast Cancer Screening    Breast cancer is the second most common cancer in women after skin cancer, and the second leading cause of death from cancer after lung cancer. Mammograms can detect breast cancer early, which significantly increases the chances of curing the cancer.      A screening mammogram is an x-ray image of the breasts used for early breast cancer detection. It can help reduce the number of deaths from breast cancer among women. To get a clear image, the breast is placed between two plastic plates to make it flat. How often a mammogram is needed depends on your age and your breast cancer risk.            Diabetic Retinal Eye Exam    Diabetes is the #1 cause of blindness in the [place] - early detection before signs or symptoms develop can prevent debilitating blindness.    Once-a-year screening is recommended for all diabetic patients. This exam can prevent and treat diabetes complications in the eye before developing symptoms. This can be done with a special camera is used to take photographs of the back of your eye without having to dilate them, or you can see an eye doctor for a full dilated exam.    Diabetic A1c Testing    Your chart identifies you as having diabetes. It is recommended that all diabetes patients have an A1c test done at least once a year, but [facility] recommends twice a year for most patients. This helps your doctor to better help you manage your diabetes. This is a non-fasting lab test which can be completed at any time. Your  result will be sent to your Primary Care Provider for review and that office will contact you with the results.

## 2023-05-30 ENCOUNTER — TELEPHONE (OUTPATIENT)
Dept: FAMILY MEDICINE | Facility: CLINIC | Age: 53
End: 2023-05-30
Payer: COMMERCIAL

## 2023-05-30 NOTE — LETTER
AUTHORIZATION FOR RELEASE OF   CONFIDENTIAL INFORMATION    Dear Dr. Weiss,    We are seeing Holly Chicas, date of birth 1970, in the clinic at [facility]. Myriam Joe DO is the patient's PCP. Holly Chicas has an outstanding lab/procedure at the time we reviewed her chart. In order to help keep her health information updated, she has authorized us to request the following medical record(s):        (  )  MAMMOGRAM                                      (  )  COLONOSCOPY      (  )  PAP SMEAR                                          (  )  OUTSIDE LAB RESULTS     (  )  DEXA SCAN                                          (X)  EYE EXAM  near or on 23         (  )  FOOT EXAM                                          (  )  ENTIRE RECORD     (  )  OUTSIDE IMMUNIZATIONS                 (  )  _______________         Please fax records to [facility], Lashathan E Banks, DO, [phone]     Thank you and if  you have any questions, please contact Dolores Ballard RN at [phone].     Patient Name: Holly Chicas  : 1970  Patient Phone #: [phone]

## 2023-05-30 NOTE — LETTER
AUTHORIZATION FOR RELEASE OF   CONFIDENTIAL INFORMATION    Dear Carleton,    We are seeing Holly Chicas, date of birth 1970, in the clinic at [facility]. Myriam Joe DO is the patient's PCP. Holly Chicas has an outstanding lab/procedure at the time we reviewed her chart. In order to help keep her health information updated, she has authorized us to request the following medical record(s):        (  )  MAMMOGRAM                                      (  )  COLONOSCOPY      (  )  PAP SMEAR                                          (  )  OUTSIDE LAB RESULTS     (  )  DEXA SCAN                                          (X)  EYE EXAM  on or near 23       (  )  FOOT EXAM                                          (  )  ENTIRE RECORD     (  )  OUTSIDE IMMUNIZATIONS                 (  )  _______________         Please fax records to [facility], Lashathan E Banks, DO,   [phone]     Thank you and if you have any questions, please contact Dolores Ballard RN at [phone].       Patient Name: Holly Chicas  : 1970  Patient Phone #: [phone]

## 2023-05-30 NOTE — TELEPHONE ENCOUNTER
Sent fax request for eye exam report from first week of May 2023, to Dr. Victor Manuel Dennis's office.

## 2023-06-01 ENCOUNTER — LAB VISIT (OUTPATIENT)
Dept: LAB | Facility: HOSPITAL | Age: 53
End: 2023-06-01
Attending: STUDENT IN AN ORGANIZED HEALTH CARE EDUCATION/TRAINING PROGRAM
Payer: COMMERCIAL

## 2023-06-01 DIAGNOSIS — M54.10 RADICULOPATHY OF ARM: ICD-10-CM

## 2023-06-01 DIAGNOSIS — Z00.00 ENCOUNTER FOR WELLNESS EXAMINATION IN ADULT: ICD-10-CM

## 2023-06-01 DIAGNOSIS — E11.69 HYPERLIPIDEMIA ASSOCIATED WITH TYPE 2 DIABETES MELLITUS: ICD-10-CM

## 2023-06-01 DIAGNOSIS — E78.5 HYPERLIPIDEMIA ASSOCIATED WITH TYPE 2 DIABETES MELLITUS: ICD-10-CM

## 2023-06-01 LAB
ALBUMIN SERPL BCP-MCNC: 4 G/DL (ref 3.5–5.2)
ALP SERPL-CCNC: 98 U/L (ref 55–135)
ALT SERPL W/O P-5'-P-CCNC: 25 U/L (ref 10–44)
AST SERPL-CCNC: 23 U/L (ref 10–40)
BILIRUB DIRECT SERPL-MCNC: 0.2 MG/DL (ref 0.1–0.3)
BILIRUB SERPL-MCNC: 0.4 MG/DL (ref 0.1–1)
CHOLEST SERPL-MCNC: 186 MG/DL (ref 120–199)
CHOLEST/HDLC SERPL: 3.2 {RATIO} (ref 2–5)
ESTIMATED AVG GLUCOSE: 114 MG/DL (ref 68–131)
FOLATE SERPL-MCNC: 4.7 NG/ML (ref 4–24)
HBA1C MFR BLD: 5.6 % (ref 4–5.6)
HBV SURFACE AB SER-ACNC: 252.23 MIU/ML
HBV SURFACE AB SER-ACNC: REACTIVE M[IU]/ML
HCV AB SERPL QL IA: NORMAL
HDLC SERPL-MCNC: 59 MG/DL (ref 40–75)
HDLC SERPL: 31.7 % (ref 20–50)
HIV 1+2 AB+HIV1 P24 AG SERPL QL IA: NORMAL
LDLC SERPL CALC-MCNC: 111.2 MG/DL (ref 63–159)
NONHDLC SERPL-MCNC: 127 MG/DL
PROT SERPL-MCNC: 6.7 G/DL (ref 6–8.4)
TRIGL SERPL-MCNC: 79 MG/DL (ref 30–150)
TSH SERPL DL<=0.005 MIU/L-ACNC: 1.17 UIU/ML (ref 0.4–4)
VIT B12 SERPL-MCNC: 275 PG/ML (ref 210–950)

## 2023-06-01 PROCEDURE — 84443 ASSAY THYROID STIM HORMONE: CPT | Performed by: STUDENT IN AN ORGANIZED HEALTH CARE EDUCATION/TRAINING PROGRAM

## 2023-06-01 PROCEDURE — 86803 HEPATITIS C AB TEST: CPT | Performed by: STUDENT IN AN ORGANIZED HEALTH CARE EDUCATION/TRAINING PROGRAM

## 2023-06-01 PROCEDURE — 83036 HEMOGLOBIN GLYCOSYLATED A1C: CPT | Performed by: STUDENT IN AN ORGANIZED HEALTH CARE EDUCATION/TRAINING PROGRAM

## 2023-06-01 PROCEDURE — 82607 VITAMIN B-12: CPT | Performed by: STUDENT IN AN ORGANIZED HEALTH CARE EDUCATION/TRAINING PROGRAM

## 2023-06-01 PROCEDURE — 87389 HIV-1 AG W/HIV-1&-2 AB AG IA: CPT | Performed by: STUDENT IN AN ORGANIZED HEALTH CARE EDUCATION/TRAINING PROGRAM

## 2023-06-01 PROCEDURE — 86706 HEP B SURFACE ANTIBODY: CPT | Mod: 91 | Performed by: STUDENT IN AN ORGANIZED HEALTH CARE EDUCATION/TRAINING PROGRAM

## 2023-06-01 PROCEDURE — 36415 COLL VENOUS BLD VENIPUNCTURE: CPT | Mod: PO | Performed by: STUDENT IN AN ORGANIZED HEALTH CARE EDUCATION/TRAINING PROGRAM

## 2023-06-01 PROCEDURE — 80061 LIPID PANEL: CPT | Performed by: STUDENT IN AN ORGANIZED HEALTH CARE EDUCATION/TRAINING PROGRAM

## 2023-06-01 PROCEDURE — 80076 HEPATIC FUNCTION PANEL: CPT | Performed by: STUDENT IN AN ORGANIZED HEALTH CARE EDUCATION/TRAINING PROGRAM

## 2023-06-01 PROCEDURE — 82746 ASSAY OF FOLIC ACID SERUM: CPT | Performed by: STUDENT IN AN ORGANIZED HEALTH CARE EDUCATION/TRAINING PROGRAM

## 2023-06-02 ENCOUNTER — TELEPHONE (OUTPATIENT)
Dept: FAMILY MEDICINE | Facility: CLINIC | Age: 53
End: 2023-06-02
Payer: COMMERCIAL

## 2023-06-02 NOTE — TELEPHONE ENCOUNTER
----- Message from Myriam Joe DO sent at 6/1/2023  3:02 PM CDT -----  Please inform patient that her lab results are normal. If she has any further questions, she can contact us.

## 2023-06-06 ENCOUNTER — OFFICE VISIT (OUTPATIENT)
Dept: ORTHOPEDICS | Facility: CLINIC | Age: 53
End: 2023-06-06
Payer: COMMERCIAL

## 2023-06-06 DIAGNOSIS — G56.03 CARPAL TUNNEL SYNDROME, BILATERAL: Primary | ICD-10-CM

## 2023-06-06 DIAGNOSIS — G56.23 CUBITAL TUNNEL SYNDROME, BILATERAL: ICD-10-CM

## 2023-06-06 PROCEDURE — 99024 PR POST-OP FOLLOW-UP VISIT: ICD-10-PCS | Mod: S$GLB,,, | Performed by: ORTHOPAEDIC SURGERY

## 2023-06-06 PROCEDURE — 3044F PR MOST RECENT HEMOGLOBIN A1C LEVEL <7.0%: ICD-10-PCS | Mod: CPTII,S$GLB,, | Performed by: ORTHOPAEDIC SURGERY

## 2023-06-06 PROCEDURE — 3044F HG A1C LEVEL LT 7.0%: CPT | Mod: CPTII,S$GLB,, | Performed by: ORTHOPAEDIC SURGERY

## 2023-06-06 PROCEDURE — 99024 POSTOP FOLLOW-UP VISIT: CPT | Mod: S$GLB,,, | Performed by: ORTHOPAEDIC SURGERY

## 2023-06-06 NOTE — PROGRESS NOTES
Post-op Note    HPI    Holly Chicas is here 2 weeks s/p the following procedure:     Right carpal tunnel release and cubital tunnel release    Overall doing well. Pain controlled on current regimen.  Numbness much better.  She is taking gabapentin which was prescribed by her PCP.  This also seems to help.      Physical Exam:     Patient is alert and oriented no acute distress.   Assistive Device:  None    Right elbow and palm Incision(s) are well healed.  There is no evidence of dehiscence.  There is no induration erythema or signs of infection.  Appropriate soft tissue swelling.  Compartments are soft and compressible.  Warm well-perfused extremity.  Full range of motion of the elbow and wrist.    Assessment    Holly Chicas is 2 weeks Post-op     Plan:    Overall doing as expected.  We discussed expectations of surgery and postoperative course.     Pain: Continued postoperative pain regimen -- Tylenol/ibuprofen/gabapentin  Weight-bearing as tolerated with 5-10 lb weight restriction.  No heavy gripping.    Follow-up: 4 weeks   X-rays next visit:  None

## 2023-06-07 ENCOUNTER — TELEPHONE (OUTPATIENT)
Dept: ALLERGY | Facility: CLINIC | Age: 53
End: 2023-06-07
Payer: COMMERCIAL

## 2023-06-09 ENCOUNTER — PATIENT OUTREACH (OUTPATIENT)
Dept: ADMINISTRATIVE | Facility: HOSPITAL | Age: 53
End: 2023-06-09
Payer: COMMERCIAL

## 2023-06-09 NOTE — PROGRESS NOTES
Population Health Chart Review & Patient Outreach Details:     Reason for Outreach Encounter:     [x]  Non-Compliant Report   []  Payor Report ([employer], PHN, BCBS, MSSP, MCIP, UHC, etc.)   []  Pre-Visit Chart Review     Updates Requested / Reviewed:     []  Care Everywhere    []     []  External Sources ([facility], [facility], DIS, etc.)   []  Care Team Updated    Patient Outreach Method:    []  Telephone Outreach Completed   [] Successful   [] Left Voicemail   [] Unable to Contact (wrong number, no voicemail)  []  [facility] Portal Outreach Sent  []  Letter Outreach Mailed  []  Fax Sent for External Records  [x]  External Records Upload    Health Maintenance Topics Addressed and Outreach Outcomes / Actions Taken:        []      Breast Cancer Screening []  Mammo Scheduled      []  External Records Requested     []  Added Reminder to Complete to Upcoming Primary Care Appt Notes     []  Patient Declined     []  Patient Will Call Back to Schedule     []  Patient Will Schedule with External Provider / Order Routed if Applicable             []       Cervical Cancer Screening []  Pap Scheduled      []  External Records Requested     []  Added Reminder to Complete to Upcoming Primary Care Appt Notes     []  Patient Declined     []  Patient Will Call Back to Schedule     []  Patient Will Schedule with External Provider               []          Colorectal Cancer Screening []  Colonoscopy Case Request or Referral Placed     []  External Records Requested     []  Added Reminder to Complete to Upcoming Primary Care Appt Notes     []  Patient Declined     []  Patient Will Call Back to Schedule     []  Patient Will Schedule with External Provider     []  Fit Kit Mailed (add the SmartPhrase under additional notes)     []  Reminded Patient to Complete Home Test             [x]      Diabetic Eye Exam []  Eye Camera Scheduled or Optometry Referral Placed     []  External Records Requested     []  Added Reminder to Complete to  Upcoming Primary Care Appt Notes     []  Patient Declined     []  Patient Will Call Back to Schedule     []  Patient Will Schedule with External Provider             []      Blood Pressure Control []  Primary Care Follow Up Visit Scheduled     []  Remote Blood Pressure Reading Captured     []  Added Reminder to Complete to Upcoming Primary Care Appt Notes     []  Patient Declined     []  Patient Will Call Back / Patient Will Send Portal Message with Reading     []  Patient Will Call Back to Schedule Provider Visit             []       HbA1c & Other Labs []  Lab Appt Scheduled for Due Labs     []  Primary Care Follow Up Visit Scheduled      []  Reminded Patient to Complete Home Test     []  Added Reminder to Complete to Upcoming Primary Care Appt Notes     []  Patient Declined     []  Patient Will Call Back to Schedule     []  Patient Will Schedule with External Provider / Order Routed if Applicable           []    Schedule Primary Care Appt []  Primary Care Appt Scheduled     []  Patient Declined     []  Patient Will Call Back to Schedule     []  Pt Established with External Provider & Updated Care Team             []      Medication Adherence []  Primary Care Appointment Scheduled     []  Added Reminder to Upcoming Primary Care Appt Notes     []  Patient Reminded to  Prescription     []  Patient Declined, Provider Notified if Needed     []  Sent Provider Message to Review and/or Add Exclusion to Problem List             []      Osteoporosis Screening []  DXA Appointment Scheduled     []  External Records Requested     []  Added Reminder to Complete to Upcoming Primary Care Appt Notes     []  Patient Declined     []  Patient Will Call Back to Schedule     []  Patient Will Schedule with External Provider / Order Routed if Applicable     Additional Care Coordinator Notes:         Further Action Needed If Patient Returns Outreach:

## 2023-06-16 DIAGNOSIS — G56.03 CARPAL TUNNEL SYNDROME, BILATERAL: ICD-10-CM

## 2023-06-19 RX ORDER — IBUPROFEN 600 MG/1
TABLET ORAL
Qty: 90 TABLET | Refills: 0 | Status: SHIPPED | OUTPATIENT
Start: 2023-06-19 | End: 2023-08-29

## 2023-06-27 DIAGNOSIS — G47.411 NARCOLEPSY WITH CATAPLEXY: ICD-10-CM

## 2023-06-28 RX ORDER — DEXTROAMPHETAMINE SACCHARATE, AMPHETAMINE ASPARTATE, DEXTROAMPHETAMINE SULFATE AND AMPHETAMINE SULFATE 7.5; 7.5; 7.5; 7.5 MG/1; MG/1; MG/1; MG/1
1 TABLET ORAL 2 TIMES DAILY PRN
Qty: 60 TABLET | Refills: 0 | Status: SHIPPED | OUTPATIENT
Start: 2023-06-28 | End: 2023-08-05 | Stop reason: SDUPTHER

## 2023-06-29 ENCOUNTER — PATIENT MESSAGE (OUTPATIENT)
Dept: FAMILY MEDICINE | Facility: CLINIC | Age: 53
End: 2023-06-29
Payer: COMMERCIAL

## 2023-06-29 NOTE — TELEPHONE ENCOUNTER
Please inform patient that she may try 2 tabs of the gabapentin in the morning and in the evening.  A good supplement for neuropathy is ALA also noted alpha lipoic acid. This can be purchased over-the-counter , recommend she try at least 600 mg of the supplement daily that may assist her with neuropathy

## 2023-07-05 ENCOUNTER — PATIENT MESSAGE (OUTPATIENT)
Dept: FAMILY MEDICINE | Facility: CLINIC | Age: 53
End: 2023-07-05
Payer: COMMERCIAL

## 2023-07-05 DIAGNOSIS — Z12.31 ENCOUNTER FOR SCREENING MAMMOGRAM FOR MALIGNANT NEOPLASM OF BREAST: Primary | ICD-10-CM

## 2023-07-05 DIAGNOSIS — E11.9 TYPE 2 DIABETES MELLITUS WITHOUT COMPLICATION: ICD-10-CM

## 2023-07-18 ENCOUNTER — HOSPITAL ENCOUNTER (OUTPATIENT)
Dept: RADIOLOGY | Facility: CLINIC | Age: 53
Discharge: HOME | End: 2023-07-18
Attending: STUDENT IN AN ORGANIZED HEALTH CARE EDUCATION/TRAINING PROGRAM
Payer: COMMERCIAL

## 2023-07-18 DIAGNOSIS — Z12.31 ENCOUNTER FOR SCREENING MAMMOGRAM FOR MALIGNANT NEOPLASM OF BREAST: ICD-10-CM

## 2023-07-18 PROCEDURE — 77067 MAMMO DIGITAL SCREENING BILAT WITH TOMO: ICD-10-PCS | Mod: 26,,, | Performed by: RADIOLOGY

## 2023-07-18 PROCEDURE — 77067 SCR MAMMO BI INCL CAD: CPT | Mod: 26,,, | Performed by: RADIOLOGY

## 2023-07-18 PROCEDURE — 77063 BREAST TOMOSYNTHESIS BI: CPT | Mod: 26,,, | Performed by: RADIOLOGY

## 2023-07-18 PROCEDURE — 77063 MAMMO DIGITAL SCREENING BILAT WITH TOMO: ICD-10-PCS | Mod: 26,,, | Performed by: RADIOLOGY

## 2023-07-18 PROCEDURE — 77067 SCR MAMMO BI INCL CAD: CPT | Mod: TC,PO

## 2023-07-19 ENCOUNTER — TELEPHONE (OUTPATIENT)
Dept: FAMILY MEDICINE | Facility: CLINIC | Age: 53
End: 2023-07-19
Payer: COMMERCIAL

## 2023-07-19 ENCOUNTER — PATIENT MESSAGE (OUTPATIENT)
Dept: FAMILY MEDICINE | Facility: CLINIC | Age: 53
End: 2023-07-19
Payer: COMMERCIAL

## 2023-07-19 DIAGNOSIS — E78.5 HYPERLIPIDEMIA ASSOCIATED WITH TYPE 2 DIABETES MELLITUS: ICD-10-CM

## 2023-07-19 DIAGNOSIS — M54.10 RADICULOPATHY OF ARM: ICD-10-CM

## 2023-07-19 DIAGNOSIS — E11.69 HYPERLIPIDEMIA ASSOCIATED WITH TYPE 2 DIABETES MELLITUS: ICD-10-CM

## 2023-07-19 NOTE — TELEPHONE ENCOUNTER
----- Message from Myriam Joe DO sent at 7/19/2023  1:18 PM CDT -----  Please inform patient of the followup.     Mammogram negative for malignancy.

## 2023-07-20 RX ORDER — GABAPENTIN 300 MG/1
300 CAPSULE ORAL 3 TIMES DAILY
Qty: 90 CAPSULE | Refills: 2 | Status: SHIPPED | OUTPATIENT
Start: 2023-07-20 | End: 2023-07-20

## 2023-07-20 RX ORDER — GABAPENTIN 600 MG/1
600 TABLET ORAL 3 TIMES DAILY
Qty: 270 TABLET | Refills: 3 | Status: SHIPPED | OUTPATIENT
Start: 2023-07-20 | End: 2024-07-19

## 2023-07-20 RX ORDER — ROSUVASTATIN CALCIUM 5 MG/1
5 TABLET, COATED ORAL DAILY
Qty: 90 TABLET | Refills: 3 | Status: SHIPPED | OUTPATIENT
Start: 2023-07-20

## 2023-07-20 NOTE — TELEPHONE ENCOUNTER
Please inform the patient that I have increased the gabapentin doses from 300 to 600mg, and I refilled that prescription so she may take 600 mg 3 times per day or less if she chooses to do so.  I have also refilled the Crestor.

## 2023-07-26 ENCOUNTER — OFFICE VISIT (OUTPATIENT)
Dept: ALLERGY | Facility: CLINIC | Age: 53
End: 2023-07-26
Payer: COMMERCIAL

## 2023-07-26 VITALS — HEIGHT: 66 IN | BODY MASS INDEX: 22.46 KG/M2 | WEIGHT: 139.75 LBS

## 2023-07-26 DIAGNOSIS — J30.89 ALLERGIC RHINITIS DUE TO HOUSE DUST MITE: ICD-10-CM

## 2023-07-26 DIAGNOSIS — J30.9 CHRONIC ALLERGIC RHINITIS: Primary | ICD-10-CM

## 2023-07-26 PROCEDURE — 99214 PR OFFICE/OUTPT VISIT, EST, LEVL IV, 30-39 MIN: ICD-10-PCS | Mod: S$GLB,,, | Performed by: STUDENT IN AN ORGANIZED HEALTH CARE EDUCATION/TRAINING PROGRAM

## 2023-07-26 PROCEDURE — 3044F PR MOST RECENT HEMOGLOBIN A1C LEVEL <7.0%: ICD-10-PCS | Mod: CPTII,S$GLB,, | Performed by: STUDENT IN AN ORGANIZED HEALTH CARE EDUCATION/TRAINING PROGRAM

## 2023-07-26 PROCEDURE — 3008F BODY MASS INDEX DOCD: CPT | Mod: CPTII,S$GLB,, | Performed by: STUDENT IN AN ORGANIZED HEALTH CARE EDUCATION/TRAINING PROGRAM

## 2023-07-26 PROCEDURE — 99999 PR PBB SHADOW E&M-EST. PATIENT-LVL III: CPT | Mod: PBBFAC,,, | Performed by: STUDENT IN AN ORGANIZED HEALTH CARE EDUCATION/TRAINING PROGRAM

## 2023-07-26 PROCEDURE — 99999 PR PBB SHADOW E&M-EST. PATIENT-LVL III: ICD-10-PCS | Mod: PBBFAC,,, | Performed by: STUDENT IN AN ORGANIZED HEALTH CARE EDUCATION/TRAINING PROGRAM

## 2023-07-26 PROCEDURE — 1159F MED LIST DOCD IN RCRD: CPT | Mod: CPTII,S$GLB,, | Performed by: STUDENT IN AN ORGANIZED HEALTH CARE EDUCATION/TRAINING PROGRAM

## 2023-07-26 PROCEDURE — 99214 OFFICE O/P EST MOD 30 MIN: CPT | Mod: S$GLB,,, | Performed by: STUDENT IN AN ORGANIZED HEALTH CARE EDUCATION/TRAINING PROGRAM

## 2023-07-26 PROCEDURE — 3044F HG A1C LEVEL LT 7.0%: CPT | Mod: CPTII,S$GLB,, | Performed by: STUDENT IN AN ORGANIZED HEALTH CARE EDUCATION/TRAINING PROGRAM

## 2023-07-26 PROCEDURE — 3008F PR BODY MASS INDEX (BMI) DOCUMENTED: ICD-10-PCS | Mod: CPTII,S$GLB,, | Performed by: STUDENT IN AN ORGANIZED HEALTH CARE EDUCATION/TRAINING PROGRAM

## 2023-07-26 PROCEDURE — 1159F PR MEDICATION LIST DOCUMENTED IN MEDICAL RECORD: ICD-10-PCS | Mod: CPTII,S$GLB,, | Performed by: STUDENT IN AN ORGANIZED HEALTH CARE EDUCATION/TRAINING PROGRAM

## 2023-07-26 RX ORDER — EPINEPHRINE 0.3 MG/.3ML
1 INJECTION SUBCUTANEOUS ONCE
Qty: 2 EACH | Refills: 1 | Status: ACTIVE | OUTPATIENT
Start: 2023-07-26 | End: 2024-02-19 | Stop reason: SDUPTHER

## 2023-07-26 RX ORDER — ACYCLOVIR 400 MG/1
400 TABLET ORAL 3 TIMES DAILY
COMMUNITY
Start: 2023-07-16

## 2023-07-26 NOTE — PROGRESS NOTES
"ALLERGY & IMMUNOLOGY CLINIC -  Established Patient     HISTORY OF PRESENT ILLNESS     Patient ID: Holly Chicas is a 52 y.o. female    CC: follow up visit    HPI: Holly Chicas is a 52 y.o. female presents for evaluation of:    Office Visit 07/26/2023  Previously established care with Dr. Franks and Dr. Toney. Previous allergy tested +Dust mites. States she continues to experience the sensation of "bugs in her eyes". Feels like there is "water in her eyes" but the eyes do not water. Feels like consumption of boiled crab will make these symptoms worse.     Also endorses voice hoarseness for the previous 3 weeks or so. Also experiencing nasal congestion and coughing episodes. Experiencing a productive cough with yellow phlegm. Does have occasional shortness of breath due to the "thick air" but denies wheezing episodes. Interested in pursuing Odactra    Has been using fluticasone and azelastine 1 sprays each nostril BID. Does not believes there is any benefit from these nasal sprays and she points straight up.     LOV 9/2022  HPI: 50 yo woman with chronic rhinitis was last seen by Dr. Roque jose 05/03/2022 is here to follow-up dust allergy not adequately controlled on meds.  She is here alone, and she is a fair historian.      Related medications and other interventions   Astelin 1 squirt twice a day   Flonase 1 squirt-twice a day   Allegra-out for several months  Pataday  Unknown eyedrops           09/06/2022: Since last visit I had portal communication with client about her dust sensitivity by Immunocap testing.  Related basic dust avoidance measures.  Also since last visit she was seen at [facility] and ophthalmology.  While they had no answers for the feeling of worms in her head, she does have 2 nasal sprays as an and an eyedrop added to her medication list.     No other problems or complaints.        Rhinitis symptoms started in March of 2022.  Per Dr. Roque jose "She has sensation of worms/bugs in her eyes and " "nose. She feels itchy eyes, watery eyes, rhinorrhea. No sneezing. Some headaches. Symptoms occur inside and outside and have been daily for the past two months. Sense of smell is intact. No heartburn.   She is taking allegra and Pataday. They work for about 4 hours, and then symptoms return. She has tried other OTC meds are not working (zyrtec Xyzal). She was prescribed Flonase but didn't find relief from that.      Alcohol causes tongue and lip numbness and swelling and she "goes into respiratory distress." She has been prescribed an Epipen for this. She also thinks the gets this from being around perfumes and colognes that contain alcohol. This allergy started after the birth of her second child. "     REVIEW OF SYSTEMS     CONST: no F/C/NS, no unintentional weight changes  Balance of review of systems negative except as mentioned above     MEDICAL HISTORY     MedHx: active problems reviewed  SurgHx:   Past Surgical History:   Procedure Laterality Date    ADENOIDECTOMY      AUGMENTATION OF BREAST      BREAST SURGERY      aumentation/ reduction     BREAST SURGERY  2017    augmentation     brizilian butt lift   2017    CARPAL TUNNEL RELEASE Left 2022    Procedure: RELEASE, CARPAL TUNNEL;  Surgeon: Joce Montanez MD;  Location: [facility] OR;  Service: Orthopedics;  Laterality: Left;    CARPAL TUNNEL RELEASE Right 2023    Procedure: RELEASE, CARPAL TUNNEL;  Surgeon: Joce Montanez MD;  Location: [facility] OR;  Service: Orthopedics;  Laterality: Right;     SECTION      CHOLECYSTECTOMY  2018    Dr SEAN [facility]    COLONOSCOPY      COLONOSCOPY N/A 2022    Procedure: COLONOSCOPY;  Surgeon: Kavita Yee MD;  Location: [facility] ENDO;  Service: Endoscopy;  Laterality: N/A;    ESOPHAGOGASTRODUODENOSCOPY N/A 2022    Procedure: EGD (ESOPHAGOGASTRODUODENOSCOPY);  Surgeon: Kavita Yee MD;  Location: [facility] ENDO;  Service: Endoscopy;  Laterality: N/A;    FOOT " "SURGERY Left     CYST REMOVAL    GASTRIC BYPASS  04/2015    HERNIA REPAIR      UHR    HYSTERECTOMY      KNEE SURGERY Left     SCOPE    RELEASE OF ULNAR NERVE AT CUBITAL TUNNEL Left 12/07/2022    Procedure: RELEASE, ULNAR TUNNEL;  Surgeon: Joce Montanez MD;  Location: [facility] OR;  Service: Orthopedics;  Laterality: Left;    RELEASE OF ULNAR NERVE AT CUBITAL TUNNEL Right 05/25/2023    Procedure: RELEASE, ULNAR TUNNEL;  Surgeon: Joce Montanez MD;  Location: [facility] OR;  Service: Orthopedics;  Laterality: Right;    SHOULDER SURGERY  2000    right should surgery     SHOULDER SURGERY  01/2017    left shoulder / torn labrium     THIGH LIFT  03/03/2017    TONSILLECTOMY      tummy tuck   02/28/2016     Allergies: see below  Medications: MAR reviewed    No pertinent allergy changes in medical history since last visit     PHYSICAL EXAM     VS: Ht 5' 6" (1.676 m)   Wt 63.4 kg (139 lb 12.4 oz)   BMI 22.56 kg/m²   GENERAL: awake, alert, cooperative with exam  EYES: PERRL, EOMI, no conjunctival injection, no discharge, no infraorbital shiners  EARS: external auditory canals normal B/L  ORAL: MMM, no ulcers, no thrush, no cobblestoning  LUNGS: CTAB, no w/r/c, no increased WOB  HEART: Normal Rate and regular rhythm, normal S1/S2, no m/g/r  EXTREMITIES: +2 distal pulses, no c/c/e  DERM: no rashes, no skin breaks     LABORATORY STUDIES     No eosinophilia, normal liver and renal function         ALLERGEN TESTING      Immunocaps (2022) class 1-2 dust mite      IMAGING & OTHER DIAGNOSTICS      CT chest 2021 normal     ASSESSMENT/PLAN     Holly Chicas is a 52 y.o. female with       1. Chronic allergic rhinitis  -Sensitized to dust mites only and poorly controlled with fluticasone and azelastine nasal spray. Discussed proper usage of nasal sprays and patient interested in pursuing SLIT with odactra. Discussed that fomication is not an indication for SLIT, but symptoms of rhino-conjunctivitis (itchy, watery eyes, sneezing, runny " nose and nasal congestion) are an FDA approved indication. Will proceed with submitting for authorization today    2. Allergic rhinitis due to house dust mite  As Above  - allerg xt,D.farinae-D.pteronys (ODACTRA) 12 SQ-HDM Subl; Place 1 tablet under the tongue once daily.  Dispense: 30 tablet; Refill: 11      Follow up: 1st dose odactra       Reji Robledo MD    I spent a total of 45 minutes on the day of the visit. This includes face to face time and non-face to face time preparing to see the patient (eg, review of tests), obtaining and/or reviewing separately obtained history, documenting clinical information in the electronic or other health record, independently interpreting results and communicating results to the patient/family/caregiver, or care coordinator.

## 2023-08-05 DIAGNOSIS — G47.411 NARCOLEPSY WITH CATAPLEXY: ICD-10-CM

## 2023-08-07 RX ORDER — DEXTROAMPHETAMINE SACCHARATE, AMPHETAMINE ASPARTATE, DEXTROAMPHETAMINE SULFATE AND AMPHETAMINE SULFATE 7.5; 7.5; 7.5; 7.5 MG/1; MG/1; MG/1; MG/1
1 TABLET ORAL 2 TIMES DAILY PRN
Qty: 60 TABLET | Refills: 0 | Status: SHIPPED | OUTPATIENT
Start: 2023-08-07 | End: 2023-09-21 | Stop reason: SDUPTHER

## 2023-08-07 NOTE — TELEPHONE ENCOUNTER
Requested Prescriptions     Pending Prescriptions Disp Refills    dextroamphetamine-amphetamine 30 mg Tab 60 tablet 0     Sig: Take 1 tablet (30 mg total) by mouth 2 (two) times daily as needed.     LOV:01/13/2023

## 2023-08-09 ENCOUNTER — SPECIALTY PHARMACY (OUTPATIENT)
Dept: PHARMACY | Facility: CLINIC | Age: 53
End: 2023-08-09
Payer: COMMERCIAL

## 2023-08-09 ENCOUNTER — TELEPHONE (OUTPATIENT)
Dept: PHARMACY | Facility: CLINIC | Age: 53
End: 2023-08-09
Payer: COMMERCIAL

## 2023-08-09 DIAGNOSIS — J30.89 ALLERGIC RHINITIS DUE TO HOUSE DUST MITE: Primary | ICD-10-CM

## 2023-08-09 NOTE — TELEPHONE ENCOUNTER
Specialty Pharmacy - Initial Clinical Assessment    Specialty Medication Orders Linked to Encounter      Flowsheet Row Most Recent Value   Medication #1 allerg xt,D.farinae-D.pteronys (ODACTRA) 12 SQ-HDM Subl (Order#[id number], Rx#[id number])          Patient Diagnosis   J30.89 - Allergic rhinitis due to house dust mite    Subjective    Holly Chicas is a 52 y.o. female, who is followed by the specialty pharmacy service for management and education.    Recent Encounters       Date Type Provider Description    08/09/2023 Specialty Pharmacy Karen Phan, Jose Ramon Initial Clinical Assessment    08/09/2023 Specialty Pharmacy Karen Phan, Jose Ramon Referral Authorization            Current Outpatient Medications   Medication Sig    ACCU-CHEK GUIDE TEST STRIPS Strp     acyclovir (ZOVIRAX) 400 MG tablet Take 400 mg by mouth 3 (three) times daily.    allerg xt,D.farinae-D.pteronys (ODACTRA) 12 SQ-HDM Subl Place 1 tablet under the tongue once daily.    azelastine (ASTELIN) 137 mcg (0.1 %) nasal spray 1 spray in each nosril twice a day    dextroamphetamine-amphetamine 30 mg Tab Take 1 tablet (30 mg total) by mouth 2 (two) times daily as needed.    DUEXIS 800-26.6 mg Tab Take 1 tablet by mouth 3 (three) times daily.     EPINEPHrine (EPIPEN) 0.3 mg/0.3 mL AtIn Inject into the muscle once.    EPINEPHrine (EPIPEN) 0.3 mg/0.3 mL AtIn Inject 0.3 mLs (0.3 mg total) into the muscle once. for 1 dose    estradioL (ESTRACE) 0.01 % (0.1 mg/gram) vaginal cream USE 1/2 GRAM VAGINALLY 4 TIMES WEEKLY AS DIRECTED    fexofenadine (ALLEGRA) 180 MG tablet Take 1 tablet (180 mg total) by mouth once daily. (Patient not taking: Reported on 7/26/2023)    fluticasone propionate (FLONASE) 50 mcg/actuation nasal spray 1 spray in each nostril twice a day    gabapentin (NEURONTIN) 600 MG tablet Take 1 tablet (600 mg total) by mouth 3 (three) times daily.    ibuprofen (ADVIL,MOTRIN) 600 MG tablet TAKE 1 TABLET BY MOUTH THREE TIMES A DAY    rosuvastatin  (CRESTOR) 5 MG tablet Take 1 tablet (5 mg total) by mouth once daily.    solriamfetoL (SUNOSI) 150 mg Tab Take 1 tablet by mouth once daily.    STELARA 45 mg/0.5 mL Syrg syringe     XYREM 500 mg/mL Soln    Last reviewed on 8/9/2023 11:57 AM by Karen Phan, PharmD    Review of patient's allergies indicates:   Allergen Reactions    Alcohol Anaphylaxis     Drinking alcohol, RESPIRATORY DISTRESS     Last reviewed on  8/9/2023 11:56 AM by Karen Phan          Assessment Questions - Documented Responses      Flowsheet Row Most Recent Value   Assessment    Medication Reconciliation completed for patient Yes   During the past 4 weeks, has patient missed any activities due to condition or medication? No   During the past 4 weeks, did patient have any of the following urgent care visits? None   Goals of Therapy Status Discussed (new start)   Status of the patients ability to self-administer: Is Able   All education points have been covered with patient? Yes, supplemental printed education provided   Welcome packet contents reviewed and discussed with patient? No  [FDO in clinic]   Assesment completed? Yes   Plan Therapy being initiated   Do you need to open a clinical intervention (i-vent)? No   Do you want to schedule first shipment? Yes   Medication #1 Assessment Info    Patient status New medication, New to [facility]   Is this medication appropriate for the patient? Yes   Is this medication effective? Not yet started          Refill Questions - Documented Responses      Flowsheet Row Most Recent Value   Patient Availability and HIPAA Verification    Does patient want to proceed with activity? Yes   HIPAA/medical authority confirmed? Yes   Relationship to patient of person spoken to? Self   Refill Screening Questions    When does the patient need to receive the medication? 08/11/23   Refill Delivery Questions    How will the patient receive the medication?    When does the patient need to receive the medication?  "08/11/23   Shipping Address Prescription   Address in [facility] confirmed and updated if neccessary? Yes   Expected Copay ($) 25   Is the patient able to afford the medication copay? Yes   Payment Method new CC added to file   Days supply of Refill 30   Supplies needed? No supplies needed   Refill activity completed? Yes   Refill activity plan Refill scheduled   Shipment/Pickup Date: 08/11/23            Objective    She has a past medical history of Abnormal Pap smear (1996), Allergy, Anxiety, Arthritis, Depression, Good hypertension control (09/11/2014), Hyperlipidemia, Hypertension, Hypertriglyceridemia, Meningitis, Narcolepsy, Peripheral neuropathy (09/12/2014), PONV (postoperative nausea and vomiting), Psoriasis, and Psoriasis (05/10/2018).    Tried/failed medications: Zyrtec, Xyzal, Allegra, Pataday, Flonase, Astelin    BP Readings from Last 4 Encounters:   05/29/23 128/82   05/25/23 (!) 147/83   12/07/22 (!) 140/77   09/16/22 138/88     Ht Readings from Last 4 Encounters:   07/26/23 5' 6" (1.676 m)   05/29/23 5' 6" (1.676 m)   05/25/23 5' 6" (1.676 m)   05/12/23 5' 6" (1.676 m)     Wt Readings from Last 4 Encounters:   07/26/23 63.4 kg (139 lb 12.4 oz)   05/29/23 62.1 kg (136 lb 14.5 oz)   05/25/23 60.8 kg (134 lb)   05/12/23 60.8 kg (134 lb)       The goals of prescribed drug therapy management include:  Supporting patient to meet the prescriber's medical treatment objectives  Improving or maintaining quality of life  Maintaining optimal therapy adherence  Minimizing and managing side effects      Goals of Therapy Status: Discussed (new start)    Assessment/Plan  Patient plans to start therapy on 8/16/23      Indication, dosage, appropriateness, effectiveness, safety and convenience of her specialty medication(s) were reviewed today.     Patient Education   Patient received education on the following:   Expectations and possible outcomes of therapy  Proper use, timely administration, and missed dose " management  Duration of therapy  Side effects, including prevention, minimization, and management  Contraindications and safety precautions  New or changed medications, including prescribe and over the counter medications and supplements  Reviews recommended vaccinations, as appropriate  Storage, safe handling, and disposal    Epipen delivering to clinic for FDO    Tasks added this encounter   No tasks added.   Tasks due within next 3 months   8/9/2023 - Initial Clinical Assessment/Patient Education (1 Time Occurence)  8/9/2023 - Set up Initial Fill     Karen Phan, PharmD  [facility]  [address] [place] [id number]  Phone: [phone]  Fax: [phone]

## 2023-08-09 NOTE — TELEPHONE ENCOUNTER
Reji Robledo MD Nguyen, Janette, PharmD  Please dispense to [facility]. Attn Gregor Velazquez   Please dispense next Wednesday August 16th

## 2023-08-09 NOTE — TELEPHONE ENCOUNTER
Hello, this is Karen Phan, clinical pharmacist with [facility] that is part of your care team.  We have begun working on your prescription that your doctor has sent us. Our next steps include:     Working with your insurance company to obtain approval for your medication  Working with you to ensure your medication is affordable     We will be calling you along the way with updates on your medication but if you have any concerns or receive information that you would like to discuss please reach us at [phone].    Welcome call outcome: Patient/caregiver reached

## 2023-08-16 ENCOUNTER — TELEPHONE (OUTPATIENT)
Dept: ALLERGY | Facility: CLINIC | Age: 53
End: 2023-08-16
Payer: COMMERCIAL

## 2023-08-23 ENCOUNTER — PATIENT MESSAGE (OUTPATIENT)
Dept: FAMILY MEDICINE | Facility: CLINIC | Age: 53
End: 2023-08-23
Payer: COMMERCIAL

## 2023-08-23 NOTE — TELEPHONE ENCOUNTER
Patient:   TIM FRANCE            MRN: [id number]            FIN: [id number]               Age:   15 years     Sex:  Male     :  2002   Associated Diagnoses:   Routine Physical Examination   Author:   Anthony Simeon MD      Visit Information   Visit type:  Annual exam.    Accompanied by   Source of history      Chief Complaint   3/9/2018 9:30 AM CST     Sports px     Adolescent Physical  SEE SCANNED PATIENT HISTORY FORM      Well Child History   Well Child History   Academics/ activities.     Socialization interacting well with family/ relatives and interacting well with peers/ friends.     Bathing daily baths.     Diet/ Feeding balanced.     Sleeping good sleeper.     No parental concerns/ questions.        Review of Systems   Constitutional:  Negative.    Eye:  No visual disturbances.    Ear/Nose/Mouth/Throat:  No decreased hearing.    Respiratory:  Negative.    Cardiovascular:  Negative.    Gastrointestinal:  Negative.    Genitourinary:  Negative.    Hematology/Lymphatics:  No bruising tendency, No bleeding tendency.    Endocrine:  Negative.    Musculoskeletal:  No joint pain, No muscle pain, No trauma.    Integumentary:  Negative.    Neurologic:  Alert and oriented X4, No abnormal balance, No headache.    Psychiatric:  No anxiety, No depression.       Health Status   Allergies:    Allergic Reactions (Selected)  No known allergies      Histories   Past Medical History:    No active or resolved past medical history items have been selected or recorded.   Family History:    No family history items have been selected or recorded.   Procedure history:    No active procedure history items have been selected or recorded.   Social History:             No active social history items have been recorded.      Physical Examination   Vital Signs   3/9/2018 9:30 AM CST Temperature Tympanic 97.1 DegF  LOW    Peripheral Pulse Rate 94 bpm  HI    HR Method Electronic    Systolic Blood Pressure 137 mmHg    Diastolic  Patient was placed in a different time slot per her request, email sent   Blood Pressure 79 mmHg    Mean Arterial Pressure 98 mmHg    BP Method Electronic      Measurements from flowsheet : Measurements   3/9/2018 9:30 AM CST Height Measured - Standard 77 in    Weight Measured - Standard 216.0 lb    BSA 2.3 m2    Body Mass Index 25.61 kg/m2    Body Mass Index Percentile 86.50      General:  Alert and oriented.    Eye:  Pupils are equal, round and reactive to light, Extraocular movements are intact, Normal conjunctiva.    HENT:  Tympanic membranes are clear, Normal hearing, Oral mucosa is moist.    Neck:  Supple, Non-tender, No lymphadenopathy, No thyromegaly.    Respiratory:  Lungs are clear to auscultation.    Cardiovascular:  Normal rate, Regular rhythm, No murmur, Good pulses equal in all extremities.    Gastrointestinal:  Soft, Non-tender, Non-distended, No organomegaly.    Genitourinary:  Normal genitalia for age and sex.    Musculoskeletal:  No scoliosis, back and neck normal, Normal gait, normal hips, thighs,knees, legs, ankles and feet; normal duck walk, Upper and lower extremity strength normal and equal bilaterally, Normal shoulders, arms, elbow, forearms, wrists and hands.    Integumentary:  Intact.    Neurologic:  Alert, Oriented.    Psychiatric:  Cooperative, Appropriate mood & affect, Normal judgment.       Health Maintenance   Immunization schedule:  check Tdap, meningococcal, HPV, Hep A, varicella # 2.    14 - 17 years:   Risks/ Toxic exposure: smoking/ tobacco use, sexual activity, substance abuse (alcohol, drugs).   Counseling/ Guidance: nutrition, exercise, sleep, injury prevention (auto/ airbags/ seat belts, helmet for biking/ skating/ ATV/ motorcycle).         Impression and Plan   Diagnosis     Routine Physical Examination (SHE54-RU Z00.00).     Course:  Cleared for sports participation without restrictions.    Anticipatory Guidance:       Adolescence (11 - 21 years): Hobbies, Peer relations, School performance, Television, Substance abuse, Sexual identity/ dating,  Seatbelts/ airbags, Depression/ anxiety, Alcohol/ drugs/ smoking, Nutrition/ oral health.    Counseled:  Patient, Family.

## 2023-08-29 ENCOUNTER — OFFICE VISIT (OUTPATIENT)
Dept: FAMILY MEDICINE | Facility: CLINIC | Age: 53
End: 2023-08-29
Payer: COMMERCIAL

## 2023-08-29 VITALS
DIASTOLIC BLOOD PRESSURE: 78 MMHG | BODY MASS INDEX: 22.25 KG/M2 | SYSTOLIC BLOOD PRESSURE: 122 MMHG | WEIGHT: 138.44 LBS | HEART RATE: 79 BPM | HEIGHT: 66 IN | TEMPERATURE: 98 F | OXYGEN SATURATION: 97 %

## 2023-08-29 DIAGNOSIS — G43.009 MIGRAINE WITHOUT AURA AND WITHOUT STATUS MIGRAINOSUS, NOT INTRACTABLE: Primary | ICD-10-CM

## 2023-08-29 DIAGNOSIS — M54.10 RADICULOPATHY OF ARM: ICD-10-CM

## 2023-08-29 PROCEDURE — 3078F PR MOST RECENT DIASTOLIC BLOOD PRESSURE < 80 MM HG: ICD-10-PCS | Mod: CPTII,S$GLB,, | Performed by: STUDENT IN AN ORGANIZED HEALTH CARE EDUCATION/TRAINING PROGRAM

## 2023-08-29 PROCEDURE — 3044F PR MOST RECENT HEMOGLOBIN A1C LEVEL <7.0%: ICD-10-PCS | Mod: CPTII,S$GLB,, | Performed by: STUDENT IN AN ORGANIZED HEALTH CARE EDUCATION/TRAINING PROGRAM

## 2023-08-29 PROCEDURE — 99213 PR OFFICE/OUTPT VISIT, EST, LEVL III, 20-29 MIN: ICD-10-PCS | Mod: S$GLB,,, | Performed by: STUDENT IN AN ORGANIZED HEALTH CARE EDUCATION/TRAINING PROGRAM

## 2023-08-29 PROCEDURE — 3078F DIAST BP <80 MM HG: CPT | Mod: CPTII,S$GLB,, | Performed by: STUDENT IN AN ORGANIZED HEALTH CARE EDUCATION/TRAINING PROGRAM

## 2023-08-29 PROCEDURE — 3044F HG A1C LEVEL LT 7.0%: CPT | Mod: CPTII,S$GLB,, | Performed by: STUDENT IN AN ORGANIZED HEALTH CARE EDUCATION/TRAINING PROGRAM

## 2023-08-29 PROCEDURE — 3074F PR MOST RECENT SYSTOLIC BLOOD PRESSURE < 130 MM HG: ICD-10-PCS | Mod: CPTII,S$GLB,, | Performed by: STUDENT IN AN ORGANIZED HEALTH CARE EDUCATION/TRAINING PROGRAM

## 2023-08-29 PROCEDURE — 99213 OFFICE O/P EST LOW 20 MIN: CPT | Mod: S$GLB,,, | Performed by: STUDENT IN AN ORGANIZED HEALTH CARE EDUCATION/TRAINING PROGRAM

## 2023-08-29 PROCEDURE — 99999 PR PBB SHADOW E&M-EST. PATIENT-LVL IV: CPT | Mod: PBBFAC,,, | Performed by: STUDENT IN AN ORGANIZED HEALTH CARE EDUCATION/TRAINING PROGRAM

## 2023-08-29 PROCEDURE — 3074F SYST BP LT 130 MM HG: CPT | Mod: CPTII,S$GLB,, | Performed by: STUDENT IN AN ORGANIZED HEALTH CARE EDUCATION/TRAINING PROGRAM

## 2023-08-29 PROCEDURE — 3008F BODY MASS INDEX DOCD: CPT | Mod: CPTII,S$GLB,, | Performed by: STUDENT IN AN ORGANIZED HEALTH CARE EDUCATION/TRAINING PROGRAM

## 2023-08-29 PROCEDURE — 99999 PR PBB SHADOW E&M-EST. PATIENT-LVL IV: ICD-10-PCS | Mod: PBBFAC,,, | Performed by: STUDENT IN AN ORGANIZED HEALTH CARE EDUCATION/TRAINING PROGRAM

## 2023-08-29 PROCEDURE — 1160F RVW MEDS BY RX/DR IN RCRD: CPT | Mod: CPTII,S$GLB,, | Performed by: STUDENT IN AN ORGANIZED HEALTH CARE EDUCATION/TRAINING PROGRAM

## 2023-08-29 PROCEDURE — 1160F PR REVIEW ALL MEDS BY PRESCRIBER/CLIN PHARMACIST DOCUMENTED: ICD-10-PCS | Mod: CPTII,S$GLB,, | Performed by: STUDENT IN AN ORGANIZED HEALTH CARE EDUCATION/TRAINING PROGRAM

## 2023-08-29 PROCEDURE — 1159F PR MEDICATION LIST DOCUMENTED IN MEDICAL RECORD: ICD-10-PCS | Mod: CPTII,S$GLB,, | Performed by: STUDENT IN AN ORGANIZED HEALTH CARE EDUCATION/TRAINING PROGRAM

## 2023-08-29 PROCEDURE — 3008F PR BODY MASS INDEX (BMI) DOCUMENTED: ICD-10-PCS | Mod: CPTII,S$GLB,, | Performed by: STUDENT IN AN ORGANIZED HEALTH CARE EDUCATION/TRAINING PROGRAM

## 2023-08-29 PROCEDURE — 1159F MED LIST DOCD IN RCRD: CPT | Mod: CPTII,S$GLB,, | Performed by: STUDENT IN AN ORGANIZED HEALTH CARE EDUCATION/TRAINING PROGRAM

## 2023-08-29 RX ORDER — IBUPROFEN AND FAMOTIDINE 800; 26.6 MG/1; MG/1
1 TABLET, COATED ORAL 3 TIMES DAILY
Qty: 90 TABLET | Refills: 3 | Status: SHIPPED | OUTPATIENT
Start: 2023-08-29 | End: 2023-09-06

## 2023-08-29 NOTE — PATIENT INSTRUCTIONS
Sudeep Barrera,     If you are due for any health screening(s) below please notify me so we can arrange them to be ordered and scheduled to maintain your health. Most healthy patients complete it. Don't lose out on improving your health.     All of your core healthy metrics are met.

## 2023-08-29 NOTE — PROGRESS NOTES
"SUBJECTIVE:    CHIEF COMPLAINT:   Chief Complaint   Patient presents with    Follow-up           274}    HISTORY OF PRESENT ILLNESS:  Holly Chicas is a 52 y.o. female with past medical history below presents to the clinic for three-month follow-up.  Her peripheral neuropathy has improved greatly with increased dose of gabapentin.  He is planning to see Allergy on tomorrow to start a new medication.  She denies any concerns at this time and is planning to go on a cruise in September.        PAST MEDICAL HISTORY:     274}  Past Medical History:   Diagnosis Date    Abnormal Pap smear     "pre-cancer cells post hysterectomy"    Allergy     Anxiety     Arthritis     Depression     Good hypertension control 2014    Hyperlipidemia     Hypertension     Hypertriglyceridemia     Meningitis     Narcolepsy     Peripheral neuropathy 2014    PONV (postoperative nausea and vomiting)     Psoriasis     Psoriasis 05/10/2018       PAST SURGICAL HISTORY:  Past Surgical History:   Procedure Laterality Date    ADENOIDECTOMY      AUGMENTATION OF BREAST      BREAST SURGERY      aumentation/ reduction     BREAST SURGERY  2017    augmentation     brizilian butt lift   2017    CARPAL TUNNEL RELEASE Left 2022    Procedure: RELEASE, CARPAL TUNNEL;  Surgeon: Joce Montanez MD;  Location: [facility] OR;  Service: Orthopedics;  Laterality: Left;    CARPAL TUNNEL RELEASE Right 2023    Procedure: RELEASE, CARPAL TUNNEL;  Surgeon: Joce Montanez MD;  Location: [facility] OR;  Service: Orthopedics;  Laterality: Right;     SECTION      CHOLECYSTECTOMY  2018    Dr SEAN [facility]    COLONOSCOPY      COLONOSCOPY N/A 2022    Procedure: COLONOSCOPY;  Surgeon: Kavita Yee MD;  Location: [facility];  Service: Endoscopy;  Laterality: N/A;    ESOPHAGOGASTRODUODENOSCOPY N/A 2022    Procedure: EGD (ESOPHAGOGASTRODUODENOSCOPY);  Surgeon: Kavita Yee MD;  Location: [facility];  " Service: Endoscopy;  Laterality: N/A;    FOOT SURGERY Left     CYST REMOVAL    GASTRIC BYPASS  04/2015    HERNIA REPAIR      UHR    HYSTERECTOMY      KNEE SURGERY Left     SCOPE    RELEASE OF ULNAR NERVE AT CUBITAL TUNNEL Left 12/07/2022    Procedure: RELEASE, ULNAR TUNNEL;  Surgeon: Joce Mnotanez MD;  Location: [facility] OR;  Service: Orthopedics;  Laterality: Left;    RELEASE OF ULNAR NERVE AT CUBITAL TUNNEL Right 05/25/2023    Procedure: RELEASE, ULNAR TUNNEL;  Surgeon: Joce Montanez MD;  Location: [facility] OR;  Service: Orthopedics;  Laterality: Right;    SHOULDER SURGERY  2000    right should surgery     SHOULDER SURGERY  01/2017    left shoulder / torn labrium     THIGH LIFT  03/03/2017    TONSILLECTOMY      tummy tuck   02/28/2016       SOCIAL HISTORY:  Social History     Socioeconomic History    Marital status:    Tobacco Use    Smoking status: Never    Smokeless tobacco: Never   Substance and Sexual Activity    Alcohol use: No     Comment: ALLERGIC TO ALCOHOL(DRINKING) PER PATIENT    Drug use: No    Sexual activity: Yes     Partners: Male     Birth control/protection: Surgical     Comment: [facility]     Social Determinants of Health     Financial Resource Strain: Low Risk  (12/10/2019)    Overall Financial Resource Strain (CARDIA)     Difficulty of Paying Living Expenses: Not hard at all   Food Insecurity: No Food Insecurity (12/10/2019)    Hunger Vital Sign     Worried About Running Out of Food in the Last Year: Never true     Ran Out of Food in the Last Year: Never true   Transportation Needs: No Transportation Needs (12/10/2019)    PRAPARE - Transportation     Lack of Transportation (Medical): No     Lack of Transportation (Non-Medical): No   Physical Activity: Sufficiently Active (12/10/2019)    Exercise Vital Sign     Days of Exercise per Week: 3 days     Minutes of Exercise per Session: 50 min   Social Connections: Unknown (12/10/2019)    Social Connection and Isolation Panel [NHANES]     Frequency  of Communication with Friends and Family: More than three times a week     Frequency of Social Gatherings with Friends and Family: More than three times a week     Active Member of Clubs or Organizations: Yes     Attends Club or Organization Meetings: More than 4 times per year     Marital Status:        FAMILY HISTORY:       Family History   Problem Relation Age of Onset    Cancer Mother         breast bilateral/ skin     Breast cancer Mother     Skin cancer Mother     Diabetes Father     Heart disease Father 65        CABG    Hypertension Father     Cancer Father         skin    Skin cancer Father     Gout Father     Colon polyps Father     Gout Brother     No Known Problems Daughter     No Known Problems Son     Cancer Maternal Aunt         lung, pancrease - smoker , breast    Lung cancer Maternal Aunt         x2    Pancreatic cancer Maternal Aunt     Breast cancer Maternal Aunt     No Known Problems Maternal Uncle     No Known Problems Paternal Uncle     Cancer Maternal Grandmother         pancrease    Pancreatic cancer Maternal Grandmother     Breast cancer Maternal Grandmother     Diabetes Paternal Grandmother     Heart disease Paternal Grandfather     Colon cancer Maternal Grandfather     Ovarian cancer Neg Hx     Ulcerative colitis Neg Hx     Stomach cancer Neg Hx     Crohn's disease Neg Hx     Rectal cancer Neg Hx        ALLERGIES AND MEDICATIONS: updated and reviewed.      274}  Review of patient's allergies indicates:   Allergen Reactions    Alcohol Anaphylaxis     Drinking alcohol, RESPIRATORY DISTRESS       Medication List with Changes/Refills   Current Medications    ACCU-CHEK GUIDE TEST STRIPS STRP        ACYCLOVIR (ZOVIRAX) 400 MG TABLET    Take 400 mg by mouth 3 (three) times daily.    ALLERG XT,D.FARINAE-D.PTERONYS (ODACTRA) 12 SQ-HDM SUBL    Place 1 tablet under the tongue once daily.    AZELASTINE (ASTELIN) 137 MCG (0.1 %) NASAL SPRAY    1 spray in each nosril twice a day     DEXTROAMPHETAMINE-AMPHETAMINE 30 MG TAB    Take 1 tablet (30 mg total) by mouth 2 (two) times daily as needed.    ESTRADIOL (ESTRACE) 0.01 % (0.1 MG/GRAM) VAGINAL CREAM    USE 1/2 GRAM VAGINALLY 4 TIMES WEEKLY AS DIRECTED    FEXOFENADINE (ALLEGRA) 180 MG TABLET    Take 1 tablet (180 mg total) by mouth once daily.    FLUTICASONE PROPIONATE (FLONASE) 50 MCG/ACTUATION NASAL SPRAY    1 spray in each nostril twice a day    GABAPENTIN (NEURONTIN) 600 MG TABLET    Take 1 tablet (600 mg total) by mouth 3 (three) times daily.    ROSUVASTATIN (CRESTOR) 5 MG TABLET    Take 1 tablet (5 mg total) by mouth once daily.    SOLRIAMFETOL (SUNOSI) 150 MG TAB    Take 1 tablet by mouth once daily.    STELARA 45 MG/0.5 ML SYRG SYRINGE        XYREM 500 MG/ML SOLN       Changed and/or Refilled Medications    Modified Medication Previous Medication    DUEXIS 800-26.6 MG TAB DUEXIS 800-26.6 mg Tab       Take 1 tablet by mouth 3 (three) times daily. For pain    Take 1 tablet by mouth 3 (three) times daily.    Discontinued Medications    EPINEPHRINE (EPIPEN) 0.3 MG/0.3 ML ATIN    Inject into the muscle once.    IBUPROFEN (ADVIL,MOTRIN) 600 MG TABLET    TAKE 1 TABLET BY MOUTH THREE TIMES A DAY       SCREENING HISTORY:    274}  Health Maintenance         Date Due Completion Date    Pneumococcal Vaccines (Age 0-64) (3 - PCV) 05/23/2015 5/23/2014    COVID-19 Vaccine (6 - Pfizer risk series) 11/08/2022 9/13/2022    Influenza Vaccine (1) 09/01/2023 10/21/2022    Hemoglobin A1c 06/01/2024 6/1/2023    Mammogram 07/18/2024 7/18/2023    TETANUS VACCINE 04/02/2028 4/2/2018    Lipid Panel 06/01/2028 6/1/2023    Colorectal Cancer Screening 04/29/2032 4/29/2022            REVIEW OF SYSTEMS:   Review of Systems   Constitutional:  Negative for chills and fever.   Respiratory:  Negative for chest tightness, shortness of breath and wheezing.    Cardiovascular:  Negative for chest pain and palpitations.   Gastrointestinal:  Negative for abdominal pain, nausea  "and vomiting.   All other systems reviewed and are negative.      PHYSICAL EXAM:      274}  /78 (BP Location: Right arm, Patient Position: Sitting, BP Method: Medium (Manual))   Pulse 79   Temp 97.7 °F (36.5 °C)   Ht 5' 6" (1.676 m)   Wt 62.8 kg (138 lb 7.2 oz)   SpO2 97%   BMI 22.35 kg/m²   Wt Readings from Last 3 Encounters:   08/29/23 62.8 kg (138 lb 7.2 oz)   07/26/23 63.4 kg (139 lb 12.4 oz)   05/29/23 62.1 kg (136 lb 14.5 oz)     BP Readings from Last 3 Encounters:   08/29/23 122/78   05/29/23 128/82   05/25/23 (!) 147/83     Estimated body mass index is 22.35 kg/m² as calculated from the following:    Height as of this encounter: 5' 6" (1.676 m).    Weight as of this encounter: 62.8 kg (138 lb 7.2 oz).     Physical Exam  Vitals reviewed.   Constitutional:       General: She is not in acute distress.     Appearance: Normal appearance. She is well-developed and normal weight. She is not ill-appearing.   HENT:      Head: Normocephalic and atraumatic.      Right Ear: External ear normal.      Left Ear: External ear normal.      Nose: Nose normal.   Eyes:      Extraocular Movements: Extraocular movements intact.      Conjunctiva/sclera: Conjunctivae normal.      Pupils: Pupils are equal, round, and reactive to light.   Neck:      Thyroid: No thyroid mass.   Cardiovascular:      Rate and Rhythm: Normal rate and regular rhythm.      Pulses: Normal pulses.      Heart sounds: Normal heart sounds, S1 normal and S2 normal. No murmur heard.     No gallop.   Pulmonary:      Effort: Pulmonary effort is normal. No respiratory distress.      Breath sounds: Normal breath sounds and air entry. No stridor. No wheezing, rhonchi or rales.   Abdominal:      General: Bowel sounds are normal. There is no distension.      Palpations: Abdomen is soft. There is no mass.      Tenderness: There is no abdominal tenderness.   Musculoskeletal:         General: No swelling or deformity. Normal range of motion.      Cervical back: " Normal range of motion and neck supple. No edema or erythema.   Skin:     General: Skin is warm and dry.      Findings: No lesion or rash.   Neurological:      General: No focal deficit present.      Mental Status: She is alert and oriented to person, place, and time. Mental status is at baseline.   Psychiatric:         Mood and Affect: Mood normal.         Behavior: Behavior normal. Behavior is cooperative.         Judgment: Judgment normal.         LABS:   274}  I have reviewed old labs below:  Lab Results   Component Value Date    WBC 4.48 05/19/2023    HGB 13.4 05/19/2023    HCT 39.6 05/19/2023    MCV 93 05/19/2023     05/19/2023     05/19/2023    K 4.9 05/19/2023     05/19/2023    CALCIUM 9.5 05/19/2023    CO2 34 (H) 05/19/2023    GLU 69 (L) 05/19/2023    BUN 12 05/19/2023    CREATININE 0.8 05/19/2023    ANIONGAP 6 (L) 05/19/2023    ESTGFRAFRICA >60.0 12/02/2021    EGFRNONAA >60.0 12/02/2021    PROT 6.7 06/01/2023    ALBUMIN 4.0 06/01/2023    BILITOT 0.4 06/01/2023    ALKPHOS 98 06/01/2023    ALT 25 06/01/2023    AST 23 06/01/2023    CHOL 186 06/01/2023    TRIG 79 06/01/2023    HDL 59 06/01/2023    LDLCALC 111.2 06/01/2023    TSH 1.174 06/01/2023    HGBA1C 5.6 06/01/2023       ASSESSMENT AND PLAN:  274}  1. Migraine without aura and without status migrainosus, not intractable  Comments:  Refilled on today.  Orders:  -     DUEXIS 800-26.6 mg Tab; Take 1 tablet by mouth 3 (three) times daily. For pain  Dispense: 90 tablet; Refill: 3    2. Radiculopathy of arm  Comments:  Continue with gabapentin 600 mg p.o. t.i.d. symptoms well controlled.           No orders of the defined types were placed in this encounter.      Follow up in about 6 months (around 2/29/2024). or sooner as needed.

## 2023-08-29 NOTE — PROGRESS NOTES
ALLERGY & IMMUNOLOGY CLINIC -  First Dose SLIT      HISTORY OF PRESENT ILLNESS     Patient ID: Holly Chicas is a 52 y.o. female    CC:   Chief Complaint   Patient presents with    Immunotherapy     Odactra 1st dose under observation       HPI: Holly Chicas is a 52 y.o. female presenting for first dose of odactra. Previous allergy testing performed showed sensitization to these allergens (See Allergy testing below). Feeling well today and denies fevers. Chills, cough, wheezing, shortness of breath, dysphagia, lip swelling, nausea, vomiting, diarrhea and skin rashes.      REVIEW OF SYSTEMS   Balance of review of systems negative except as mentioned above     MEDICAL HISTORY     MedHx: active problems reviewed  SurgHx:   Past Surgical History:   Procedure Laterality Date    ADENOIDECTOMY      AUGMENTATION OF BREAST      BREAST SURGERY      aumentation/ reduction     BREAST SURGERY  2017    augmentation     brizilian butt lift   2017    CARPAL TUNNEL RELEASE Left 2022    Procedure: RELEASE, CARPAL TUNNEL;  Surgeon: Joce Montanez MD;  Location: [facility] OR;  Service: Orthopedics;  Laterality: Left;    CARPAL TUNNEL RELEASE Right 2023    Procedure: RELEASE, CARPAL TUNNEL;  Surgeon: Joce Montanez MD;  Location: [facility] OR;  Service: Orthopedics;  Laterality: Right;     SECTION      CHOLECYSTECTOMY  2018    Dr SEAN Christopher [facility]    COLONOSCOPY      COLONOSCOPY N/A 2022    Procedure: COLONOSCOPY;  Surgeon: Kavita Yee MD;  Location: [facility];  Service: Endoscopy;  Laterality: N/A;    ESOPHAGOGASTRODUODENOSCOPY N/A 2022    Procedure: EGD (ESOPHAGOGASTRODUODENOSCOPY);  Surgeon: Kavita Yee MD;  Location: [facility];  Service: Endoscopy;  Laterality: N/A;    FOOT SURGERY Left     CYST REMOVAL    GASTRIC BYPASS  2015    HERNIA REPAIR      UHR    HYSTERECTOMY      KNEE SURGERY Left     SCOPE    RELEASE OF ULNAR NERVE AT CUBITAL TUNNEL Left  "12/07/2022    Procedure: RELEASE, ULNAR TUNNEL;  Surgeon: Joce Montanez MD;  Location: [facility] OR;  Service: Orthopedics;  Laterality: Left;    RELEASE OF ULNAR NERVE AT CUBITAL TUNNEL Right 05/25/2023    Procedure: RELEASE, ULNAR TUNNEL;  Surgeon: Joce Montanez MD;  Location: [facility] OR;  Service: Orthopedics;  Laterality: Right;    SHOULDER SURGERY  2000    right should surgery     SHOULDER SURGERY  01/2017    left shoulder / torn labrium     THIGH LIFT  03/03/2017    TONSILLECTOMY      tummy tuck   02/28/2016     Allergies: see below  Medications: MAR reviewed       PHYSICAL EXAM     VS: Ht 5' 6" (1.676 m)   Wt 63.2 kg (139 lb 5.3 oz)   BMI 22.49 kg/m²   GENERAL: awake, alert, cooperative with exam  EYES: PERRL, EOMI, no conjunctival injection, no discharge, no infraorbital shiners  EARS: external auditory canals normal B/L  LUNGS: CTAB, no w/r/c, no increased WOB  HEART: Normal Rate and regular rhythm, normal S1/S2, no m/g/r  DERM: no rashes, no skin breaks       ALLERGEN TESTING     Immunocaps (2022) class 1-2 dust mite     ASSESSMENT/PLAN     Holly Chicas is a 52 y.o. female with     1. Chronic allergic rhinitis    2. Allergic rhinitis due to house dust mite          -Sensitized to dust mites and presents today for first dose 30 minute observation period. Discussed in detail and reviewed package label insert. Counseled on importance of daily medication adherence for optimal relief. Discussed that oropharyngeal symptoms including lip tingling, lip swelling, and throat scratching can be common with initial doses. Discussed proper administration of medication including avoidance of swallowing for first minute and nothing to eat or drink for initial 5 minutes after daily dose. Counseled on usage of Epinephrine auto-injector as well. Recommended to continue SLIT daily and notify MD if starting any new medications or have new/worsening symptoms of hives, wheezing, shortness of breath, coughing, vomiting, " diarrhea, difficulty swallowing, or any other concerns        Follow up: 6-12 months      Reji Robledo MD    I spent a total of 30 minutes on the day of the visit. This includes face to face time and non-face to face time preparing to see the patient (eg, review of tests), obtaining and/or reviewing separately obtained history, documenting clinical information in the electronic or other health record, independently interpreting results and communicating results to the patient/family/caregiver, or care coordinator.

## 2023-08-30 ENCOUNTER — OFFICE VISIT (OUTPATIENT)
Dept: ALLERGY | Facility: CLINIC | Age: 53
End: 2023-08-30
Payer: COMMERCIAL

## 2023-08-30 VITALS — HEIGHT: 66 IN | WEIGHT: 139.31 LBS | BODY MASS INDEX: 22.39 KG/M2

## 2023-08-30 DIAGNOSIS — J30.9 CHRONIC ALLERGIC RHINITIS: Primary | ICD-10-CM

## 2023-08-30 DIAGNOSIS — J30.89 ALLERGIC RHINITIS DUE TO HOUSE DUST MITE: ICD-10-CM

## 2023-08-30 PROCEDURE — 99999 PR PBB SHADOW E&M-EST. PATIENT-LVL III: ICD-10-PCS | Mod: PBBFAC,,, | Performed by: STUDENT IN AN ORGANIZED HEALTH CARE EDUCATION/TRAINING PROGRAM

## 2023-08-30 PROCEDURE — 3044F HG A1C LEVEL LT 7.0%: CPT | Mod: CPTII,S$GLB,, | Performed by: STUDENT IN AN ORGANIZED HEALTH CARE EDUCATION/TRAINING PROGRAM

## 2023-08-30 PROCEDURE — 1159F MED LIST DOCD IN RCRD: CPT | Mod: CPTII,S$GLB,, | Performed by: STUDENT IN AN ORGANIZED HEALTH CARE EDUCATION/TRAINING PROGRAM

## 2023-08-30 PROCEDURE — 99214 PR OFFICE/OUTPT VISIT, EST, LEVL IV, 30-39 MIN: ICD-10-PCS | Mod: S$GLB,,, | Performed by: STUDENT IN AN ORGANIZED HEALTH CARE EDUCATION/TRAINING PROGRAM

## 2023-08-30 PROCEDURE — 3044F PR MOST RECENT HEMOGLOBIN A1C LEVEL <7.0%: ICD-10-PCS | Mod: CPTII,S$GLB,, | Performed by: STUDENT IN AN ORGANIZED HEALTH CARE EDUCATION/TRAINING PROGRAM

## 2023-08-30 PROCEDURE — 99999 PR PBB SHADOW E&M-EST. PATIENT-LVL III: CPT | Mod: PBBFAC,,, | Performed by: STUDENT IN AN ORGANIZED HEALTH CARE EDUCATION/TRAINING PROGRAM

## 2023-08-30 PROCEDURE — 99214 OFFICE O/P EST MOD 30 MIN: CPT | Mod: S$GLB,,, | Performed by: STUDENT IN AN ORGANIZED HEALTH CARE EDUCATION/TRAINING PROGRAM

## 2023-08-30 PROCEDURE — 3008F BODY MASS INDEX DOCD: CPT | Mod: CPTII,S$GLB,, | Performed by: STUDENT IN AN ORGANIZED HEALTH CARE EDUCATION/TRAINING PROGRAM

## 2023-08-30 PROCEDURE — 3008F PR BODY MASS INDEX (BMI) DOCUMENTED: ICD-10-PCS | Mod: CPTII,S$GLB,, | Performed by: STUDENT IN AN ORGANIZED HEALTH CARE EDUCATION/TRAINING PROGRAM

## 2023-08-30 PROCEDURE — 1159F PR MEDICATION LIST DOCUMENTED IN MEDICAL RECORD: ICD-10-PCS | Mod: CPTII,S$GLB,, | Performed by: STUDENT IN AN ORGANIZED HEALTH CARE EDUCATION/TRAINING PROGRAM

## 2023-09-06 RX ORDER — IBUPROFEN AND FAMOTIDINE 26.6; 8 MG/1; MG/1
1 TABLET ORAL 3 TIMES DAILY
Qty: 270 TABLET | Refills: 1 | Status: SHIPPED | OUTPATIENT
Start: 2023-09-06 | End: 2023-12-05

## 2023-09-06 NOTE — TELEPHONE ENCOUNTER
Please see attached portal message.  Prior authorization for Duexis denied by patient's insurance. Patient is requesting order for generic. Please advise. Thank you.

## 2023-09-06 NOTE — TELEPHONE ENCOUNTER
Please inform the patient of the following:    I have attempted to submit a generic prescription however this prescription does require prior authorization still from your insurance.  I do recommend trying to obtain this medication from the [facility] pharmacy which typically handles our prior authorizations.  I have resent that prescription to the [facility] pharmacy.     If this does not work and you are not able to obtain the medication the alternative would be to take these 2 medications separately .  This will essentially mean taking 2 different pills as opposed to 1 pill.    Please let us know if you are able to obtain this prescription from the [facility] pharmacy or not and if you would prefer I can send 2 different pills

## 2023-09-08 ENCOUNTER — TELEPHONE (OUTPATIENT)
Dept: SLEEP MEDICINE | Facility: CLINIC | Age: 53
End: 2023-09-08
Payer: COMMERCIAL

## 2023-09-21 DIAGNOSIS — G47.411 NARCOLEPSY WITH CATAPLEXY: ICD-10-CM

## 2023-09-22 RX ORDER — DEXTROAMPHETAMINE SACCHARATE, AMPHETAMINE ASPARTATE, DEXTROAMPHETAMINE SULFATE AND AMPHETAMINE SULFATE 7.5; 7.5; 7.5; 7.5 MG/1; MG/1; MG/1; MG/1
1 TABLET ORAL 2 TIMES DAILY PRN
Qty: 60 TABLET | Refills: 0 | Status: SHIPPED | OUTPATIENT
Start: 2023-09-22 | End: 2023-10-29 | Stop reason: SDUPTHER

## 2023-10-12 ENCOUNTER — IMMUNIZATION (OUTPATIENT)
Dept: URGENT CARE | Facility: CLINIC | Age: 53
End: 2023-10-12
Payer: COMMERCIAL

## 2023-10-12 DIAGNOSIS — Z23 NEED FOR INFLUENZA VACCINATION: Primary | ICD-10-CM

## 2023-10-12 PROCEDURE — 90686 IIV4 VACC NO PRSV 0.5 ML IM: CPT | Mod: S$GLB,,, | Performed by: EMERGENCY MEDICINE

## 2023-10-12 PROCEDURE — 90471 IMMUNIZATION ADMIN: CPT | Mod: S$GLB,,, | Performed by: EMERGENCY MEDICINE

## 2023-10-12 PROCEDURE — 90686 PR FLU VACCINE, QIIV4, NO PRSV, 0.5 ML, IM: ICD-10-PCS | Mod: S$GLB,,, | Performed by: EMERGENCY MEDICINE

## 2023-10-12 PROCEDURE — 90471 PR IMMUNIZ ADMIN,1 SINGLE/COMB VAC/TOXOID: ICD-10-PCS | Mod: S$GLB,,, | Performed by: EMERGENCY MEDICINE

## 2023-10-23 ENCOUNTER — PATIENT MESSAGE (OUTPATIENT)
Dept: FAMILY MEDICINE | Facility: CLINIC | Age: 53
End: 2023-10-23
Payer: COMMERCIAL

## 2023-10-24 ENCOUNTER — OFFICE VISIT (OUTPATIENT)
Dept: FAMILY MEDICINE | Facility: CLINIC | Age: 53
End: 2023-10-24
Payer: COMMERCIAL

## 2023-10-24 VITALS
HEIGHT: 66 IN | DIASTOLIC BLOOD PRESSURE: 78 MMHG | BODY MASS INDEX: 23.49 KG/M2 | TEMPERATURE: 98 F | WEIGHT: 146.19 LBS | HEART RATE: 83 BPM | OXYGEN SATURATION: 98 % | SYSTOLIC BLOOD PRESSURE: 130 MMHG

## 2023-10-24 DIAGNOSIS — R31.9 URINARY TRACT INFECTION WITH HEMATURIA, SITE UNSPECIFIED: Primary | ICD-10-CM

## 2023-10-24 DIAGNOSIS — N39.0 URINARY TRACT INFECTION WITH HEMATURIA, SITE UNSPECIFIED: Primary | ICD-10-CM

## 2023-10-24 LAB
BILIRUBIN, UA POC OHS: NEGATIVE
BLOOD, UA POC OHS: ABNORMAL
CLARITY, UA POC OHS: CLEAR
COLOR, UA POC OHS: YELLOW
GLUCOSE, UA POC OHS: NEGATIVE
KETONES, UA POC OHS: NEGATIVE
LEUKOCYTES, UA POC OHS: ABNORMAL
NITRITE, UA POC OHS: NEGATIVE
PH, UA POC OHS: 7
PROTEIN, UA POC OHS: NEGATIVE
SPECIFIC GRAVITY, UA POC OHS: 1.01
UROBILINOGEN, UA POC OHS: 0.2

## 2023-10-24 PROCEDURE — 99213 OFFICE O/P EST LOW 20 MIN: CPT | Mod: S$GLB,,, | Performed by: FAMILY MEDICINE

## 2023-10-24 PROCEDURE — 3044F PR MOST RECENT HEMOGLOBIN A1C LEVEL <7.0%: ICD-10-PCS | Mod: CPTII,S$GLB,, | Performed by: FAMILY MEDICINE

## 2023-10-24 PROCEDURE — 1159F MED LIST DOCD IN RCRD: CPT | Mod: CPTII,S$GLB,, | Performed by: FAMILY MEDICINE

## 2023-10-24 PROCEDURE — 3078F PR MOST RECENT DIASTOLIC BLOOD PRESSURE < 80 MM HG: ICD-10-PCS | Mod: CPTII,S$GLB,, | Performed by: FAMILY MEDICINE

## 2023-10-24 PROCEDURE — 81003 URINALYSIS AUTO W/O SCOPE: CPT | Mod: QW,S$GLB,, | Performed by: FAMILY MEDICINE

## 2023-10-24 PROCEDURE — 87077 CULTURE AEROBIC IDENTIFY: CPT | Performed by: FAMILY MEDICINE

## 2023-10-24 PROCEDURE — 3075F PR MOST RECENT SYSTOLIC BLOOD PRESS GE 130-139MM HG: ICD-10-PCS | Mod: CPTII,S$GLB,, | Performed by: FAMILY MEDICINE

## 2023-10-24 PROCEDURE — 87088 URINE BACTERIA CULTURE: CPT | Performed by: FAMILY MEDICINE

## 2023-10-24 PROCEDURE — 99999 PR PBB SHADOW E&M-EST. PATIENT-LVL III: CPT | Mod: PBBFAC,,, | Performed by: FAMILY MEDICINE

## 2023-10-24 PROCEDURE — 87086 URINE CULTURE/COLONY COUNT: CPT | Performed by: FAMILY MEDICINE

## 2023-10-24 PROCEDURE — 99999 PR PBB SHADOW E&M-EST. PATIENT-LVL III: ICD-10-PCS | Mod: PBBFAC,,, | Performed by: FAMILY MEDICINE

## 2023-10-24 PROCEDURE — 1159F PR MEDICATION LIST DOCUMENTED IN MEDICAL RECORD: ICD-10-PCS | Mod: CPTII,S$GLB,, | Performed by: FAMILY MEDICINE

## 2023-10-24 PROCEDURE — 3008F PR BODY MASS INDEX (BMI) DOCUMENTED: ICD-10-PCS | Mod: CPTII,S$GLB,, | Performed by: FAMILY MEDICINE

## 2023-10-24 PROCEDURE — 87186 SC STD MICRODIL/AGAR DIL: CPT | Performed by: FAMILY MEDICINE

## 2023-10-24 PROCEDURE — 1160F RVW MEDS BY RX/DR IN RCRD: CPT | Mod: CPTII,S$GLB,, | Performed by: FAMILY MEDICINE

## 2023-10-24 PROCEDURE — 3075F SYST BP GE 130 - 139MM HG: CPT | Mod: CPTII,S$GLB,, | Performed by: FAMILY MEDICINE

## 2023-10-24 PROCEDURE — 3008F BODY MASS INDEX DOCD: CPT | Mod: CPTII,S$GLB,, | Performed by: FAMILY MEDICINE

## 2023-10-24 PROCEDURE — 3078F DIAST BP <80 MM HG: CPT | Mod: CPTII,S$GLB,, | Performed by: FAMILY MEDICINE

## 2023-10-24 PROCEDURE — 1160F PR REVIEW ALL MEDS BY PRESCRIBER/CLIN PHARMACIST DOCUMENTED: ICD-10-PCS | Mod: CPTII,S$GLB,, | Performed by: FAMILY MEDICINE

## 2023-10-24 PROCEDURE — 99213 PR OFFICE/OUTPT VISIT, EST, LEVL III, 20-29 MIN: ICD-10-PCS | Mod: S$GLB,,, | Performed by: FAMILY MEDICINE

## 2023-10-24 PROCEDURE — 81003 POCT URINALYSIS(INSTRUMENT): ICD-10-PCS | Mod: QW,S$GLB,, | Performed by: FAMILY MEDICINE

## 2023-10-24 PROCEDURE — 3044F HG A1C LEVEL LT 7.0%: CPT | Mod: CPTII,S$GLB,, | Performed by: FAMILY MEDICINE

## 2023-10-24 RX ORDER — SULFAMETHOXAZOLE AND TRIMETHOPRIM 800; 160 MG/1; MG/1
1 TABLET ORAL 2 TIMES DAILY
Qty: 14 TABLET | Refills: 0 | Status: SHIPPED | OUTPATIENT
Start: 2023-10-24 | End: 2023-12-08 | Stop reason: ALTCHOICE

## 2023-10-24 RX ORDER — FLUCONAZOLE 150 MG/1
150 TABLET ORAL WEEKLY
Qty: 2 TABLET | Refills: 0 | Status: SHIPPED | OUTPATIENT
Start: 2023-10-24

## 2023-10-24 NOTE — PROGRESS NOTES
Subjective:       Patient ID: Holly Chicas is a 53 y.o. female.    Chief Complaint: No chief complaint on file.    New to me patient here for UC visit.  Low abdominal pain with urination; no dysuria per se; no blood noted.  No fever, Nausea or flank pain.      Review of Systems   Constitutional:  Negative for fever.   Respiratory:  Negative for shortness of breath.    Cardiovascular:  Negative for chest pain.   Gastrointestinal:  Positive for abdominal pain. Negative for nausea.   Skin:  Negative for rash.   All other systems reviewed and are negative.      Objective:      Physical Exam  Constitutional:       General: She is not in acute distress.     Appearance: She is well-developed.   Cardiovascular:      Rate and Rhythm: Normal rate and regular rhythm.      Heart sounds: No murmur heard.  Pulmonary:      Effort: Pulmonary effort is normal.      Breath sounds: Normal breath sounds.   Abdominal:      Palpations: Abdomen is soft.      Tenderness: There is abdominal tenderness in the suprapubic area. There is no right CVA tenderness or left CVA tenderness.         Assessment:       1. Urinary tract infection with hematuria, site unspecified        Plan:       Urinary tract infection with hematuria, site unspecified  -     POCT Urinalysis(Instrument) - positive for Blood and Leuks   -     Urine culture  -     sulfamethoxazole-trimethoprim 800-160mg (BACTRIM DS) 800-160 mg Tab; Take 1 tablet by mouth 2 (two) times daily.  Dispense: 14 tablet; Refill: 0  -     fluconazole (DIFLUCAN) 150 MG Tab; Take 1 tablet (150 mg total) by mouth once a week.  Dispense: 2 tablet; Refill: 0      Patient Instructions   Push fluids intake.  Drink plenty of water.     Contact me or your PCP if any worsening or for any new concerns as we discussed.     
normal affect/normal behavior

## 2023-10-26 LAB — BACTERIA UR CULT: ABNORMAL

## 2023-10-28 DIAGNOSIS — G47.411 NARCOLEPSY WITH CATAPLEXY: ICD-10-CM

## 2023-10-29 DIAGNOSIS — G47.411 NARCOLEPSY WITH CATAPLEXY: ICD-10-CM

## 2023-10-30 RX ORDER — SOLRIAMFETOL 150 MG/1
150 TABLET, FILM COATED ORAL DAILY
Qty: 30 TABLET | Refills: 3 | Status: SHIPPED | OUTPATIENT
Start: 2023-10-30 | End: 2024-03-08 | Stop reason: SDUPTHER

## 2023-10-30 RX ORDER — DEXTROAMPHETAMINE SACCHARATE, AMPHETAMINE ASPARTATE, DEXTROAMPHETAMINE SULFATE AND AMPHETAMINE SULFATE 7.5; 7.5; 7.5; 7.5 MG/1; MG/1; MG/1; MG/1
1 TABLET ORAL 2 TIMES DAILY PRN
Qty: 60 TABLET | Refills: 0 | Status: SHIPPED | OUTPATIENT
Start: 2023-10-30 | End: 2023-11-28 | Stop reason: SDUPTHER

## 2023-10-30 NOTE — TELEPHONE ENCOUNTER
Requested Prescriptions     Pending Prescriptions Disp Refills    solriamfetoL (SUNOSI) 150 mg Tab 30 tablet 3     Sig: Take 1 tablet by mouth once daily.     Lov 1/13/2023

## 2023-10-31 ENCOUNTER — OFFICE VISIT (OUTPATIENT)
Dept: ORTHOPEDICS | Facility: CLINIC | Age: 53
End: 2023-10-31
Payer: COMMERCIAL

## 2023-10-31 VITALS — WEIGHT: 146 LBS | BODY MASS INDEX: 23.46 KG/M2 | HEIGHT: 66 IN | RESPIRATION RATE: 18 BRPM

## 2023-10-31 DIAGNOSIS — M79.644 PAIN OF RIGHT THUMB: Primary | ICD-10-CM

## 2023-10-31 DIAGNOSIS — M65.311 TRIGGER THUMB OF RIGHT HAND: ICD-10-CM

## 2023-10-31 PROCEDURE — 3008F BODY MASS INDEX DOCD: CPT | Mod: CPTII,S$GLB,, | Performed by: ORTHOPAEDIC SURGERY

## 2023-10-31 PROCEDURE — 99213 PR OFFICE/OUTPT VISIT, EST, LEVL III, 20-29 MIN: ICD-10-PCS | Mod: S$GLB,,, | Performed by: ORTHOPAEDIC SURGERY

## 2023-10-31 PROCEDURE — 3008F PR BODY MASS INDEX (BMI) DOCUMENTED: ICD-10-PCS | Mod: CPTII,S$GLB,, | Performed by: ORTHOPAEDIC SURGERY

## 2023-10-31 PROCEDURE — 99999 PR PBB SHADOW E&M-EST. PATIENT-LVL II: ICD-10-PCS | Mod: PBBFAC,,, | Performed by: ORTHOPAEDIC SURGERY

## 2023-10-31 PROCEDURE — 99213 OFFICE O/P EST LOW 20 MIN: CPT | Mod: S$GLB,,, | Performed by: ORTHOPAEDIC SURGERY

## 2023-10-31 PROCEDURE — 3044F HG A1C LEVEL LT 7.0%: CPT | Mod: CPTII,S$GLB,, | Performed by: ORTHOPAEDIC SURGERY

## 2023-10-31 PROCEDURE — 99999 PR PBB SHADOW E&M-EST. PATIENT-LVL II: CPT | Mod: PBBFAC,,, | Performed by: ORTHOPAEDIC SURGERY

## 2023-10-31 PROCEDURE — 3044F PR MOST RECENT HEMOGLOBIN A1C LEVEL <7.0%: ICD-10-PCS | Mod: CPTII,S$GLB,, | Performed by: ORTHOPAEDIC SURGERY

## 2023-10-31 RX ORDER — METHYLPREDNISOLONE 4 MG/1
TABLET ORAL
Qty: 21 EACH | Refills: 0 | Status: SHIPPED | OUTPATIENT
Start: 2023-10-31 | End: 2023-12-08 | Stop reason: ALTCHOICE

## 2023-10-31 NOTE — PROGRESS NOTES
"Patient ID: Holly Chicas is a 53 y.o. female    Chief Complaint:   Chief Complaint   Patient presents with    Right Hand - Pain       History of Present Illness:    Pleasant 53-year-old right-hand dominant female here for evaluation of right thumb pain.  She also reports locking and feeling like in a thumb is getting stuck.  She is status post carpal tunnel and cubital tunnel release by me about 6 months ago.  Did well postoperatively.  Her thumb locking and pain has been present for about 3 weeks.  No history of trigger fingers were trigger thumbs past.  Used to be diabetic but now well controlled on lifestyle modification and diet.    PAST MEDICAL HISTORY:   Past Medical History:   Diagnosis Date    Abnormal Pap smear     "pre-cancer cells post hysterectomy"    Allergy     Anxiety     Arthritis     Depression     Good hypertension control 2014    Hyperlipidemia     Hypertension     Hypertriglyceridemia     Meningitis     Narcolepsy     Peripheral neuropathy 2014    PONV (postoperative nausea and vomiting)     Psoriasis     Psoriasis 05/10/2018     PAST SURGICAL HISTORY:   Past Surgical History:   Procedure Laterality Date    ADENOIDECTOMY      AUGMENTATION OF BREAST      BREAST SURGERY      aumentation/ reduction     BREAST SURGERY  2017    augmentation     brizilian butt lift   2017    CARPAL TUNNEL RELEASE Left 2022    Procedure: RELEASE, CARPAL TUNNEL;  Surgeon: Joce Montanez MD;  Location: [facility] OR;  Service: Orthopedics;  Laterality: Left;    CARPAL TUNNEL RELEASE Right 2023    Procedure: RELEASE, CARPAL TUNNEL;  Surgeon: Joce Montanez MD;  Location: [facility] OR;  Service: Orthopedics;  Laterality: Right;     SECTION      CHOLECYSTECTOMY  2018    Dr SEAN Christopher [facility]    COLONOSCOPY      COLONOSCOPY N/A 2022    Procedure: COLONOSCOPY;  Surgeon: Kavita Yee MD;  Location: [facility];  Service: Endoscopy;  Laterality: N/A;    " ESOPHAGOGASTRODUODENOSCOPY N/A 04/29/2022    Procedure: EGD (ESOPHAGOGASTRODUODENOSCOPY);  Surgeon: Kavita Yee MD;  Location: [facility] ENDO;  Service: Endoscopy;  Laterality: N/A;    FOOT SURGERY Left     CYST REMOVAL    GASTRIC BYPASS  04/2015    HERNIA REPAIR      UHR    HYSTERECTOMY      KNEE SURGERY Left     SCOPE    RELEASE OF ULNAR NERVE AT CUBITAL TUNNEL Left 12/07/2022    Procedure: RELEASE, ULNAR TUNNEL;  Surgeon: Joce Montanez MD;  Location: [facility] OR;  Service: Orthopedics;  Laterality: Left;    RELEASE OF ULNAR NERVE AT CUBITAL TUNNEL Right 05/25/2023    Procedure: RELEASE, ULNAR TUNNEL;  Surgeon: Joce Montanez MD;  Location: [facility] OR;  Service: Orthopedics;  Laterality: Right;    SHOULDER SURGERY  2000    right should surgery     SHOULDER SURGERY  01/2017    left shoulder / torn labrium     THIGH LIFT  03/03/2017    TONSILLECTOMY      tummy tuck   02/28/2016     FAMILY HISTORY:   Family History   Problem Relation Age of Onset    Cancer Mother         breast bilateral/ skin     Breast cancer Mother     Skin cancer Mother     Diabetes Father     Heart disease Father 65        CABG    Hypertension Father     Cancer Father         skin    Skin cancer Father     Gout Father     Colon polyps Father     Gout Brother     No Known Problems Daughter     No Known Problems Son     Cancer Maternal Aunt         lung, pancrease - smoker , breast    Lung cancer Maternal Aunt         x2    Pancreatic cancer Maternal Aunt     Breast cancer Maternal Aunt     No Known Problems Maternal Uncle     No Known Problems Paternal Uncle     Cancer Maternal Grandmother         pancrease    Pancreatic cancer Maternal Grandmother     Breast cancer Maternal Grandmother     Diabetes Paternal Grandmother     Heart disease Paternal Grandfather     Colon cancer Maternal Grandfather     Ovarian cancer Neg Hx     Ulcerative colitis Neg Hx     Stomach cancer Neg Hx     Crohn's disease Neg Hx     Rectal cancer Neg Hx      SOCIAL  HISTORY:   Social History     Occupational History    Not on file   Tobacco Use    Smoking status: Never    Smokeless tobacco: Never   Substance and Sexual Activity    Alcohol use: No     Comment: ALLERGIC TO ALCOHOL(DRINKING) PER PATIENT    Drug use: No    Sexual activity: Yes     Partners: Male     Birth control/protection: Surgical     Comment: [facility]        MEDICATIONS:   Current Outpatient Medications:     ACCU-CHEK GUIDE TEST STRIPS Strp, , Disp: , Rfl:     acyclovir (ZOVIRAX) 400 MG tablet, Take 400 mg by mouth 3 (three) times daily., Disp: , Rfl:     allerg xt,D.farinae-D.pteronys (ODACTRA) 12 SQ-HDM Subl, Place 1 tablet under the tongue once daily., Disp: 30 tablet, Rfl: 11    azelastine (ASTELIN) 137 mcg (0.1 %) nasal spray, 1 spray in each nosril twice a day, Disp: 30 mL, Rfl: 5    dextroamphetamine-amphetamine 30 mg Tab, Take 1 tablet (30 mg total) by mouth 2 (two) times daily as needed., Disp: 60 tablet, Rfl: 0    estradioL (ESTRACE) 0.01 % (0.1 mg/gram) vaginal cream, USE 1/2 GRAM VAGINALLY 4 TIMES WEEKLY AS DIRECTED, Disp: , Rfl:     fexofenadine (ALLEGRA) 180 MG tablet, Take 1 tablet (180 mg total) by mouth once daily., Disp: 90 tablet, Rfl: 2    fluconazole (DIFLUCAN) 150 MG Tab, Take 1 tablet (150 mg total) by mouth once a week., Disp: 2 tablet, Rfl: 0    fluticasone propionate (FLONASE) 50 mcg/actuation nasal spray, 1 spray in each nostril twice a day, Disp: 16 g, Rfl: 5    gabapentin (NEURONTIN) 600 MG tablet, Take 1 tablet (600 mg total) by mouth 3 (three) times daily., Disp: 270 tablet, Rfl: 3    ibuprofen-famotidine (DUEXIS) 800-26.6 mg Tab, Take 1 tablet by mouth 3 (three) times daily. For Migraines and pain, Disp: 270 tablet, Rfl: 1    methylPREDNISolone (MEDROL DOSEPACK) 4 mg tablet, use as directed, Disp: 21 each, Rfl: 0    rosuvastatin (CRESTOR) 5 MG tablet, Take 1 tablet (5 mg total) by mouth once daily., Disp: 90 tablet, Rfl: 3    solriamfetoL (SUNOSI) 150 mg Tab, Take 1 tablet by mouth  once daily., Disp: 30 tablet, Rfl: 3    STELARA 45 mg/0.5 mL Syrg syringe, , Disp: , Rfl:     sulfamethoxazole-trimethoprim 800-160mg (BACTRIM DS) 800-160 mg Tab, Take 1 tablet by mouth 2 (two) times daily., Disp: 14 tablet, Rfl: 0    XYREM 500 mg/mL Soln, , Disp: , Rfl:   No current facility-administered medications for this visit.    Facility-Administered Medications Ordered in Other Visits:     diphenhydrAMINE injection 12.5 mg, 12.5 mg, Intravenous, Once PRN, Alphonso Ambrosio MD    electrolyte-S (ISOLYTE), , Intravenous, Continuous, Alphonso Ambrosio MD, Stopped at 05/25/23 0953    fentaNYL 50 mcg/mL injection 25 mcg, 25 mcg, Intravenous, Q5 Min PRN, Alphonso Ambrosio MD    HYDROmorphone (PF) injection 0.2 mg, 0.2 mg, Intravenous, Q5 Min PRN, Alphonso Ambrosio MD    lactated ringers infusion, 10 mL/hr, Intravenous, Continuous, Alphonso Ambrosio MD    lactated ringers infusion, 500 mL, Intravenous, Once, Alphonso Ambrosio MD    LORazepam injection 0.25 mg, 0.25 mg, Intravenous, Once PRN, Alphonso Ambrosio MD    ondansetron injection 4 mg, 4 mg, Intravenous, Once PRN, Alphonso Ambrosio MD    prochlorperazine injection Soln 5 mg, 5 mg, Intravenous, Q30 Min PRN, Alphonso Ambrosio MD    sodium chloride 0.9% flush 3 mL, 3 mL, Intravenous, Q8H, Alphonso Ambrosio MD  ALLERGIES:   Review of patient's allergies indicates:   Allergen Reactions    Alcohol Anaphylaxis     Drinking alcohol, RESPIRATORY DISTRESS           Physical Exam     Vitals:    10/31/23 0906   Resp: 18     Alert and oriented to person, place and time. No acute distress. Well-groomed, not ill appearing. Pupils round and reactive, normal respiratory effort, no audible wheezing.     On exam she has tenderness of the right thumb A1 pulley.  Exquisitely tender to palpation.  No swelling induration or signs of infection.  No evidence of flexor tenosynovitis.  Active triggering with flexion of the thumb.  FDS FDP intact.         Imaging:       X-Ray: I have reviewed all pertinent results/findings and my personal findings are:  No evidence of fracture or dislocation to the right hand.      Assessment & Plan    Pain of right thumb  -     methylPREDNISolone (MEDROL DOSEPACK) 4 mg tablet; use as directed  Dispense: 21 each; Refill: 0    Trigger thumb of right hand         Treatment options discussed with her in detail.  I do think this is likely trigger thumb.  We went over treatment options including therapy, injections, anti-inflammatories as well as surgical A1 pulley release.  I did offer her an injection today as this is fairly early in the triggering process.  She declined today.  We did offer a oral steroid pack and home stretching program which she would like to try.  She will follow up if no improvement and at that time can consider injection or surgery.

## 2023-11-15 ENCOUNTER — TELEPHONE (OUTPATIENT)
Dept: SLEEP MEDICINE | Facility: CLINIC | Age: 53
End: 2023-11-15
Payer: COMMERCIAL

## 2023-11-20 ENCOUNTER — PATIENT MESSAGE (OUTPATIENT)
Dept: ADMINISTRATIVE | Facility: OTHER | Age: 53
End: 2023-11-20
Payer: COMMERCIAL

## 2023-11-28 DIAGNOSIS — G47.411 NARCOLEPSY WITH CATAPLEXY: ICD-10-CM

## 2023-11-28 RX ORDER — DEXTROAMPHETAMINE SACCHARATE, AMPHETAMINE ASPARTATE, DEXTROAMPHETAMINE SULFATE AND AMPHETAMINE SULFATE 7.5; 7.5; 7.5; 7.5 MG/1; MG/1; MG/1; MG/1
1 TABLET ORAL 2 TIMES DAILY PRN
Qty: 60 TABLET | Refills: 0 | Status: SHIPPED | OUTPATIENT
Start: 2023-11-28 | End: 2024-01-05 | Stop reason: SDUPTHER

## 2023-12-04 DIAGNOSIS — M25.511 RIGHT SHOULDER PAIN, UNSPECIFIED CHRONICITY: Primary | ICD-10-CM

## 2023-12-08 ENCOUNTER — OFFICE VISIT (OUTPATIENT)
Dept: ORTHOPEDICS | Facility: CLINIC | Age: 53
End: 2023-12-08
Payer: COMMERCIAL

## 2023-12-08 ENCOUNTER — HOSPITAL ENCOUNTER (OUTPATIENT)
Dept: RADIOLOGY | Facility: HOSPITAL | Age: 53
Discharge: HOME | End: 2023-12-08
Attending: ORTHOPAEDIC SURGERY
Payer: COMMERCIAL

## 2023-12-08 ENCOUNTER — OFFICE VISIT (OUTPATIENT)
Dept: SLEEP MEDICINE | Facility: CLINIC | Age: 53
End: 2023-12-08
Payer: COMMERCIAL

## 2023-12-08 VITALS — HEIGHT: 66 IN | RESPIRATION RATE: 18 BRPM | WEIGHT: 146 LBS | BODY MASS INDEX: 23.46 KG/M2

## 2023-12-08 DIAGNOSIS — G47.411 PRIMARY NARCOLEPSY WITH CATAPLEXY: Primary | ICD-10-CM

## 2023-12-08 DIAGNOSIS — M65.311 TRIGGER THUMB OF RIGHT HAND: Primary | ICD-10-CM

## 2023-12-08 DIAGNOSIS — M25.511 RIGHT SHOULDER PAIN, UNSPECIFIED CHRONICITY: ICD-10-CM

## 2023-12-08 DIAGNOSIS — Z01.818 PRE-OP TESTING: ICD-10-CM

## 2023-12-08 PROCEDURE — 99214 PR OFFICE/OUTPT VISIT, EST, LEVL IV, 30-39 MIN: ICD-10-PCS | Mod: 95,,, | Performed by: NURSE PRACTITIONER

## 2023-12-08 PROCEDURE — 3044F PR MOST RECENT HEMOGLOBIN A1C LEVEL <7.0%: ICD-10-PCS | Mod: CPTII,95,, | Performed by: NURSE PRACTITIONER

## 2023-12-08 PROCEDURE — 3044F HG A1C LEVEL LT 7.0%: CPT | Mod: CPTII,95,, | Performed by: NURSE PRACTITIONER

## 2023-12-08 PROCEDURE — 73030 XR SHOULDER TRAUMA 3 VIEW LEFT: ICD-10-PCS | Mod: 26,LT,, | Performed by: RADIOLOGY

## 2023-12-08 PROCEDURE — 99999 PR PBB SHADOW E&M-EST. PATIENT-LVL II: ICD-10-PCS | Mod: PBBFAC,,, | Performed by: ORTHOPAEDIC SURGERY

## 2023-12-08 PROCEDURE — 99214 PR OFFICE/OUTPT VISIT, EST, LEVL IV, 30-39 MIN: ICD-10-PCS | Mod: S$GLB,,, | Performed by: ORTHOPAEDIC SURGERY

## 2023-12-08 PROCEDURE — 99999 PR PBB SHADOW E&M-EST. PATIENT-LVL II: CPT | Mod: PBBFAC,,, | Performed by: ORTHOPAEDIC SURGERY

## 2023-12-08 PROCEDURE — 3044F HG A1C LEVEL LT 7.0%: CPT | Mod: CPTII,S$GLB,, | Performed by: ORTHOPAEDIC SURGERY

## 2023-12-08 PROCEDURE — 3008F BODY MASS INDEX DOCD: CPT | Mod: CPTII,S$GLB,, | Performed by: ORTHOPAEDIC SURGERY

## 2023-12-08 PROCEDURE — 73030 X-RAY EXAM OF SHOULDER: CPT | Mod: TC,LT

## 2023-12-08 PROCEDURE — 3008F PR BODY MASS INDEX (BMI) DOCUMENTED: ICD-10-PCS | Mod: CPTII,S$GLB,, | Performed by: ORTHOPAEDIC SURGERY

## 2023-12-08 PROCEDURE — 99214 OFFICE O/P EST MOD 30 MIN: CPT | Mod: S$GLB,,, | Performed by: ORTHOPAEDIC SURGERY

## 2023-12-08 PROCEDURE — 73030 X-RAY EXAM OF SHOULDER: CPT | Mod: 26,LT,, | Performed by: RADIOLOGY

## 2023-12-08 PROCEDURE — 99214 OFFICE O/P EST MOD 30 MIN: CPT | Mod: 95,,, | Performed by: NURSE PRACTITIONER

## 2023-12-08 PROCEDURE — 3044F PR MOST RECENT HEMOGLOBIN A1C LEVEL <7.0%: ICD-10-PCS | Mod: CPTII,S$GLB,, | Performed by: ORTHOPAEDIC SURGERY

## 2023-12-08 RX ORDER — MUPIROCIN 20 MG/G
OINTMENT TOPICAL
Status: CANCELLED | OUTPATIENT
Start: 2023-12-08

## 2023-12-08 NOTE — PROGRESS NOTES
"Patient ID: Holly Chicas is a 53 y.o. female    Chief Complaint:   Chief Complaint   Patient presents with    Left Shoulder - Pain       History of Present Illness:    Pleasant 53-year-old right-hand dominant female here for evaluation of right thumb pain.  She also reports locking and feeling like in a thumb is getting stuck.  She is status post carpal tunnel and cubital tunnel release by me about 6 months ago.  Did well postoperatively.  Her thumb locking and pain has been present for about 3 weeks.  No history of trigger fingers were trigger thumbs past.  Used to be diabetic but now well controlled on lifestyle modification and diet.    ____________________________________________________________________    Interval history 12/08/2023 : Patient returns today for follow up of her right thumb.  No significant changes since I saw her.  Still having triggering and pain.  Last visit we did a Medrol Dosepak but this did not help.  She has tried bracing without help.  Also complaining of some left shoulder pain.  Does have history of rotator cuff surgery several years ago and feels that this is the same type of pain.    PAST MEDICAL HISTORY:   Past Medical History:   Diagnosis Date    Abnormal Pap smear 1996    "pre-cancer cells post hysterectomy"    Allergy     Anxiety     Arthritis     Depression     Good hypertension control 09/11/2014    Hyperlipidemia     Hypertension     Hypertriglyceridemia     Meningitis     Narcolepsy     Peripheral neuropathy 09/12/2014    PONV (postoperative nausea and vomiting)     Psoriasis     Psoriasis 05/10/2018     PAST SURGICAL HISTORY:   Past Surgical History:   Procedure Laterality Date    ADENOIDECTOMY      AUGMENTATION OF BREAST      BREAST SURGERY  2016    aumentation/ reduction     BREAST SURGERY  03/03/2017    augmentation     brizilian butt lift   03/03/2017    CARPAL TUNNEL RELEASE Left 12/07/2022    Procedure: RELEASE, CARPAL TUNNEL;  Surgeon: Joce Montanez MD;  " Location: [facility] OR;  Service: Orthopedics;  Laterality: Left;    CARPAL TUNNEL RELEASE Right 2023    Procedure: RELEASE, CARPAL TUNNEL;  Surgeon: Joce Montanez MD;  Location: [facility];  Service: Orthopedics;  Laterality: Right;     SECTION      CHOLECYSTECTOMY  2018    Dr SEAN Christopher [facility]    COLONOSCOPY      COLONOSCOPY N/A 2022    Procedure: COLONOSCOPY;  Surgeon: Kavita Yee MD;  Location: [facility] ENDO;  Service: Endoscopy;  Laterality: N/A;    ESOPHAGOGASTRODUODENOSCOPY N/A 2022    Procedure: EGD (ESOPHAGOGASTRODUODENOSCOPY);  Surgeon: Kavita Yee MD;  Location: [facility] ENDO;  Service: Endoscopy;  Laterality: N/A;    FOOT SURGERY Left     CYST REMOVAL    GASTRIC BYPASS  2015    HERNIA REPAIR      UHR    HYSTERECTOMY      KNEE SURGERY Left     SCOPE    RELEASE OF ULNAR NERVE AT CUBITAL TUNNEL Left 2022    Procedure: RELEASE, ULNAR TUNNEL;  Surgeon: Joce Montanez MD;  Location: [facility] OR;  Service: Orthopedics;  Laterality: Left;    RELEASE OF ULNAR NERVE AT CUBITAL TUNNEL Right 2023    Procedure: RELEASE, ULNAR TUNNEL;  Surgeon: Joce Montanez MD;  Location: [facility] OR;  Service: Orthopedics;  Laterality: Right;    SHOULDER SURGERY      right should surgery     SHOULDER SURGERY  2017    left shoulder / torn labrium     THIGH LIFT  2017    TONSILLECTOMY      tummy tuck   2016     FAMILY HISTORY:   Family History   Problem Relation Age of Onset    Cancer Mother         breast bilateral/ skin     Breast cancer Mother     Skin cancer Mother     Diabetes Father     Heart disease Father 65        CABG    Hypertension Father     Cancer Father         skin    Skin cancer Father     Gout Father     Colon polyps Father     Gout Brother     No Known Problems Daughter     No Known Problems Son     Cancer Maternal Aunt         lung, pancrease - smoker , breast    Lung cancer Maternal Aunt         x2    Pancreatic cancer Maternal Aunt      Breast cancer Maternal Aunt     No Known Problems Maternal Uncle     No Known Problems Paternal Uncle     Cancer Maternal Grandmother         pancrease    Pancreatic cancer Maternal Grandmother     Breast cancer Maternal Grandmother     Diabetes Paternal Grandmother     Heart disease Paternal Grandfather     Colon cancer Maternal Grandfather     Ovarian cancer Neg Hx     Ulcerative colitis Neg Hx     Stomach cancer Neg Hx     Crohn's disease Neg Hx     Rectal cancer Neg Hx      SOCIAL HISTORY:   Social History     Occupational History    Not on file   Tobacco Use    Smoking status: Never    Smokeless tobacco: Never   Substance and Sexual Activity    Alcohol use: No     Comment: ALLERGIC TO ALCOHOL(DRINKING) PER PATIENT    Drug use: No    Sexual activity: Yes     Partners: Male     Birth control/protection: Surgical     Comment: [facility]        MEDICATIONS:   Current Outpatient Medications:     ACCU-CHEK GUIDE TEST STRIPS Strp, , Disp: , Rfl:     acyclovir (ZOVIRAX) 400 MG tablet, Take 400 mg by mouth 3 (three) times daily., Disp: , Rfl:     allerg xt,D.farinae-D.pteronys (ODACTRA) 12 SQ-HDM Subl, Place 1 tablet under the tongue once daily., Disp: 30 tablet, Rfl: 11    azelastine (ASTELIN) 137 mcg (0.1 %) nasal spray, 1 spray in each nosril twice a day, Disp: 30 mL, Rfl: 5    dextroamphetamine-amphetamine 30 mg Tab, Take 1 tablet (30 mg total) by mouth 2 (two) times daily as needed., Disp: 60 tablet, Rfl: 0    estradioL (ESTRACE) 0.01 % (0.1 mg/gram) vaginal cream, USE 1/2 GRAM VAGINALLY 4 TIMES WEEKLY AS DIRECTED, Disp: , Rfl:     fexofenadine (ALLEGRA) 180 MG tablet, Take 1 tablet (180 mg total) by mouth once daily., Disp: 90 tablet, Rfl: 2    fluconazole (DIFLUCAN) 150 MG Tab, Take 1 tablet (150 mg total) by mouth once a week., Disp: 2 tablet, Rfl: 0    fluticasone propionate (FLONASE) 50 mcg/actuation nasal spray, 1 spray in each nostril twice a day, Disp: 16 g, Rfl: 5    gabapentin (NEURONTIN) 600 MG tablet, Take 1  tablet (600 mg total) by mouth 3 (three) times daily., Disp: 270 tablet, Rfl: 3    rosuvastatin (CRESTOR) 5 MG tablet, Take 1 tablet (5 mg total) by mouth once daily., Disp: 90 tablet, Rfl: 3    solriamfetoL (SUNOSI) 150 mg Tab, Take 1 tablet by mouth once daily., Disp: 30 tablet, Rfl: 3    STELARA 45 mg/0.5 mL Syrg syringe, , Disp: , Rfl:     XYREM 500 mg/mL Soln, , Disp: , Rfl:   No current facility-administered medications for this visit.    Facility-Administered Medications Ordered in Other Visits:     diphenhydrAMINE injection 12.5 mg, 12.5 mg, Intravenous, Once PRN, Alphonso Ambrosio MD    electrolyte-S (ISOLYTE), , Intravenous, Continuous, Alphonso Ambrosio MD, Stopped at 05/25/23 0953    fentaNYL 50 mcg/mL injection 25 mcg, 25 mcg, Intravenous, Q5 Min PRN, Alphonso Ambrosio MD    HYDROmorphone (PF) injection 0.2 mg, 0.2 mg, Intravenous, Q5 Min PRN, Alphonso Ambrosio MD    lactated ringers infusion, 10 mL/hr, Intravenous, Continuous, Alphonso Ambrosio MD    lactated ringers infusion, 500 mL, Intravenous, Once, Alphonso Ambrosio MD    LORazepam injection 0.25 mg, 0.25 mg, Intravenous, Once PRN, Alphonso Ambrosio MD    ondansetron injection 4 mg, 4 mg, Intravenous, Once PRN, Alphonso Ambrosio MD    prochlorperazine injection Soln 5 mg, 5 mg, Intravenous, Q30 Min PRN, Alphonso Ambrosio MD    sodium chloride 0.9% flush 3 mL, 3 mL, Intravenous, Q8H, Alphonso Ambrosio MD  ALLERGIES:   Review of patient's allergies indicates:   Allergen Reactions    Alcohol Anaphylaxis     Drinking alcohol, RESPIRATORY DISTRESS           Physical Exam     Vitals:    12/08/23 0926   Resp: 18     Alert and oriented to person, place and time. No acute distress. Well-groomed, not ill appearing. Pupils round and reactive, normal respiratory effort, no audible wheezing.     On exam she has tenderness of the right thumb A1 pulley.  Exquisitely tender to palpation.  No swelling induration or signs of  infection.  No evidence of flexor tenosynovitis.  Active triggering with flexion of the thumb.  FDS FDP intact.        Imaging:       X-Ray: I have reviewed all pertinent results/findings and my personal findings are:  No evidence of fracture or dislocation to the right hand.      Assessment & Plan    Trigger thumb of right hand         Treatment options were discussed with her in detail.  I went over her clinical exam findings consistent with trigger thumb.  At this point she has failed conservative treatment with bracing and anti-inflammatories.  We discussed further options including injections and surgery.  We discussed the risks and benefits of both in detail.  Ultimately she elected to proceed with surgical A1 pulley release.  We will get this arranged for her in January.    _________________________________________________________________      Diagnosis and findings were discussed with her in lay terms. I discussed the findings and treatment options with them at length. Questions were actively sought and all questions were answered to their apparent satisfaction. We discussed the risks and benefits of surgery. We reviewed the operative indications surgical technique and potential rehabilitation involved. Risks including, but not limited to pain, bleeding, infection, damage to surrounding neurovascular structures, and other soft tissues, decreased range of motion, instability, poor cosmetic result, scarring/painful scars, poor functional result, reflex sympathetic dystrophy. We discussed as well the risk of anesthesia, DVT/PE and possible need for additional surgical intervention in lay terms. Despite the risks as explained to them, they wished to proceed with surgery. She expressed understanding and agreement with the treatment plan and understand that no guarantee of outcome is implied or expressed given.       Holly Chicas will be scheduled for the following procedure(s):     Right trigger thumb  release    Post-Op Medications to be prescribed:   Percocet 5/325mg Take 1-2 tablets every 4-6 hours PRN pain #12  Zofran 4mg oral disintegrating tablets every 8 hours PRN nausea/vomiting       Additional notes/concerns:  None, re-evaluate left shoulder postop    Opioid Disclosure  Today we discussed the reasons why the prescription is necessary as well as: the risks of addiction and overdose associated with opioid drugs and the dangers of taking opioid drugs with alcohol, benzodiazepines and other central nervous system depressants; alternative treatments that may be available; the risks associated with the use of the drugs being prescribed, specifically that opioids are highly addictive, even when taken as prescribed, that there is a risk of developing a physical or psychological dependence on the controlled dangerous substance, and that the risks of taking more opioids than prescribed or mixing sedatives, benzodiazepines or alcohol with opioids can result in fatal respiratory depression.

## 2023-12-08 NOTE — PROGRESS NOTES
The patient location is [place]  The chief complaint leading to consultation is: narcolepsy    Visit type: TELE AUDIOVISUAL:79432    Face to Face time with patient:12minutes of total time spent on the encounter, which includes face to face time and non-face to face time preparing to see the patient (eg, review of tests), Obtaining and/or reviewing separately obtained history, Documenting clinical information in the electronic or other health record, Independently interpreting results (not separately reported) and communicating results to the patient/family/caregiver, or Care coordination (not separately reported). Each patient to whom he or she provides medical services by telemedicine is:  (1) informed of the relationship between the physician and patient and the respective role of any other health care provider with respect to management of the patient; and (2) notified that he or she may decline to receive medical services by telemedicine and may withdraw from such care at any time.      Since seen she continues to take xyrem to 4.5G 2x nightly. ESS=13. Had about week of enuresis but this has resolved . Continues to take sunosi and adderall 30mg am and 2nd dose afternoon. Was contacted about study sounds like for xyrem generic??      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 Used bipap for some time but had mouth opening and chin strap was intolerable due to claustrophobia. Had MSLT revealing narcolepsy (see epic records). She currently talking sunosi 150mg am and nuvigil 250mg qd and Adderall 30mg bid. Takes weekend drug holidays. Remains very sleepy impacting driving, could lose her license and can fall asleep at work. ESS=23!! Has lost 70# since PSG and no longer snores. Denies witnessed apneic pauses.     ?cataplexy passed out while sitting on amusement ride [place]  Legs have buckled before/fallen-not sure if with strong emotion    12/28/21: Since seen she increased her xyrem to 4.5G 2x  nightly. Significantly helping reduce daytime sleepiness. ESS=14.  Feels bad currently , thinks she might have repeat Covid. Continues to take sunosi and nuvigil am and just 1 30mg tab adderall afternoon.   Denies cataplexy      MSTL 2018 avg SL 1min, 2/5 SOREMs  SH . [facility]     ASSESSMENT:   Narcolepsy with cataplexy, symptoms improved on max therapeutic dose Xyrem and sunosi and adderall   Hx gastric bypass  Psoriasis       PLAN:   1.Xyrem continue 4.5 2x nightly, however discussed possibility ins may only cover generic going forward and discussed higher risk CVD with xyrem vs switching to Xywav. Continue sunosi 150mg qam and adderall 30mg BID prn  2  RTC annually, sooner accordingly

## 2023-12-12 ENCOUNTER — PATIENT MESSAGE (OUTPATIENT)
Dept: SLEEP MEDICINE | Facility: CLINIC | Age: 53
End: 2023-12-12
Payer: COMMERCIAL

## 2024-01-05 DIAGNOSIS — G47.411 NARCOLEPSY WITH CATAPLEXY: ICD-10-CM

## 2024-01-05 RX ORDER — DEXTROAMPHETAMINE SACCHARATE, AMPHETAMINE ASPARTATE, DEXTROAMPHETAMINE SULFATE AND AMPHETAMINE SULFATE 7.5; 7.5; 7.5; 7.5 MG/1; MG/1; MG/1; MG/1
1 TABLET ORAL 2 TIMES DAILY PRN
Qty: 60 TABLET | Refills: 0 | Status: SHIPPED | OUTPATIENT
Start: 2024-01-05 | End: 2024-02-04 | Stop reason: SDUPTHER

## 2024-01-05 NOTE — TELEPHONE ENCOUNTER
Requested Prescriptions     Pending Prescriptions Disp Refills    dextroamphetamine-amphetamine 30 mg Tab 60 tablet 0     Sig: Take 1 tablet (30 mg total) by mouth 2 (two) times daily as needed.     12/8/2023

## 2024-01-08 ENCOUNTER — HOSPITAL ENCOUNTER (OUTPATIENT)
Dept: PREADMISSION TESTING | Facility: HOSPITAL | Age: 54
Discharge: HOME | End: 2024-01-08
Attending: ORTHOPAEDIC SURGERY
Payer: COMMERCIAL

## 2024-01-08 ENCOUNTER — LAB VISIT (OUTPATIENT)
Dept: LAB | Facility: HOSPITAL | Age: 54
End: 2024-01-08
Attending: ORTHOPAEDIC SURGERY
Payer: COMMERCIAL

## 2024-01-08 VITALS — BODY MASS INDEX: 23.46 KG/M2 | WEIGHT: 146 LBS | HEIGHT: 66 IN

## 2024-01-08 DIAGNOSIS — Z01.818 PRE-OP TESTING: ICD-10-CM

## 2024-01-08 DIAGNOSIS — Z01.818 PREOPERATIVE TESTING: ICD-10-CM

## 2024-01-08 DIAGNOSIS — Z01.818 PREOPERATIVE TESTING: Primary | ICD-10-CM

## 2024-01-08 DIAGNOSIS — M65.311 TRIGGER THUMB OF RIGHT HAND: ICD-10-CM

## 2024-01-08 DIAGNOSIS — Z01.818 PRE-OP EXAM: Primary | ICD-10-CM

## 2024-01-08 LAB
ANION GAP SERPL CALC-SCNC: 8 MMOL/L (ref 8–16)
BASOPHILS # BLD AUTO: 0.01 K/UL (ref 0–0.2)
BASOPHILS NFR BLD: 0.3 % (ref 0–1.9)
BUN SERPL-MCNC: 10 MG/DL (ref 6–20)
CALCIUM SERPL-MCNC: 8.4 MG/DL (ref 8.7–10.5)
CHLORIDE SERPL-SCNC: 106 MMOL/L (ref 95–110)
CO2 SERPL-SCNC: 29 MMOL/L (ref 23–29)
CREAT SERPL-MCNC: 0.7 MG/DL (ref 0.5–1.4)
DIFFERENTIAL METHOD BLD: ABNORMAL
EOSINOPHIL # BLD AUTO: 0.1 K/UL (ref 0–0.5)
EOSINOPHIL NFR BLD: 2.3 % (ref 0–8)
ERYTHROCYTE [DISTWIDTH] IN BLOOD BY AUTOMATED COUNT: 11.7 % (ref 11.5–14.5)
EST. GFR  (NO RACE VARIABLE): >60 ML/MIN/1.73 M^2
GLUCOSE SERPL-MCNC: 78 MG/DL (ref 70–110)
HCT VFR BLD AUTO: 35.3 % (ref 37–48.5)
HGB BLD-MCNC: 11.4 G/DL (ref 12–16)
IMM GRANULOCYTES # BLD AUTO: 0.01 K/UL (ref 0–0.04)
IMM GRANULOCYTES NFR BLD AUTO: 0.3 % (ref 0–0.5)
LYMPHOCYTES # BLD AUTO: 1.1 K/UL (ref 1–4.8)
LYMPHOCYTES NFR BLD: 28.2 % (ref 18–48)
MCH RBC QN AUTO: 29.9 PG (ref 27–31)
MCHC RBC AUTO-ENTMCNC: 32.3 G/DL (ref 32–36)
MCV RBC AUTO: 93 FL (ref 82–98)
MONOCYTES # BLD AUTO: 0.5 K/UL (ref 0.3–1)
MONOCYTES NFR BLD: 11.5 % (ref 4–15)
NEUTROPHILS # BLD AUTO: 2.2 K/UL (ref 1.8–7.7)
NEUTROPHILS NFR BLD: 57.4 % (ref 38–73)
NRBC BLD-RTO: 0 /100 WBC
PLATELET # BLD AUTO: 203 K/UL (ref 150–450)
PMV BLD AUTO: 9.9 FL (ref 9.2–12.9)
POTASSIUM SERPL-SCNC: 3.4 MMOL/L (ref 3.5–5.1)
RBC # BLD AUTO: 3.81 M/UL (ref 4–5.4)
SODIUM SERPL-SCNC: 143 MMOL/L (ref 136–145)
WBC # BLD AUTO: 3.9 K/UL (ref 3.9–12.7)

## 2024-01-08 PROCEDURE — 36415 COLL VENOUS BLD VENIPUNCTURE: CPT | Performed by: ORTHOPAEDIC SURGERY

## 2024-01-08 PROCEDURE — 85025 COMPLETE CBC W/AUTO DIFF WBC: CPT | Performed by: ORTHOPAEDIC SURGERY

## 2024-01-08 PROCEDURE — 93005 ELECTROCARDIOGRAM TRACING: CPT

## 2024-01-08 PROCEDURE — 80048 BASIC METABOLIC PNL TOTAL CA: CPT | Performed by: ORTHOPAEDIC SURGERY

## 2024-01-08 PROCEDURE — 93010 ELECTROCARDIOGRAM REPORT: CPT | Mod: ,,, | Performed by: INTERNAL MEDICINE

## 2024-01-08 NOTE — DISCHARGE INSTRUCTIONS
To confirm, Your doctor has instructed you that surgery is scheduled for: 1/11/24    Please report to [facility], Registration the morning of surgery. You must check-in and receive a wristband before going to your procedure.  [address] [place] [address] [place] [id number]    Pre-Op will call the afternoon prior to surgery between 1:00 and 6:00 PM with the final arrival time.  Phone number: [phone]    PLEASE NOTE:  The surgery schedule has many variables which may affect the time of your surgery case.  Family members should be available if your surgery time changes.  Plan to be here the day of your procedure between 4-6 hours.    MEDICATIONS:  TAKE ONLY THESE MEDICATIONS WITH A SMALL SIP OF WATER THE MORNING OF YOUR PROCEDURE:    See List    DO NOT TAKE THESE MEDICATIONS 5-7 DAYS PRIOR to your procedure or per your surgeon's request:   ASPIRIN, ALEVE, ADVIL, IBUPROFEN, FISH OIL VITAMIN E, HERBALS    (May take Tylenol)    ONLY if you are prescribed any types of blood thinners such as:  Aspirin, Coumadin, Plavix, Pradaxa, Xarelto, Aggrenox, Effient, Eliquis, Savasya, Brilinta, or any other, ask your surgeon whether you should stop taking them and how long before surgery you should stop.  You may also need to verify with the prescribing physician if it is ok to stop your medication.      INSTRUCTIONS IMPORTANT!!  Do not eat or drink anything between midnight and the time of your procedure- this includes gum, mints, and candy.  Do not smoke or drink alcoholic beverages 24 hours prior to your procedure.  Shower the night before AND the morning of your procedure with a Chlorhexidine wash such as Hibiclens or Dial antibacterial soap from the neck down.  Do not get it on your face or in your eyes.  You may use your own shampoo and face wash. This helps your skin to be as bacteria free as possible.    If you wear contact lenses, dentures, hearing aids or glasses, bring a container to put them in during  surgery and give to a family member for safe keeping.  Please leave all jewelry, piercing's and valuables at home. You must remove your false eyelashes prior to surgery.    DO NOT remove hair from the surgery site.  Do not shave the incision site unless you are given specific instructions to do so.    ONLY if you have been diagnosed with sleep apnea please bring your C-PAP machine.  ONLY if you wear home oxygen please bring your portable oxygen tank the day of your procedure.  ONLY if you have a history of OPEN HEART SURGERY you will need a clearance from your Cardiologist per Anesthesia.      ONLY for patients requiring bowel prep, written instructions will be given by your doctor's office.  ONLY if you have a neuro stimulator, please bring the controller with you the morning of surgery  ONLY if a type and screen test is needed before surgery, please return:  If your doctor has scheduled you for an overnight stay, bring a small overnight bag with any personal items you need.  Make arrangements in advance for transportation home by a responsible adult.  It is not safe to drive a vehicle during the 24 hours after anesthesia.          All  facilities and properties are tobacco free.  Smoking is NOT allowed.   If you have any questions about these instructions, call Pre-Op Admit  Nursing at [phone] or the Pre-Op Day Surgery Unit at [phone].

## 2024-01-10 ENCOUNTER — ANESTHESIA EVENT (OUTPATIENT)
Dept: SURGERY | Facility: HOSPITAL | Age: 54
End: 2024-01-10
Payer: COMMERCIAL

## 2024-01-10 RX ORDER — ONDANSETRON 4 MG/1
4 TABLET, ORALLY DISINTEGRATING ORAL EVERY 8 HOURS PRN
Qty: 30 TABLET | Refills: 0 | Status: SHIPPED | OUTPATIENT
Start: 2024-01-10

## 2024-01-10 RX ORDER — SODIUM CHLORIDE, SODIUM LACTATE, POTASSIUM CHLORIDE, CALCIUM CHLORIDE 600; 310; 30; 20 MG/100ML; MG/100ML; MG/100ML; MG/100ML
INJECTION, SOLUTION INTRAVENOUS CONTINUOUS
Status: CANCELLED | OUTPATIENT
Start: 2024-01-10

## 2024-01-10 RX ORDER — OXYCODONE AND ACETAMINOPHEN 5; 325 MG/1; MG/1
1 TABLET ORAL EVERY 6 HOURS PRN
Qty: 12 TABLET | Refills: 0 | Status: SHIPPED | OUTPATIENT
Start: 2024-01-10

## 2024-01-11 ENCOUNTER — ANESTHESIA (OUTPATIENT)
Dept: SURGERY | Facility: HOSPITAL | Age: 54
End: 2024-01-11
Payer: COMMERCIAL

## 2024-01-11 ENCOUNTER — HOSPITAL ENCOUNTER (OUTPATIENT)
Facility: HOSPITAL | Age: 54
Discharge: HOME | End: 2024-01-11
Attending: ORTHOPAEDIC SURGERY | Admitting: ORTHOPAEDIC SURGERY
Payer: COMMERCIAL

## 2024-01-11 DIAGNOSIS — Z01.818 PRE-OP TESTING: ICD-10-CM

## 2024-01-11 DIAGNOSIS — M65.311 TRIGGER THUMB OF RIGHT HAND: ICD-10-CM

## 2024-01-11 PROCEDURE — 63600175 PHARM REV CODE 636 W HCPCS: Mod: JZ,JG | Performed by: ORTHOPAEDIC SURGERY

## 2024-01-11 PROCEDURE — 36000707: Performed by: ORTHOPAEDIC SURGERY

## 2024-01-11 PROCEDURE — 25000003 PHARM REV CODE 250: Performed by: ANESTHESIOLOGY

## 2024-01-11 PROCEDURE — 27200651 HC AIRWAY, LMA: Performed by: NURSE ANESTHETIST, CERTIFIED REGISTERED

## 2024-01-11 PROCEDURE — 63600175 PHARM REV CODE 636 W HCPCS: Performed by: ANESTHESIOLOGY

## 2024-01-11 PROCEDURE — 71000039 HC RECOVERY, EACH ADD'L HOUR: Performed by: ORTHOPAEDIC SURGERY

## 2024-01-11 PROCEDURE — 71000033 HC RECOVERY, INTIAL HOUR: Performed by: ORTHOPAEDIC SURGERY

## 2024-01-11 PROCEDURE — D9220A PRA ANESTHESIA: Mod: CRNA,,, | Performed by: NURSE ANESTHETIST, CERTIFIED REGISTERED

## 2024-01-11 PROCEDURE — 94799 UNLISTED PULMONARY SVC/PX: CPT

## 2024-01-11 PROCEDURE — 63600175 PHARM REV CODE 636 W HCPCS: Performed by: ORTHOPAEDIC SURGERY

## 2024-01-11 PROCEDURE — 63600175 PHARM REV CODE 636 W HCPCS: Performed by: NURSE ANESTHETIST, CERTIFIED REGISTERED

## 2024-01-11 PROCEDURE — 36000706: Performed by: ORTHOPAEDIC SURGERY

## 2024-01-11 PROCEDURE — 37000008 HC ANESTHESIA 1ST 15 MINUTES: Performed by: ORTHOPAEDIC SURGERY

## 2024-01-11 PROCEDURE — 25000003 PHARM REV CODE 250: Performed by: NURSE ANESTHETIST, CERTIFIED REGISTERED

## 2024-01-11 PROCEDURE — 71000015 HC POSTOP RECOV 1ST HR: Performed by: ORTHOPAEDIC SURGERY

## 2024-01-11 PROCEDURE — 26055 INCISE FINGER TENDON SHEATH: CPT | Mod: F5,,, | Performed by: ORTHOPAEDIC SURGERY

## 2024-01-11 PROCEDURE — D9220A PRA ANESTHESIA: Mod: ANES,,, | Performed by: ANESTHESIOLOGY

## 2024-01-11 PROCEDURE — 37000009 HC ANESTHESIA EA ADD 15 MINS: Performed by: ORTHOPAEDIC SURGERY

## 2024-01-11 PROCEDURE — 25000003 PHARM REV CODE 250: Performed by: ORTHOPAEDIC SURGERY

## 2024-01-11 RX ORDER — OXYCODONE HYDROCHLORIDE 10 MG/1
10 TABLET ORAL EVERY 4 HOURS PRN
Status: CANCELLED | OUTPATIENT
Start: 2024-01-11

## 2024-01-11 RX ORDER — DIPHENHYDRAMINE HYDROCHLORIDE 50 MG/ML
25 INJECTION INTRAMUSCULAR; INTRAVENOUS EVERY 6 HOURS PRN
Status: DISCONTINUED | OUTPATIENT
Start: 2024-01-11 | End: 2024-01-11 | Stop reason: HOSPADM

## 2024-01-11 RX ORDER — LIDOCAINE HYDROCHLORIDE 20 MG/ML
INJECTION INTRAVENOUS
Status: DISCONTINUED | OUTPATIENT
Start: 2024-01-11 | End: 2024-01-11

## 2024-01-11 RX ORDER — PROCHLORPERAZINE EDISYLATE 5 MG/ML
5 INJECTION INTRAMUSCULAR; INTRAVENOUS EVERY 4 HOURS PRN
Status: DISCONTINUED | OUTPATIENT
Start: 2024-01-11 | End: 2024-01-11 | Stop reason: HOSPADM

## 2024-01-11 RX ORDER — LIDOCAINE HYDROCHLORIDE 10 MG/ML
0.5 INJECTION, SOLUTION EPIDURAL; INFILTRATION; INTRACAUDAL; PERINEURAL ONCE
Status: DISCONTINUED | OUTPATIENT
Start: 2024-01-11 | End: 2024-01-11 | Stop reason: HOSPADM

## 2024-01-11 RX ORDER — MIDAZOLAM HYDROCHLORIDE 1 MG/ML
INJECTION INTRAMUSCULAR; INTRAVENOUS
Status: DISCONTINUED | OUTPATIENT
Start: 2024-01-11 | End: 2024-01-11

## 2024-01-11 RX ORDER — FENTANYL CITRATE 50 UG/ML
INJECTION, SOLUTION INTRAMUSCULAR; INTRAVENOUS
Status: DISCONTINUED | OUTPATIENT
Start: 2024-01-11 | End: 2024-01-11

## 2024-01-11 RX ORDER — ACETAMINOPHEN 10 MG/ML
INJECTION, SOLUTION INTRAVENOUS
Status: DISCONTINUED | OUTPATIENT
Start: 2024-01-11 | End: 2024-01-11

## 2024-01-11 RX ORDER — MUPIROCIN 20 MG/G
OINTMENT TOPICAL
Status: DISCONTINUED | OUTPATIENT
Start: 2024-01-11 | End: 2024-01-11 | Stop reason: HOSPADM

## 2024-01-11 RX ORDER — ONDANSETRON 4 MG/1
8 TABLET, ORALLY DISINTEGRATING ORAL EVERY 8 HOURS PRN
Status: CANCELLED | OUTPATIENT
Start: 2024-01-11

## 2024-01-11 RX ORDER — ACETAMINOPHEN 325 MG/1
650 TABLET ORAL EVERY 4 HOURS PRN
Status: CANCELLED | OUTPATIENT
Start: 2024-01-11

## 2024-01-11 RX ORDER — MEPERIDINE HYDROCHLORIDE 50 MG/ML
12.5 INJECTION INTRAMUSCULAR; INTRAVENOUS; SUBCUTANEOUS ONCE
Status: DISCONTINUED | OUTPATIENT
Start: 2024-01-11 | End: 2024-01-11 | Stop reason: HOSPADM

## 2024-01-11 RX ORDER — HYDROMORPHONE HYDROCHLORIDE 2 MG/ML
0.2 INJECTION, SOLUTION INTRAMUSCULAR; INTRAVENOUS; SUBCUTANEOUS EVERY 5 MIN PRN
Status: DISCONTINUED | OUTPATIENT
Start: 2024-01-11 | End: 2024-01-11 | Stop reason: HOSPADM

## 2024-01-11 RX ORDER — OXYCODONE HYDROCHLORIDE 5 MG/1
5 TABLET ORAL
Status: DISCONTINUED | OUTPATIENT
Start: 2024-01-11 | End: 2024-01-11 | Stop reason: HOSPADM

## 2024-01-11 RX ORDER — ONDANSETRON HYDROCHLORIDE 2 MG/ML
4 INJECTION, SOLUTION INTRAVENOUS ONCE
Status: DISCONTINUED | OUTPATIENT
Start: 2024-01-11 | End: 2024-01-11 | Stop reason: HOSPADM

## 2024-01-11 RX ORDER — ONDANSETRON HYDROCHLORIDE 2 MG/ML
INJECTION, SOLUTION INTRAMUSCULAR; INTRAVENOUS
Status: DISCONTINUED | OUTPATIENT
Start: 2024-01-11 | End: 2024-01-11

## 2024-01-11 RX ORDER — DEXAMETHASONE SODIUM PHOSPHATE 4 MG/ML
INJECTION, SOLUTION INTRA-ARTICULAR; INTRALESIONAL; INTRAMUSCULAR; INTRAVENOUS; SOFT TISSUE
Status: DISCONTINUED | OUTPATIENT
Start: 2024-01-11 | End: 2024-01-11

## 2024-01-11 RX ORDER — BUPIVACAINE HYDROCHLORIDE 5 MG/ML
INJECTION, SOLUTION EPIDURAL; INTRACAUDAL
Status: DISCONTINUED | OUTPATIENT
Start: 2024-01-11 | End: 2024-01-11 | Stop reason: HOSPADM

## 2024-01-11 RX ORDER — FENTANYL CITRATE 50 UG/ML
25 INJECTION, SOLUTION INTRAMUSCULAR; INTRAVENOUS EVERY 5 MIN PRN
Status: DISCONTINUED | OUTPATIENT
Start: 2024-01-11 | End: 2024-01-11 | Stop reason: HOSPADM

## 2024-01-11 RX ORDER — HYDROCODONE BITARTRATE AND ACETAMINOPHEN 5; 325 MG/1; MG/1
1 TABLET ORAL EVERY 4 HOURS PRN
Status: CANCELLED | OUTPATIENT
Start: 2024-01-11

## 2024-01-11 RX ORDER — SCOLOPAMINE TRANSDERMAL SYSTEM 1 MG/1
1 PATCH, EXTENDED RELEASE TRANSDERMAL
Status: DISCONTINUED | OUTPATIENT
Start: 2024-01-11 | End: 2024-01-11 | Stop reason: HOSPADM

## 2024-01-11 RX ORDER — CEFAZOLIN SODIUM 2 G/50ML
2 SOLUTION INTRAVENOUS
Status: COMPLETED | OUTPATIENT
Start: 2024-01-11 | End: 2024-01-11

## 2024-01-11 RX ORDER — SODIUM CHLORIDE 0.9 % (FLUSH) 0.9 %
10 SYRINGE (ML) INJECTION
Status: DISCONTINUED | OUTPATIENT
Start: 2024-01-11 | End: 2024-01-11 | Stop reason: HOSPADM

## 2024-01-11 RX ORDER — PROPOFOL 10 MG/ML
VIAL (ML) INTRAVENOUS
Status: DISCONTINUED | OUTPATIENT
Start: 2024-01-11 | End: 2024-01-11

## 2024-01-11 RX ORDER — PHENYLEPHRINE HYDROCHLORIDE 10 MG/ML
INJECTION INTRAVENOUS
Status: DISCONTINUED | OUTPATIENT
Start: 2024-01-11 | End: 2024-01-11

## 2024-01-11 RX ADMIN — FENTANYL CITRATE 50 MCG: 50 INJECTION, SOLUTION INTRAMUSCULAR; INTRAVENOUS at 07:01

## 2024-01-11 RX ADMIN — LIDOCAINE HYDROCHLORIDE 75 MG: 20 INJECTION, SOLUTION INTRAVENOUS at 07:01

## 2024-01-11 RX ADMIN — DEXAMETHASONE SODIUM PHOSPHATE 4 MG: 4 INJECTION, SOLUTION INTRA-ARTICULAR; INTRALESIONAL; INTRAMUSCULAR; INTRAVENOUS; SOFT TISSUE at 07:01

## 2024-01-11 RX ADMIN — PROPOFOL 150 MG: 10 INJECTION, EMULSION INTRAVENOUS at 07:01

## 2024-01-11 RX ADMIN — CEFAZOLIN SODIUM 2 G: 2 SOLUTION INTRAVENOUS at 07:01

## 2024-01-11 RX ADMIN — SODIUM CHLORIDE, SODIUM GLUCONATE, SODIUM ACETATE, POTASSIUM CHLORIDE AND MAGNESIUM CHLORIDE: 526; 502; 368; 37; 30 INJECTION, SOLUTION INTRAVENOUS at 06:01

## 2024-01-11 RX ADMIN — ACETAMINOPHEN 1000 MG: 10 INJECTION, SOLUTION INTRAVENOUS at 07:01

## 2024-01-11 RX ADMIN — ONDANSETRON 4 MG: 2 INJECTION INTRAMUSCULAR; INTRAVENOUS at 07:01

## 2024-01-11 RX ADMIN — PHENYLEPHRINE HYDROCHLORIDE 100 MCG: 10 INJECTION INTRAVENOUS at 07:01

## 2024-01-11 RX ADMIN — MUPIROCIN: 20 OINTMENT TOPICAL at 06:01

## 2024-01-11 RX ADMIN — SCOPALAMINE 1 PATCH: 1 PATCH, EXTENDED RELEASE TRANSDERMAL at 06:01

## 2024-01-11 RX ADMIN — MIDAZOLAM HYDROCHLORIDE 2 MG: 1 INJECTION, SOLUTION INTRAMUSCULAR; INTRAVENOUS at 07:01

## 2024-01-11 RX ADMIN — OXYCODONE HYDROCHLORIDE 5 MG: 5 TABLET ORAL at 08:01

## 2024-01-11 RX ADMIN — FENTANYL CITRATE 25 MCG: 50 INJECTION INTRAMUSCULAR; INTRAVENOUS at 08:01

## 2024-01-11 NOTE — ANESTHESIA PREPROCEDURE EVALUATION
01/11/2024  Holly Chicas is a 53 y.o., female.      Pre-op Assessment    I have reviewed the Patient Summary Reports.     I have reviewed the Nursing Notes. I have reviewed the NPO Status.   I have reviewed the Medications.     Review of Systems  Anesthesia Hx:             Denies Family Hx of Anesthesia complications.   Personal Hx of Anesthesia complications, Post-Operative Nausea/Vomiting, with every anesthetic, despite treatment                    Cardiovascular:     Hypertension           hyperlipidemia                             Musculoskeletal:     Right hand carpal tunnel syndrome             Neurological:    Neuromuscular Disease,  Headaches                                 Endocrine:  Diabetes, type 2           Psych:  Psychiatric History  depression                Physical Exam  General: Well nourished, Cooperative, Alert and Oriented    Airway:  Mallampati: II   Mouth Opening: Normal  TM Distance: Normal  Tongue: Normal  Neck ROM: Normal ROM    Dental:  Intact        Anesthesia Plan  Type of Anesthesia, risks & benefits discussed:    Anesthesia Type: Gen Supraglottic Airway  Intra-op Monitoring Plan: Standard ASA Monitors  Post Op Pain Control Plan: multimodal analgesia  Induction:  IV  Informed Consent: Informed consent signed with the Patient and all parties understand the risks and agree with anesthesia plan.  All questions answered.   ASA Score: 2    Ready For Surgery From Anesthesia Perspective.     .

## 2024-01-11 NOTE — PROGRESS NOTES
Criteria per anesthesia for transfer to post op . Tolerating po fluids Pain addressed with po and IV analgesics Transferred per stretcher to phase 2 Report given to cordell

## 2024-01-11 NOTE — DISCHARGE INSTRUCTIONS
INSTRUCTIONS    HAND SURGERY    DO'S:   - Keep affected wrist elevated about the level of your heart.   - Check circulation frequently in fingers by pressing on the nail bed.  Nail bed     should turn white and then pink when released.   - Exercise fingers to promote circulation.   - Advance diet as tolerated   - Resume home medications _______________.   - Keep splint dressing clean and dry when showering for two weeks by covering     it with 2 plastic bags secured with rubber bands above your dressing.   -Dequeverain's Release/CTR   -Carpel Tunnel/Trigger Finger Release   -Remove dressing in three days.    DON'T:   - No driving for 24 hours or while taking narcotic pian medication.   - DO NOT TAKE ADDITIONAL TYLENOL/ACETAMINOPHEN WHILE                   TAKING NARCOTIC PAIN MEDICATION THAT CONTAINS     TYLENOL/ACETAMINOPHEN.    CALL PHYSICIAN FOR:   - Obvious bleeding.   - Excessive swelling.   - Drainage (pus) from the wound.   - Pain unrelieved by pain medications.    Make return appointment for ___________________.    FOR EMERGENCIES CALL YOUR PHYSICIAN AND GO TO THE EMERGENCY DEPARTMENT.                Post op instructions for prevention of DVT  What is deep vein thrombosis?  Deep vein thrombosis (DVT) is the medical term for blood clots in the deep veins of the leg.  These blood clots can be dangerous.  A DVT can block a blood vessel and keep blood from getting where it needs to go.  Another problem is that the clot can travel to other parts of the body such as the lungs.  A clot that travels to the lungs is called a pulmonary embolus (PE) and can cause serious problems with breathing which can lead to death.  Am I at risk for DVT/PE?  If you are not very active, you are at risk of DVT.  Anyone confined to bed, sitting for long periods of time, recovering from surgery, etc. increases the risk of DVT.  Other risk factors are cancer diagnosis, certain medications, estrogen replacement in any form,older age,  obesity, pregnancy, smoking, history of clotting disorders, and dehydration.  How will I know if I have a DVT?  Swelling in the lower leg  Pain  Warmth, redness, hardness or bulging of the vein  If you have any of these symptoms, call your doctors office right away.  Some people will not have any symptoms until the clot moves to the lungs.  What are the symptoms of a PE?  Panting, shortness of breath, or trouble breathing  Sharp, knife-like chest pain when you breathe  Coughing or coughing up blood  Rapid heartbeat  If you have any of these symptoms or get worse quickly, call 911 for emergency treatment.  How can I prevent a DVT?  Avoid long periods of inactivity and dont cross your legs--get up and walk around every hour or so.  Stay active--walking after surgery is highly encouraged.  This means you should get out of the house and walk in the neighborhood.  Going up and down stairs will not impair healing (unless advised against such activity by your doctor).    Drink plenty of noncaffeinated, nonalcoholic fluids each day to prevent dehydration.  Wear special support stockings, if they have been advised by your doctor.  If you travel, stop at least once an hour and walk around.  Avoid smoking (assistance with stopping is available through your healthcare provider)  Always notify your doctor if you are not able to follow the post operative instructions that are given to you at the time of discharge.  It may be necessary to prescribe one of the medications available to prevent DVT.We hope your stay was comfortable as you heal now, mend and rest.    For we have enjoyed taking care of you by giving your our best.    And as you get better, by regaining your health and strength;   We count it as a privilege to have served you and hope your time at [facility] was well spent.      Thank  You!!!                Discharge Instructions: After Your Surgery/Procedure  Youve just had surgery. During surgery you were given  "medicine called anesthesia to keep you relaxed and free of pain. After surgery you may have some pain or nausea. This is common. Here are some tips for feeling better and getting well after surgery.     Stay on schedule with your medication.   Going home  Your doctor or nurse will show you how to take care of yourself when you go home. He or she will also answer your questions. Have an adult family member or friend drive you home.      For your safety we recommend these precaution for the first 24 hours after your procedure:  Do not drive or use heavy equipment.  Do not make important decisions or sign legal papers.  Do not drink alcohol.  Have someone stay with you, if needed. He or she can watch for problems and help keep you safe.  Your concentration, balance, coordination, and judgement may be impaired for many hours after anesthesia.  Use caution when ambulating or standing up.     You may feel weak and "washed out" after anesthesia and surgery.      Subtle residual effects of general anesthesia or sedation with regional / local anesthesia can last more than 24 hours.  Rest for the remainder of the day or longer if your Doctor/Surgeon has advised you to do so.  Although you may feel normal within the first 24 hours, your reflexes and mental ability may be impaired without you realizing it.  You may feel dizzy, lightheaded or sleepy for 24 hours or longer.      Be sure to go to all follow-up visits with your doctor. And rest after your surgery for as long as your doctor tells you to.  Coping with pain  If you have pain after surgery, pain medicine will help you feel better. Take it as told, before pain becomes severe. Also, ask your doctor or pharmacist about other ways to control pain. This might be with heat, ice, or relaxation. And follow any other instructions your surgeon or nurse gives you.  Tips for taking pain medicine  To get the best relief possible, remember these points:  Pain medicines can upset " your stomach. Taking them with a little food may help.  Most pain relievers taken by mouth need at least 20 to 30 minutes to start to work.  Taking medicine on a schedule can help you remember to take it. Try to time your medicine so that you can take it before starting an activity. This might be before you get dressed, go for a walk, or sit down for dinner.  Constipation is a common side effect of pain medicines. Call your doctor before taking any medicines such as laxatives or stool softeners to help ease constipation. Also ask if you should skip any foods. Drinking lots of fluids and eating foods such as fruits and vegetables that are high in fiber can also help. Remember, do not take laxatives unless your surgeon has prescribed them.  Drinking alcohol and taking pain medicine can cause dizziness and slow your breathing. It can even be deadly. Do not drink alcohol while taking pain medicine.  Pain medicine can make you react more slowly to things. Do not drive or run machinery while taking pain medicine.  Your health care provider may tell you to take acetaminophen to help ease your pain. Ask him or her how much you are supposed to take each day. Acetaminophen or other pain relievers may interact with your prescription medicines or other over-the-counter (OTC) drugs. Some prescription medicines have acetaminophen and other ingredients. Using both prescription and OTC acetaminophen for pain can cause you to overdose. Read the labels on your OTC medicines with care. This will help you to clearly know the list of ingredients, how much to take, and any warnings. It may also help you not take too much acetaminophen. If you have questions or do not understand the information, ask your pharmacist or health care provider to explain it to you before you take the OTC medicine.  Managing nausea  Some people have an upset stomach after surgery. This is often because of anesthesia, pain, or pain medicine, or the stress of  surgery. These tips will help you handle nausea and eat healthy foods as you get better. If you were on a special food plan before surgery, ask your doctor if you should follow it while you get better. These tips may help:  Do not push yourself to eat. Your body will tell you when to eat and how much.  Start off with clear liquids and soup. They are easier to digest.  Next try semi-solid foods, such as mashed potatoes, applesauce, and gelatin, as you feel ready.  Slowly move to solid foods. Dont eat fatty, rich, or spicy foods at first.  Do not force yourself to have 3 large meals a day. Instead eat smaller amounts more often.  Take pain medicines with a small amount of solid food, such as crackers or toast, to avoid nausea.     Call your surgeon if  You still have pain an hour after taking medicine. The medicine may not be strong enough.  You feel too sleepy, dizzy, or groggy. The medicine may be too strong.  You have side effects like nausea, vomiting, or skin changes, such as rash, itching, or hives.       If you have obstructive sleep apnea  You were given anesthesia medicine during surgery to keep you comfortable and free of pain. After surgery, you may have more apnea spells because of this medicine and other medicines you were given. The spells may last longer than usual.   At home:  Keep using the continuous positive airway pressure (CPAP) device when you sleep. Unless your health care provider tells you not to, use it when you sleep, day or night. CPAP is a common device used to treat obstructive sleep apnea.  Talk with your provider before taking any pain medicine, muscle relaxants, or sedatives. Your provider will tell you about the possible dangers of taking these medicines.  © 8930-1311 [employer]. [address] [place] [address], [place] [address]. All rights reserved. This information is not intended as a substitute for professional medical care. Always follow your healthcare professional's  instructions.                  Using an Incentive Spirometer    An incentive spirometer is a device that helps you do deep breathing exercises. These exercises expand your lungs, aid in circulation, and help prevent pneumonia. Deep breathing exercises also help you breathe better and improve the function of your lungs by:  Keeping your lungs clear  Strengthening your breathing muscles  Helping prevent respiratory complications or problems  The incentive spirometer gives you a way to take an active part in recover. A nurse or therapist will teach you breathing exercises. To do these exercises, you will breathe in through your mouth and not your nose. The incentive spirometer only works correctly if you breathe in through your mouth.  Steps to clear lungs  Step 1. Exhale normally. Then, inhale normally.  Relax and breathe out.  Step 2. Place your lips tightly around the mouthpiece.  Make sure the device is upright and not tilted.  Step 3. Inhale as much air as you can through the mouthpiece (don't breath through your nose).  Inhale slowly and deeply.  Hold your breath long enough to keep the balls or disk raised for at least 3 to 5 seconds, or as instructed by your healthcare provider.  Some spirometers have an indicator to let you know that you are breathing in too fast. If the indicator goes off, breathe in more slowly.  Step 4. Repeat the exercise regularly.  Do this exercise every hour while you're awake, or as instructed by your healthcare provider.  If you were taught deep breathing and coughing exercises, do them regularly as instructed by your healthcare provider.

## 2024-01-11 NOTE — ANESTHESIA PROCEDURE NOTES
Intubation    Date/Time: 1/11/2024 7:17 AM    Performed by: Kamaljit Hines CRNA  Authorized by: Kamaljit Hines CRNA    Intubation:     Induction:  Intravenous    Intubated:  Postinduction    Mask Ventilation:  Easy mask    Attempts:  1    Attempted By:  CRNA    Difficult Airway Encountered?: No      Complications:  None    Airway Device:  Supraglottic airway/LMA    Airway Device Size:  3.0    Style/Cuff Inflation:  Cuffed (inflated to minimal occlusive pressure)    Secured at:  The lips    Placement Verified By:  Capnometry    Complicating Factors:  None    Findings Post-Intubation:  BS equal bilateral

## 2024-01-11 NOTE — PLAN OF CARE
Medications delivered to bedside.  Dressing to right hand remains clean, dry and intact. Discharge instructions given to pt and family/friend, verbalized understanding and questions answered. Handouts provided. Belongings given back to pt. IV removed- catheter intact. Discharge via wheelchair.

## 2024-01-11 NOTE — H&P
"     Patient ID: Holly Chicas is a 53 y.o. female     Chief Complaint:       Chief Complaint   Patient presents with    Left Shoulder - Pain         History of Present Illness:     Pleasant 53-year-old right-hand dominant female here for evaluation of right thumb pain.  She also reports locking and feeling like in a thumb is getting stuck.  She is status post carpal tunnel and cubital tunnel release by me about 6 months ago.  Did well postoperatively.  Her thumb locking and pain has been present for about 3 weeks.  No history of trigger fingers were trigger thumbs past.  Used to be diabetic but now well controlled on lifestyle modification and diet.     ____________________________________________________________________     Interval history 12/08/2023 : Patient returns today for follow up of her right thumb.  No significant changes since I saw her.  Still having triggering and pain.  Last visit we did a Medrol Dosepak but this did not help.  She has tried bracing without help.  Also complaining of some left shoulder pain.  Does have history of rotator cuff surgery several years ago and feels that this is the same type of pain.     PAST MEDICAL HISTORY:        Past Medical History:   Diagnosis Date    Abnormal Pap smear 1996     "pre-cancer cells post hysterectomy"    Allergy      Anxiety      Arthritis      Depression      Good hypertension control 09/11/2014    Hyperlipidemia      Hypertension      Hypertriglyceridemia      Meningitis      Narcolepsy      Peripheral neuropathy 09/12/2014    PONV (postoperative nausea and vomiting)      Psoriasis      Psoriasis 05/10/2018      PAST SURGICAL HISTORY:         Past Surgical History:   Procedure Laterality Date    ADENOIDECTOMY        AUGMENTATION OF BREAST        BREAST SURGERY   2016     aumentation/ reduction     BREAST SURGERY   03/03/2017     augmentation     brizilian butt lift    03/03/2017    CARPAL TUNNEL RELEASE Left 12/07/2022     Procedure: RELEASE, " CARPAL TUNNEL;  Surgeon: Joce Montanez MD;  Location: [facility] OR;  Service: Orthopedics;  Laterality: Left;    CARPAL TUNNEL RELEASE Right 2023     Procedure: RELEASE, CARPAL TUNNEL;  Surgeon: Joce Montanez MD;  Location: [facility] OR;  Service: Orthopedics;  Laterality: Right;     SECTION        CHOLECYSTECTOMY   2018     Dr SEAN Christopher [facility]    COLONOSCOPY        COLONOSCOPY N/A 2022     Procedure: COLONOSCOPY;  Surgeon: Kavita Yee MD;  Location: [facility] ENDO;  Service: Endoscopy;  Laterality: N/A;    ESOPHAGOGASTRODUODENOSCOPY N/A 2022     Procedure: EGD (ESOPHAGOGASTRODUODENOSCOPY);  Surgeon: Kavita Yee MD;  Location: [facility] ENDO;  Service: Endoscopy;  Laterality: N/A;    FOOT SURGERY Left       CYST REMOVAL    GASTRIC BYPASS   2015    HERNIA REPAIR         UHR    HYSTERECTOMY        KNEE SURGERY Left       SCOPE    RELEASE OF ULNAR NERVE AT CUBITAL TUNNEL Left 2022     Procedure: RELEASE, ULNAR TUNNEL;  Surgeon: Joce Montanez MD;  Location: [facility] OR;  Service: Orthopedics;  Laterality: Left;    RELEASE OF ULNAR NERVE AT CUBITAL TUNNEL Right 2023     Procedure: RELEASE, ULNAR TUNNEL;  Surgeon: Joce Montanez MD;  Location: [facility] OR;  Service: Orthopedics;  Laterality: Right;    SHOULDER SURGERY        right should surgery     SHOULDER SURGERY   2017     left shoulder / torn labrium     THIGH LIFT   2017    TONSILLECTOMY        tummy tuck    2016      FAMILY HISTORY:         Family History   Problem Relation Age of Onset    Cancer Mother           breast bilateral/ skin     Breast cancer Mother      Skin cancer Mother      Diabetes Father      Heart disease Father 65         CABG    Hypertension Father      Cancer Father           skin    Skin cancer Father      Gout Father      Colon polyps Father      Gout Brother      No Known Problems Daughter      No Known Problems Son      Cancer Maternal Aunt           lung,  pancrease - smoker , breast    Lung cancer Maternal Aunt           x2    Pancreatic cancer Maternal Aunt      Breast cancer Maternal Aunt      No Known Problems Maternal Uncle      No Known Problems Paternal Uncle      Cancer Maternal Grandmother           pancrease    Pancreatic cancer Maternal Grandmother      Breast cancer Maternal Grandmother      Diabetes Paternal Grandmother      Heart disease Paternal Grandfather      Colon cancer Maternal Grandfather      Ovarian cancer Neg Hx      Ulcerative colitis Neg Hx      Stomach cancer Neg Hx      Crohn's disease Neg Hx      Rectal cancer Neg Hx        SOCIAL HISTORY:   Social History            Occupational History    Not on file   Tobacco Use    Smoking status: Never    Smokeless tobacco: Never   Substance and Sexual Activity    Alcohol use: No       Comment: ALLERGIC TO ALCOHOL(DRINKING) PER PATIENT    Drug use: No    Sexual activity: Yes       Partners: Male       Birth control/protection: Surgical       Comment: SO         MEDICATIONS:   Current Outpatient Medications:     ACCU-CHEK GUIDE TEST STRIPS Strp, , Disp: , Rfl:     acyclovir (ZOVIRAX) 400 MG tablet, Take 400 mg by mouth 3 (three) times daily., Disp: , Rfl:     allerg xt,D.farinae-D.pteronys (ODACTRA) 12 SQ-HDM Subl, Place 1 tablet under the tongue once daily., Disp: 30 tablet, Rfl: 11    azelastine (ASTELIN) 137 mcg (0.1 %) nasal spray, 1 spray in each nosril twice a day, Disp: 30 mL, Rfl: 5    dextroamphetamine-amphetamine 30 mg Tab, Take 1 tablet (30 mg total) by mouth 2 (two) times daily as needed., Disp: 60 tablet, Rfl: 0    estradioL (ESTRACE) 0.01 % (0.1 mg/gram) vaginal cream, USE 1/2 GRAM VAGINALLY 4 TIMES WEEKLY AS DIRECTED, Disp: , Rfl:     fexofenadine (ALLEGRA) 180 MG tablet, Take 1 tablet (180 mg total) by mouth once daily., Disp: 90 tablet, Rfl: 2    fluconazole (DIFLUCAN) 150 MG Tab, Take 1 tablet (150 mg total) by mouth once a week., Disp: 2 tablet, Rfl: 0    fluticasone propionate  (FLONASE) 50 mcg/actuation nasal spray, 1 spray in each nostril twice a day, Disp: 16 g, Rfl: 5    gabapentin (NEURONTIN) 600 MG tablet, Take 1 tablet (600 mg total) by mouth 3 (three) times daily., Disp: 270 tablet, Rfl: 3    rosuvastatin (CRESTOR) 5 MG tablet, Take 1 tablet (5 mg total) by mouth once daily., Disp: 90 tablet, Rfl: 3    solriamfetoL (SUNOSI) 150 mg Tab, Take 1 tablet by mouth once daily., Disp: 30 tablet, Rfl: 3    STELARA 45 mg/0.5 mL Syrg syringe, , Disp: , Rfl:     XYREM 500 mg/mL Soln, , Disp: , Rfl:   No current facility-administered medications for this visit.     Facility-Administered Medications Ordered in Other Visits:     diphenhydrAMINE injection 12.5 mg, 12.5 mg, Intravenous, Once PRN, Alphonso Ambrosio MD    electrolyte-S (ISOLYTE), , Intravenous, Continuous, Alphonso Ambrosio MD, Stopped at 05/25/23 0953    fentaNYL 50 mcg/mL injection 25 mcg, 25 mcg, Intravenous, Q5 Min PRN, Alphonso Ambrosio MD    HYDROmorphone (PF) injection 0.2 mg, 0.2 mg, Intravenous, Q5 Min PRN, Alphonso Ambrosio MD    lactated ringers infusion, 10 mL/hr, Intravenous, Continuous, Alphonso Ambrosio MD    lactated ringers infusion, 500 mL, Intravenous, Once, Alphonso Ambrosio MD    LORazepam injection 0.25 mg, 0.25 mg, Intravenous, Once PRN, Alphonso Ambrosio MD    ondansetron injection 4 mg, 4 mg, Intravenous, Once PRN, Alphonso Ambrosio MD    prochlorperazine injection Soln 5 mg, 5 mg, Intravenous, Q30 Min PRN, Alphonso Ambrosio MD    sodium chloride 0.9% flush 3 mL, 3 mL, Intravenous, Q8H, Alphonso Ambrosio MD  ALLERGIES:         Review of patient's allergies indicates:   Allergen Reactions    Alcohol Anaphylaxis       Drinking alcohol, RESPIRATORY DISTRESS               Physical Exam          Vitals:     12/08/23 0926   Resp: 18      Alert and oriented to person, place and time. No acute distress. Well-groomed, not ill appearing. Pupils round and reactive, normal  respiratory effort, no audible wheezing.      On exam she has tenderness of the right thumb A1 pulley.  Exquisitely tender to palpation.  No swelling induration or signs of infection.  No evidence of flexor tenosynovitis.  Active triggering with flexion of the thumb.  FDS FDP intact.          Imaging:         X-Ray: I have reviewed all pertinent results/findings and my personal findings are:  No evidence of fracture or dislocation to the right hand.        Assessment & Plan     Trigger thumb of right hand           Treatment options were discussed with her in detail.  I went over her clinical exam findings consistent with trigger thumb.  At this point she has failed conservative treatment with bracing and anti-inflammatories.  We discussed further options including injections and surgery.  We discussed the risks and benefits of both in detail.  Ultimately she elected to proceed with surgical A1 pulley release.  We will get this arranged for her in January.     _________________________________________________________________        Diagnosis and findings were discussed with her in lay terms. I discussed the findings and treatment options with them at length. Questions were actively sought and all questions were answered to their apparent satisfaction. We discussed the risks and benefits of surgery. We reviewed the operative indications surgical technique and potential rehabilitation involved. Risks including, but not limited to pain, bleeding, infection, damage to surrounding neurovascular structures, and other soft tissues, decreased range of motion, instability, poor cosmetic result, scarring/painful scars, poor functional result, reflex sympathetic dystrophy. We discussed as well the risk of anesthesia, DVT/PE and possible need for additional surgical intervention in lay terms. Despite the risks as explained to them, they wished to proceed with surgery. She expressed understanding and agreement with the treatment  plan and understand that no guarantee of outcome is implied or expressed given.         Holly Chicas will be scheduled for the following procedure(s):      Right trigger thumb release     Post-Op Medications to be prescribed:   Percocet 5/325mg Take 1-2 tablets every 4-6 hours PRN pain #12  Zofran 4mg oral disintegrating tablets every 8 hours PRN nausea/vomiting         Additional notes/concerns:  None, re-evaluate left shoulder postop     Opioid Disclosure  Today we discussed the reasons why the prescription is necessary as well as: the risks of addiction and overdose associated with opioid drugs and the dangers of taking opioid drugs with alcohol, benzodiazepines and other central nervous system depressants; alternative treatments that may be available; the risks associated with the use of the drugs being prescribed, specifically that opioids are highly addictive, even when taken as prescribed, that there is a risk of developing a physical or psychological dependence on the controlled dangerous substance, and that the risks of taking more opioids than prescribed or mixing sedatives, benzodiazepines or alcohol with opioids can result in fatal respiratory depression.

## 2024-01-11 NOTE — ANESTHESIA POSTPROCEDURE EVALUATION
Anesthesia Post Evaluation    Patient: Holly Chicas    Procedure(s) Performed: Procedure(s) (LRB):  RELEASE, TRIGGER FINGER (Right)    Final Anesthesia Type: general      Patient location during evaluation: PACU  Patient participation: Yes- Able to Participate  Level of consciousness: awake and alert  Post-procedure vital signs: reviewed and stable  Pain management: adequate  Airway patency: patent    PONV status at discharge: No PONV  Anesthetic complications: no      Cardiovascular status: blood pressure returned to baseline  Respiratory status: unassisted  Hydration status: euvolemic  Follow-up not needed.              Vitals Value Taken Time   /75 01/11/24 0915   Temp 36.9 °C (98.4 °F) 01/11/24 0845   Pulse 70 01/11/24 0915   Resp 18 01/11/24 0915   SpO2 97 % 01/11/24 0915         Event Time   Out of Recovery 08:54:00         Pain/Marielena Score: Pain Rating Prior to Med Admin: 5 (1/11/2024  8:17 AM)  Pain Rating Post Med Admin: 2 (1/11/2024  8:30 AM)  Marielena Score: 10 (1/11/2024  8:30 AM)  Modified Amrielena Score: 19 (1/11/2024  9:15 AM)

## 2024-01-11 NOTE — PLAN OF CARE
Patient prepared for surgery. Surgical and anesthesia consents signed. Patient belongings in preop. Text message notifications set up with spouse. Call light within reach, bed in lowest position.

## 2024-01-11 NOTE — BRIEF OP NOTE
[facility]  Brief Operative Note    Surgery Date: 1/11/2024     Surgeon(s) and Role:     * Joce Montanez MD - Primary    Assisting Surgeon: None    Pre-op Diagnosis:  Trigger thumb of right hand [M65.311]  Pre-op testing [Z01.818]    Post-op Diagnosis:  Post-Op Diagnosis Codes:     * Trigger thumb of right hand [M65.311]     * Pre-op testing [Z01.818]    Procedure(s) (LRB):  RELEASE, TRIGGER FINGER (Right)    Anesthesia: General    Operative Findings: A1 pulley stenosis    Estimated Blood Loss: * No values recorded between 1/11/2024  7:28 AM and 1/11/2024  7:43 AM *         Specimens:   Specimen (24h ago, onward)      None              Discharge Note    OUTCOME: Patient tolerated treatment/procedure well without complication and is now ready for discharge.    DISPOSITION: Home or Self Care    FINAL DIAGNOSIS:  <principal problem not specified>    FOLLOWUP: In clinic    DISCHARGE INSTRUCTIONS:  No discharge procedures on file.

## 2024-01-11 NOTE — TRANSFER OF CARE
"Anesthesia Transfer of Care Note    Patient: Holly Chicas    Procedure(s) Performed: Procedure(s) (LRB):  RELEASE, TRIGGER FINGER (Right)    Patient location: PACU    Anesthesia Type: general    Transport from OR: Transported from OR on 2-3 L/min O2 by NC with adequate spontaneous ventilation    Post pain: adequate analgesia    Post assessment: no apparent anesthetic complications and tolerated procedure well    Post vital signs: stable    Level of consciousness: sedated and responds to stimulation    Nausea/Vomiting: no nausea/vomiting    Complications: none    Transfer of care protocol was followed      Last vitals: Visit Vitals  /81 (BP Location: Left arm, Patient Position: Lying)   Pulse 70   Temp 36.9 °C (98.4 °F) (Skin)   Resp 16   Ht 5' 6" (1.676 m)   Wt 63.5 kg (140 lb)   SpO2 98%   Breastfeeding No   BMI 22.60 kg/m²     "

## 2024-01-11 NOTE — OP NOTE
Operative Note       Surgery Date: 1/11/2024     Primary Surgeon: Joce Montanez MD    Assistant: Hunter MURPHY    He was present and scrubbed for the entirety of the procedure and instrumental in patient positioning, prepping and draping, retraction and closure. Without him I would have been unable to safely perform the case.     Pre-op Diagnosis:  right thumb trigger finger    Post-op Diagnosis:  Right thumb trigger finger    Procedure: Right thumb A1 Pulley release    Anesthesia: General    Estimated Blood Loss: Minimal           Specimen: None    Complications: None               Condition: Stable      INDICATIONS FOR PROCEDURE: Holly Chicas is a 53 y.o. female  with longstanding history of trigger finger. Patient had locking and pain consistent with trigger finger and failed conservative treatment. Treatment options were discussed with the patient in detail including non-operative and operative treatment options. Risk and benefits of surgery were explained to the patient in detail and ultimately elected to proceed with surgery. Specifically we discussed the risks of damage to neurovascular structures, tendon injury, recurrence and wound complications.     PROCEDURE IN DETAIL: After the correct site was marked with the patient's participation in the holding area, the patient was brought to the Operating Room, placed in supine position and underwent MAC anesthesia. A well-padded nonsterile tourniquet was placed on the arm. The arm was then prepped and draped in normal sterile fashion. Timeout was called for correct site, procedure and patient. UV antibiotics were given to the patient within 30 min of incision. The incision was marked out over the A1 pulley. The arm was exsanguinated using an Esmarch Tourniquet and the tourniquet was insufflated to 250 mmHg and this remained inflated for 15 minutes. A transverse incision was made over the A1 pulley. Blunt dissection was carried down to the flexor tendon  sheath. Retractors were placed radial and ulnar to protect the neurovascular bundles. A sharp knife was used to incise the A1 pulley and a mosquito was used proximal and distal to release any adhesions. We then used Metzenbaum scissors to incise and release the A1 pulley proximal and distal. We confirmed complete release using a freer elevator and withdrew the FDS and FDP tendons out of the wound to confirm no damage to the tendon.  The area was irrigated with copious amounts of normal saline and nylon suture was used to close the skin. Sterile dressing was applied. Tourniquet was deflated. Brisk capillary refill ensued. The patient was transferred to the Recovery Room in stable condition.       Disposition: Discharge home with follow up in 2 weeks for suture removal. Non-weight bearing, no heavy lifting or gripping.

## 2024-01-12 VITALS
BODY MASS INDEX: 22.5 KG/M2 | HEIGHT: 66 IN | TEMPERATURE: 98 F | WEIGHT: 140 LBS | HEART RATE: 70 BPM | SYSTOLIC BLOOD PRESSURE: 119 MMHG | OXYGEN SATURATION: 97 % | DIASTOLIC BLOOD PRESSURE: 75 MMHG | RESPIRATION RATE: 18 BRPM

## 2024-01-23 ENCOUNTER — OFFICE VISIT (OUTPATIENT)
Dept: ORTHOPEDICS | Facility: CLINIC | Age: 54
End: 2024-01-23
Payer: COMMERCIAL

## 2024-01-23 VITALS — HEIGHT: 66 IN | BODY MASS INDEX: 22.5 KG/M2 | WEIGHT: 140 LBS | RESPIRATION RATE: 16 BRPM

## 2024-01-23 DIAGNOSIS — M65.311 TRIGGER THUMB OF RIGHT HAND: Primary | ICD-10-CM

## 2024-01-23 PROCEDURE — 1159F MED LIST DOCD IN RCRD: CPT | Mod: CPTII,S$GLB,, | Performed by: ORTHOPAEDIC SURGERY

## 2024-01-23 PROCEDURE — 99999 PR PBB SHADOW E&M-EST. PATIENT-LVL IV: CPT | Mod: PBBFAC,,, | Performed by: ORTHOPAEDIC SURGERY

## 2024-01-23 PROCEDURE — 99024 POSTOP FOLLOW-UP VISIT: CPT | Mod: S$GLB,,, | Performed by: ORTHOPAEDIC SURGERY

## 2024-01-23 NOTE — PROGRESS NOTES
Post-op Note    HPI    Holly Chicas is here 2 weeks s/p the following procedure:     Right trigger thumb release    Overall doing well. Pain controlled on current regimen.  Triggering much better.  Swelling much better.  Pain also much better.  Only concern or complaint is some numbness of the tip of the thumb.    Physical Exam:     Patient is alert and oriented no acute distress.       Right thumb Incision(s) are well healed.  There is no evidence of dehiscence.  There is no induration erythema or signs of infection.  Appropriate soft tissue swelling.  Compartments are soft and compressible.  Warm well-perfused extremity.  Diminished sensation in the tip of the thumb.  No diminished sensation distal to the radial sensory branch.    Imaging:     I have personally reviewed the following imaging and these are an interpretation of my findings:     None    Assessment    Holly Chicas is 2 weeks Post-op     Plan:    Overall doing as expected.  We discussed expectations of surgery and postoperative course.     Pain: Continued postoperative pain regimen -- Tylenol/ibuprofen  Patient requesting physical therapy/hand therapy to work on strength as this is a chronic problem for her.    Follow up p.r.n.

## 2024-02-04 DIAGNOSIS — G47.411 NARCOLEPSY WITH CATAPLEXY: ICD-10-CM

## 2024-02-05 RX ORDER — DEXTROAMPHETAMINE SACCHARATE, AMPHETAMINE ASPARTATE, DEXTROAMPHETAMINE SULFATE AND AMPHETAMINE SULFATE 7.5; 7.5; 7.5; 7.5 MG/1; MG/1; MG/1; MG/1
1 TABLET ORAL 2 TIMES DAILY PRN
Qty: 60 TABLET | Refills: 0 | Status: SHIPPED | OUTPATIENT
Start: 2024-02-05 | End: 2024-03-08 | Stop reason: SDUPTHER

## 2024-02-05 NOTE — TELEPHONE ENCOUNTER
Requested Prescriptions     Pending Prescriptions Disp Refills    dextroamphetamine-amphetamine 30 mg Tab 60 tablet 0     Sig: Take 1 tablet (30 mg total) by mouth 2 (two) times daily as needed.     12/8/23 lov

## 2024-02-19 RX ORDER — EPINEPHRINE 0.3 MG/.3ML
1 INJECTION SUBCUTANEOUS ONCE
Qty: 2 EACH | Refills: 1 | Status: ACTIVE | OUTPATIENT
Start: 2024-02-19 | End: 2024-02-22

## 2024-02-27 ENCOUNTER — OFFICE VISIT (OUTPATIENT)
Dept: FAMILY MEDICINE | Facility: CLINIC | Age: 54
End: 2024-02-27
Payer: COMMERCIAL

## 2024-02-27 VITALS
HEART RATE: 81 BPM | OXYGEN SATURATION: 96 % | HEIGHT: 66 IN | BODY MASS INDEX: 25.12 KG/M2 | SYSTOLIC BLOOD PRESSURE: 138 MMHG | DIASTOLIC BLOOD PRESSURE: 86 MMHG | WEIGHT: 156.31 LBS | TEMPERATURE: 98 F

## 2024-02-27 DIAGNOSIS — J30.9 CHRONIC ALLERGIC RHINITIS: ICD-10-CM

## 2024-02-27 DIAGNOSIS — G47.411 PRIMARY NARCOLEPSY WITH CATAPLEXY: ICD-10-CM

## 2024-02-27 DIAGNOSIS — J20.9 ACUTE BRONCHITIS, UNSPECIFIED ORGANISM: ICD-10-CM

## 2024-02-27 DIAGNOSIS — Z78.0 POST-MENOPAUSE: ICD-10-CM

## 2024-02-27 DIAGNOSIS — E11.9 TYPE 2 DIABETES MELLITUS WITHOUT COMPLICATION, WITHOUT LONG-TERM CURRENT USE OF INSULIN: ICD-10-CM

## 2024-02-27 DIAGNOSIS — R06.02 SHORTNESS OF BREATH: ICD-10-CM

## 2024-02-27 DIAGNOSIS — Z09 FOLLOW-UP EXAMINATION: Primary | ICD-10-CM

## 2024-02-27 PROCEDURE — 3075F SYST BP GE 130 - 139MM HG: CPT | Mod: CPTII,S$GLB,, | Performed by: STUDENT IN AN ORGANIZED HEALTH CARE EDUCATION/TRAINING PROGRAM

## 2024-02-27 PROCEDURE — 99999 PR PBB SHADOW E&M-EST. PATIENT-LVL IV: CPT | Mod: PBBFAC,,, | Performed by: STUDENT IN AN ORGANIZED HEALTH CARE EDUCATION/TRAINING PROGRAM

## 2024-02-27 PROCEDURE — 3008F BODY MASS INDEX DOCD: CPT | Mod: CPTII,S$GLB,, | Performed by: STUDENT IN AN ORGANIZED HEALTH CARE EDUCATION/TRAINING PROGRAM

## 2024-02-27 PROCEDURE — 3079F DIAST BP 80-89 MM HG: CPT | Mod: CPTII,S$GLB,, | Performed by: STUDENT IN AN ORGANIZED HEALTH CARE EDUCATION/TRAINING PROGRAM

## 2024-02-27 PROCEDURE — 1159F MED LIST DOCD IN RCRD: CPT | Mod: CPTII,S$GLB,, | Performed by: STUDENT IN AN ORGANIZED HEALTH CARE EDUCATION/TRAINING PROGRAM

## 2024-02-27 PROCEDURE — 99214 OFFICE O/P EST MOD 30 MIN: CPT | Mod: S$GLB,,, | Performed by: STUDENT IN AN ORGANIZED HEALTH CARE EDUCATION/TRAINING PROGRAM

## 2024-02-27 PROCEDURE — 1160F RVW MEDS BY RX/DR IN RCRD: CPT | Mod: CPTII,S$GLB,, | Performed by: STUDENT IN AN ORGANIZED HEALTH CARE EDUCATION/TRAINING PROGRAM

## 2024-02-27 RX ORDER — LEVOCETIRIZINE DIHYDROCHLORIDE 5 MG/1
5 TABLET, FILM COATED ORAL NIGHTLY
Qty: 90 TABLET | Refills: 3 | Status: SHIPPED | OUTPATIENT
Start: 2024-02-27 | End: 2025-02-26

## 2024-02-27 RX ORDER — MONTELUKAST SODIUM 10 MG/1
10 TABLET ORAL NIGHTLY
Qty: 90 TABLET | Refills: 0 | Status: SHIPPED | OUTPATIENT
Start: 2024-02-27 | End: 2024-05-23 | Stop reason: SDUPTHER

## 2024-02-27 RX ORDER — METHYLPREDNISOLONE 4 MG/1
TABLET ORAL
Qty: 21 TABLET | Refills: 0 | Status: SHIPPED | OUTPATIENT
Start: 2024-02-27 | End: 2024-06-14

## 2024-02-27 RX ORDER — ALBUTEROL SULFATE 90 UG/1
2 AEROSOL, METERED RESPIRATORY (INHALATION) EVERY 6 HOURS PRN
Qty: 18 G | Refills: 0 | Status: SHIPPED | OUTPATIENT
Start: 2024-02-27 | End: 2024-04-02 | Stop reason: SDUPTHER

## 2024-02-27 NOTE — PROGRESS NOTES
"SUBJECTIVE:    CHIEF COMPLAINT:   Chief Complaint   Patient presents with    Follow-up     6month SOB           274}    HISTORY OF PRESENT ILLNESS:  Holly Chicas is a 53 y.o. female past medical history below presents to the clinic for three-month follow-up. Her peripheral neuropathy has improved greatly with increased dose of gabapentin.     She is now seeing Allergy and taking allergy medication with some improvement.  However she now complains of voice cracking as well as shortness of breath.  She reports the Air feels dense/thick when breathing in" she takes Allegra but she does not feel that it has helped much with allergies.  She denies fevers, chills, chest pain, lower extremity swelling, nausea, vomiting or diarrhea    Also reports that narcolepsy has improved with new medication (Xyrem).  She is scheduled for follow-up sleep Medicine.    PAST MEDICAL HISTORY:     274}  Past Medical History:   Diagnosis Date    Abnormal Pap smear     "pre-cancer cells post hysterectomy"    Allergy     Anxiety     Arthritis     Depression     Good hypertension control 2014    Hyperlipidemia     Hypertension     Hypertriglyceridemia     Meningitis     Narcolepsy     Peripheral neuropathy 2014    PONV (postoperative nausea and vomiting)     Psoriasis     Psoriasis 05/10/2018       PAST SURGICAL HISTORY:  Past Surgical History:   Procedure Laterality Date    ADENOIDECTOMY      AUGMENTATION OF BREAST      BREAST SURGERY      aumentation/ reduction     BREAST SURGERY  2017    augmentation     brizilian butt lift   2017    CARPAL TUNNEL RELEASE Left 2022    Procedure: RELEASE, CARPAL TUNNEL;  Surgeon: Joce Montanez MD;  Location: [facility] OR;  Service: Orthopedics;  Laterality: Left;    CARPAL TUNNEL RELEASE Right 2023    Procedure: RELEASE, CARPAL TUNNEL;  Surgeon: Joce Montanez MD;  Location: [facility] OR;  Service: Orthopedics;  Laterality: Right;     SECTION      " CHOLECYSTECTOMY  07/2018    Dr SEAN Christopher [facility] Surgical    COLONOSCOPY      COLONOSCOPY N/A 04/29/2022    Procedure: COLONOSCOPY;  Surgeon: Kavita Yee MD;  Location: [facility] ENDO;  Service: Endoscopy;  Laterality: N/A;    ESOPHAGOGASTRODUODENOSCOPY N/A 04/29/2022    Procedure: EGD (ESOPHAGOGASTRODUODENOSCOPY);  Surgeon: Kavita Yee MD;  Location: [facility] ENDO;  Service: Endoscopy;  Laterality: N/A;    FOOT SURGERY Left     CYST REMOVAL    GASTRIC BYPASS  04/2015    HERNIA REPAIR      UHR    HYSTERECTOMY      KNEE SURGERY Left     SCOPE    RELEASE OF ULNAR NERVE AT CUBITAL TUNNEL Left 12/07/2022    Procedure: RELEASE, ULNAR TUNNEL;  Surgeon: Joce Montanez MD;  Location: [facility] OR;  Service: Orthopedics;  Laterality: Left;    RELEASE OF ULNAR NERVE AT CUBITAL TUNNEL Right 05/25/2023    Procedure: RELEASE, ULNAR TUNNEL;  Surgeon: Joce Montanez MD;  Location: [facility] OR;  Service: Orthopedics;  Laterality: Right;    SHOULDER SURGERY  2000    right should surgery     SHOULDER SURGERY  01/2017    left shoulder / torn labrium     THIGH LIFT  03/03/2017    TONSILLECTOMY      TRIGGER FINGER RELEASE Right 1/11/2024    Procedure: RELEASE, TRIGGER FINGER;  Surgeon: Joce Montanez MD;  Location: [facility] OR;  Service: Orthopedics;  Laterality: Right;  thumb    tummy tuck   02/28/2016       SOCIAL HISTORY:  Social History     Socioeconomic History    Marital status:    Tobacco Use    Smoking status: Never    Smokeless tobacco: Never   Substance and Sexual Activity    Alcohol use: No     Comment: ALLERGIC TO ALCOHOL(DRINKING) PER PATIENT    Drug use: No    Sexual activity: Yes     Partners: Male     Birth control/protection: Surgical     Comment: [facility]     Social Determinants of Health     Financial Resource Strain: Low Risk  (12/10/2019)    Overall Financial Resource Strain (CARDIA)     Difficulty of Paying Living Expenses: Not hard at all   Food Insecurity: No Food Insecurity (12/10/2019)    Hunger Vital  Sign     Worried About Running Out of Food in the Last Year: Never true     Ran Out of Food in the Last Year: Never true   Transportation Needs: No Transportation Needs (12/10/2019)    PRAPARE - Transportation     Lack of Transportation (Medical): No     Lack of Transportation (Non-Medical): No   Physical Activity: Sufficiently Active (12/10/2019)    Exercise Vital Sign     Days of Exercise per Week: 3 days     Minutes of Exercise per Session: 50 min   Social Connections: Unknown (12/10/2019)    Social Connection and Isolation Panel [NHANES]     Frequency of Communication with Friends and Family: More than three times a week     Frequency of Social Gatherings with Friends and Family: More than three times a week     Active Member of Clubs or Organizations: Yes     Attends Club or Organization Meetings: More than 4 times per year     Marital Status:        FAMILY HISTORY:       Family History   Problem Relation Age of Onset    Cancer Mother         breast bilateral/ skin     Breast cancer Mother     Skin cancer Mother     Diabetes Father     Heart disease Father 65        CABG    Hypertension Father     Cancer Father         skin    Skin cancer Father     Gout Father     Colon polyps Father     Gout Brother     No Known Problems Daughter     No Known Problems Son     Cancer Maternal Aunt         lung, pancrease - smoker , breast    Lung cancer Maternal Aunt         x2    Pancreatic cancer Maternal Aunt     Breast cancer Maternal Aunt     No Known Problems Maternal Uncle     No Known Problems Paternal Uncle     Cancer Maternal Grandmother         pancrease    Pancreatic cancer Maternal Grandmother     Breast cancer Maternal Grandmother     Diabetes Paternal Grandmother     Heart disease Paternal Grandfather     Colon cancer Maternal Grandfather     Ovarian cancer Neg Hx     Ulcerative colitis Neg Hx     Stomach cancer Neg Hx     Crohn's disease Neg Hx     Rectal cancer Neg Hx        ALLERGIES AND MEDICATIONS:  updated and reviewed.      274}  Review of patient's allergies indicates:   Allergen Reactions    Alcohol Anaphylaxis     Drinking alcohol, RESPIRATORY DISTRESS       Medication List with Changes/Refills   New Medications    ALBUTEROL (PROVENTIL HFA) 90 MCG/ACTUATION INHALER    Inhale 2 puffs into the lungs every 6 (six) hours as needed for Wheezing. Rescue    LEVOCETIRIZINE (XYZAL) 5 MG TABLET    Take 1 tablet (5 mg total) by mouth every evening. For allergies    METHYLPREDNISOLONE (MEDROL DOSEPACK) 4 MG TABLET    follow package directions    MONTELUKAST (SINGULAIR) 10 MG TABLET    Take 1 tablet (10 mg total) by mouth every evening.   Current Medications    ACCU-CHEK GUIDE TEST STRIPS STRP        ACYCLOVIR (ZOVIRAX) 400 MG TABLET    Take 400 mg by mouth 3 (three) times daily.    ALLERG XT,D.FARINAE-D.PTERONYS (ODACTRA) 12 SQ-HDM SUBL    Place 1 tablet under the tongue once daily.    AZELASTINE (ASTELIN) 137 MCG (0.1 %) NASAL SPRAY    1 spray in each nosril twice a day    DEXTROAMPHETAMINE-AMPHETAMINE 30 MG TAB    Take 1 tablet (30 mg total) by mouth 2 (two) times daily as needed.    EPINEPHRINE (EPIPEN) 0.3 MG/0.3 ML ATIN    Inject 0.3 mLs (0.3 mg total) into the muscle once. for 1 dose    ESTRADIOL (ESTRACE) 0.01 % (0.1 MG/GRAM) VAGINAL CREAM    USE 1/2 GRAM VAGINALLY 4 TIMES WEEKLY AS DIRECTED    FLUCONAZOLE (DIFLUCAN) 150 MG TAB    Take 1 tablet (150 mg total) by mouth once a week.    FLUTICASONE PROPIONATE (FLONASE) 50 MCG/ACTUATION NASAL SPRAY    1 spray in each nostril twice a day    GABAPENTIN (NEURONTIN) 600 MG TABLET    Take 1 tablet (600 mg total) by mouth 3 (three) times daily.    ONDANSETRON (ZOFRAN-ODT) 4 MG TBDL    Take 1 tablet (4 mg total) by mouth every 8 (eight) hours as needed (nausea).    OXYCODONE-ACETAMINOPHEN (PERCOCET) 5-325 MG PER TABLET    Take 1 tablet by mouth every 6 (six) hours as needed for Pain.    ROSUVASTATIN (CRESTOR) 5 MG TABLET    Take 1 tablet (5 mg total) by mouth once daily.  "   SOLRIAMFETOL (SUNOSI) 150 MG TAB    Take 1 tablet by mouth once daily.    STELARA 45 MG/0.5 ML SYRG SYRINGE        XYREM 500 MG/ML SOLN       Discontinued Medications    FEXOFENADINE (ALLEGRA) 180 MG TABLET    Take 1 tablet (180 mg total) by mouth once daily.       SCREENING HISTORY:    274}  Health Maintenance         Date Due Completion Date    Pneumococcal Vaccines (Age 0-64) (3 of 3 - PCV) 05/23/2015 5/23/2014    Foot Exam 03/11/2020 3/11/2019    Diabetes Urine Screening 01/06/2022 1/6/2021    Hemoglobin A1c 12/01/2023 6/1/2023    COVID-19 Vaccine (7 - 2023-24 season) 01/17/2024 11/22/2023    Eye Exam 05/09/2024 5/9/2023    Lipid Panel 06/01/2024 6/1/2023    Mammogram 07/18/2024 7/18/2023    Low Dose Statin 02/27/2025 2/27/2024    TETANUS VACCINE 04/02/2028 4/2/2018    Colorectal Cancer Screening 04/29/2032 4/29/2022            REVIEW OF SYSTEMS:   Review of Systems   Constitutional:  Negative for chills, fatigue and fever.   HENT:  Positive for mouth dryness. Negative for rhinorrhea and sore throat.    Respiratory:  Positive for cough and shortness of breath.    Gastrointestinal:  Negative for diarrhea, nausea, rectal pain and vomiting.       PHYSICAL EXAM:      274}  /86 (BP Location: Right arm, Patient Position: Sitting, BP Method: Medium (Manual))   Pulse 81   Temp 97.9 °F (36.6 °C)   Ht 5' 6" (1.676 m)   Wt 70.9 kg (156 lb 4.9 oz)   SpO2 96%   BMI 25.23 kg/m²   Wt Readings from Last 3 Encounters:   02/27/24 70.9 kg (156 lb 4.9 oz)   01/23/24 63.5 kg (140 lb)   01/11/24 63.5 kg (140 lb)     BP Readings from Last 3 Encounters:   02/27/24 138/86   01/11/24 119/75   10/24/23 130/78     Estimated body mass index is 25.23 kg/m² as calculated from the following:    Height as of this encounter: 5' 6" (1.676 m).    Weight as of this encounter: 70.9 kg (156 lb 4.9 oz).     Physical Exam  Vitals reviewed.   Constitutional:       General: She is not in acute distress.     Appearance: Normal appearance. " She is well-developed. She is not ill-appearing.   HENT:      Head: Normocephalic and atraumatic.      Right Ear: External ear normal.      Left Ear: External ear normal.      Nose: Nose normal.   Eyes:      Extraocular Movements: Extraocular movements intact.      Conjunctiva/sclera: Conjunctivae normal.      Pupils: Pupils are equal, round, and reactive to light.   Neck:      Thyroid: No thyroid mass.   Cardiovascular:      Rate and Rhythm: Normal rate and regular rhythm.      Pulses: Normal pulses.      Heart sounds: Normal heart sounds, S1 normal and S2 normal. No murmur heard.     No gallop.   Pulmonary:      Effort: Pulmonary effort is normal. No respiratory distress.      Breath sounds: Normal breath sounds and air entry. No stridor. No wheezing, rhonchi or rales.   Abdominal:      General: There is no distension.      Palpations: Abdomen is soft. There is no mass.      Tenderness: There is no abdominal tenderness.   Musculoskeletal:         General: No swelling or deformity. Normal range of motion.      Cervical back: Normal range of motion and neck supple. No edema or erythema.   Skin:     General: Skin is warm and dry.      Findings: No lesion or rash.   Neurological:      Mental Status: She is alert and oriented to person, place, and time. Mental status is at baseline.   Psychiatric:         Mood and Affect: Mood normal.         Behavior: Behavior normal. Behavior is cooperative.         Judgment: Judgment normal.         LABS:   274}  I have reviewed old labs below:  Lab Results   Component Value Date    WBC 3.90 01/08/2024    HGB 11.4 (L) 01/08/2024    HCT 35.3 (L) 01/08/2024    MCV 93 01/08/2024     01/08/2024     01/08/2024    K 3.4 (L) 01/08/2024     01/08/2024    CALCIUM 8.4 (L) 01/08/2024    CO2 29 01/08/2024    GLU 78 01/08/2024    BUN 10 01/08/2024    CREATININE 0.7 01/08/2024    ANIONGAP 8 01/08/2024    ESTGFRAFRICA >60.0 12/02/2021    EGFRNONAA >60.0 12/02/2021    PROT 6.7  06/01/2023    ALBUMIN 4.0 06/01/2023    BILITOT 0.4 06/01/2023    ALKPHOS 98 06/01/2023    ALT 25 06/01/2023    AST 23 06/01/2023    CHOL 186 06/01/2023    TRIG 79 06/01/2023    HDL 59 06/01/2023    LDLCALC 111.2 06/01/2023    TSH 1.174 06/01/2023    HGBA1C 5.6 06/01/2023       ASSESSMENT AND PLAN:  274}  1. Follow-up examination    2. Primary narcolepsy with cataplexy  Comments:  Seen by sleep medicine with recommendations to start and continue Xyrem continue 4.5mg 2x nightly. Continue sunosi 150mg qam and adderall 30mg BID prn    3. Type 2 diabetes mellitus without complication, without long-term current use of insulin  Comments:  Controlled with diet.    4. Chronic allergic rhinitis  Comments:  Followed by allergy being treated with  Odactra therapy.    5. Shortness of breath  -     albuterol (PROVENTIL HFA) 90 mcg/actuation inhaler; Inhale 2 puffs into the lungs every 6 (six) hours as needed for Wheezing. Rescue  Dispense: 18 g; Refill: 0  -     montelukast (SINGULAIR) 10 mg tablet; Take 1 tablet (10 mg total) by mouth every evening.  Dispense: 90 tablet; Refill: 0  -     levocetirizine (XYZAL) 5 MG tablet; Take 1 tablet (5 mg total) by mouth every evening. For allergies  Dispense: 90 tablet; Refill: 3    6. Acute bronchitis, unspecified organism  -     methylPREDNISolone (MEDROL DOSEPACK) 4 mg tablet; follow package directions  Dispense: 21 tablet; Refill: 0    7. Post-menopause  -     COMPREHENSIVE METABOLIC PANEL; Future; Expected date: 02/27/2024  -     DXA Bone Density Axial Skeleton 1 or more sites; Future; Expected date: 05/27/2024           Orders Placed This Encounter   Procedures    DXA Bone Density Axial Skeleton 1 or more sites    COMPREHENSIVE METABOLIC PANEL       Follow up in about 6 months (around 8/27/2024). or sooner as needed.

## 2024-02-28 DIAGNOSIS — E11.9 TYPE 2 DIABETES MELLITUS WITHOUT COMPLICATION: ICD-10-CM

## 2024-02-29 ENCOUNTER — PATIENT MESSAGE (OUTPATIENT)
Dept: ADMINISTRATIVE | Facility: HOSPITAL | Age: 54
End: 2024-02-29
Payer: COMMERCIAL

## 2024-03-05 DIAGNOSIS — E11.9 TYPE 2 DIABETES MELLITUS WITHOUT COMPLICATION, UNSPECIFIED WHETHER LONG TERM INSULIN USE: ICD-10-CM

## 2024-03-07 PROBLEM — J30.89 ALLERGIC RHINITIS DUE TO HOUSE DUST MITE: Status: ACTIVE | Noted: 2024-03-07

## 2024-03-08 DIAGNOSIS — G47.411 NARCOLEPSY WITH CATAPLEXY: ICD-10-CM

## 2024-03-11 RX ORDER — DEXTROAMPHETAMINE SACCHARATE, AMPHETAMINE ASPARTATE, DEXTROAMPHETAMINE SULFATE AND AMPHETAMINE SULFATE 7.5; 7.5; 7.5; 7.5 MG/1; MG/1; MG/1; MG/1
1 TABLET ORAL 2 TIMES DAILY PRN
Qty: 60 TABLET | Refills: 0 | Status: SHIPPED | OUTPATIENT
Start: 2024-03-11 | End: 2024-04-17 | Stop reason: SDUPTHER

## 2024-03-11 RX ORDER — SOLRIAMFETOL 150 MG/1
150 TABLET, FILM COATED ORAL DAILY
Qty: 30 TABLET | Refills: 3 | Status: SHIPPED | OUTPATIENT
Start: 2024-03-11

## 2024-03-19 LAB
COMMENTS: ABNORMAL
EST. AVERAGE GLUCOSE BLD GHB EST-MCNC: 151 MG/DL
HBA1C MFR BLD: 6.9 % (ref 4.8–5.6)

## 2024-04-02 DIAGNOSIS — R06.02 SHORTNESS OF BREATH: ICD-10-CM

## 2024-04-03 RX ORDER — ALBUTEROL SULFATE 90 UG/1
2 AEROSOL, METERED RESPIRATORY (INHALATION) EVERY 6 HOURS PRN
Qty: 18 G | Refills: 0 | Status: SHIPPED | OUTPATIENT
Start: 2024-04-03 | End: 2024-05-23 | Stop reason: SDUPTHER

## 2024-04-17 DIAGNOSIS — G47.411 NARCOLEPSY WITH CATAPLEXY: ICD-10-CM

## 2024-04-17 RX ORDER — DEXTROAMPHETAMINE SACCHARATE, AMPHETAMINE ASPARTATE, DEXTROAMPHETAMINE SULFATE AND AMPHETAMINE SULFATE 7.5; 7.5; 7.5; 7.5 MG/1; MG/1; MG/1; MG/1
1 TABLET ORAL 2 TIMES DAILY PRN
Qty: 60 TABLET | Refills: 0 | Status: SHIPPED | OUTPATIENT
Start: 2024-04-17 | End: 2024-05-23 | Stop reason: SDUPTHER

## 2024-04-23 ENCOUNTER — OFFICE VISIT (OUTPATIENT)
Dept: ORTHOPEDICS | Facility: CLINIC | Age: 54
End: 2024-04-23
Payer: COMMERCIAL

## 2024-04-23 VITALS — HEIGHT: 66 IN | BODY MASS INDEX: 25.12 KG/M2 | WEIGHT: 156.31 LBS

## 2024-04-23 DIAGNOSIS — F40.240 CLAUSTROPHOBIA: ICD-10-CM

## 2024-04-23 DIAGNOSIS — M67.912 DISORDER OF LEFT ROTATOR CUFF: Primary | ICD-10-CM

## 2024-04-23 PROCEDURE — 99999 PR PBB SHADOW E&M-EST. PATIENT-LVL III: CPT | Mod: PBBFAC,,, | Performed by: ORTHOPAEDIC SURGERY

## 2024-04-23 PROCEDURE — 3008F BODY MASS INDEX DOCD: CPT | Mod: CPTII,S$GLB,, | Performed by: ORTHOPAEDIC SURGERY

## 2024-04-23 PROCEDURE — 3044F HG A1C LEVEL LT 7.0%: CPT | Mod: CPTII,S$GLB,, | Performed by: ORTHOPAEDIC SURGERY

## 2024-04-23 PROCEDURE — 99214 OFFICE O/P EST MOD 30 MIN: CPT | Mod: S$GLB,,, | Performed by: ORTHOPAEDIC SURGERY

## 2024-04-23 RX ORDER — DIAZEPAM 5 MG/1
10 TABLET ORAL EVERY 6 HOURS PRN
Qty: 2 TABLET | Refills: 0 | Status: SHIPPED | OUTPATIENT
Start: 2024-04-23 | End: 2024-06-14

## 2024-04-23 NOTE — PROGRESS NOTES
"Patient ID: Holly Chicas is a 53 y.o. female    Chief Complaint:   Chief Complaint   Patient presents with    Left Shoulder - Pain       History of Present Illness:    Pleasant 53-year-old female here for evaluation of left shoulder pain.  She reports several month history of left shoulder pain without trauma.  She does have history of shoulder surgery on that left shoulder several years ago arthroscopically.  She thinks it was about 7-10 years ago.  Unsure exactly what was done but possibly a rotator cuff repair or labral repair.  She has a open labral repair on the right shoulder.  She reports nighttime symptoms and pain even at rest.  Reports a pain in the front of the shoulder and difficulty with overhead function.  Pain is interfering with activities of daily living.  No recent injections of the left shoulder although she has had some in the past.    PAST MEDICAL HISTORY:   Past Medical History:   Diagnosis Date    Abnormal Pap smear     "pre-cancer cells post hysterectomy"    Allergy     Anxiety     Arthritis     Depression     Good hypertension control 2014    Hyperlipidemia     Hypertension     Hypertriglyceridemia     Meningitis     Narcolepsy     Peripheral neuropathy 2014    PONV (postoperative nausea and vomiting)     Psoriasis     Psoriasis 05/10/2018     PAST SURGICAL HISTORY:   Past Surgical History:   Procedure Laterality Date    ADENOIDECTOMY      AUGMENTATION OF BREAST      BREAST SURGERY      aumentation/ reduction     BREAST SURGERY  2017    augmentation     brizilian butt lift   2017    CARPAL TUNNEL RELEASE Left 2022    Procedure: RELEASE, CARPAL TUNNEL;  Surgeon: Joce Montanez MD;  Location: [facility] OR;  Service: Orthopedics;  Laterality: Left;    CARPAL TUNNEL RELEASE Right 2023    Procedure: RELEASE, CARPAL TUNNEL;  Surgeon: Joce Montanez MD;  Location: [facility] OR;  Service: Orthopedics;  Laterality: Right;     SECTION      " CHOLECYSTECTOMY  07/2018    Dr SEAN Christopher [facility] Surgical    COLONOSCOPY      COLONOSCOPY N/A 04/29/2022    Procedure: COLONOSCOPY;  Surgeon: Kavita Yee MD;  Location: [facility] ENDO;  Service: Endoscopy;  Laterality: N/A;    ESOPHAGOGASTRODUODENOSCOPY N/A 04/29/2022    Procedure: EGD (ESOPHAGOGASTRODUODENOSCOPY);  Surgeon: Kavita Yee MD;  Location: [facility] ENDO;  Service: Endoscopy;  Laterality: N/A;    FOOT SURGERY Left     CYST REMOVAL    GASTRIC BYPASS  04/2015    HERNIA REPAIR      UHR    HYSTERECTOMY      KNEE SURGERY Left     SCOPE    RELEASE OF ULNAR NERVE AT CUBITAL TUNNEL Left 12/07/2022    Procedure: RELEASE, ULNAR TUNNEL;  Surgeon: Joce Montanez MD;  Location: [facility] OR;  Service: Orthopedics;  Laterality: Left;    RELEASE OF ULNAR NERVE AT CUBITAL TUNNEL Right 05/25/2023    Procedure: RELEASE, ULNAR TUNNEL;  Surgeon: Joce Montanez MD;  Location: [facility] OR;  Service: Orthopedics;  Laterality: Right;    SHOULDER SURGERY  2000    right should surgery     SHOULDER SURGERY  01/2017    left shoulder / torn labrium     THIGH LIFT  03/03/2017    TONSILLECTOMY      TRIGGER FINGER RELEASE Right 1/11/2024    Procedure: RELEASE, TRIGGER FINGER;  Surgeon: Joce Montanez MD;  Location: [facility] OR;  Service: Orthopedics;  Laterality: Right;  thumb    tummy tuck   02/28/2016     FAMILY HISTORY:   Family History   Problem Relation Name Age of Onset    Cancer Mother          breast bilateral/ skin     Breast cancer Mother      Skin cancer Mother      Diabetes Father      Heart disease Father  65        CABG    Hypertension Father      Cancer Father          skin    Skin cancer Father      Gout Father      Colon polyps Father      Gout Brother 1     No Known Problems Daughter 2     No Known Problems Son 1     Cancer Maternal Aunt 3         lung, pancrease - smoker , breast    Lung cancer Maternal Aunt 3         x2    Pancreatic cancer Maternal Aunt 3     Breast cancer Maternal Aunt 3     No Known Problems  Maternal Uncle 1     No Known Problems Paternal Uncle 1     Cancer Maternal Grandmother          pancrease    Pancreatic cancer Maternal Grandmother      Breast cancer Maternal Grandmother      Diabetes Paternal Grandmother      Heart disease Paternal Grandfather      Colon cancer Maternal Grandfather      Ovarian cancer Neg Hx      Ulcerative colitis Neg Hx      Stomach cancer Neg Hx      Crohn's disease Neg Hx      Rectal cancer Neg Hx       SOCIAL HISTORY:   Social History     Occupational History    Not on file   Tobacco Use    Smoking status: Never    Smokeless tobacco: Never   Substance and Sexual Activity    Alcohol use: No     Comment: ALLERGIC TO ALCOHOL(DRINKING) PER PATIENT    Drug use: No    Sexual activity: Yes     Partners: Male     Birth control/protection: Surgical     Comment: SO        MEDICATIONS:   Current Outpatient Medications:     ACCU-CHEK GUIDE TEST STRIPS Strp, , Disp: , Rfl:     acyclovir (ZOVIRAX) 400 MG tablet, Take 400 mg by mouth 3 (three) times daily., Disp: , Rfl:     albuterol (PROVENTIL HFA) 90 mcg/actuation inhaler, Inhale 2 puffs into the lungs every 6 (six) hours as needed for Wheezing. Rescue, Disp: 18 g, Rfl: 0    allerg xt,D.farinae-D.pteronys (ODACTRA) 12 SQ-HDM Subl, Place 1 tablet under the tongue once daily., Disp: 30 tablet, Rfl: 11    azelastine (ASTELIN) 137 mcg (0.1 %) nasal spray, 1 spray in each nosril twice a day, Disp: 30 mL, Rfl: 5    dextroamphetamine-amphetamine 30 mg Tab, Take 1 tablet (30 mg total) by mouth 2 (two) times daily as needed., Disp: 60 tablet, Rfl: 0    EPINEPHrine (EPIPEN) 0.3 mg/0.3 mL AtIn, Inject 0.3 mLs (0.3 mg total) into the muscle once. for 1 dose, Disp: 2 each, Rfl: 1    estradioL (ESTRACE) 0.01 % (0.1 mg/gram) vaginal cream, USE 1/2 GRAM VAGINALLY 4 TIMES WEEKLY AS DIRECTED, Disp: , Rfl:     fluconazole (DIFLUCAN) 150 MG Tab, Take 1 tablet (150 mg total) by mouth once a week., Disp: 2 tablet, Rfl: 0    fluticasone propionate (FLONASE)  50 mcg/actuation nasal spray, 1 spray in each nostril twice a day, Disp: 16 g, Rfl: 5    gabapentin (NEURONTIN) 600 MG tablet, Take 1 tablet (600 mg total) by mouth 3 (three) times daily., Disp: 270 tablet, Rfl: 3    levocetirizine (XYZAL) 5 MG tablet, Take 1 tablet (5 mg total) by mouth every evening. For allergies, Disp: 90 tablet, Rfl: 3    methylPREDNISolone (MEDROL DOSEPACK) 4 mg tablet, follow package directions, Disp: 21 tablet, Rfl: 0    montelukast (SINGULAIR) 10 mg tablet, Take 1 tablet (10 mg total) by mouth every evening., Disp: 90 tablet, Rfl: 0    ondansetron (ZOFRAN-ODT) 4 MG TbDL, Take 1 tablet (4 mg total) by mouth every 8 (eight) hours as needed (nausea)., Disp: 30 tablet, Rfl: 0    oxyCODONE-acetaminophen (PERCOCET) 5-325 mg per tablet, Take 1 tablet by mouth every 6 (six) hours as needed for Pain., Disp: 12 tablet, Rfl: 0    rosuvastatin (CRESTOR) 5 MG tablet, Take 1 tablet (5 mg total) by mouth once daily., Disp: 90 tablet, Rfl: 3    solriamfetoL (SUNOSI) 150 mg Tab, Take 1 tablet by mouth once daily., Disp: 30 tablet, Rfl: 3    STELARA 45 mg/0.5 mL Syrg syringe, , Disp: , Rfl:     XYREM 500 mg/mL Soln, , Disp: , Rfl:   No current facility-administered medications for this visit.    Facility-Administered Medications Ordered in Other Visits:     diphenhydrAMINE injection 12.5 mg, 12.5 mg, Intravenous, Once PRN, Alphonso Ambrosio MD    electrolyte-S (ISOLYTE), , Intravenous, Continuous, Alphonso Ambrosio MD, Stopped at 05/25/23 0948    fentaNYL 50 mcg/mL injection 25 mcg, 25 mcg, Intravenous, Q5 Min PRN, Alphonso Ambrosio MD    HYDROmorphone (PF) injection 0.2 mg, 0.2 mg, Intravenous, Q5 Min PRN, Alphonso Ambrosio MD    lactated ringers infusion, 10 mL/hr, Intravenous, Continuous, Alphonso Ambrosio MD    lactated ringers infusion, 500 mL, Intravenous, Once, Alphonso Ambrosio MD    LORazepam injection 0.25 mg, 0.25 mg, Intravenous, Once PRN, Alphonso Ambrosio MD     ondansetron injection 4 mg, 4 mg, Intravenous, Once PRN, Alphonso Ambrosio MD    prochlorperazine injection Soln 5 mg, 5 mg, Intravenous, Q30 Min PRN, Alphonso Ambrosio MD    sodium chloride 0.9% flush 3 mL, 3 mL, Intravenous, Q8H, Alphonso Ambrosio MD  ALLERGIES:   Review of patient's allergies indicates:   Allergen Reactions    Alcohol Anaphylaxis     Drinking alcohol, RESPIRATORY DISTRESS           Physical Exam     There were no vitals filed for this visit.  Alert and oriented to person, place and time. No acute distress. Well-groomed, not ill appearing. Pupils round and reactive, normal respiratory effort, no audible wheezing.     Shoulder  Exam    OBSERVATION:     Swelling  none     Discoloration  none      Scars   none     Deformity  None    TENDERNESS                Clavicle   negative         AC Jt.    negative               Acromion:  negative        Scapular Spine negative   Supraspinatus  negative       Infraspinatus  negative   LH Biceps   positive   Greater Tub.  Positive        ROM:               Forward Flexion  140°       ER at 0°    60°  with pain      ER at 90° ABD  90° with pain      IR at 90°  ABD   NA         IR (spine level)   L2        STRENGTH:       SCAPTION   4/5        IR    5/5       ER    5/5       BICEPS   4/5            SIGNS:          NEER   +     SYED   +        DROP ARM   Neg   ZACKARY's  +    BELLY PRESS Neg   O'RAMIREZ's  Neg    SPEEDs  +   LIFT-OFF  Neg   X-Body ADD    Neg            Imaging:       X-Ray: I have reviewed all pertinent results/findings and my personal findings are:  Mild degenerative changes of the glenohumeral joint.  Evidence of fracture or dislocation.      Assessment & Plan    Disorder of left rotator cuff  -     MRI Shoulder Without Contrast Left; Future; Expected date: 04/23/2024    Claustrophobia         Treatment options were discussed with the patient in detail. We discussed the patient's current imaging as well as differential diagnosis in  detail. Based on the patient's symptoms of failure of conservative treatment and concern for recurrent rotator cuff tear, I do believe an MRI of the Left Shoulder is indicated to rule out rotator cuff tear, adhesive capsulitis, biceps/labral complex injury     The patient will follow-up after MRI to discuss results and further treatment options. They will call the clinic with any questions or concerns.     Follow up: for MRI/CT Results   X-rays next visit:  None

## 2024-05-01 ENCOUNTER — HOSPITAL ENCOUNTER (OUTPATIENT)
Dept: RADIOLOGY | Facility: HOSPITAL | Age: 54
Discharge: HOME | End: 2024-05-01
Attending: ORTHOPAEDIC SURGERY
Payer: COMMERCIAL

## 2024-05-01 DIAGNOSIS — M67.912 DISORDER OF LEFT ROTATOR CUFF: ICD-10-CM

## 2024-05-01 PROCEDURE — 73221 MRI JOINT UPR EXTREM W/O DYE: CPT | Mod: 26,LT,, | Performed by: RADIOLOGY

## 2024-05-01 PROCEDURE — 73221 MRI JOINT UPR EXTREM W/O DYE: CPT | Mod: TC,PO,LT

## 2024-05-03 ENCOUNTER — OFFICE VISIT (OUTPATIENT)
Dept: ORTHOPEDICS | Facility: CLINIC | Age: 54
End: 2024-05-03
Payer: COMMERCIAL

## 2024-05-03 VITALS — BODY MASS INDEX: 25.12 KG/M2 | WEIGHT: 156.31 LBS | HEIGHT: 66 IN

## 2024-05-03 DIAGNOSIS — M19.012 ARTHRITIS OF LEFT SHOULDER REGION: Primary | ICD-10-CM

## 2024-05-03 PROCEDURE — 99999 PR PBB SHADOW E&M-EST. PATIENT-LVL II: CPT | Mod: PBBFAC,,, | Performed by: ORTHOPAEDIC SURGERY

## 2024-05-03 PROCEDURE — 3008F BODY MASS INDEX DOCD: CPT | Mod: CPTII,S$GLB,, | Performed by: ORTHOPAEDIC SURGERY

## 2024-05-03 PROCEDURE — 20610 DRAIN/INJ JOINT/BURSA W/O US: CPT | Mod: LT,S$GLB,, | Performed by: ORTHOPAEDIC SURGERY

## 2024-05-03 PROCEDURE — 99214 OFFICE O/P EST MOD 30 MIN: CPT | Mod: 25,S$GLB,, | Performed by: ORTHOPAEDIC SURGERY

## 2024-05-03 PROCEDURE — 3044F HG A1C LEVEL LT 7.0%: CPT | Mod: CPTII,S$GLB,, | Performed by: ORTHOPAEDIC SURGERY

## 2024-05-03 RX ORDER — TRIAMCINOLONE ACETONIDE 40 MG/ML
40 INJECTION, SUSPENSION INTRA-ARTICULAR; INTRAMUSCULAR
Status: DISCONTINUED | OUTPATIENT
Start: 2024-05-03 | End: 2024-05-03 | Stop reason: HOSPADM

## 2024-05-03 RX ADMIN — TRIAMCINOLONE ACETONIDE 40 MG: 40 INJECTION, SUSPENSION INTRA-ARTICULAR; INTRAMUSCULAR at 08:05

## 2024-05-03 NOTE — PROGRESS NOTES
"Patient ID: Holly Chicas is a 53 y.o. female    Chief Complaint:   Chief Complaint   Patient presents with    Results     R shoulder MRI review       History of Present Illness:    Pleasant 53-year-old female here for evaluation of left shoulder pain.  She reports several month history of left shoulder pain without trauma.  She does have history of shoulder surgery on that left shoulder several years ago arthroscopically.  She thinks it was about 7-10 years ago.  Unsure exactly what was done but possibly a rotator cuff repair or labral repair.  She has a open labral repair on the right shoulder.  She reports nighttime symptoms and pain even at rest.  Reports a pain in the front of the shoulder and difficulty with overhead function.  Pain is interfering with activities of daily living.  No recent injections of the left shoulder although she has had some in the past.    ____________________________________________________________________    Interval history 05/03/2024 : Patient returns today for MRI follow-up of their left shoulder.  They report no changes since I saw them last.     PAST MEDICAL HISTORY:   Past Medical History:   Diagnosis Date    Abnormal Pap smear 1996    "pre-cancer cells post hysterectomy"    Allergy     Anxiety     Arthritis     Depression     Good hypertension control 09/11/2014    Hyperlipidemia     Hypertension     Hypertriglyceridemia     Meningitis     Narcolepsy     Peripheral neuropathy 09/12/2014    PONV (postoperative nausea and vomiting)     Psoriasis     Psoriasis 05/10/2018     PAST SURGICAL HISTORY:   Past Surgical History:   Procedure Laterality Date    ADENOIDECTOMY      AUGMENTATION OF BREAST      BREAST SURGERY  2016    aumentation/ reduction     BREAST SURGERY  03/03/2017    augmentation     brizilian butt lift   03/03/2017    CARPAL TUNNEL RELEASE Left 12/07/2022    Procedure: RELEASE, CARPAL TUNNEL;  Surgeon: Joce Montanez MD;  Location: [facility];  Service: Orthopedics;  " Laterality: Left;    CARPAL TUNNEL RELEASE Right 2023    Procedure: RELEASE, CARPAL TUNNEL;  Surgeon: Joce Montanez MD;  Location: [facility] OR;  Service: Orthopedics;  Laterality: Right;     SECTION      CHOLECYSTECTOMY  2018    Dr SEAN [facility]    COLONOSCOPY      COLONOSCOPY N/A 2022    Procedure: COLONOSCOPY;  Surgeon: Kavita Yee MD;  Location: [facility] ENDO;  Service: Endoscopy;  Laterality: N/A;    ESOPHAGOGASTRODUODENOSCOPY N/A 2022    Procedure: EGD (ESOPHAGOGASTRODUODENOSCOPY);  Surgeon: Kavita Yee MD;  Location: [facility] ENDO;  Service: Endoscopy;  Laterality: N/A;    FOOT SURGERY Left     CYST REMOVAL    GASTRIC BYPASS  2015    HERNIA REPAIR      UHR    HYSTERECTOMY      KNEE SURGERY Left     SCOPE    RELEASE OF ULNAR NERVE AT CUBITAL TUNNEL Left 2022    Procedure: RELEASE, ULNAR TUNNEL;  Surgeon: Joce Montanez MD;  Location: [facility] OR;  Service: Orthopedics;  Laterality: Left;    RELEASE OF ULNAR NERVE AT CUBITAL TUNNEL Right 2023    Procedure: RELEASE, ULNAR TUNNEL;  Surgeon: Joce Montanez MD;  Location: [facility] OR;  Service: Orthopedics;  Laterality: Right;    SHOULDER SURGERY      right should surgery     SHOULDER SURGERY  2017    left shoulder / torn labrium     THIGH LIFT  2017    TONSILLECTOMY      TRIGGER FINGER RELEASE Right 2024    Procedure: RELEASE, TRIGGER FINGER;  Surgeon: Joce Montanez MD;  Location: [facility] OR;  Service: Orthopedics;  Laterality: Right;  thumb    tummy tuck   2016     FAMILY HISTORY:   Family History   Problem Relation Name Age of Onset    Cancer Mother          breast bilateral/ skin     Breast cancer Mother      Skin cancer Mother      Diabetes Father      Heart disease Father  65        CABG    Hypertension Father      Cancer Father          skin    Skin cancer Father      Gout Father      Colon polyps Father      Gout Brother 1     No Known Problems Daughter 2     No Known  Problems Son 1     Cancer Maternal Aunt 3         lung, pancrease - smoker , breast    Lung cancer Maternal Aunt 3         x2    Pancreatic cancer Maternal Aunt 3     Breast cancer Maternal Aunt 3     No Known Problems Maternal Uncle 1     No Known Problems Paternal Uncle 1     Cancer Maternal Grandmother          pancrease    Pancreatic cancer Maternal Grandmother      Breast cancer Maternal Grandmother      Diabetes Paternal Grandmother      Heart disease Paternal Grandfather      Colon cancer Maternal Grandfather      Ovarian cancer Neg Hx      Ulcerative colitis Neg Hx      Stomach cancer Neg Hx      Crohn's disease Neg Hx      Rectal cancer Neg Hx       SOCIAL HISTORY:   Social History     Occupational History    Not on file   Tobacco Use    Smoking status: Never    Smokeless tobacco: Never   Substance and Sexual Activity    Alcohol use: No     Comment: ALLERGIC TO ALCOHOL(DRINKING) PER PATIENT    Drug use: No    Sexual activity: Yes     Partners: Male     Birth control/protection: Surgical     Comment: SO        MEDICATIONS:   Current Outpatient Medications:     ACCU-CHEK GUIDE TEST STRIPS Strp, , Disp: , Rfl:     acyclovir (ZOVIRAX) 400 MG tablet, Take 400 mg by mouth 3 (three) times daily., Disp: , Rfl:     albuterol (PROVENTIL HFA) 90 mcg/actuation inhaler, Inhale 2 puffs into the lungs every 6 (six) hours as needed for Wheezing. Rescue, Disp: 18 g, Rfl: 0    allerg xt,D.farinae-D.pteronys (ODACTRA) 12 SQ-HDM Subl, Place 1 tablet under the tongue once daily., Disp: 30 tablet, Rfl: 11    azelastine (ASTELIN) 137 mcg (0.1 %) nasal spray, 1 spray in each nosril twice a day, Disp: 30 mL, Rfl: 5    dextroamphetamine-amphetamine 30 mg Tab, Take 1 tablet (30 mg total) by mouth 2 (two) times daily as needed., Disp: 60 tablet, Rfl: 0    diazePAM (VALIUM) 5 MG tablet, Take 30 minutes prior to procedure . Must have someone bring/take you to MRI appt, Disp: 2 tablet, Rfl: 0    EPINEPHrine (EPIPEN) 0.3 mg/0.3 mL AtIn,  Inject 0.3 mLs (0.3 mg total) into the muscle once. for 1 dose, Disp: 2 each, Rfl: 1    estradioL (ESTRACE) 0.01 % (0.1 mg/gram) vaginal cream, USE 1/2 GRAM VAGINALLY 4 TIMES WEEKLY AS DIRECTED, Disp: , Rfl:     fluconazole (DIFLUCAN) 150 MG Tab, Take 1 tablet (150 mg total) by mouth once a week., Disp: 2 tablet, Rfl: 0    fluticasone propionate (FLONASE) 50 mcg/actuation nasal spray, 1 spray in each nostril twice a day, Disp: 16 g, Rfl: 5    gabapentin (NEURONTIN) 600 MG tablet, Take 1 tablet (600 mg total) by mouth 3 (three) times daily., Disp: 270 tablet, Rfl: 3    levocetirizine (XYZAL) 5 MG tablet, Take 1 tablet (5 mg total) by mouth every evening. For allergies, Disp: 90 tablet, Rfl: 3    methylPREDNISolone (MEDROL DOSEPACK) 4 mg tablet, follow package directions, Disp: 21 tablet, Rfl: 0    montelukast (SINGULAIR) 10 mg tablet, Take 1 tablet (10 mg total) by mouth every evening., Disp: 90 tablet, Rfl: 0    ondansetron (ZOFRAN-ODT) 4 MG TbDL, Take 1 tablet (4 mg total) by mouth every 8 (eight) hours as needed (nausea)., Disp: 30 tablet, Rfl: 0    oxyCODONE-acetaminophen (PERCOCET) 5-325 mg per tablet, Take 1 tablet by mouth every 6 (six) hours as needed for Pain., Disp: 12 tablet, Rfl: 0    rosuvastatin (CRESTOR) 5 MG tablet, Take 1 tablet (5 mg total) by mouth once daily., Disp: 90 tablet, Rfl: 3    solriamfetoL (SUNOSI) 150 mg Tab, Take 1 tablet by mouth once daily., Disp: 30 tablet, Rfl: 3    STELARA 45 mg/0.5 mL Syrg syringe, , Disp: , Rfl:     XYREM 500 mg/mL Soln, , Disp: , Rfl:   No current facility-administered medications for this visit.    Facility-Administered Medications Ordered in Other Visits:     diphenhydrAMINE injection 12.5 mg, 12.5 mg, Intravenous, Once PRN, Alphonso Ambrosio MD    electrolyte-S (ISOLYTE), , Intravenous, Continuous, Alphonso Ambrosio MD, Stopped at 05/25/23 0953    fentaNYL 50 mcg/mL injection 25 mcg, 25 mcg, Intravenous, Q5 Min PRN, Alphonso Ambrosio MD     HYDROmorphone (PF) injection 0.2 mg, 0.2 mg, Intravenous, Q5 Min PRN, Alphonso Ambrosio MD    lactated ringers infusion, 10 mL/hr, Intravenous, Continuous, Alphonso Ambrosio MD    lactated ringers infusion, 500 mL, Intravenous, Once, Alphonso Ambrosio MD    LORazepam injection 0.25 mg, 0.25 mg, Intravenous, Once PRN, Alphonso Ambrosio MD    ondansetron injection 4 mg, 4 mg, Intravenous, Once PRN, Alphonso Ambrosio MD    prochlorperazine injection Soln 5 mg, 5 mg, Intravenous, Q30 Min PRN, Alphonso Ambrosio MD    sodium chloride 0.9% flush 3 mL, 3 mL, Intravenous, Q8H, Alphonso Ambrosio MD  ALLERGIES:   Review of patient's allergies indicates:   Allergen Reactions    Alcohol Anaphylaxis     Drinking alcohol, RESPIRATORY DISTRESS           Physical Exam     There were no vitals filed for this visit.  Alert and oriented to person, place and time. No acute distress. Well-groomed, not ill appearing. Pupils round and reactive, normal respiratory effort, no audible wheezing.     Shoulder  Exam    OBSERVATION:     Swelling  none     Discoloration  none      Scars   none     Deformity  None    TENDERNESS                Clavicle   negative         AC Jt.    negative               Acromion:  negative        Scapular Spine negative   Supraspinatus  negative       Infraspinatus  negative   LH Biceps   positive   Greater Tub.  Positive        ROM:               Forward Flexion  140°       ER at 0°    60°  with pain      ER at 90° ABD  90° with pain      IR at 90°  ABD   NA         IR (spine level)   L2        STRENGTH:       SCAPTION   4/5        IR    5/5       ER    5/5       BICEPS   4/5            SIGNS:          NEER   +     SYED   +        DROP ARM   Neg   ZACKARY's  +    BELLY PRESS Neg   O'RAMIREZ's  Neg    SPEEDs  +   LIFT-OFF  Neg   X-Body ADD    Neg            Imaging:       X-Ray: I have reviewed all pertinent results/findings and my personal findings are:  Mild degenerative changes of the  glenohumeral joint.  Evidence of fracture or dislocation.    MRI of the left shoulder confirms moderate DJD of the glenohumeral joint with cystic changes in the glenoid.  Intact rotator cuff with mild tendinosis and undersurface fraying    Assessment & Plan    Arthritis of left shoulder region         I made the decision to obtain old records of the patient including previous notes and imaging. New imaging, if ordered today of the extremity or extremities, were evaluated. I independently reviewed and interpreted the radiographs and/or MRIs/CT scan today as well as prior imaging. I also reviewed the referring provider's documentation as well as any pertinent labs, tests and imaging.     Holly Chicas is a 53 y.o. female with left shoulder DJD    Treatment options were discussed in detail with the patient. We discussed multiple options including non-operative and operative treatment options.   Non-operatively we discussed bracing, physical therapy and injections. Operatively we discussed arthroscopy with debridement versus total shoulder arthroplasty and the expectations of surgery long term as well as the rehabilitation associated with surgery as well as risks and benefits of surgery.     After shared medical decision-making with the patient, patient would like to proceed with a trial of:     Corticosteroid Injection left shoulder     We can consider surgery if pain is refractory to conservative treatment    All questions were answered and patient is agreeable to the above plan.     Follow up:  4 weeks

## 2024-05-03 NOTE — PROCEDURES
Large Joint Aspiration/Injection: L subacromial bursa    Date/Time: 5/3/2024 8:45 AM    Performed by: Joce Montanez MD  Authorized by: Joce Montanez MD    Consent Done?:  Yes (Verbal)  Indications:  Pain  Site marked: the procedure site was marked    Timeout: prior to procedure the correct patient, procedure, and site was verified    Local anesthetic:  Lidocaine 1% without epinephrine  Anesthetic total (ml):  3      Details:  Needle Size:  22 G  Ultrasonic Guidance for needle placement?: No    Approach:  Posterior  Location:  Shoulder  Site:  L subacromial bursa  Medications:  40 mg triamcinolone acetonide 40 mg/mL  Patient tolerance:  Patient tolerated the procedure well with no immediate complications   room air

## 2024-05-21 NOTE — PROGRESS NOTES
"ALLERGY & IMMUNOLOGY CLINIC -  Established Patient     HISTORY OF PRESENT ILLNESS     Patient ID: Holly Chicas is a 53 y.o. female    CC: follow up visit    HPI: Holly Chicas is a 53 y.o. female presents for evaluation of:    Office Visit 2024  Allergic rhinitis: Sensitized to HDM and started on odactra last 2023. States for the previous 3 months has been experiencing voice hoarseness as well as shortness of breath. Feels like the "air is thick." Continues fluticasone/azelastine 2 sprays each nostril daily, xyzal nightly and montelukast nightly as well. Has not noticed significant improvement with initiation of odactra. Albuterol relieves shortness of breath. Medrol dose pack did not relieve symptoms. Progressively worsened sinus pressure and headaches associated with pressure. Morning is most bothersome time of the day.       REVIEW OF SYSTEMS     CONST: no F/C/NS, no unintentional weight changes  Balance of review of systems negative except as mentioned above     MEDICAL HISTORY     MedHx: active problems reviewed  SurgHx:   Past Surgical History:   Procedure Laterality Date    ADENOIDECTOMY      AUGMENTATION OF BREAST      BREAST SURGERY      aumentation/ reduction     BREAST SURGERY  2017    augmentation     brizilian butt lift   2017    CARPAL TUNNEL RELEASE Left 2022    Procedure: RELEASE, CARPAL TUNNEL;  Surgeon: Joce Montanez MD;  Location: [facility] OR;  Service: Orthopedics;  Laterality: Left;    CARPAL TUNNEL RELEASE Right 2023    Procedure: RELEASE, CARPAL TUNNEL;  Surgeon: Joce Montanez MD;  Location: [facility] OR;  Service: Orthopedics;  Laterality: Right;     SECTION      CHOLECYSTECTOMY  2018    Dr SEAN [facility] Surgical    COLONOSCOPY      COLONOSCOPY N/A 2022    Procedure: COLONOSCOPY;  Surgeon: Kavita Yee MD;  Location: [facility];  Service: Endoscopy;  Laterality: N/A;    ESOPHAGOGASTRODUODENOSCOPY N/A 2022    Procedure: " "EGD (ESOPHAGOGASTRODUODENOSCOPY);  Surgeon: Kavita Yee MD;  Location: [facility] ENDO;  Service: Endoscopy;  Laterality: N/A;    FOOT SURGERY Left     CYST REMOVAL    GASTRIC BYPASS  04/2015    HERNIA REPAIR      UHR    HYSTERECTOMY      KNEE SURGERY Left     SCOPE    RELEASE OF ULNAR NERVE AT CUBITAL TUNNEL Left 12/07/2022    Procedure: RELEASE, ULNAR TUNNEL;  Surgeon: Joce Montanez MD;  Location: [facility] OR;  Service: Orthopedics;  Laterality: Left;    RELEASE OF ULNAR NERVE AT CUBITAL TUNNEL Right 05/25/2023    Procedure: RELEASE, ULNAR TUNNEL;  Surgeon: Joce Montanez MD;  Location: [facility] OR;  Service: Orthopedics;  Laterality: Right;    SHOULDER SURGERY  2000    right should surgery     SHOULDER SURGERY  01/2017    left shoulder / torn labrium     THIGH LIFT  03/03/2017    TONSILLECTOMY      TRIGGER FINGER RELEASE Right 1/11/2024    Procedure: RELEASE, TRIGGER FINGER;  Surgeon: Joce Montanez MD;  Location: [facility] OR;  Service: Orthopedics;  Laterality: Right;  thumb    tummy tuck   02/28/2016     Allergies: see below  Medications: MAR reviewed    No pertinent allergy changes in medical history since last visit     PHYSICAL EXAM     VS: Ht 5' 6" (1.676 m)   Wt 73.6 kg (162 lb 4.1 oz)   BMI 26.19 kg/m²   GENERAL: awake, alert, cooperative with exam  ORAL: MMM, no ulcers, no thrush, no cobblestoning  LUNGS: CTAB, no w/r/c, no increased WOB  HEART: Normal Rate and regular rhythm, normal S1/S2, no m/g/r  EXTREMITIES: +2 distal pulses, no c/c/e  DERM: no rashes, no skin breaks     LABORATORY/ALLERGY Evaluation     +Dust Mites     ASSESSMENT/PLAN     Holly Chicas is a 53 y.o. female with       1. Hoarseness    2. Chronic allergic rhinitis    3. Allergic rhinitis due to house dust mite    4. At high risk for respiratory distress      Longstanding episodes of rhinitis, conjunctivitis with associated postnasal drip and hoarseness. Recommend continuation of fluticasone/azelastine 2 sprays each nostril BID. " Discussed discontinuation of odactra today given lack of efficacy but will continue until follow up with ENT Referral placed today. Given dyspnea and allergic sensitization, will send for baseline spirometry to assess for obstructive lung disease and consider addition of ICS pending results of testing.     Follow up: 3 Months      Reji Robledo MD    I spent a total of 40 minutes on the day of the visit. This includes face to face time and non-face to face time preparing to see the patient (eg, review of tests), obtaining and/or reviewing separately obtained history, documenting clinical information in the electronic or other health record, independently interpreting results and communicating results to the patient/family/caregiver, or care coordinator.

## 2024-05-22 ENCOUNTER — OFFICE VISIT (OUTPATIENT)
Dept: ALLERGY | Facility: CLINIC | Age: 54
End: 2024-05-22
Payer: COMMERCIAL

## 2024-05-22 VITALS — WEIGHT: 162.25 LBS | BODY MASS INDEX: 26.08 KG/M2 | HEIGHT: 66 IN

## 2024-05-22 DIAGNOSIS — Z91.89: ICD-10-CM

## 2024-05-22 DIAGNOSIS — J30.89 ALLERGIC RHINITIS DUE TO HOUSE DUST MITE: ICD-10-CM

## 2024-05-22 DIAGNOSIS — R49.0 HOARSENESS: Primary | ICD-10-CM

## 2024-05-22 DIAGNOSIS — J30.9 CHRONIC ALLERGIC RHINITIS: ICD-10-CM

## 2024-05-22 PROCEDURE — 99999 PR PBB SHADOW E&M-EST. PATIENT-LVL V: CPT | Mod: PBBFAC,,, | Performed by: STUDENT IN AN ORGANIZED HEALTH CARE EDUCATION/TRAINING PROGRAM

## 2024-05-22 PROCEDURE — 3008F BODY MASS INDEX DOCD: CPT | Mod: CPTII,S$GLB,, | Performed by: STUDENT IN AN ORGANIZED HEALTH CARE EDUCATION/TRAINING PROGRAM

## 2024-05-22 PROCEDURE — 99215 OFFICE O/P EST HI 40 MIN: CPT | Mod: S$GLB,,, | Performed by: STUDENT IN AN ORGANIZED HEALTH CARE EDUCATION/TRAINING PROGRAM

## 2024-05-22 PROCEDURE — 3044F HG A1C LEVEL LT 7.0%: CPT | Mod: CPTII,S$GLB,, | Performed by: STUDENT IN AN ORGANIZED HEALTH CARE EDUCATION/TRAINING PROGRAM

## 2024-05-22 PROCEDURE — 1159F MED LIST DOCD IN RCRD: CPT | Mod: CPTII,S$GLB,, | Performed by: STUDENT IN AN ORGANIZED HEALTH CARE EDUCATION/TRAINING PROGRAM

## 2024-05-22 RX ORDER — FEXOFENADINE HYDROCHLORIDE 180 MG/1
TABLET, FILM COATED ORAL
COMMUNITY
Start: 2024-04-07 | End: 2024-06-14

## 2024-05-22 NOTE — PATIENT INSTRUCTIONS
To schedule a PFT (Pulmonary function test) in [place]:  Call [phone] (Main Number) and ask for outpatient scheduling department or [phone] (direct line). You may need to leave a voicemail. Ms. Myles or Ms. Bernardo will call you back. Tell them you want to schedule a PFT and the orders are in Epic    To Schedule a PFT (Pulmonary Function test) in [place]:  Call [phone]--[facility] [place]  Ask to schedule a Pulmonary Function test, tell them orders are in Epic

## 2024-05-23 DIAGNOSIS — G47.411 NARCOLEPSY WITH CATAPLEXY: ICD-10-CM

## 2024-05-23 DIAGNOSIS — R06.02 SHORTNESS OF BREATH: ICD-10-CM

## 2024-05-23 DIAGNOSIS — M54.10 RADICULOPATHY OF ARM: ICD-10-CM

## 2024-05-23 RX ORDER — DEXTROAMPHETAMINE SACCHARATE, AMPHETAMINE ASPARTATE, DEXTROAMPHETAMINE SULFATE AND AMPHETAMINE SULFATE 7.5; 7.5; 7.5; 7.5 MG/1; MG/1; MG/1; MG/1
1 TABLET ORAL 2 TIMES DAILY PRN
Qty: 60 TABLET | Refills: 0 | Status: SHIPPED | OUTPATIENT
Start: 2024-05-23

## 2024-05-23 RX ORDER — AZELASTINE 1 MG/ML
SPRAY, METERED NASAL
Qty: 30 ML | Refills: 5 | Status: SHIPPED | OUTPATIENT
Start: 2024-05-23

## 2024-05-23 RX ORDER — ALBUTEROL SULFATE 90 UG/1
2 AEROSOL, METERED RESPIRATORY (INHALATION) EVERY 6 HOURS PRN
Qty: 18 G | Refills: 3 | Status: SHIPPED | OUTPATIENT
Start: 2024-05-23 | End: 2025-05-23

## 2024-05-23 RX ORDER — MONTELUKAST SODIUM 10 MG/1
10 TABLET ORAL NIGHTLY
Qty: 90 TABLET | Refills: 3 | Status: SHIPPED | OUTPATIENT
Start: 2024-05-23 | End: 2025-05-23

## 2024-05-23 RX ORDER — GABAPENTIN 600 MG/1
600 TABLET ORAL 3 TIMES DAILY
Qty: 270 TABLET | Refills: 3 | Status: SHIPPED | OUTPATIENT
Start: 2024-05-23 | End: 2025-05-23

## 2024-05-23 RX ORDER — FLUTICASONE PROPIONATE 50 MCG
SPRAY, SUSPENSION (ML) NASAL
Qty: 16 G | Refills: 5 | Status: SHIPPED | OUTPATIENT
Start: 2024-05-23

## 2024-05-23 NOTE — TELEPHONE ENCOUNTER
Montelukast--LR--2-7-24  Albuterol Inhaler--LR--4-3-24  LOV--2-27-24  FOV--9-10-24     3 = A little assistance

## 2024-05-23 NOTE — TELEPHONE ENCOUNTER
Flonase--LR--5-29-23  Azelastine--LR--5-29-23  Gabapentin--LR--7-20-23  LOV--2-27-24  FOV--9-10-24         No

## 2024-05-24 ENCOUNTER — HOSPITAL ENCOUNTER (OUTPATIENT)
Dept: PULMONOLOGY | Facility: HOSPITAL | Age: 54
Discharge: HOME | End: 2024-05-24
Attending: STUDENT IN AN ORGANIZED HEALTH CARE EDUCATION/TRAINING PROGRAM
Payer: COMMERCIAL

## 2024-05-24 ENCOUNTER — TELEPHONE (OUTPATIENT)
Dept: OTOLARYNGOLOGY | Facility: CLINIC | Age: 54
End: 2024-05-24

## 2024-05-24 ENCOUNTER — OFFICE VISIT (OUTPATIENT)
Dept: OTOLARYNGOLOGY | Facility: CLINIC | Age: 54
End: 2024-05-24
Payer: COMMERCIAL

## 2024-05-24 VITALS
HEART RATE: 75 BPM | DIASTOLIC BLOOD PRESSURE: 90 MMHG | WEIGHT: 163.13 LBS | HEIGHT: 66 IN | SYSTOLIC BLOOD PRESSURE: 143 MMHG | BODY MASS INDEX: 26.22 KG/M2

## 2024-05-24 DIAGNOSIS — Z87.898 HISTORY OF HEADACHE: ICD-10-CM

## 2024-05-24 DIAGNOSIS — R49.0 HOARSENESS: ICD-10-CM

## 2024-05-24 DIAGNOSIS — R49.0 DYSPHONIA: Primary | ICD-10-CM

## 2024-05-24 DIAGNOSIS — Z91.89: ICD-10-CM

## 2024-05-24 PROCEDURE — 3008F BODY MASS INDEX DOCD: CPT | Mod: CPTII,S$GLB,, | Performed by: PHYSICIAN ASSISTANT

## 2024-05-24 PROCEDURE — 31575 DIAGNOSTIC LARYNGOSCOPY: CPT | Mod: S$GLB,,, | Performed by: PHYSICIAN ASSISTANT

## 2024-05-24 PROCEDURE — 3080F DIAST BP >= 90 MM HG: CPT | Mod: CPTII,S$GLB,, | Performed by: PHYSICIAN ASSISTANT

## 2024-05-24 PROCEDURE — 3077F SYST BP >= 140 MM HG: CPT | Mod: CPTII,S$GLB,, | Performed by: PHYSICIAN ASSISTANT

## 2024-05-24 PROCEDURE — 1159F MED LIST DOCD IN RCRD: CPT | Mod: CPTII,S$GLB,, | Performed by: PHYSICIAN ASSISTANT

## 2024-05-24 PROCEDURE — 3044F HG A1C LEVEL LT 7.0%: CPT | Mod: CPTII,S$GLB,, | Performed by: PHYSICIAN ASSISTANT

## 2024-05-24 PROCEDURE — 99213 OFFICE O/P EST LOW 20 MIN: CPT | Mod: 25,S$GLB,, | Performed by: PHYSICIAN ASSISTANT

## 2024-05-24 PROCEDURE — 94010 BREATHING CAPACITY TEST: CPT

## 2024-05-24 PROCEDURE — 99999 PR PBB SHADOW E&M-EST. PATIENT-LVL V: CPT | Mod: PBBFAC,,, | Performed by: PHYSICIAN ASSISTANT

## 2024-05-24 PROCEDURE — 1160F RVW MEDS BY RX/DR IN RCRD: CPT | Mod: CPTII,S$GLB,, | Performed by: PHYSICIAN ASSISTANT

## 2024-05-24 NOTE — PROGRESS NOTES
"[facility]    Subjective:      Patient: Holly Chicas Patient PCP: Myriam Joe DO         :  1970     Sex:  female      MRN:  [id number]          Date of Visit: 2024      Chief Complaint: Dysphonia    Patient ID: Holly Chicas is a 53 y.o. female non-smoker who presents to office for evaluation of dysphonia. She is followed by allergy. Current sinus regimen: flonase 1 spray to each nostril twice daily; astelin 1 spray to each nostril twice daily; singulair 10mg; xyzal 5mg. Pt states that she has had ongoing issues with dysphonia x 3 months. Pt states that she will have voice hoarseness and breakage of voice when speaking. This is episodic and her voice will go back to baseline at time. No vocal fatigue as the day goes on. Pt denies fever/chills, sore throat, dysphagia or odynophagia. Pt denies any GERD symptoms.     Pt has been having issues with shortness of breath. She uses albuterol once daily for rescue. She denies h/o asthma or COPD. She had PFT completed today as ordered by Allergy. Results are not yet viewable. She has had new onset cough for the past 1 week. No wheezing.     Pt reports bitemporal headaches and headaches on the top of her head and thinks they may be "sinus headaches." No other acute sinusitis symptoms.     Past Medical History  She has a past medical history of Abnormal Pap smear, Allergy, Anxiety, Arthritis, Depression, Good hypertension control, Hyperlipidemia, Hypertension, Hypertriglyceridemia, Meningitis, Narcolepsy, Peripheral neuropathy, PONV (postoperative nausea and vomiting), Psoriasis, and Psoriasis.    Family History  Her family history includes Breast cancer in her maternal aunt, maternal grandmother, and mother; Cancer in her father, maternal aunt, maternal grandmother, and mother; Colon cancer in her maternal grandfather; Colon polyps in her father; Diabetes in her father and paternal grandmother; Gout in her brother and father; Heart disease in her " paternal grandfather; Heart disease (age of onset: 65) in her father; Hypertension in her father; Lung cancer in her maternal aunt; No Known Problems in her daughter, maternal uncle, paternal uncle, and son; Pancreatic cancer in her maternal aunt and maternal grandmother; Skin cancer in her father and mother.    Past Surgical History:   Procedure Laterality Date    ADENOIDECTOMY      AUGMENTATION OF BREAST      BREAST SURGERY      aumentation/ reduction     BREAST SURGERY  2017    augmentation     brizilian butt lift   2017    CARPAL TUNNEL RELEASE Left 2022    Procedure: RELEASE, CARPAL TUNNEL;  Surgeon: Joce Montanez MD;  Location: [facility] OR;  Service: Orthopedics;  Laterality: Left;    CARPAL TUNNEL RELEASE Right 2023    Procedure: RELEASE, CARPAL TUNNEL;  Surgeon: Joce Montanez MD;  Location: [facility] OR;  Service: Orthopedics;  Laterality: Right;     SECTION      CHOLECYSTECTOMY  2018    Dr SEAN [facility]    COLONOSCOPY      COLONOSCOPY N/A 2022    Procedure: COLONOSCOPY;  Surgeon: Kavita Yee MD;  Location: [facility] ENDO;  Service: Endoscopy;  Laterality: N/A;    ESOPHAGOGASTRODUODENOSCOPY N/A 2022    Procedure: EGD (ESOPHAGOGASTRODUODENOSCOPY);  Surgeon: Kavita Yee MD;  Location: [facility] ENDO;  Service: Endoscopy;  Laterality: N/A;    FOOT SURGERY Left     CYST REMOVAL    GASTRIC BYPASS  2015    HERNIA REPAIR      UHR    HYSTERECTOMY      KNEE SURGERY Left     SCOPE    RELEASE OF ULNAR NERVE AT CUBITAL TUNNEL Left 2022    Procedure: RELEASE, ULNAR TUNNEL;  Surgeon: Joce Montanez MD;  Location: [facility] OR;  Service: Orthopedics;  Laterality: Left;    RELEASE OF ULNAR NERVE AT CUBITAL TUNNEL Right 2023    Procedure: RELEASE, ULNAR TUNNEL;  Surgeon: Joce Montanez MD;  Location: [facility] OR;  Service: Orthopedics;  Laterality: Right;    SHOULDER SURGERY      right should surgery     SHOULDER SURGERY  2017    left  "shoulder / torn labrium     THIGH LIFT  03/03/2017    TONSILLECTOMY      TRIGGER FINGER RELEASE Right 1/11/2024    Procedure: RELEASE, TRIGGER FINGER;  Surgeon: Joce Montanez MD;  Location: [facility];  Service: Orthopedics;  Laterality: Right;  thumb    tummy tuck   02/28/2016     Social History     Tobacco Use    Smoking status: Never    Smokeless tobacco: Never   Substance and Sexual Activity    Alcohol use: No     Comment: ALLERGIC TO ALCOHOL(DRINKING) PER PATIENT    Drug use: No    Sexual activity: Yes     Partners: Male     Birth control/protection: Surgical     Comment: SO     Medications  She has a current medication list which includes the following prescription(s): accu-chek guide test strips, acyclovir, albuterol, allerg xt,d.farinae-d.pteronys, azelastine, dextroamphetamine-amphetamine, estradiol, fluticasone propionate, gabapentin, levocetirizine, montelukast, ondansetron, rosuvastatin, sunosi, stelara, xyrem, allergy relief (fexofenadine), diazepam, epinephrine, fluconazole, methylprednisolone, and oxycodone-acetaminophen, and the following Facility-Administered Medications: diphenhydramine, electrolyte-s (ph 7.4), fentanyl, hydromorphone (pf), lactated ringers, lactated ringers, lorazepam, ondansetron, prochlorperazine, and sodium chloride 0.9%.    Review of patient's allergies indicates:   Allergen Reactions    Alcohol Anaphylaxis     Drinking alcohol, RESPIRATORY DISTRESS       All medications, allergies, and past history have been reviewed.    Objective:      Vitals:      5/3/2024     8:55 AM 5/22/2024     2:30 PM 5/24/2024     8:35 AM   Vitals - 1 value per visit   SYSTOLIC   143   DIASTOLIC   90   Pulse   75   Weight (lb) 156.31 162.26 163.14   Weight (kg) 70.9 73.6 74   Height 5' 6" (1.676 m) 5' 6" (1.676 m) 5' 6" (1.676 m)   BMI (Calculated) 25.2 26.2 26.3   Pain Score Three Zero Five       Body surface area is 1.86 meters squared.  Physical Exam  Constitutional:       General: She is not " in acute distress.     Appearance: Normal appearance. She is not ill-appearing.   HENT:      Head: Normocephalic and atraumatic.      Right Ear: Tympanic membrane, ear canal and external ear normal.      Left Ear: Tympanic membrane, ear canal and external ear normal.      Nose: Septal deviation present.      Comments: Anterior rhinoscopy unremarkable for nasal polyps or purulence in nasal cavities. This was confirmed by assessing bilateral nasal passages with scope during laryngoscopy.      Mouth/Throat:      Lips: Pink. No lesions.      Mouth: Mucous membranes are moist. No oral lesions.      Tongue: No lesions.      Palate: No lesions.      Pharynx: Oropharynx is clear. Uvula midline. No pharyngeal swelling, oropharyngeal exudate, posterior oropharyngeal erythema or uvula swelling.      Tonsils: 0 on the right. 0 on the left.      Comments: S/p tonsillectomy  Eyes:      General:         Right eye: No discharge.         Left eye: No discharge.      Extraocular Movements: Extraocular movements intact.      Conjunctiva/sclera: Conjunctivae normal.   Pulmonary:      Effort: Pulmonary effort is normal.   Neurological:      General: No focal deficit present.      Mental Status: She is alert and oriented to person, place, and time. Mental status is at baseline.   Psychiatric:         Mood and Affect: Mood normal.         Behavior: Behavior normal.         Thought Content: Thought content normal.         Judgment: Judgment normal.       Laryngoscopy     Date/Time: 5/24/2024 8:45 AM     Performed by: Santiago Jett PA-C  Authorized by: Santiago Jett PA-C    Anesthesia:     Local anesthetic:  Lidocaine 4% and Guero-Synephrine 1/2%    Patient tolerance:  Patient tolerated the procedure well with no immediate complications    Decongestion performed?: Yes    Laryngoscopy:     Areas examined:  Nasopharynx, oropharynx, hypopharynx, larynx and vocal cords    Laryngoscope size: disposable ambu flexible  "scope.  Nose External:      No external nasal deformity  Nasopharynx:      No mucosa lesions     Adenoids not present     Posterior choanae patent     Eustachian tube patent  Larynx/hypopharynx:      No epiglottis lesions     No epiglottis edema     No AE folds lesions     No vocal cord polyps     No hypopharynx lesions     No piriform sinus pooling     No piriform sinus lesions     No post cricoid edema     No post cricoid erythema     Mild increased tension of false vocal folds over true vocal cords right greater than left-compensatory supraglottic hyperfunction. Mild generalized edema of true vocal cords bilaterally. Good adduction of true vocal cords with phonation. Good abduction of true vocal cords with inspiration. No vocal cord pareses/paralysis.     Labs:  WBC   Date Value Ref Range Status   01/08/2024 3.90 3.90 - 12.70 K/uL Final     Eosinophil %   Date Value Ref Range Status   01/08/2024 2.3 0.0 - 8.0 % Final     Eos #   Date Value Ref Range Status   01/08/2024 0.1 0.0 - 0.5 K/uL Final     Platelets   Date Value Ref Range Status   01/08/2024 203 150 - 450 K/uL Final     Glucose   Date Value Ref Range Status   01/08/2024 78 70 - 110 mg/dL Final     Total IgE   Date Value Ref Range Status   05/03/2022 <35 0 - 100 IU/mL Final     All lab results, imaging results, and data have been reviewed.    Assessment:        ICD-10-CM ICD-9-CM   1. Dysphonia  R49.0 784.42   2. Hoarseness  R49.0 784.42   3. History of headache  Z87.898 V13.89            Plan:      Pt reports bitemporal headaches and headaches on the top of her head and thinks they may be "sinus headaches." I suspect tension headache and true sinusitis is lower on my differential, so will just proceed with sinus XR at this time to further assess. Continue following allergy regimen as advised by Allergist.     Laryngoscopy today shows mild increased tension of false vocal folds over true vocal cords right greater than left-compensatory supraglottic " hyperfunction. Mild generalized edema of true vocal cords bilaterally. Pt appears to have mild muscle tension dysphonia as well as mild generalized edema of true vocal cords. Suspect true vocal cord edema is secondary to recent onset of cough. I suspect that a combination of these findings are the source of pt's dysphonia. Consult placed to speech therapy for voice and we will follow up in 2 months to recheck with repeat laryngoscopy and to see how voice quality is doing after undergoing speech therapy.

## 2024-05-24 NOTE — PROCEDURES
Laryngoscopy    Date/Time: 5/24/2024 8:45 AM    Performed by: Santiago Jett PA-C  Authorized by: Santiago Jett PA-C    Anesthesia:     Local anesthetic:  Lidocaine 4% and Guero-Synephrine 1/2%    Patient tolerance:  Patient tolerated the procedure well with no immediate complications    Decongestion performed?: Yes    Laryngoscopy:     Areas examined:  Nasopharynx, oropharynx, hypopharynx, larynx and vocal cords    Laryngoscope size: disposable ambu flexible scope.  Nose External:      No external nasal deformity  Nasopharynx:      No mucosa lesions     Adenoids not present     Posterior choanae patent     Eustachian tube patent  Larynx/hypopharynx:      No epiglottis lesions     No epiglottis edema     No AE folds lesions     No vocal cord polyps     No hypopharynx lesions     No piriform sinus pooling     No piriform sinus lesions     No post cricoid edema     No post cricoid erythema     Mild increased tension of false vocal folds over true vocal cords right greater than left-compensatory supraglottic hyperfunction. Mild generalized edema of true vocal cords bilaterally. Good adduction of true vocal cords with phonation. Good abduction of true vocal cords with inspiration. No vocal cord pareses/paralysis.

## 2024-05-24 NOTE — TELEPHONE ENCOUNTER
1st attempt to contact pt to schedule sinus XR per Maliha.  No answer; LVM for call back.  Pt portal msg sent to pt due to failed call attempt.

## 2024-05-27 ENCOUNTER — HOSPITAL ENCOUNTER (OUTPATIENT)
Dept: RADIOLOGY | Facility: CLINIC | Age: 54
Discharge: HOME | End: 2024-05-27
Attending: STUDENT IN AN ORGANIZED HEALTH CARE EDUCATION/TRAINING PROGRAM
Payer: COMMERCIAL

## 2024-05-27 ENCOUNTER — HOSPITAL ENCOUNTER (OUTPATIENT)
Dept: RADIOLOGY | Facility: CLINIC | Age: 54
Discharge: HOME | End: 2024-05-27
Attending: PHYSICIAN ASSISTANT
Payer: COMMERCIAL

## 2024-05-27 DIAGNOSIS — Z78.0 POST-MENOPAUSE: ICD-10-CM

## 2024-05-27 DIAGNOSIS — Z87.898 HISTORY OF HEADACHE: ICD-10-CM

## 2024-05-27 PROCEDURE — 70220 X-RAY EXAM OF SINUSES: CPT | Mod: 26,,, | Performed by: RADIOLOGY

## 2024-05-27 PROCEDURE — 70220 X-RAY EXAM OF SINUSES: CPT | Mod: TC,FY,PO

## 2024-05-27 PROCEDURE — 77080 DXA BONE DENSITY AXIAL: CPT | Mod: 26,,, | Performed by: RADIOLOGY

## 2024-05-27 PROCEDURE — 77080 DXA BONE DENSITY AXIAL: CPT | Mod: TC,PO

## 2024-05-27 NOTE — PROGRESS NOTES
Please inform patient :    Luigi Chicas:     Your bone density testing results revealed Osteopenia, or some loss of bone density. We would recommend supplementation to maintain bone mass. The recommendations would be at least 800-1000 mg of calcium daily and at least 400-800 units of vitamin-D daily. The DEXA scan can be repeated in 3 yrs.      No further changes are recommended to your medical treatment     If there are any further questions, please contact our office

## 2024-05-28 ENCOUNTER — TELEPHONE (OUTPATIENT)
Dept: SLEEP MEDICINE | Facility: CLINIC | Age: 54
End: 2024-05-28
Payer: COMMERCIAL

## 2024-05-28 NOTE — TELEPHONE ENCOUNTER
Chantel voice emssage to have release form signed before we can send records over        ----- Message from Iker Khan sent at 5/28/2024  2:32 PM CDT -----   Name of Who is Calling:     What is the request in detail:  request call back in reference to copy of records Please contact to further discuss and advise      Can the clinic reply by [facility]:     What Number to Call Back if not in [facility]:  stacie / dr roche office / [phone]

## 2024-05-29 DIAGNOSIS — E11.9 TYPE 2 DIABETES MELLITUS WITHOUT COMPLICATION, UNSPECIFIED WHETHER LONG TERM INSULIN USE: ICD-10-CM

## 2024-05-31 ENCOUNTER — OFFICE VISIT (OUTPATIENT)
Dept: ORTHOPEDICS | Facility: CLINIC | Age: 54
End: 2024-05-31
Payer: COMMERCIAL

## 2024-05-31 VITALS — BODY MASS INDEX: 26.22 KG/M2 | WEIGHT: 163.13 LBS | HEIGHT: 66 IN

## 2024-05-31 DIAGNOSIS — Z01.818 PRE-OP TESTING: Primary | ICD-10-CM

## 2024-05-31 DIAGNOSIS — M67.912 DISORDER OF LEFT ROTATOR CUFF: ICD-10-CM

## 2024-05-31 DIAGNOSIS — M19.012 ARTHRITIS OF LEFT SHOULDER REGION: Primary | ICD-10-CM

## 2024-05-31 DIAGNOSIS — M75.20 BICEPS TENDINITIS: ICD-10-CM

## 2024-05-31 PROCEDURE — 3008F BODY MASS INDEX DOCD: CPT | Mod: CPTII,S$GLB,, | Performed by: ORTHOPAEDIC SURGERY

## 2024-05-31 PROCEDURE — 99999 PR PBB SHADOW E&M-EST. PATIENT-LVL II: CPT | Mod: PBBFAC,,, | Performed by: ORTHOPAEDIC SURGERY

## 2024-05-31 PROCEDURE — 99215 OFFICE O/P EST HI 40 MIN: CPT | Mod: S$GLB,,, | Performed by: ORTHOPAEDIC SURGERY

## 2024-05-31 PROCEDURE — 3044F HG A1C LEVEL LT 7.0%: CPT | Mod: CPTII,S$GLB,, | Performed by: ORTHOPAEDIC SURGERY

## 2024-05-31 RX ORDER — MUPIROCIN 20 MG/G
OINTMENT TOPICAL
Status: CANCELLED | OUTPATIENT
Start: 2024-05-31

## 2024-05-31 NOTE — H&P (VIEW-ONLY)
"Patient ID: Holly Chicas is a 53 y.o. female    Chief Complaint:   Chief Complaint   Patient presents with    Left Shoulder - Pain     LAST INJECTION ONLY HELPED A LITTLE       History of Present Illness:    Pleasant 53-year-old female here for evaluation of left shoulder pain.  She reports several month history of left shoulder pain without trauma.  She does have history of shoulder surgery on that left shoulder several years ago arthroscopically.  She thinks it was about 7-10 years ago.  Unsure exactly what was done but possibly a rotator cuff repair or labral repair.  She has a open labral repair on the right shoulder.  She reports nighttime symptoms and pain even at rest.  Reports a pain in the front of the shoulder and difficulty with overhead function.  Pain is interfering with activities of daily living.  No recent injections of the left shoulder although she has had some in the past.    ____________________________________________________________________    Interval history 05/31/2024 : Patient returns today for evaluation of her left shoulder pain.  At her last visit we did an injection.  We also did home exercise program.  Reports that the injection only helped for a day or so.  PAST MEDICAL HISTORY:   Past Medical History:   Diagnosis Date    Abnormal Pap smear 1996    "pre-cancer cells post hysterectomy"    Allergy     Anxiety     Arthritis     Depression     Good hypertension control 09/11/2014    Hyperlipidemia     Hypertension     Hypertriglyceridemia     Meningitis     Narcolepsy     Peripheral neuropathy 09/12/2014    PONV (postoperative nausea and vomiting)     Psoriasis     Psoriasis 05/10/2018     PAST SURGICAL HISTORY:   Past Surgical History:   Procedure Laterality Date    ADENOIDECTOMY      AUGMENTATION OF BREAST      BREAST SURGERY  2016    aumentation/ reduction     BREAST SURGERY  03/03/2017    augmentation     brizilian butt lift   03/03/2017    CARPAL TUNNEL RELEASE Left 12/07/2022    " Procedure: RELEASE, CARPAL TUNNEL;  Surgeon: Joce Montanez MD;  Location: [facility] OR;  Service: Orthopedics;  Laterality: Left;    CARPAL TUNNEL RELEASE Right 2023    Procedure: RELEASE, CARPAL TUNNEL;  Surgeon: Joce Montanez MD;  Location: [facility] OR;  Service: Orthopedics;  Laterality: Right;     SECTION      CHOLECYSTECTOMY  2018    Dr SEAN [facility]    COLONOSCOPY      COLONOSCOPY N/A 2022    Procedure: COLONOSCOPY;  Surgeon: Kavita Yee MD;  Location: [facility] ENDO;  Service: Endoscopy;  Laterality: N/A;    ESOPHAGOGASTRODUODENOSCOPY N/A 2022    Procedure: EGD (ESOPHAGOGASTRODUODENOSCOPY);  Surgeon: Kavita Yee MD;  Location: [facility] ENDO;  Service: Endoscopy;  Laterality: N/A;    FOOT SURGERY Left     CYST REMOVAL    GASTRIC BYPASS  2015    HERNIA REPAIR      UHR    HYSTERECTOMY      KNEE SURGERY Left     SCOPE    RELEASE OF ULNAR NERVE AT CUBITAL TUNNEL Left 2022    Procedure: RELEASE, ULNAR TUNNEL;  Surgeon: Joce Montanez MD;  Location: [facility] OR;  Service: Orthopedics;  Laterality: Left;    RELEASE OF ULNAR NERVE AT CUBITAL TUNNEL Right 2023    Procedure: RELEASE, ULNAR TUNNEL;  Surgeon: Joce Montanez MD;  Location: [facility] OR;  Service: Orthopedics;  Laterality: Right;    SHOULDER SURGERY      right should surgery     SHOULDER SURGERY  2017    left shoulder / torn labrium     THIGH LIFT  2017    TONSILLECTOMY      TRIGGER FINGER RELEASE Right 2024    Procedure: RELEASE, TRIGGER FINGER;  Surgeon: Joce Montanez MD;  Location: [facility] OR;  Service: Orthopedics;  Laterality: Right;  thumb    tummy tuck   2016     FAMILY HISTORY:   Family History   Problem Relation Name Age of Onset    Cancer Mother          breast bilateral/ skin     Breast cancer Mother      Skin cancer Mother      Diabetes Father      Heart disease Father  65        CABG    Hypertension Father      Cancer Father          skin    Skin cancer  Father      Gout Father      Colon polyps Father      Gout Brother 1     No Known Problems Daughter 2     No Known Problems Son 1     Cancer Maternal Aunt 3         lung, pancrease - smoker , breast    Lung cancer Maternal Aunt 3         x2    Pancreatic cancer Maternal Aunt 3     Breast cancer Maternal Aunt 3     No Known Problems Maternal Uncle 1     No Known Problems Paternal Uncle 1     Cancer Maternal Grandmother          pancrease    Pancreatic cancer Maternal Grandmother      Breast cancer Maternal Grandmother      Diabetes Paternal Grandmother      Heart disease Paternal Grandfather      Colon cancer Maternal Grandfather      Ovarian cancer Neg Hx      Ulcerative colitis Neg Hx      Stomach cancer Neg Hx      Crohn's disease Neg Hx      Rectal cancer Neg Hx       SOCIAL HISTORY:   Social History     Occupational History    Not on file   Tobacco Use    Smoking status: Never    Smokeless tobacco: Never   Substance and Sexual Activity    Alcohol use: No     Comment: ALLERGIC TO ALCOHOL(DRINKING) PER PATIENT    Drug use: No    Sexual activity: Yes     Partners: Male     Birth control/protection: Surgical     Comment: SO        MEDICATIONS:   Current Outpatient Medications:     ACCU-CHEK GUIDE TEST STRIPS Strp, , Disp: , Rfl:     acyclovir (ZOVIRAX) 400 MG tablet, Take 400 mg by mouth 3 (three) times daily., Disp: , Rfl:     albuterol (PROVENTIL HFA) 90 mcg/actuation inhaler, Inhale 2 puffs into the lungs every 6 (six) hours as needed for Wheezing. Rescue, Disp: 18 g, Rfl: 3    allerg xt,D.farinae-D.pteronys (ODACTRA) 12 SQ-HDM Subl, Place 1 tablet under the tongue once daily., Disp: 30 tablet, Rfl: 11    ALLERGY RELIEF, FEXOFENADINE, 180 mg tablet, Take by mouth. (Patient not taking: Reported on 5/22/2024), Disp: , Rfl:     azelastine (ASTELIN) 137 mcg (0.1 %) nasal spray, 1 spray in each nosril twice a day, Disp: 30 mL, Rfl: 5    dextroamphetamine-amphetamine 30 mg Tab, Take 1 tablet (30 mg total) by mouth 2  (two) times daily as needed., Disp: 60 tablet, Rfl: 0    diazePAM (VALIUM) 5 MG tablet, Take 30 minutes prior to procedure . Must have someone bring/take you to MRI appt, Disp: 2 tablet, Rfl: 0    EPINEPHrine (EPIPEN) 0.3 mg/0.3 mL AtIn, Inject 0.3 mLs (0.3 mg total) into the muscle once. for 1 dose, Disp: 2 each, Rfl: 1    estradioL (ESTRACE) 0.01 % (0.1 mg/gram) vaginal cream, USE 1/2 GRAM VAGINALLY 4 TIMES WEEKLY AS DIRECTED, Disp: , Rfl:     fluconazole (DIFLUCAN) 150 MG Tab, Take 1 tablet (150 mg total) by mouth once a week. (Patient not taking: Reported on 5/22/2024), Disp: 2 tablet, Rfl: 0    fluticasone propionate (FLONASE) 50 mcg/actuation nasal spray, 1 spray in each nostril twice a day, Disp: 16 g, Rfl: 5    gabapentin (NEURONTIN) 600 MG tablet, Take 1 tablet (600 mg total) by mouth 3 (three) times daily., Disp: 270 tablet, Rfl: 3    levocetirizine (XYZAL) 5 MG tablet, Take 1 tablet (5 mg total) by mouth every evening. For allergies, Disp: 90 tablet, Rfl: 3    methylPREDNISolone (MEDROL DOSEPACK) 4 mg tablet, follow package directions, Disp: 21 tablet, Rfl: 0    montelukast (SINGULAIR) 10 mg tablet, Take 1 tablet (10 mg total) by mouth every evening., Disp: 90 tablet, Rfl: 3    ondansetron (ZOFRAN-ODT) 4 MG TbDL, Take 1 tablet (4 mg total) by mouth every 8 (eight) hours as needed (nausea)., Disp: 30 tablet, Rfl: 0    oxyCODONE-acetaminophen (PERCOCET) 5-325 mg per tablet, Take 1 tablet by mouth every 6 (six) hours as needed for Pain., Disp: 12 tablet, Rfl: 0    rosuvastatin (CRESTOR) 5 MG tablet, Take 1 tablet (5 mg total) by mouth once daily., Disp: 90 tablet, Rfl: 3    solriamfetoL (SUNOSI) 150 mg Tab, Take 1 tablet by mouth once daily., Disp: 30 tablet, Rfl: 3    STELARA 45 mg/0.5 mL Syrg syringe, , Disp: , Rfl:     XYREM 500 mg/mL Soln, , Disp: , Rfl:   No current facility-administered medications for this visit.    Facility-Administered Medications Ordered in Other Visits:     diphenhydrAMINE  injection 12.5 mg, 12.5 mg, Intravenous, Once PRN, Alphonso Ambrosio MD    electrolyte-S (ISOLYTE), , Intravenous, Continuous, Alphonso Ambrosio MD, Stopped at 05/25/23 0953    fentaNYL 50 mcg/mL injection 25 mcg, 25 mcg, Intravenous, Q5 Min PRN, Alphonso Ambrosio MD    HYDROmorphone (PF) injection 0.2 mg, 0.2 mg, Intravenous, Q5 Min PRN, Alphonso Ambrosio MD    lactated ringers infusion, 10 mL/hr, Intravenous, Continuous, Alphonso Ambrosio MD    lactated ringers infusion, 500 mL, Intravenous, Once, Alphonso Ambrosio MD    LORazepam injection 0.25 mg, 0.25 mg, Intravenous, Once PRN, Alphonso Ambrosio MD    ondansetron injection 4 mg, 4 mg, Intravenous, Once PRN, Alphonso Ambrosio MD    prochlorperazine injection Soln 5 mg, 5 mg, Intravenous, Q30 Min PRN, Alphonso Ambrosio MD    sodium chloride 0.9% flush 3 mL, 3 mL, Intravenous, Q8H, Alphonso Ambrosio MD  ALLERGIES:   Review of patient's allergies indicates:   Allergen Reactions    Alcohol Anaphylaxis     Drinking alcohol, RESPIRATORY DISTRESS           Physical Exam     There were no vitals filed for this visit.  Alert and oriented to person, place and time. No acute distress. Well-groomed, not ill appearing. Pupils round and reactive, normal respiratory effort, no audible wheezing.     Shoulder  Exam    OBSERVATION:     Swelling  none     Discoloration  none      Scars   none     Deformity  None    TENDERNESS                Clavicle   negative         AC Jt.    negative               Acromion:  negative        Scapular Spine negative   Supraspinatus  negative       Infraspinatus  negative   LH Biceps   positive   Greater Tub.  Positive        ROM:               Forward Flexion  140°       ER at 0°    60°  with pain      ER at 90° ABD  90° with pain      IR at 90°  ABD   NA         IR (spine level)   L2        STRENGTH:       SCAPTION   4/5        IR    5/5       ER    5/5       BICEPS   4/5            SIGNS:           NEER   +     SYED   +        DROP ARM   Neg   ZACKARY's  +    BELLY PRESS Neg   O'RAMIREZ's  Neg    SPEEDs  +   LIFT-OFF  Neg   X-Body ADD    Neg            Imaging:       X-Ray: I have reviewed all pertinent results/findings and my personal findings are:  Mild degenerative changes of the glenohumeral joint.  Evidence of fracture or dislocation.    MRI of the left shoulder confirms moderate DJD of the glenohumeral joint with cystic changes in the glenoid.  Intact rotator cuff with mild tendinosis and undersurface fraying    Assessment & Plan    Arthritis of left shoulder region    Disorder of left rotator cuff      Holly Chicas is a 53 y.o. female who has radiographic and clinical evidence of left shoulder arthropathy, chondromalacia, tendinopathy of the rotator cuff.     At this point the patient has failed extensive non-operative and conservative treatment with physician guided home exercise program, injections, PT, activity modification and NSAIDs/OTC medications. We discussed multiple options including non-operative and operative treatments. Non-operatively we discussed injections, HEP and formal PT.  Operatively we discussed extensive debridement arthroscopically of the shoulder with possible biceps tenodesis. We discussed the pros and cons of surgery in detail as well as the risks and benefits of surgery, time required for recovery, need for physical therapy post-operatively and expectations long term. We discussed specifically the risks of damage to surrounding neurovascular structures, arthrofibrosis and stiffness, need for revision surgery or manipulation as well as chronic pain and dysfunction. Despite the risks of surgery, they do elect to proceed to improve function and pain.     _________________________________________________________________      Holly Chicas will be scheduled for the following procedure:     left Shoulder Arthroscopy with    Rotator cuff debridement with possible  repair  Subacromial decompression with acromioplasty  Possible distal clavicle excision  Possible biceps tenotomy versus tenodesis      Post-Op Medications to be prescribed:   Percocet 5/325mg Take 1-2 tablets every 4-6 hours PRN pain #28  Zofran 4mg oral disintegrating tablets every 8 hours PRN nausea/vomiting   Motrin 600 mg TID PRN   Flexeril 10mg BID PRN muscle spasms    Patient will need post-operative formal physical therapy to improve function, pain, and range of motion.     Medical Clearance: No  Hx of DVT,PE, anesthetic complications: No      Additional notes/concerns:  Patient understands that long term she may require shoulder replacement

## 2024-05-31 NOTE — PROGRESS NOTES
"Patient ID: Holly Chicas is a 53 y.o. female    Chief Complaint:   Chief Complaint   Patient presents with    Left Shoulder - Pain     LAST INJECTION ONLY HELPED A LITTLE       History of Present Illness:    Pleasant 53-year-old female here for evaluation of left shoulder pain.  She reports several month history of left shoulder pain without trauma.  She does have history of shoulder surgery on that left shoulder several years ago arthroscopically.  She thinks it was about 7-10 years ago.  Unsure exactly what was done but possibly a rotator cuff repair or labral repair.  She has a open labral repair on the right shoulder.  She reports nighttime symptoms and pain even at rest.  Reports a pain in the front of the shoulder and difficulty with overhead function.  Pain is interfering with activities of daily living.  No recent injections of the left shoulder although she has had some in the past.    ____________________________________________________________________    Interval history 05/31/2024 : Patient returns today for evaluation of her left shoulder pain.  At her last visit we did an injection.  We also did home exercise program.  Reports that the injection only helped for a day or so.  PAST MEDICAL HISTORY:   Past Medical History:   Diagnosis Date    Abnormal Pap smear 1996    "pre-cancer cells post hysterectomy"    Allergy     Anxiety     Arthritis     Depression     Good hypertension control 09/11/2014    Hyperlipidemia     Hypertension     Hypertriglyceridemia     Meningitis     Narcolepsy     Peripheral neuropathy 09/12/2014    PONV (postoperative nausea and vomiting)     Psoriasis     Psoriasis 05/10/2018     PAST SURGICAL HISTORY:   Past Surgical History:   Procedure Laterality Date    ADENOIDECTOMY      AUGMENTATION OF BREAST      BREAST SURGERY  2016    aumentation/ reduction     BREAST SURGERY  03/03/2017    augmentation     brizilian butt lift   03/03/2017    CARPAL TUNNEL RELEASE Left 12/07/2022    " Procedure: RELEASE, CARPAL TUNNEL;  Surgeon: Joce Montanez MD;  Location: [facility] OR;  Service: Orthopedics;  Laterality: Left;    CARPAL TUNNEL RELEASE Right 2023    Procedure: RELEASE, CARPAL TUNNEL;  Surgeon: Joce Montanez MD;  Location: [facility] OR;  Service: Orthopedics;  Laterality: Right;     SECTION      CHOLECYSTECTOMY  2018    Dr SEAN [facility]    COLONOSCOPY      COLONOSCOPY N/A 2022    Procedure: COLONOSCOPY;  Surgeon: Kavita Yee MD;  Location: [facility] ENDO;  Service: Endoscopy;  Laterality: N/A;    ESOPHAGOGASTRODUODENOSCOPY N/A 2022    Procedure: EGD (ESOPHAGOGASTRODUODENOSCOPY);  Surgeon: Kavita Yee MD;  Location: [facility] ENDO;  Service: Endoscopy;  Laterality: N/A;    FOOT SURGERY Left     CYST REMOVAL    GASTRIC BYPASS  2015    HERNIA REPAIR      UHR    HYSTERECTOMY      KNEE SURGERY Left     SCOPE    RELEASE OF ULNAR NERVE AT CUBITAL TUNNEL Left 2022    Procedure: RELEASE, ULNAR TUNNEL;  Surgeon: Joce Montanez MD;  Location: [facility] OR;  Service: Orthopedics;  Laterality: Left;    RELEASE OF ULNAR NERVE AT CUBITAL TUNNEL Right 2023    Procedure: RELEASE, ULNAR TUNNEL;  Surgeon: Joce Montanez MD;  Location: [facility] OR;  Service: Orthopedics;  Laterality: Right;    SHOULDER SURGERY      right should surgery     SHOULDER SURGERY  2017    left shoulder / torn labrium     THIGH LIFT  2017    TONSILLECTOMY      TRIGGER FINGER RELEASE Right 2024    Procedure: RELEASE, TRIGGER FINGER;  Surgeon: Joce Montanez MD;  Location: [facility] OR;  Service: Orthopedics;  Laterality: Right;  thumb    tummy tuck   2016     FAMILY HISTORY:   Family History   Problem Relation Name Age of Onset    Cancer Mother          breast bilateral/ skin     Breast cancer Mother      Skin cancer Mother      Diabetes Father      Heart disease Father  65        CABG    Hypertension Father      Cancer Father          skin    Skin cancer  Father      Gout Father      Colon polyps Father      Gout Brother 1     No Known Problems Daughter 2     No Known Problems Son 1     Cancer Maternal Aunt 3         lung, pancrease - smoker , breast    Lung cancer Maternal Aunt 3         x2    Pancreatic cancer Maternal Aunt 3     Breast cancer Maternal Aunt 3     No Known Problems Maternal Uncle 1     No Known Problems Paternal Uncle 1     Cancer Maternal Grandmother          pancrease    Pancreatic cancer Maternal Grandmother      Breast cancer Maternal Grandmother      Diabetes Paternal Grandmother      Heart disease Paternal Grandfather      Colon cancer Maternal Grandfather      Ovarian cancer Neg Hx      Ulcerative colitis Neg Hx      Stomach cancer Neg Hx      Crohn's disease Neg Hx      Rectal cancer Neg Hx       SOCIAL HISTORY:   Social History     Occupational History    Not on file   Tobacco Use    Smoking status: Never    Smokeless tobacco: Never   Substance and Sexual Activity    Alcohol use: No     Comment: ALLERGIC TO ALCOHOL(DRINKING) PER PATIENT    Drug use: No    Sexual activity: Yes     Partners: Male     Birth control/protection: Surgical     Comment: SO        MEDICATIONS:   Current Outpatient Medications:     ACCU-CHEK GUIDE TEST STRIPS Strp, , Disp: , Rfl:     acyclovir (ZOVIRAX) 400 MG tablet, Take 400 mg by mouth 3 (three) times daily., Disp: , Rfl:     albuterol (PROVENTIL HFA) 90 mcg/actuation inhaler, Inhale 2 puffs into the lungs every 6 (six) hours as needed for Wheezing. Rescue, Disp: 18 g, Rfl: 3    allerg xt,D.farinae-D.pteronys (ODACTRA) 12 SQ-HDM Subl, Place 1 tablet under the tongue once daily., Disp: 30 tablet, Rfl: 11    ALLERGY RELIEF, FEXOFENADINE, 180 mg tablet, Take by mouth. (Patient not taking: Reported on 5/22/2024), Disp: , Rfl:     azelastine (ASTELIN) 137 mcg (0.1 %) nasal spray, 1 spray in each nosril twice a day, Disp: 30 mL, Rfl: 5    dextroamphetamine-amphetamine 30 mg Tab, Take 1 tablet (30 mg total) by mouth 2  (two) times daily as needed., Disp: 60 tablet, Rfl: 0    diazePAM (VALIUM) 5 MG tablet, Take 30 minutes prior to procedure . Must have someone bring/take you to MRI appt, Disp: 2 tablet, Rfl: 0    EPINEPHrine (EPIPEN) 0.3 mg/0.3 mL AtIn, Inject 0.3 mLs (0.3 mg total) into the muscle once. for 1 dose, Disp: 2 each, Rfl: 1    estradioL (ESTRACE) 0.01 % (0.1 mg/gram) vaginal cream, USE 1/2 GRAM VAGINALLY 4 TIMES WEEKLY AS DIRECTED, Disp: , Rfl:     fluconazole (DIFLUCAN) 150 MG Tab, Take 1 tablet (150 mg total) by mouth once a week. (Patient not taking: Reported on 5/22/2024), Disp: 2 tablet, Rfl: 0    fluticasone propionate (FLONASE) 50 mcg/actuation nasal spray, 1 spray in each nostril twice a day, Disp: 16 g, Rfl: 5    gabapentin (NEURONTIN) 600 MG tablet, Take 1 tablet (600 mg total) by mouth 3 (three) times daily., Disp: 270 tablet, Rfl: 3    levocetirizine (XYZAL) 5 MG tablet, Take 1 tablet (5 mg total) by mouth every evening. For allergies, Disp: 90 tablet, Rfl: 3    methylPREDNISolone (MEDROL DOSEPACK) 4 mg tablet, follow package directions, Disp: 21 tablet, Rfl: 0    montelukast (SINGULAIR) 10 mg tablet, Take 1 tablet (10 mg total) by mouth every evening., Disp: 90 tablet, Rfl: 3    ondansetron (ZOFRAN-ODT) 4 MG TbDL, Take 1 tablet (4 mg total) by mouth every 8 (eight) hours as needed (nausea)., Disp: 30 tablet, Rfl: 0    oxyCODONE-acetaminophen (PERCOCET) 5-325 mg per tablet, Take 1 tablet by mouth every 6 (six) hours as needed for Pain., Disp: 12 tablet, Rfl: 0    rosuvastatin (CRESTOR) 5 MG tablet, Take 1 tablet (5 mg total) by mouth once daily., Disp: 90 tablet, Rfl: 3    solriamfetoL (SUNOSI) 150 mg Tab, Take 1 tablet by mouth once daily., Disp: 30 tablet, Rfl: 3    STELARA 45 mg/0.5 mL Syrg syringe, , Disp: , Rfl:     XYREM 500 mg/mL Soln, , Disp: , Rfl:   No current facility-administered medications for this visit.    Facility-Administered Medications Ordered in Other Visits:     diphenhydrAMINE  injection 12.5 mg, 12.5 mg, Intravenous, Once PRN, Alphonso Ambrosio MD    electrolyte-S (ISOLYTE), , Intravenous, Continuous, Alphonso Ambrosio MD, Stopped at 05/25/23 0953    fentaNYL 50 mcg/mL injection 25 mcg, 25 mcg, Intravenous, Q5 Min PRN, Alphonso Ambrosio MD    HYDROmorphone (PF) injection 0.2 mg, 0.2 mg, Intravenous, Q5 Min PRN, Alphonso Ambrosio MD    lactated ringers infusion, 10 mL/hr, Intravenous, Continuous, Alphonso Ambrosio MD    lactated ringers infusion, 500 mL, Intravenous, Once, Alphonso Ambrosio MD    LORazepam injection 0.25 mg, 0.25 mg, Intravenous, Once PRN, Alphonso Ambrosio MD    ondansetron injection 4 mg, 4 mg, Intravenous, Once PRN, Alphonso Ambrosio MD    prochlorperazine injection Soln 5 mg, 5 mg, Intravenous, Q30 Min PRN, Alphonso Ambrosio MD    sodium chloride 0.9% flush 3 mL, 3 mL, Intravenous, Q8H, Alphonso Ambrosio MD  ALLERGIES:   Review of patient's allergies indicates:   Allergen Reactions    Alcohol Anaphylaxis     Drinking alcohol, RESPIRATORY DISTRESS           Physical Exam     There were no vitals filed for this visit.  Alert and oriented to person, place and time. No acute distress. Well-groomed, not ill appearing. Pupils round and reactive, normal respiratory effort, no audible wheezing.     Shoulder  Exam    OBSERVATION:     Swelling  none     Discoloration  none      Scars   none     Deformity  None    TENDERNESS                Clavicle   negative         AC Jt.    negative               Acromion:  negative        Scapular Spine negative   Supraspinatus  negative       Infraspinatus  negative   LH Biceps   positive   Greater Tub.  Positive        ROM:               Forward Flexion  140°       ER at 0°    60°  with pain      ER at 90° ABD  90° with pain      IR at 90°  ABD   NA         IR (spine level)   L2        STRENGTH:       SCAPTION   4/5        IR    5/5       ER    5/5       BICEPS   4/5            SIGNS:           NEER   +     SYED   +        DROP ARM   Neg   ZACKARY's  +    BELLY PRESS Neg   O'RAMIREZ's  Neg    SPEEDs  +   LIFT-OFF  Neg   X-Body ADD    Neg            Imaging:       X-Ray: I have reviewed all pertinent results/findings and my personal findings are:  Mild degenerative changes of the glenohumeral joint.  Evidence of fracture or dislocation.    MRI of the left shoulder confirms moderate DJD of the glenohumeral joint with cystic changes in the glenoid.  Intact rotator cuff with mild tendinosis and undersurface fraying    Assessment & Plan    Arthritis of left shoulder region    Disorder of left rotator cuff      Holly Chicas is a 53 y.o. female who has radiographic and clinical evidence of left shoulder arthropathy, chondromalacia, tendinopathy of the rotator cuff.     At this point the patient has failed extensive non-operative and conservative treatment with physician guided home exercise program, injections, PT, activity modification and NSAIDs/OTC medications. We discussed multiple options including non-operative and operative treatments. Non-operatively we discussed injections, HEP and formal PT.  Operatively we discussed extensive debridement arthroscopically of the shoulder with possible biceps tenodesis. We discussed the pros and cons of surgery in detail as well as the risks and benefits of surgery, time required for recovery, need for physical therapy post-operatively and expectations long term. We discussed specifically the risks of damage to surrounding neurovascular structures, arthrofibrosis and stiffness, need for revision surgery or manipulation as well as chronic pain and dysfunction. Despite the risks of surgery, they do elect to proceed to improve function and pain.     _________________________________________________________________      Holly Chicas will be scheduled for the following procedure:     left Shoulder Arthroscopy with    Rotator cuff debridement with possible  repair  Subacromial decompression with acromioplasty  Possible distal clavicle excision  Possible biceps tenotomy versus tenodesis      Post-Op Medications to be prescribed:   Percocet 5/325mg Take 1-2 tablets every 4-6 hours PRN pain #28  Zofran 4mg oral disintegrating tablets every 8 hours PRN nausea/vomiting   Motrin 600 mg TID PRN   Flexeril 10mg BID PRN muscle spasms    Patient will need post-operative formal physical therapy to improve function, pain, and range of motion.     Medical Clearance: No  Hx of DVT,PE, anesthetic complications: No      Additional notes/concerns:  Patient understands that long term she may require shoulder replacement

## 2024-06-03 ENCOUNTER — PATIENT OUTREACH (OUTPATIENT)
Dept: ADMINISTRATIVE | Facility: HOSPITAL | Age: 54
End: 2024-06-03
Payer: COMMERCIAL

## 2024-06-03 ENCOUNTER — PATIENT MESSAGE (OUTPATIENT)
Dept: ADMINISTRATIVE | Facility: HOSPITAL | Age: 54
End: 2024-06-03
Payer: COMMERCIAL

## 2024-06-03 DIAGNOSIS — E78.5 HYPERLIPIDEMIA ASSOCIATED WITH TYPE 2 DIABETES MELLITUS: Primary | ICD-10-CM

## 2024-06-03 DIAGNOSIS — E11.69 HYPERLIPIDEMIA ASSOCIATED WITH TYPE 2 DIABETES MELLITUS: Primary | ICD-10-CM

## 2024-06-03 NOTE — PROGRESS NOTES
Population Health Chart Review & Patient Outreach Details      Additional [facility] Health Notes:               Updates Requested / Reviewed:      Updated Care Coordination Note, Care Everywhere, , and Immunizations Reconciliation Completed or Queried: [place]         Health Maintenance Topics Overdue:      VBHM Score: 3     Eye Exam  Foot Exam  Uncontrolled BP                       Health Maintenance Topic(s) Outreach Outcomes & Actions Taken:    Lab(s) - Outreach Outcomes & Actions Taken  : Overdue Lab(s) Ordered and Overdue Lab(s) Scheduled    Eye Exam - Outreach Outcomes & Actions Taken  : External Records Requested & Care Team Updated if Applicable

## 2024-06-03 NOTE — LETTER
"       AUTHORIZATION FOR RELEASE OF   CONFIDENTIAL INFORMATION    Dear Dr. Victor Manuel Dennis,    We are seeing Holly Chicas, date of birth 1970, in the clinic at [facility]. Myriam Joe DO is the patient's PCP. Holly Chicas has an outstanding lab/procedure at the time we reviewed her chart. In order to help keep her health information updated, she has authorized us to request the following medical record(s):        ( X) DILATED EYE EXAM     Please fax records to [facility], Banks, Lashathan E., DO, [phone]     If you have any questions, please contact       Liliana Rinaldi  Nurse Clinical Care Coordinator  [facility]  Phone: [phone]  Fax: [phone]      Patient Name: Holly Chicas  : 1970  Patient Phone #: [phone]              Holly Chicas  MRN: [id number]  : 1970  Age: 53 y.o.  Sex: female          Registration Hospital Authorization         Patient/Guardian Signature:      A. Consent for Examination and Treatment: I hereby authorize the providers and employees of [facility] and [facility] ("[facility]") to provide medical treatment/services which includes, but is not limited to, performing and administering tests and diagnostic procedures that are deemed necessary, including, but not limited to, imaging examinations, blood tests and other laboratory procedures as may be required by the hospital, clinic, or may be ordered by my physician(s) or persons working under the general and/or special instructions of my physician(s).      1. I understand and agree that this consent covers all authorized persons, including but not limited to physicians, residents, nurse practitioners, physicians' assistants, specialists, consultants, student nurses, and independently contracted physicians, who are called upon by the physician in charge, to carry out the diagnostic procedures and medical or " surgical treatment.   2. I hereby authorize [facility] to retain or dispose of any specimens or tissue, should there be such remaining from any test or procedure.   3. I hereby authorize and give consent for [facility] providers and employees to take photographs, images or videotapes of such diagnostic, surgical or treatment procedures of Patient as may be required by [facility] or as may be ordered by a physician. I further acknowledge and agree that [facility] may use cameras or other devices for patient monitoring.   4. I am aware that the practice of medicine is not an exact science, and I acknowledge that no guarantees have been made to me as to the outcome of any tests, procedures or treatment.   5. As part of your [facility] Health Care delivery, you will be offered a Covid-19 vaccine. Certain eligibility criteria may be supported under Emergency Use Authorization (EUA). Please let your medical team know if you wish to receive the Covid-19 vaccine during this hospitalization.      B. Authorization for Release of Information: I understand that my insurance company and/or their agents may need information necessary to make determinations about payment/reimbursement. I hereby provide authorization to release to all insurance companies, their successors, assignees, other parties with whom they may have contracted, or others acting on their behalf, that are involved with payment for any hospital and/or clinic charges incurred by the patient, any information that they request and deem necessary for payment/reimbursement, and/or quality review.   I further authorize the release of my health information to physicians or other health care practitioners on staff who are involved in my health care now and in the future, and to other health care providers, entities, or institutions for the purpose of my continued care and treatment, including referrals.         C. Medicare Patient's  Certification and Authorization to Release Information and Payment Request: I certify that the information given by me in applying for payment under Title XVIII of the Social Security Act is correct. I authorize any aguilera of medical or other information about me to release to the Social Security Administration, or its intermediaries or carriers, any information needed for this or a related Medicare claim. I request that payment of authorized benefits be made on my behalf.      Hospital Authorization - Form 1084 - pg. 1 of 3                  D. Assignment of Insurance Benefits: I hereby authorize any and all insurance companies, health plans, defined benefit plans, health insurers or any entity that is or may be responsible for payment of my medical expenses to pay all hospital and medical benefits now due, and to become due and payable to me under any hospital benefits, sick benefits, injury benefits or any other benefit for services rendered to me, including Major Medical Benefits, direct to [facility] and all independently contracted physicians. I assign any and all rights that I may have against any and all insurance companies, health plans, defined benefit plans, health insurers or any entity that is or may be responsible for payment of my medical expenses, including, but not limited to any right to appeal a denial of a claim, any right to bring any action, lawsuit, administrative proceeding, or other cause of action on my behalf. I specifically assign my right to pursue litigation against any and all insurance companies, health plans, defined benefit plans, health insurers or any entity that is or may be responsible for payment of my medical expenses based upon a refusal to pay charges.       E. Valuables: It is understood and agreed that [facility] is not liable for the damage to or loss of any money, jewelry, documents, dentures, eye glasses, hearing aids, prosthetics, or other property of value.       F. Computer Equipment: I understand and agree that should I choose to use computer equipment owned by [facility] or if I choose to access the Internet via [facility]s network, I do so at my own risk. [facility] is not responsible for any damage to my computer equipment or to any damages of any type that might arise from my loss of equipment or data.      G. Acceptance of Financial Responsibility: I agree that in consideration of the services and supplies that have been or will be furnished to the patient, I am hereby obligated to pay all charges made for or on the account of the patient according to the standard rates (in effect at the time the services and supplies are delivered) established by [facility], including its Patient Financial Assistance Policy to the extent it is applicable. I understand that I am responsible for all charges, or portions thereof, not covered by insurance or other sources. Patient refunds will be distributed only after balances at all [facility] facilities are paid.      H. Communication Authorization: I hereby authorize [facility] and its representatives, along with any billing service or  who may work on their behalf, to contact me on my cell phone and/or home phone using pre-recorded messages, artificial voice messages, automatic telephone dialing devices or other computer assisted technology, or by electronic mail, text messaging, or by any other form of electronic communication. This includes, but is not limited to, appointment reminders, yearly physical exam reminders, preventive care reminders, patient campaigns, welcome calls, and calls about account balances on my account or any account on which I am listed as a guarantor. I understand I have the right to opt out of these communications at any time.      I. Relationship Between Facility and Physician: I understand that some, but not all, providers furnishing services to  the patient are not employees or agents of [facility]. The patient is under the care and supervision of his/her attending physician, and it is the responsibility of the facility and its nursing staff to carry out the instructions of such physicians. It is the responsibility of the patient's physician/designee to obtain the patient's informed consent, when required, for medical or surgical treatment, special diagnostic or therapeutic procedures, or hospital services rendered for the patient under the special instructions of the physician/designee.      J. Notice of Privacy Practices: I acknowledge I have received a copy of [facility]'s Notice of Privacy Practices.      K. Facility Directory: I have discussed with the organization my desire to be either included or excluded in the facility directory. I understand that if my choice is to opt-out of being identified in the facility directory that the facility will not provide any information about me such as my condition (e.g., fair, stable, etc.) or my location in the facility (e.g., room number, department).         Hospital Authorization - Form 1084 - pg.2 of 3                          L. Immunizations: [facility] shares immunization information with state sponsored health departments to help you and your doctor keep track of your immunization records. By signing, you consent to have this information shared with the health department in your state:      [place] - LINKS ([place] [employer])      [place]: As used in this document, [facility] means all [facility] owned and managed facilities, including, but not limited to, all health centers, surgery centers, clinics, urgent care centers, and hospitals.      N. TERM: This authorization is valid for this and subsequent care/treatment I receive at [facility] and will remain valid unless/ until revoked in writing by me.         [facility] complies with applicable Federal civil rights laws and does not discriminate on the basis of race, color, national origin, age, disability, sex, gender identity or expression.      [facility] cumple con las leyes federales de derechos civiles aplicables y no discrimina por motivos de jerry, color, nacionalidad, edad, discapacidad, sexo, identidad o expresión de género. Llame al [phone]      [facility] tuân th? malika?t nhân esvin?n hi?n hành c?a Liên bang và không phân bi?t ??i x? d?a trên ch?ng t?c, màu da, virgil?n g?c qu?c chelsi, tu?i tác khuy?t t?t, gi?i tính, nh?n d?ng gi?i t??nh ho?c bi?u hi?n. G?i s? [phone].      Hospital Authorization - Form 1084 - pg. 3 of 3

## 2024-06-05 ENCOUNTER — CLINICAL SUPPORT (OUTPATIENT)
Dept: REHABILITATION | Facility: HOSPITAL | Age: 54
End: 2024-06-05
Payer: COMMERCIAL

## 2024-06-05 DIAGNOSIS — R49.0 DYSPHONIA: ICD-10-CM

## 2024-06-05 PROCEDURE — 92524 BEHAVRAL QUALIT ANALYS VOICE: CPT | Mod: PO

## 2024-06-05 PROCEDURE — 92507 TX SP LANG VOICE COMM INDIV: CPT | Mod: PO

## 2024-06-05 NOTE — PLAN OF CARE
"[facility]  Speech Therapy Evaluation -Voice    Date: 2024     Name: Holly Chicas   MRN: [id number]    Therapy Diagnosis:   Encounter Diagnosis   Name Primary?    Dysphonia     Physician: Santiago Jett,*  Physician Orders: Ambulatory Referral to Speech Therapy   Medical Diagnosis: Dysphonia [R49.0]     Visit # / Visits Authorized:     Date of Evaluation:  2024   Insurance Authorization Period: 2024 to 2025  Plan of Care Certification:    2024 to 2024      Time In:1530   Time Out: 1610  Total time: 40    Procedure   Qualitative Analysis of Voice and Resonance   Speech Language Voice Therapy     Precautions: Standard  Subjective   Date of Onset: 3 months ago   History of Current Condition:  Holly Chicas is a 53 y.o. female who presents to [facility] Outpatient Speech Therapy for evaluation secondary to Dysphonia. Patient was referred to therapy by Santiago Jett,* , which is the patient's Otolaryngologist. Patient reports "voice has been going in and out for a while". Patient reports vocal quality will become hoarse and vocal strain will occur at random all day long. Patient denies GERD. Patient reports throat clearing across the day. Patient reports frustration with needing to repeat given reduced loudness related to vocal strain. Patient reprots relief with using Akila Pot for sinus relief.     Past Medical History: Holly Chicas  has a past medical history of Abnormal Pap smear (), Allergy, Anxiety, Arthritis, Depression, Good hypertension control (2014), Hyperlipidemia, Hypertension, Hypertriglyceridemia, Meningitis, Narcolepsy, Peripheral neuropathy (2014), PONV (postoperative nausea and vomiting), Psoriasis, and Psoriasis (05/10/2018).  Holly Chicas  has a past surgical history that includes  section; Hernia repair; Hysterectomy; Foot surgery (Left); Tonsillectomy; Shoulder surgery (); Knee " surgery (Left); Gastric bypass (04/2015); Breast surgery (2016); Breast surgery (03/03/2017); Thigh lift (03/03/2017); brizilian butt lift  (03/03/2017); tummy tuck  (02/28/2016); Shoulder surgery (01/2017); Cholecystectomy (07/2018); Augmentation of breast; Colonoscopy; Esophagogastroduodenoscopy (N/A, 04/29/2022); Colonoscopy (N/A, 04/29/2022); Carpal tunnel release (Left, 12/07/2022); Release of ulnar nerve at cubital tunnel (Left, 12/07/2022); Carpal tunnel release (Right, 05/25/2023); Release of ulnar nerve at cubital tunnel (Right, 05/25/2023); Adenoidectomy; and Trigger finger release (Right, 1/11/2024).  Medical Hx and Allergies: Holly has a current medication list which includes the following prescription(s): accu-chek guide test strips, acyclovir, albuterol, allerg xt,d.farinae-d.pteronys, allergy relief (fexofenadine), azelastine, dextroamphetamine-amphetamine, diazepam, epinephrine, estradiol, fluconazole, fluticasone propionate, gabapentin, levocetirizine, methylprednisolone, montelukast, ondansetron, oxycodone-acetaminophen, rosuvastatin, sunosi, stelara, and xyrem, and the following Facility-Administered Medications: diphenhydramine, electrolyte-s (ph 7.4), fentanyl, hydromorphone (pf), lactated ringers, lactated ringers, lorazepam, ondansetron, prochlorperazine, and sodium chloride 0.9%.   Review of patient's allergies indicates:   Allergen Reactions    Alcohol Anaphylaxis     Drinking alcohol, RESPIRATORY DISTRESS       ENT and FNE: Laryngeal endoscopy findings per RADHA Stapleton on 5/24/2024:  Laryngoscopy     Date/Time: 5/24/2024 8:45 AM     Performed by: Santiago Jett PA-C  Authorized by: Santiago Jett PA-C    Anesthesia:     Local anesthetic:  Lidocaine 4% and Guero-Synephrine 1/2%    Patient tolerance:  Patient tolerated the procedure well with no immediate complications    Decongestion performed?: Yes    Laryngoscopy:     Areas examined:  Nasopharynx, oropharynx,  "hypopharynx, larynx and vocal cords    Laryngoscope size: disposable ambu flexible scope.  Nose External:      No external nasal deformity  Nasopharynx:      No mucosa lesions     Adenoids not present     Posterior choanae patent     Eustachian tube patent  Larynx/hypopharynx:      No epiglottis lesions     No epiglottis edema     No AE folds lesions     No vocal cord polyps     No hypopharynx lesions     No piriform sinus pooling     No piriform sinus lesions     No post cricoid edema     No post cricoid erythema     Mild increased tension of false vocal folds over true vocal cords right greater than left-compensatory supraglottic hyperfunction. Mild generalized edema of true vocal cords bilaterally. Good adduction of true vocal cords with phonation. Good abduction of true vocal cords with inspiration. No vocal cord pareses/paralysis.        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)  Vocal Hygiene: etoh - none, smoking - none, water - minimal, sleep - narcolepsy (6--8 hours on average), caffeine - decaf unsweet tea across the day  Work/Life Stress: 6/10  Patient Therapy Goals:  "not being hoarse"    Objective   Formal Assessment:  Swallowing: full oral - regular/thin liquids   Breathing: throat clearing    Posture: Within Normal Limits   GRBAS: [id number]    Perceptual assessment:    -Quality: rough and strained  -Volume: decreased projection  -Pitch: appropriate for age and gender  -Flexibility: appropriate for age and gender    Habitual respiratory pattern: " diaphragmatic.  CAPE-V Overall Score: 55    VHI-10 (completed to assess self-perceived handicap associated with dysphonia; >11 considered abnormal): 55 indicating MODERATE perceived vocal dysfunction   RSI (completed to assess the possible presence and/or severity of LPR symptoms and any relationship between this condition and the pt's dysphagia; score of ~15 may indicate LPR): 16      Treatment   Total Treatment Time Separate from Evaluation: 15 minutes   Treatment included the following:  Vocal hygiene (hydration)  SOVT stimulability attempted today with cup bubbles and straw phonation. Patient noted with immediate and sustained improvement of vocal function following SOVT today       Education provided:   -role of Speech Therapy, goals/plan of care, scheduling/cancellations, insurance limitations with patient    Patient expressed understanding.     Home Program: patient asked to complete SOVT as demonstrated today 2-3x daily in sets of 10  Assessment     Holly presents to [facility] status post medical diagnosis of Dysphonia [R49.0] .     Interpretation of objective assessment:   She presents with mild-moderate dysphonia characterized by vocal strain, hoarseness, reduced vocal volume.    Demonstrates impairments including limitations as described in the problem list.     Positive prognostic factors: good understanding and motivation  Negative prognostic factors: n/a  Barriers to therapy: No barriers to therapy identified.     Patient's spiritual, cultural, and educational needs considered and patient agreeable to plan of care and goals.    Patient will benefit from skilled therapy.    Rehab Potential: excellent    Short Term Goals: (4 weeks) Current Progress:   Short Term Goals:  1. Pt will complete neck relaxation exercises 10x each per session/at home ind'ly.      Progressing/ Not Met 6/5/2024   Established this date   2. Patient will complete SOVT exercises and/or resonant-focused exercises  with min A.     Progressing/ Not Met 6/5/2024   Established this date   3. Patient will improve the quality, efficiency, and ease of phonation through resonant and/or airflow exercises from the syllable to conversation level with 80% accuracy.     Progressing/ Not Met 6/5/2024   Established this date    4. Patient will increase awareness for voice conservations behaviors by following the 50/10 rule and reduce voice use in competing noise environments.      Progressing/ Not Met 6/5/2024   Established this date    5. Patient will demonstrate the ability to increase awareness of voicing behavior through self-monitoring to facilitate generalization in functional speaking situations with 80% accuracy.         Progressing/ Not Met 6/5/2024   Established this date    6. Pt will reduce/eliminate/substitute throat clearing ind'ly.      Progressing/ Not Met 6/5/2024   Established this date        Long Term Goals: (6 weeks) Current Progress:   Patient will implement and adhere to vocal hygiene protocols on a daily basis, including the elimination of phonotraumatic behaviors.   Established this date     2. Patient and clinician will facilitate changes in vocal function in order to restore functional use of voice for daily occupational, social, and emotional demands.     Established this date     3. Pt will demonstrate improved vocal quality/loudness/intonantion for sustained vocalization/speech at the word/phrase/sentence/conversational level to communicate basic medical and social needs in functional living environment.      Established this date         Plan     Recommended Treatment Plan:  Patient will participate in the [facility] program for speech therapy 1 times per week for 6 weeks to address her  voice  deficits, to educate patient and their family, and to participate in a home exercise program.    Other Recommendations:   N/a    Therapist's Name:   Rylee Minaya CCC-SLP   6/5/2024

## 2024-06-14 ENCOUNTER — HOSPITAL ENCOUNTER (OUTPATIENT)
Dept: PREADMISSION TESTING | Facility: HOSPITAL | Age: 54
Discharge: HOME | End: 2024-06-14
Attending: STUDENT IN AN ORGANIZED HEALTH CARE EDUCATION/TRAINING PROGRAM
Payer: COMMERCIAL

## 2024-06-14 VITALS — HEIGHT: 66 IN | WEIGHT: 163 LBS | BODY MASS INDEX: 26.2 KG/M2

## 2024-06-14 DIAGNOSIS — M67.912 DISORDER OF LEFT ROTATOR CUFF: ICD-10-CM

## 2024-06-14 DIAGNOSIS — Z01.818 PRE-OP TESTING: ICD-10-CM

## 2024-06-14 LAB
ANION GAP SERPL CALC-SCNC: 10 MMOL/L (ref 8–16)
BASOPHILS # BLD AUTO: 0.04 K/UL (ref 0–0.2)
BASOPHILS NFR BLD: 0.5 % (ref 0–1.9)
BUN SERPL-MCNC: 17 MG/DL (ref 6–20)
CALCIUM SERPL-MCNC: 9.3 MG/DL (ref 8.7–10.5)
CHLORIDE SERPL-SCNC: 105 MMOL/L (ref 95–110)
CO2 SERPL-SCNC: 29 MMOL/L (ref 23–29)
CREAT SERPL-MCNC: 0.7 MG/DL (ref 0.5–1.4)
DIFFERENTIAL METHOD BLD: ABNORMAL
EOSINOPHIL # BLD AUTO: 0.2 K/UL (ref 0–0.5)
EOSINOPHIL NFR BLD: 3.3 % (ref 0–8)
ERYTHROCYTE [DISTWIDTH] IN BLOOD BY AUTOMATED COUNT: 13.7 % (ref 11.5–14.5)
EST. GFR  (NO RACE VARIABLE): >60 ML/MIN/1.73 M^2
GLUCOSE SERPL-MCNC: 103 MG/DL (ref 70–110)
HCT VFR BLD AUTO: 35 % (ref 37–48.5)
HGB BLD-MCNC: 11.2 G/DL (ref 12–16)
IMM GRANULOCYTES # BLD AUTO: 0.02 K/UL (ref 0–0.04)
IMM GRANULOCYTES NFR BLD AUTO: 0.3 % (ref 0–0.5)
LYMPHOCYTES # BLD AUTO: 1.8 K/UL (ref 1–4.8)
LYMPHOCYTES NFR BLD: 25.1 % (ref 18–48)
MCH RBC QN AUTO: 27.1 PG (ref 27–31)
MCHC RBC AUTO-ENTMCNC: 32 G/DL (ref 32–36)
MCV RBC AUTO: 85 FL (ref 82–98)
MONOCYTES # BLD AUTO: 0.6 K/UL (ref 0.3–1)
MONOCYTES NFR BLD: 8.1 % (ref 4–15)
NEUTROPHILS # BLD AUTO: 4.6 K/UL (ref 1.8–7.7)
NEUTROPHILS NFR BLD: 62.7 % (ref 38–73)
NRBC BLD-RTO: 0 /100 WBC
PLATELET # BLD AUTO: 242 K/UL (ref 150–450)
PMV BLD AUTO: 9.3 FL (ref 9.2–12.9)
POTASSIUM SERPL-SCNC: 3.1 MMOL/L (ref 3.5–5.1)
RBC # BLD AUTO: 4.13 M/UL (ref 4–5.4)
SODIUM SERPL-SCNC: 144 MMOL/L (ref 136–145)
WBC # BLD AUTO: 7.29 K/UL (ref 3.9–12.7)

## 2024-06-14 PROCEDURE — 80048 BASIC METABOLIC PNL TOTAL CA: CPT | Performed by: ORTHOPAEDIC SURGERY

## 2024-06-14 PROCEDURE — 85025 COMPLETE CBC W/AUTO DIFF WBC: CPT | Performed by: ORTHOPAEDIC SURGERY

## 2024-06-14 RX ORDER — IBUPROFEN 200 MG
200 TABLET ORAL EVERY 6 HOURS PRN
COMMUNITY

## 2024-06-14 NOTE — DISCHARGE INSTRUCTIONS
To confirm, Your doctor has instructed you that surgery is scheduled for:     Please report to [facility], Registration the morning of surgery. You must check-in and receive a wristband before going to your procedure.  [address] [place] [address] [place] [id number]    Pre-Op will call the afternoon prior to surgery between 1:00 and 6:00 PM with the final arrival time.  Phone number: [phone]    PLEASE NOTE:  The surgery schedule has many variables which may affect the time of your surgery case.  Family members should be available if your surgery time changes.  Plan to be here the day of your procedure between 4-6 hours.    MEDICATIONS:  TAKE ONLY THESE MEDICATIONS WITH A SMALL SIP OF WATER THE MORNING OF YOUR PROCEDURE:          DO NOT TAKE THESE MEDICATIONS 5-7 DAYS PRIOR to your procedure or per your surgeon's request:   ASPIRIN, ALEVE, ADVIL, IBUPROFEN, FISH OIL VITAMIN E, HERBALS  (May take Tylenol)    ONLY if you are prescribed any types of blood thinners such as:  Aspirin, Coumadin, Plavix, Pradaxa, Xarelto, Aggrenox, Effient, Eliquis, Savasya, Brilinta, or any other, ask your surgeon whether you should stop taking them and how long before surgery you should stop.  You may also need to verify with the prescribing physician if it is ok to stop your medication.      INSTRUCTIONS IMPORTANT!!  Do not eat or drink anything between midnight and the time of your procedure- this includes gum, mints, and candy.  Do not smoke or drink alcoholic beverages 24 hours prior to your procedure.  Shower the night before AND the morning of your procedure with a Chlorhexidine wash such as Hibiclens or Dial antibacterial soap from the neck down.  Do not get it on your face or in your eyes.  You may use your own shampoo and face wash. This helps your skin to be as bacteria free as possible.    If you wear contact lenses, dentures, hearing aids or glasses, bring a container to put them in during surgery and give to  a family member for safe keeping.  Please leave all jewelry, piercing's and valuables at home. You must remove your false eyelashes prior to surgery.    DO NOT remove hair from the surgery site.  Do not shave the incision site unless you are given specific instructions to do so.    ONLY if you have been diagnosed with sleep apnea please bring your C-PAP machine.  ONLY if you wear home oxygen please bring your portable oxygen tank the day of your procedure.  ONLY if you have a history of OPEN HEART SURGERY you will need a clearance from your Cardiologist per Anesthesia.      ONLY for patients requiring bowel prep, written instructions will be given by your doctor's office.  ONLY if you have a neuro stimulator, please bring the controller with you the morning of surgery  ONLY if a type and screen test is needed before surgery, please return:  If your doctor has scheduled you for an overnight stay, bring a small overnight bag with any personal items you need.  Make arrangements in advance for transportation home by a responsible adult. You can not go home in an uber or a cab per hospital policy.  It is not safe to drive a vehicle during the 24 hours after anesthesia.          All  facilities and properties are tobacco free.  Smoking is NOT allowed.   If you have any questions about these instructions, call Pre-Op Admit  Nursing at [phone] or the Pre-Op Day Surgery Unit at [phone].

## 2024-06-18 ENCOUNTER — ANESTHESIA EVENT (OUTPATIENT)
Dept: SURGERY | Facility: HOSPITAL | Age: 54
End: 2024-06-18
Payer: COMMERCIAL

## 2024-06-18 DIAGNOSIS — M67.912 DISORDER OF LEFT ROTATOR CUFF: Primary | ICD-10-CM

## 2024-06-18 RX ORDER — OXYCODONE AND ACETAMINOPHEN 5; 325 MG/1; MG/1
1 TABLET ORAL EVERY 6 HOURS PRN
Qty: 28 TABLET | Refills: 0 | Status: SHIPPED | OUTPATIENT
Start: 2024-06-18

## 2024-06-18 RX ORDER — CYCLOBENZAPRINE HCL 10 MG
10 TABLET ORAL 3 TIMES DAILY PRN
Qty: 30 TABLET | Refills: 0 | Status: SHIPPED | OUTPATIENT
Start: 2024-06-18 | End: 2024-06-28

## 2024-06-18 RX ORDER — IBUPROFEN 600 MG/1
600 TABLET ORAL 3 TIMES DAILY
Qty: 90 TABLET | Refills: 1 | Status: SHIPPED | OUTPATIENT
Start: 2024-06-18

## 2024-06-18 RX ORDER — ONDANSETRON 4 MG/1
4 TABLET, ORALLY DISINTEGRATING ORAL EVERY 8 HOURS PRN
Qty: 30 TABLET | Refills: 0 | Status: SHIPPED | OUTPATIENT
Start: 2024-06-18

## 2024-06-18 RX ORDER — ASPIRIN 81 MG/1
81 TABLET ORAL 2 TIMES DAILY
Qty: 28 TABLET | Refills: 0 | Status: SHIPPED | OUTPATIENT
Start: 2024-06-18 | End: 2025-06-18

## 2024-06-19 ENCOUNTER — ANESTHESIA (OUTPATIENT)
Dept: SURGERY | Facility: HOSPITAL | Age: 54
End: 2024-06-19
Payer: COMMERCIAL

## 2024-06-19 ENCOUNTER — HOSPITAL ENCOUNTER (OUTPATIENT)
Facility: HOSPITAL | Age: 54
Discharge: HOME | End: 2024-06-19
Attending: ORTHOPAEDIC SURGERY | Admitting: ORTHOPAEDIC SURGERY
Payer: COMMERCIAL

## 2024-06-19 DIAGNOSIS — Z01.818 PRE-OP TESTING: ICD-10-CM

## 2024-06-19 DIAGNOSIS — M75.20 BICEPS TENDINITIS: ICD-10-CM

## 2024-06-19 DIAGNOSIS — M67.912 DISORDER OF LEFT ROTATOR CUFF: ICD-10-CM

## 2024-06-19 PROCEDURE — 25000003 PHARM REV CODE 250: Performed by: ORTHOPAEDIC SURGERY

## 2024-06-19 PROCEDURE — 71000033 HC RECOVERY, INTIAL HOUR: Performed by: ORTHOPAEDIC SURGERY

## 2024-06-19 PROCEDURE — 71000015 HC POSTOP RECOV 1ST HR: Performed by: ORTHOPAEDIC SURGERY

## 2024-06-19 PROCEDURE — 27201423 OPTIME MED/SURG SUP & DEVICES STERILE SUPPLY: Performed by: ORTHOPAEDIC SURGERY

## 2024-06-19 PROCEDURE — C9290 INJ, BUPIVACAINE LIPOSOME: HCPCS | Performed by: ANESTHESIOLOGY

## 2024-06-19 PROCEDURE — C1713 ANCHOR/SCREW BN/BN,TIS/BN: HCPCS | Performed by: ORTHOPAEDIC SURGERY

## 2024-06-19 PROCEDURE — 29823 SHO ARTHRS SRG XTNSV DBRDMT: CPT | Mod: 51,LT,, | Performed by: ORTHOPAEDIC SURGERY

## 2024-06-19 PROCEDURE — 29827 SHO ARTHRS SRG RT8TR CUF RPR: CPT | Mod: LT,,, | Performed by: ORTHOPAEDIC SURGERY

## 2024-06-19 PROCEDURE — 25000003 PHARM REV CODE 250: Performed by: ANESTHESIOLOGY

## 2024-06-19 PROCEDURE — 63600175 PHARM REV CODE 636 W HCPCS: Performed by: NURSE ANESTHETIST, CERTIFIED REGISTERED

## 2024-06-19 PROCEDURE — 37000009 HC ANESTHESIA EA ADD 15 MINS: Performed by: ORTHOPAEDIC SURGERY

## 2024-06-19 PROCEDURE — 63600175 PHARM REV CODE 636 W HCPCS: Performed by: ORTHOPAEDIC SURGERY

## 2024-06-19 PROCEDURE — 36000711: Performed by: ORTHOPAEDIC SURGERY

## 2024-06-19 PROCEDURE — 63600175 PHARM REV CODE 636 W HCPCS: Performed by: ANESTHESIOLOGY

## 2024-06-19 PROCEDURE — 27200750 HC INSULATED NEEDLE/ STIMUPLEX: Performed by: ANESTHESIOLOGY

## 2024-06-19 PROCEDURE — 25000003 PHARM REV CODE 250: Performed by: NURSE ANESTHETIST, CERTIFIED REGISTERED

## 2024-06-19 PROCEDURE — 29828 SHO ARTHRS SRG BICP TENODSIS: CPT | Mod: 51,LT,, | Performed by: ORTHOPAEDIC SURGERY

## 2024-06-19 PROCEDURE — 29824 SHO ARTHRS SRG DSTL CLAVICLC: CPT | Mod: 51,LT,, | Performed by: ORTHOPAEDIC SURGERY

## 2024-06-19 PROCEDURE — 63600175 PHARM REV CODE 636 W HCPCS: Mod: JZ,JG | Performed by: ANESTHESIOLOGY

## 2024-06-19 PROCEDURE — 71000039 HC RECOVERY, EACH ADD'L HOUR: Performed by: ORTHOPAEDIC SURGERY

## 2024-06-19 PROCEDURE — 36000710: Performed by: ORTHOPAEDIC SURGERY

## 2024-06-19 PROCEDURE — 64415 NJX AA&/STRD BRCH PLXS IMG: CPT | Performed by: ANESTHESIOLOGY

## 2024-06-19 PROCEDURE — 37000008 HC ANESTHESIA 1ST 15 MINUTES: Performed by: ORTHOPAEDIC SURGERY

## 2024-06-19 PROCEDURE — 63600175 PHARM REV CODE 636 W HCPCS

## 2024-06-19 DEVICE — 4.75 MM ARGO KNOTLESS SP ANCHOR
Type: IMPLANTABLE DEVICE | Site: SHOULDER | Status: FUNCTIONAL
Brand: ARGO

## 2024-06-19 RX ORDER — BUPIVACAINE HYDROCHLORIDE 5 MG/ML
INJECTION, SOLUTION EPIDURAL; INTRACAUDAL
Status: COMPLETED | OUTPATIENT
Start: 2024-06-19 | End: 2024-06-19

## 2024-06-19 RX ORDER — SUCCINYLCHOLINE CHLORIDE 20 MG/ML
INJECTION INTRAMUSCULAR; INTRAVENOUS
Status: DISCONTINUED | OUTPATIENT
Start: 2024-06-19 | End: 2024-06-19

## 2024-06-19 RX ORDER — PROPOFOL 10 MG/ML
VIAL (ML) INTRAVENOUS
Status: DISCONTINUED | OUTPATIENT
Start: 2024-06-19 | End: 2024-06-19

## 2024-06-19 RX ORDER — PROCHLORPERAZINE EDISYLATE 5 MG/ML
5 INJECTION INTRAMUSCULAR; INTRAVENOUS EVERY 6 HOURS PRN
Status: CANCELLED | OUTPATIENT
Start: 2024-06-19

## 2024-06-19 RX ORDER — ONDANSETRON HYDROCHLORIDE 2 MG/ML
4 INJECTION, SOLUTION INTRAVENOUS DAILY PRN
Status: DISCONTINUED | OUTPATIENT
Start: 2024-06-19 | End: 2024-06-19 | Stop reason: HOSPADM

## 2024-06-19 RX ORDER — FENTANYL CITRATE 50 UG/ML
25 INJECTION, SOLUTION INTRAMUSCULAR; INTRAVENOUS EVERY 5 MIN PRN
Status: DISCONTINUED | OUTPATIENT
Start: 2024-06-19 | End: 2024-06-19 | Stop reason: HOSPADM

## 2024-06-19 RX ORDER — ACETAMINOPHEN 10 MG/ML
INJECTION, SOLUTION INTRAVENOUS
Status: DISCONTINUED | OUTPATIENT
Start: 2024-06-19 | End: 2024-06-19

## 2024-06-19 RX ORDER — ROCURONIUM BROMIDE 10 MG/ML
INJECTION, SOLUTION INTRAVENOUS
Status: DISCONTINUED | OUTPATIENT
Start: 2024-06-19 | End: 2024-06-19

## 2024-06-19 RX ORDER — OXYCODONE HYDROCHLORIDE 5 MG/1
5 TABLET ORAL
Status: DISCONTINUED | OUTPATIENT
Start: 2024-06-19 | End: 2024-06-19 | Stop reason: HOSPADM

## 2024-06-19 RX ORDER — SODIUM CHLORIDE 0.9 % (FLUSH) 0.9 %
3 SYRINGE (ML) INJECTION
Status: DISCONTINUED | OUTPATIENT
Start: 2024-06-19 | End: 2024-06-19 | Stop reason: HOSPADM

## 2024-06-19 RX ORDER — LIDOCAINE HYDROCHLORIDE 20 MG/ML
INJECTION INTRAVENOUS
Status: DISCONTINUED | OUTPATIENT
Start: 2024-06-19 | End: 2024-06-19

## 2024-06-19 RX ORDER — DEXAMETHASONE SODIUM PHOSPHATE 4 MG/ML
INJECTION, SOLUTION INTRA-ARTICULAR; INTRALESIONAL; INTRAMUSCULAR; INTRAVENOUS; SOFT TISSUE
Status: DISCONTINUED | OUTPATIENT
Start: 2024-06-19 | End: 2024-06-19

## 2024-06-19 RX ORDER — FENTANYL CITRATE 50 UG/ML
25-200 INJECTION, SOLUTION INTRAMUSCULAR; INTRAVENOUS
Status: DISCONTINUED | OUTPATIENT
Start: 2024-06-19 | End: 2024-06-19 | Stop reason: HOSPADM

## 2024-06-19 RX ORDER — EPHEDRINE SULFATE 50 MG/ML
INJECTION, SOLUTION INTRAVENOUS
Status: DISCONTINUED | OUTPATIENT
Start: 2024-06-19 | End: 2024-06-19

## 2024-06-19 RX ORDER — PHENYLEPHRINE HYDROCHLORIDE 10 MG/ML
INJECTION INTRAVENOUS
Status: DISCONTINUED | OUTPATIENT
Start: 2024-06-19 | End: 2024-06-19

## 2024-06-19 RX ORDER — MIDAZOLAM HYDROCHLORIDE 1 MG/ML
INJECTION INTRAMUSCULAR; INTRAVENOUS
Status: DISCONTINUED | OUTPATIENT
Start: 2024-06-19 | End: 2024-06-19

## 2024-06-19 RX ORDER — SCOLOPAMINE TRANSDERMAL SYSTEM 1 MG/1
1 PATCH, EXTENDED RELEASE TRANSDERMAL
Status: DISCONTINUED | OUTPATIENT
Start: 2024-06-19 | End: 2024-06-19 | Stop reason: HOSPADM

## 2024-06-19 RX ORDER — MUPIROCIN 20 MG/G
OINTMENT TOPICAL
Status: DISCONTINUED | OUTPATIENT
Start: 2024-06-19 | End: 2024-06-19 | Stop reason: HOSPADM

## 2024-06-19 RX ORDER — VASOPRESSIN 20 [USP'U]/ML
INJECTION, SOLUTION INTRAMUSCULAR; SUBCUTANEOUS
Status: DISCONTINUED | OUTPATIENT
Start: 2024-06-19 | End: 2024-06-19

## 2024-06-19 RX ORDER — MORPHINE SULFATE 2 MG/ML
3 INJECTION, SOLUTION INTRAMUSCULAR; INTRAVENOUS
Status: CANCELLED | OUTPATIENT
Start: 2024-06-19

## 2024-06-19 RX ORDER — MIDAZOLAM HYDROCHLORIDE 1 MG/ML
1-2 INJECTION, SOLUTION INTRAMUSCULAR; INTRAVENOUS ONCE
Status: DISCONTINUED | OUTPATIENT
Start: 2024-06-19 | End: 2024-06-19 | Stop reason: HOSPADM

## 2024-06-19 RX ORDER — ONDANSETRON 4 MG/1
8 TABLET, ORALLY DISINTEGRATING ORAL EVERY 8 HOURS PRN
Status: CANCELLED | OUTPATIENT
Start: 2024-06-19

## 2024-06-19 RX ORDER — LIDOCAINE HYDROCHLORIDE 10 MG/ML
1 INJECTION, SOLUTION EPIDURAL; INFILTRATION; INTRACAUDAL; PERINEURAL ONCE
Status: DISCONTINUED | OUTPATIENT
Start: 2024-06-19 | End: 2024-06-19 | Stop reason: HOSPADM

## 2024-06-19 RX ORDER — IBUPROFEN 600 MG/1
600 TABLET ORAL EVERY 6 HOURS PRN
Status: CANCELLED | OUTPATIENT
Start: 2024-06-19

## 2024-06-19 RX ORDER — ONDANSETRON HYDROCHLORIDE 2 MG/ML
INJECTION, SOLUTION INTRAMUSCULAR; INTRAVENOUS
Status: DISCONTINUED | OUTPATIENT
Start: 2024-06-19 | End: 2024-06-19

## 2024-06-19 RX ORDER — OXYCODONE HYDROCHLORIDE 5 MG/1
5 TABLET ORAL EVERY 4 HOURS PRN
Status: CANCELLED | OUTPATIENT
Start: 2024-06-19

## 2024-06-19 RX ORDER — EPINEPHRINE 1 MG/ML
INJECTION, SOLUTION, CONCENTRATE INTRAVENOUS
Status: DISCONTINUED | OUTPATIENT
Start: 2024-06-19 | End: 2024-06-19 | Stop reason: HOSPADM

## 2024-06-19 RX ADMIN — EPHEDRINE SULFATE 25 MG: 50 INJECTION, SOLUTION INTRAMUSCULAR; INTRAVENOUS; SUBCUTANEOUS at 10:06

## 2024-06-19 RX ADMIN — FENTANYL CITRATE 100 MCG: 50 INJECTION, SOLUTION INTRAMUSCULAR; INTRAVENOUS at 09:06

## 2024-06-19 RX ADMIN — DEXAMETHASONE SODIUM PHOSPHATE 4 MG: 4 INJECTION, SOLUTION INTRA-ARTICULAR; INTRALESIONAL; INTRAMUSCULAR; INTRAVENOUS; SOFT TISSUE at 10:06

## 2024-06-19 RX ADMIN — VASOPRESSIN 1 UNITS: 20 INJECTION, SOLUTION INTRAMUSCULAR; SUBCUTANEOUS at 10:06

## 2024-06-19 RX ADMIN — PHENYLEPHRINE HYDROCHLORIDE 200 MCG: 10 INJECTION INTRAVENOUS at 10:06

## 2024-06-19 RX ADMIN — LIDOCAINE HYDROCHLORIDE 75 MG: 20 INJECTION, SOLUTION INTRAVENOUS at 10:06

## 2024-06-19 RX ADMIN — SCOPALAMINE 1 PATCH: 1 PATCH, EXTENDED RELEASE TRANSDERMAL at 09:06

## 2024-06-19 RX ADMIN — FENTANYL CITRATE 25 MCG: 50 INJECTION, SOLUTION INTRAMUSCULAR; INTRAVENOUS at 12:06

## 2024-06-19 RX ADMIN — BUPIVACAINE HYDROCHLORIDE 5 ML: 5 INJECTION, SOLUTION EPIDURAL; INTRACAUDAL; PERINEURAL at 09:06

## 2024-06-19 RX ADMIN — BUPIVACAINE 10 ML: 13.3 INJECTION, SUSPENSION, LIPOSOMAL INFILTRATION at 09:06

## 2024-06-19 RX ADMIN — MIDAZOLAM HYDROCHLORIDE 2 MG: 1 INJECTION, SOLUTION INTRAMUSCULAR; INTRAVENOUS at 09:06

## 2024-06-19 RX ADMIN — SODIUM CHLORIDE, SODIUM GLUCONATE, SODIUM ACETATE, POTASSIUM CHLORIDE AND MAGNESIUM CHLORIDE: 526; 502; 368; 37; 30 INJECTION, SOLUTION INTRAVENOUS at 09:06

## 2024-06-19 RX ADMIN — MUPIROCIN 1 G: 20 OINTMENT TOPICAL at 09:06

## 2024-06-19 RX ADMIN — PROPOFOL 150 MG: 10 INJECTION, EMULSION INTRAVENOUS at 10:06

## 2024-06-19 RX ADMIN — ACETAMINOPHEN 1000 MG: 10 INJECTION, SOLUTION INTRAVENOUS at 10:06

## 2024-06-19 RX ADMIN — ONDANSETRON 4 MG: 2 INJECTION INTRAMUSCULAR; INTRAVENOUS at 10:06

## 2024-06-19 RX ADMIN — SUCCINYLCHOLINE CHLORIDE 120 MG: 20 INJECTION, SOLUTION INTRAMUSCULAR; INTRAVENOUS at 10:06

## 2024-06-19 RX ADMIN — ROCURONIUM BROMIDE 10 MG: 10 INJECTION, SOLUTION INTRAVENOUS at 10:06

## 2024-06-19 RX ADMIN — PHENYLEPHRINE HYDROCHLORIDE 100 MCG: 10 INJECTION INTRAVENOUS at 10:06

## 2024-06-19 RX ADMIN — OXYCODONE 5 MG: 5 TABLET ORAL at 12:06

## 2024-06-19 RX ADMIN — VASOPRESSIN 1 UNITS: 20 INJECTION, SOLUTION INTRAMUSCULAR; SUBCUTANEOUS at 11:06

## 2024-06-19 RX ADMIN — CEFAZOLIN 2 G: 2 INJECTION, POWDER, FOR SOLUTION INTRAMUSCULAR; INTRAVENOUS at 10:06

## 2024-06-19 RX ADMIN — GLYCOPYRROLATE 0.2 MG: 0.2 INJECTION, SOLUTION INTRAMUSCULAR; INTRAVITREAL at 10:06

## 2024-06-19 NOTE — OP NOTE
06/19/2024    PREOPERATIVE DIAGNOSIS:      Left shoulder rotator cuff tear, small  Left shoulder biceps tendinopathy  Left shoulder impingement  Left shoulder acromioclavicular arthritis  Left shoulder arthropathy, chondromalacia    POSTOPERATIVE DIAGNOSIS: Same    PROCEDURE:      Arthroscopic left shoulder rotator cuff repair, supraspinatus  Arthroscopic left shoulder biceps tenodesis  Arthroscopic left shoulder subacromial decompression with acromioplasty  Arthroscopic left shoulder distal clavicle excision  Extensive arthroscopic debridement of the left shoulder including debridement of the humeral head cartilage, bursa and labrum      SURGEON: Joce Montanez MD    ASSISTANT: Marimar MURPHY    She was present and scrubbed for the entirety of the procedure. They were required for patient positioning, retraction, insertion of implants and closure. Without her I would have been unable to safely perform the case due to only one scrub tech available.     ANESTHESIA:  General/Regional     ESTIMATED BLOOD LOSS:  10 mL    INDICATIONS:  Holly Chicas is a 53 y.o. year-old female who presented to my clinic with a chief complaint of left shoulder pain. Imaging and clinical exam was consistent with rotator cuff tendinopathy with possible tear, degenerative SLAP tear and biceps tendinopathy. Patient had failed conservative treatment and ultimately elected to proceed with surgical intervention due to persistent pain and dysfunction despite conservative treatment.     We discussed the risks and benefits of the surgery in detail including stiffness, CRPS, damage to surrounding neurovascular structures, failure of fixation, arthrofibrosis and stiffness as well as need for revision surgery. Despite these risks they elected to proceed.       COMPONENTS USED:      Implant Name Type Inv. Item Serial No.  Lot No. LRB No. Used Action   4.75mm renu knotless sp with 1mm hi-fi ribbon    [employer] [id number] Left  1 Implanted   y-knot rc all suture anchor w/ two 1.3mm hi-fi ribbons    [employer] [id number] Left 1 Implanted         DESCRIPTION OF PROCEDURE:  The patient was seen in the pre-operative area where consents were verified and the surgical site marked. They did receive a preoperative block for intra-operative and postoperative analgesia. The patient was taken to the Operating Room where anesthesia was administered by the Anesthesia Department. She was then placed in the lateral decubitus position on a bean bag and all superficial neurovascular structures were well padded.  The left shoulder  was then sterilely prepped and draped in the normal fashion.  Preoperative antibiotics were administered.  A time-out was performed verifying the procedure and laterality, all agreed and elected to proceed as planned.    We began by injecting 30 cc into the glenohumeral joint to distend the capsule from the posterior shoulder.  Stab incision was made posteriorly and the camera inserted into the glenohumeral joint.  Diagnostic arthroscopy was performed.    Findings:     Labral/Bicipital complex:  Previous anterior capsular sutures from presumed Bankart repair, these were debrided and removed as they were scarred in within the anterior capsule, thickening and fusiform swelling of the proximal biceps at the labral insertion.  Labrum debrided from the 6 to 12 o'clock position  Glenoid Cartilage:  Anterior inferior cartilage defect debrided to a stable base  Humeral Head Cartilage:  Grade 2 and 3 heterogeneous anterior and central humeral head arthritis, debrided to a stable base using an arthroscopic shaver  Rotator Interval:  Mild synovitis with abundant scar tissue which was debrided with an arthroscopic shaver  Articular Sided rotator cuff:  Articular sided rotator cuff fraying  Subscapularis:  Intact    After diagnostic arthroscopy was performed we created an anterior portal as well as an anterior superior lateral portal  using direct visualization with a spinal needle.  Rotator interval release was performed from the superior border of the subscapularis to the biceps tendon, extending this laterally to the lesser tuberosity and bicipital groove.     Biceps Tenodesis    We did elect to perform a Intra-articular  biceps tenodesis.  Cannula was placed anteriorly.  We looped a luggage tag suture around the biceps tendon and then pierced this distally with a bird's beak suture to secure the stitch.  We then passed the suture through a 4.75 mm knotless anchor.  The arm was externally rotated and the anchor was placed at the top of the bicipital groove in line with the arm.  The biceps tendon was tensioned appropriately and secured.  The remaining sutures were then cut.  The remaining bicipital stump was debrided to a stable base.  The superior and anterior labrum was also debrided to a stable base using an arthroscopic shaver.    Subacromial Decompression    We then turned our attention to the subacromial space.  The camera was placed within the subacromial space and we created a lateral portal using direct visualization with a spinal needle.  The bursal tissue was removed for visualization of the rotator cuff.  We also removed the bursal tissue and periosteum on the undersurface of the acromion using a combination of ablator and arthroscopic shaver.  There was a down hanging spur with downsloping anterior acromion which was impinging onto the rotator cuff.  We used a 5.5 mm bur from the anterior superior lateral portal and removed the excess bone anteriorly to create a flat surface and prevent impingement.    Distal Clavicle Excision    We then evaluated the distal clavicle.  The periosteum and bursal tissue was excised around the distal clavicle.  There was arthritic change of the distal clavicle as well as preoperative pain of the acromioclavicular joint and cross-body adduction.  For this reason we did elect to perform a distal  clavicle excision.  From my anterior superior lateral portal as well as from my anterior portal, we resected the distal 5-10 mm of bone from the distal clavicle.    Rotator Cuff    At this time we debrided the bursal tissue off the rotator cuff for evaluation.  She had a small supraspinatus tear that was full-thickness.  I mobilized nicely to the footprint.  The overall tissue quality was good.  I did think this was amenable to repair and was likely contributing to some of her nighttime symptoms.  We placed a cannula laterally as well as anteriorly for shuttling of the sutures.  We felt this was best repaired with a double loaded single row construct.  We used a spinal needle and then punched a hole into the articular margin.  We then used a All suture anchor that was double loaded.  This was secured into the bone and we shuttled 4 sutures through the rotator cuff in a side-to-side configuration.  We then retrieved these out of our lateral portal individually and these were tied using a knot pusher with excellent compression of the rotator cuff to the footprint.    Final pictures were then taken arthroscopically.  Excess fluid was removed from the shoulder and the wounds were closed with 3-0 nylon interrupted.  Sterile dressing was applied and the patient was placed in a shoulder abduction pillow.  The patient was extubated and taken to the PACU in stable condition.  Patient overall tolerated the procedure well.        Disposition:      Postoperative sling, okay to remove sling for passive range of motion of the elbow and wrist.  Begin pendulums and shoulder shrugs.  Follow-up 2 weeks for wound check and range-of-motion check and initiate physical therapy    Joce Montanez MD

## 2024-06-19 NOTE — PLAN OF CARE
VSS. NAD. Resp E/U. Calm and cooperative. Speech appropriate. Tolerating clear liquids. Denies nausea. Pain controlled. IV patent. Dressing to Lt shoulder CDI. Immobilizer intact. Family notified of patient status. All safety measures taken. Bed in low position. Bedrails up x2. Bed locked. Cleared by Anesthesia.

## 2024-06-19 NOTE — ANESTHESIA PROCEDURE NOTES
Peripheral Block    Patient location during procedure: pre-op   Block not for primary anesthetic.  Reason for block: at surgeon's request and post-op pain management   Post-op Pain Location: left shoulder   Start time: 6/19/2024 9:20 AM  Timeout: 6/19/2024 9:20 AM   End time: 6/19/2024 9:25 AM    Staffing  Authorizing Provider: Pedro Pablo Miranda MD  Performing Provider: Pedro Pablo Miranda MD    Staffing  Performed by: Pedro Pablo Miranda MD  Authorized by: Pedro Pablo Miranda MD    Preanesthetic Checklist  Completed: patient identified, IV checked, site marked, risks and benefits discussed, surgical consent, monitors and equipment checked, pre-op evaluation and timeout performed  Peripheral Block  Patient position: sitting  Prep: ChloraPrep  Patient monitoring: heart rate, cardiac monitor, continuous pulse ox, continuous capnometry and frequent blood pressure checks  Block type: interscalene  Laterality: left  Injection technique: single shot  Needle  Needle type: Stimuplex   Needle gauge: 22 G  Needle length: 2 in  Needle localization: anatomical landmarks and ultrasound guidance   -ultrasound image captured on disc.  Assessment  Injection assessment: negative aspiration, negative parasthesia and local visualized surrounding nerve  Paresthesia pain: none  Heart rate change: no  Slow fractionated injection: yes  Pain Tolerance: comfortable throughout block and no complaints  Medications:    Medications: bupivacaine (pf) (MARCAINE) injection 0.5% - Perineural   5 mL - 6/19/2024 9:25:00 AM  BUPivacaine liposome (PF) 1.3 % (13.3 mg/mL) suspension - Injection   10 mL - 6/19/2024 9:25:00 AM    Additional Notes  VSS.  [facility] RN monitoring vitals throughout procedure.  Patient tolerated procedure well.

## 2024-06-19 NOTE — ANESTHESIA PROCEDURE NOTES
Intubation    Date/Time: 6/19/2024 10:08 AM    Performed by: Urbano Sapp CRNA  Authorized by: Pedro Pablo Miranda MD    Intubation:     Induction:  Intravenous    Intubated:  Postinduction    Mask Ventilation:  Easy mask    Attempts:  1    Attempted By:  Student (RASHAD Hill)    Method of Intubation:  Video laryngoscopy    Blade:  Noble 3    Laryngeal View Grade: Grade I - full view of cords      Difficult Airway Encountered?: No      Complications:  None    Airway Device:  Oral endotracheal tube    Airway Device Size:  7.0    Style/Cuff Inflation:  Cuffed (inflated to minimal occlusive pressure)    Tube secured:  22    Secured at:  The lips    Placement Verified By:  Capnometry    Complicating Factors:  None    Findings Post-Intubation:  BS equal bilateral and atraumatic/condition of teeth unchanged

## 2024-06-19 NOTE — ANESTHESIA POSTPROCEDURE EVALUATION
Anesthesia Post Evaluation    Patient: Holly Chicas    Procedure(s) Performed: Procedure(s) (LRB):  ARTHROSCOPY, SHOULDER, WITH DISTAL CLAVICLE EXCISION (Left)  REPAIR, ROTATOR CUFF, ARTHROSCOPIC (Left)  ARTHROSCOPY,SHOULDER,WITH BICEPS TENODESIS (Left)    Final Anesthesia Type: general      Patient location during evaluation: PACU  Patient participation: Yes- Able to Participate  Level of consciousness: awake and alert  Post-procedure vital signs: reviewed and stable  Pain management: adequate  Airway patency: patent    PONV status at discharge: No PONV  Anesthetic complications: no      Cardiovascular status: hemodynamically stable  Respiratory status: unassisted and room air  Hydration status: euvolemic  Follow-up not needed.              Vitals Value Taken Time   /81 06/19/24 1335   Temp 36.6 °C (97.9 °F) 06/19/24 1255   Pulse 73 06/19/24 1335   Resp 16 06/19/24 1335   SpO2 96 % 06/19/24 1335         Event Time   Out of Recovery 13:03:00         Pain/Marielena Score: Pain Rating Prior to Med Admin: 6 (6/19/2024 12:32 PM)  Pain Rating Post Med Admin: 2 (6/19/2024 12:45 PM)  Marielena Score: 9 (6/19/2024  1:03 PM)

## 2024-06-19 NOTE — PLAN OF CARE
Reviewed discharge instructions, patient states understanding, Educated patient when to notify MD, and post anesthesia precautions, reviewed medications administration with patient, RX to bedside, IV removed as ordered, patient voiding without difficulty, post op appointment scheduled, no signs of distress at this time, patient states I'm ready for discharge.

## 2024-06-19 NOTE — DISCHARGE INSTRUCTIONS
Shoulder Arthroscopy Post-op Instructions    Wound Care  Maintain your operative dressing.  It is normal for the shoulder to bleed and swell following surgery - if blood soaks into the bandage, do not become alarmed - reinforce with additional dressing.  Please maintain steri-strips in place.  Remove surgical dressing on the second postoperative day and apply waterproof Band-Aids over incisions and change daily.   Keep surgical incisions clean and dry.  You may shower after removing the first dressing on the second postoperative day  by placing waterproof Band-Aids over incision areas.   Do NOT immerse the operative shoulder until 14 days after surgery     Icing  Icing is very important for the first 5-7 days after surgery.   Use an ice machine continuously or ice packs every 2 hours for 20 minutes daily until your first postoperative visit.   Do not place the ice bag or cooling device directly on the skin. Care must be taken to avoid frostbite to the skin    Activity  Gentle range of motion of your hand and elbow is encouraged.   While exercises are important, dont overdo it. Common sense is the rule. Increased swelling and/or pain is usually an indication youre overdoing it.  Start physical therapy within 1-3 weeks.   When sleeping or resting, inclined positions (ie: reclining chair) and a pillow under the forearm for support may provide better comfort STILL IN SLING   Avoid long periods of sitting or long distance traveling for 2 weeks.   NO driving until instructed otherwise by physician, it is illegal to drive in a sling     Sling  Wear your sling at all times.   May remove your sling to shower, or to do home exercise program (pendulums, elbow and wrist range of motion etc)     Medications  Do not drive a car or operate heavy machinery while taking narcotics.   You have been prescribed a narcotic (either Norco or Percocet) for pain control. This is to be used for a short time period.   Take 1-2 tablets every  4-6 hours as needed  Max of 12 pills per day  Plan on using for 2-5 days, depending on the level of pain.  Do not take additional Tylenol (Acetaminophen) while taking Percocet.      Common side effects include nausea, drowsiness and constipation. Take medication with food to decrease side effects.   Ibuprofen (600-800mg) may be taken in between the narcotic medication.   You should resume your normal medications for other conditions the day after surgery. You may not drive or operate heavy equipment while on narcotics. It is important not to drink while taking narcotic medication.     Diet  Resume normal diet as tolerated this evening. We have no specific diet restrictions after surgery, but extensive use of narcotics can lead to constipation. High fiber diets, lots of fluids and muscle activity can prevent this occurrence.  The anesthetic drugs used during surgery may cause nausea for the first 24 hours. If nausea is encountered, drink only clear liquids. The only solids should be dry crackers or toast. If the nausea and vomiting become severe or you show signs of being dehydrated (lack of urination), please call.      EMERGENCIES  Contact Dr. Montanez or his nurse if any of the following are present:   Difficulty breathing  Painful swelling or numbness   Unrelenting pain   Fever (over 101° - it is normal to have a low grade fever for the first day or two following surgery) or chills   Redness around incisions   Color of lower extremity   Continuous drainage or bleeding from incision (a small amount of drainage is expected)   Excessive nausea/vomiting       **If you have an emergency after office hours or on weekends, call our office and you will be connected to our page service - they will contact Dr. Montanez or one of his partners if he is unavailable.      **If you have an emergency that requires immediate attention, proceed to the nearest emergency room or call 911.

## 2024-06-19 NOTE — ANESTHESIA PREPROCEDURE EVALUATION
06/19/2024  Holly Chicas is a 53 y.o., female.      Pre-op Assessment    I have reviewed the Patient Summary Reports.     I have reviewed the Nursing Notes. I have reviewed the NPO Status.   I have reviewed the Medications.     Review of Systems  Anesthesia Hx:             Denies Family Hx of Anesthesia complications.   Personal Hx of Anesthesia complications, Post-Operative Nausea/Vomiting, with every anesthetic, despite treatment                    Cardiovascular:     Hypertension           hyperlipidemia                       Hypertension         Pulmonary:        Sleep Apnea     Obstructive Sleep Apnea (ALESIA).           Musculoskeletal:     Left rotator cuff tear             Neurological:    Neuromuscular Disease,  Headaches      Dx of Headaches                         Neuromuscular Disease   Endocrine:  Diabetes, type 2    Diabetes                      Psych:  Psychiatric History  depression                Physical Exam  General: Well nourished, Cooperative, Alert and Oriented    Airway:  Mallampati: II   Mouth Opening: Normal  TM Distance: Normal  Tongue: Normal  Neck ROM: Normal ROM    Dental:  Intact    Chest/Lungs:  Normal Respiratory Rate    Heart:  Rate: Normal        Anesthesia Plan  Type of Anesthesia, risks & benefits discussed:    Anesthesia Type: Gen ETT  Intra-op Monitoring Plan: Standard ASA Monitors  Post Op Pain Control Plan: multimodal analgesia, IV/PO Opioids PRN and peripheral nerve block  Induction:  IV  Airway Plan: Direct and Video, Post-Induction  Informed Consent: Informed consent signed with the Patient and all parties understand the risks and agree with anesthesia plan.  All questions answered.   ASA Score: 2    Ready For Surgery From Anesthesia Perspective.     .

## 2024-06-19 NOTE — TRANSFER OF CARE
"Anesthesia Transfer of Care Note    Patient: Holly Chicas    Procedure(s) Performed: Procedure(s) (LRB):  ARTHROSCOPY, SHOULDER, WITH DISTAL CLAVICLE EXCISION (Left)  REPAIR, ROTATOR CUFF, ARTHROSCOPIC (Left)  ARTHROSCOPY,SHOULDER,WITH BICEPS TENODESIS (Left)    Patient location: PACU    Anesthesia Type: general    Transport from OR: Transported from OR on 2-3 L/min O2 by NC with adequate spontaneous ventilation    Post pain: adequate analgesia    Post assessment: no apparent anesthetic complications and tolerated procedure well    Post vital signs: stable    Level of consciousness: sedated and responds to stimulation    Nausea/Vomiting: no nausea/vomiting    Complications: none    Transfer of care protocol was followed    Last vitals: Visit Vitals  BP (!) 152/91 (BP Location: Right forearm, Patient Position: Lying)   Pulse 89   Temp 36.9 °C (98.4 °F)   Resp 18   Ht 5' 6" (1.676 m)   Wt 73.9 kg (163 lb)   SpO2 100%   Breastfeeding No   BMI 26.31 kg/m²     "

## 2024-06-19 NOTE — BRIEF OP NOTE
[facility]  Brief Operative Note    Surgery Date: 6/19/2024     Surgeons and Role:     * Joce Montanez MD - Primary    Assisting Surgeon: None    Pre-op Diagnosis:  Pre-op testing [Z01.818]  Disorder of left rotator cuff [M67.912]    Post-op Diagnosis:  Post-Op Diagnosis Codes:     * Pre-op testing [Z01.818]     * Disorder of left rotator cuff [M67.912]    Procedure(s) (LRB):  ARTHROSCOPY, SHOULDER, WITH DISTAL CLAVICLE EXCISION (Left)  REPAIR, ROTATOR CUFF, ARTHROSCOPIC (Left)  ARTHROSCOPY,SHOULDER,WITH BICEPS TENODESIS (Left)    Anesthesia: General/Regional    Operative Findings:  Mild-to-moderate DJD glenohumeral joint, abundant bursa and scar tissue status post previous shoulder arthroscopy, small supraspinatus full-thickness tear, biceps tendinopathy    Estimated Blood Loss: 1 mL         Specimens:   Specimen (24h ago, onward)      None              Discharge Note    OUTCOME: Patient tolerated treatment/procedure well without complication and is now ready for discharge.    DISPOSITION: Home or Self Care    FINAL DIAGNOSIS:  <principal problem not specified>    FOLLOWUP: In clinic    DISCHARGE INSTRUCTIONS:    Discharge Procedure Orders   Diet general     Keep surgical extremity elevated     Ice to affected area     Lifting restrictions   Order Comments: Non weight bearing operative arm, ok to come out of sling daily for elbow and wrist range of motion and pendulums     No driving, operating heavy equipment or signing legal documents while taking pain medication.     Remove dressing in 48 hours   Order Comments: Follow instructions on post-op discharge instruction sheet     Call MD for:  temperature >100.4     Call MD for:  persistent nausea and vomiting     Call MD for:  severe uncontrolled pain     Call MD for:  difficulty breathing, headache or visual disturbances     Call MD for:  redness, tenderness, or signs of infection (pain, swelling, redness, odor or green/yellow  discharge around incision site)     Call MD for:  hives     Call MD for:  persistent dizziness or light-headedness     Call MD for:  extreme fatigue

## 2024-06-20 VITALS
TEMPERATURE: 98 F | SYSTOLIC BLOOD PRESSURE: 138 MMHG | WEIGHT: 163 LBS | HEART RATE: 73 BPM | OXYGEN SATURATION: 96 % | HEIGHT: 66 IN | RESPIRATION RATE: 16 BRPM | DIASTOLIC BLOOD PRESSURE: 81 MMHG | BODY MASS INDEX: 26.2 KG/M2

## 2024-06-20 NOTE — PROGRESS NOTES
6/20 Stated very pleased with care given.  All staff were kind and helpful. Pt. And/or family member states they have read and understand all discharge instructions. They stated they know how to reach out to MD for any needs.

## 2024-06-23 DIAGNOSIS — G47.411 NARCOLEPSY WITH CATAPLEXY: ICD-10-CM

## 2024-06-24 RX ORDER — DEXTROAMPHETAMINE SACCHARATE, AMPHETAMINE ASPARTATE, DEXTROAMPHETAMINE SULFATE AND AMPHETAMINE SULFATE 7.5; 7.5; 7.5; 7.5 MG/1; MG/1; MG/1; MG/1
1 TABLET ORAL 2 TIMES DAILY PRN
Qty: 60 TABLET | Refills: 0 | Status: SHIPPED | OUTPATIENT
Start: 2024-06-24

## 2024-06-26 LAB
LEFT EYE DM RETINOPATHY: NEGATIVE
RIGHT EYE DM RETINOPATHY: NEGATIVE

## 2024-06-27 ENCOUNTER — PATIENT MESSAGE (OUTPATIENT)
Dept: ORTHOPEDICS | Facility: CLINIC | Age: 54
End: 2024-06-27
Payer: COMMERCIAL

## 2024-06-27 ENCOUNTER — PATIENT MESSAGE (OUTPATIENT)
Dept: ADMINISTRATIVE | Facility: HOSPITAL | Age: 54
End: 2024-06-27
Payer: COMMERCIAL

## 2024-06-28 ENCOUNTER — PATIENT OUTREACH (OUTPATIENT)
Dept: ADMINISTRATIVE | Facility: HOSPITAL | Age: 54
End: 2024-06-28
Payer: COMMERCIAL

## 2024-06-28 NOTE — PROGRESS NOTES
Population Health Chart Review & Patient Outreach Details      Additional Pop Health Notes:               Updates Requested / Reviewed:      Updated Care Coordination Note and          Health Maintenance Topics Overdue:      VBHM Score: 3     Eye Exam  Foot Exam  Uncontrolled BP                       Health Maintenance Topic(s) Outreach Outcomes & Actions Taken:    Eye Exam - Outreach Outcomes & Actions Taken  : Diabetic Eye External Records Uploaded, Care Team & History Updated if Applicable

## 2024-06-28 NOTE — PROGRESS NOTES
Population Health Chart Review & Patient Outreach Details      Additional [facility] Notes:               Updates Requested / Reviewed:      Updated Care Coordination Note, Care Everywhere, , and Immunizations Reconciliation Completed or Queried: [place]         Health Maintenance Topics Overdue:      VB Score: 2     Foot Exam  Uncontrolled BP                       Health Maintenance Topic(s) Outreach Outcomes & Actions Taken:    Breast Cancer Screening - Outreach Outcomes & Actions Taken  :

## 2024-07-05 ENCOUNTER — OFFICE VISIT (OUTPATIENT)
Dept: ORTHOPEDICS | Facility: CLINIC | Age: 54
End: 2024-07-05
Payer: COMMERCIAL

## 2024-07-05 VITALS — WEIGHT: 162.94 LBS | BODY MASS INDEX: 26.19 KG/M2 | RESPIRATION RATE: 16 BRPM | HEIGHT: 66 IN

## 2024-07-05 DIAGNOSIS — M67.912 DISORDER OF LEFT ROTATOR CUFF: Primary | ICD-10-CM

## 2024-07-05 PROCEDURE — 99999 PR PBB SHADOW E&M-EST. PATIENT-LVL IV: CPT | Mod: PBBFAC,,, | Performed by: ORTHOPAEDIC SURGERY

## 2024-07-05 NOTE — PROGRESS NOTES
Post-op Note    HPI    Holly Chicas is here 2 weeks s/p the following procedure:     PROCEDURE:       Arthroscopic left shoulder rotator cuff repair, supraspinatus  Arthroscopic left shoulder biceps tenodesis  Arthroscopic left shoulder subacromial decompression with acromioplasty  Arthroscopic left shoulder distal clavicle excision  Extensive arthroscopic debridement of the left shoulder including debridement of the humeral head cartilage, bursa and labrum    Overall doing well. Pain controlled on current regimen. She is not yet currently enrolled in Physical Therapy. Denies any chest pain or shortness of breathe. Denies any drainage from the incision. Denies any fevers, chills or paresthesias.  Pain 2/10.  Compliant with sling use    DVT Prophylaxis:  None      Physical Exam:     Patient is alert and oriented no acute distress.   Assistive Device:  Sling    Left shoulder Incision(s) are well healed.  There is no evidence of dehiscence.  There is no induration erythema or signs of infection.  Appropriate soft tissue swelling.  Compartments are soft and compressible.  Warm well-perfused extremity.    Imaging:     I have personally reviewed the following imaging and these are an interpretation of my findings:     None    Assessment    Holly Chicas is 2 weeks Post-op     Plan:    Overall doing as expected.  We discussed expectations of surgery and postoperative course.     Pain: Continued postoperative pain regimen -- Tylenol/ibuprofen  DVT prophylaxis:  None  PT/OT: Continue/Initiate physical therapy (weight bearing status:  Nonweightbearing), okay for passive range of motion and active assisted range of motion her protocol     Follow-up: 4 weeks   X-rays next visit:  None

## 2024-07-09 NOTE — PROGRESS NOTES
See the evaluation and plan of care with today's date in the treatment section of this patient's chart.

## 2024-07-12 ENCOUNTER — CLINICAL SUPPORT (OUTPATIENT)
Dept: REHABILITATION | Facility: HOSPITAL | Age: 54
End: 2024-07-12
Attending: ORTHOPAEDIC SURGERY
Payer: COMMERCIAL

## 2024-07-12 DIAGNOSIS — M25.612 DECREASED RANGE OF MOTION OF LEFT SHOULDER: ICD-10-CM

## 2024-07-12 DIAGNOSIS — R29.898 WEAKNESS OF LEFT SHOULDER: Primary | ICD-10-CM

## 2024-07-12 DIAGNOSIS — M62.89 ABNORMAL INCREASED MUSCLE TONE: ICD-10-CM

## 2024-07-12 DIAGNOSIS — Z74.09 DECREASED FUNCTIONAL MOBILITY AND ENDURANCE: ICD-10-CM

## 2024-07-12 DIAGNOSIS — Z55.9 SPECIAL EDUCATIONAL NEEDS: ICD-10-CM

## 2024-07-12 DIAGNOSIS — M67.912 DISORDER OF LEFT ROTATOR CUFF: ICD-10-CM

## 2024-07-12 DIAGNOSIS — M25.512 ACUTE PAIN OF LEFT SHOULDER: ICD-10-CM

## 2024-07-12 PROCEDURE — 97110 THERAPEUTIC EXERCISES: CPT | Mod: PN

## 2024-07-12 PROCEDURE — 97163 PT EVAL HIGH COMPLEX 45 MIN: CPT | Mod: PN

## 2024-07-12 SDOH — SOCIAL DETERMINANTS OF HEALTH (SDOH): PROBLEMS RELATED TO EDUCATION AND LITERACY, UNSPECIFIED: Z55.9

## 2024-07-12 NOTE — PLAN OF CARE
[facility]   Physical Therapy Initial Evaluation     Date: 7/12/2024   Name: Holly Chicas  Clinic Number: [id number]    Therapy Diagnosis:   Encounter Diagnoses   Name Primary?    Disorder of left rotator cuff     Acute pain of left shoulder     Weakness of left shoulder Yes    Decreased range of motion of left shoulder     Abnormal increased muscle tone     Decreased functional mobility and endurance     Special educational needs      Physician: Joce Montanez MD    Physician Orders: PT Eval and Treat PT: Continue/Initiate physical therapy (weight bearing status:  Nonweightbearing), okay for passive range of motion and active assisted range of motion per protocol   Medical Diagnosis from Referral: M67.912 (ICD-10-CM) - Disorder of left rotator cuff  Evaluation Date: 7/12/2024  Authorization Period Expiration: 07/05/2025  Plan of Care Expiration: 10/11/2024 at 1x/wk x 4 weeks and then 2x/wk x 8 weeks.  Progress Note Due: 08/12/2024  Visit # / Visits authorized: 1/1   FOTO: 1/1     Time In: 7:40 am  Time Out: 8:54 am  Total Appointment Time (timed & untimed codes): 74 minutes    Precautions: Standard, Weightbearing, and and surgical protocol. Continue/Initiate physical therapy (weight bearing status:  Nonweightbearing), okay for passive range of motion and active assisted range of motion per protocol.  SUBJECTIVE   Date of onset: Surgery was 06/19/2024.    History of current condition - Holly reports: She reports that she had approximately a several month history of left shoulder pain without trauma. She has a history of shoulder surgery on the left shoulder from several years ago that was done arthroscopically. She was having nighttime symptoms and pain even at rest. She was having pain in the front of the shoulder,  and she was having difficulty with overhead function. Her pain was interfering with her activities of daily living. Recent injections were not helping. It was decided  "that surgery was the best option. "He did say that I would eventually need a shoulder replacement." Today, she reports that her pain has been under control since surgery. She reports that her last right shoulder surgery was about 10 years ago.    Falls: She reports no history of falls.     Imaging: No post surgical images have been taken.    Prior Therapy: She has not had PT for her post surgical complaints.   Social History: Holly lives with her spouse.  Occupation: [employer]. This involves a lot of typing and some overhead reaching.  Prior Level of Function: She was beginning to be limited with overhead activity and activities of daily living as her pain was becoming intolerable.   Current Level of Function: She is limited by post surgical precautions and pain and weakness.    Pain:  Current 0/10, worst 8/10, best 0/10   Location: The left shoulder  Description: Sharp  Aggravating Factors: Accidental movement or accidental lifting.  Easing Factors: Cold pack and Ibuprin and rest.    Patients goals: "I'd like to be able to put my hair up in a ponytail with ease."     Medical History:   Past Medical History:   Diagnosis Date    Abnormal Pap smear     "pre-cancer cells post hysterectomy"    Allergy     Anxiety     Arthritis     Depression     Diabetes mellitus     Good hypertension control 2014    Hyperlipidemia     Hypertension     Hypertriglyceridemia     Meningitis     Narcolepsy     Peripheral neuropathy 2014    PONV (postoperative nausea and vomiting)     Psoriasis     Psoriasis 05/10/2018    Wears glasses     CONTACS     Surgical History:   Holly Chicas  has a past surgical history that includes  section; Hernia repair; Hysterectomy; Foot surgery (Left); Tonsillectomy; Shoulder surgery (); Knee surgery (Left); Gastric bypass (2015); Breast surgery (); Breast surgery (2017); Thigh lift (2017); brizilian butt lift  (2017); tummy tuck  (2016); Shoulder " surgery (01/2017); Cholecystectomy (07/2018); Augmentation of breast; Colonoscopy; Esophagogastroduodenoscopy (N/A, 04/29/2022); Colonoscopy (N/A, 04/29/2022); Carpal tunnel release (Left, 12/07/2022); Release of ulnar nerve at cubital tunnel (Left, 12/07/2022); Carpal tunnel release (Right, 05/25/2023); Release of ulnar nerve at cubital tunnel (Right, 05/25/2023); Adenoidectomy; Trigger finger release (Right, 1/11/2024); Arthroscopy of shoulder with removal of distal clavicle (Left, 6/19/2024); Arthroscopic repair of rotator cuff of shoulder (Left, 6/19/2024); and arthroscopy,shoulder,with biceps tenodesis (Left, 6/19/2024).    Medications:   Holly has a current medication list which includes the following prescription(s): accu-chek guide test strips, acyclovir, albuterol, allerg xt,d.farinae-d.pteronys, aspirin, azelastine, dextroamphetamine-amphetamine, epinephrine, estradiol, fluticasone propionate, gabapentin, ibuprofen, ibuprofen, levocetirizine, montelukast, ondansetron, ondansetron, oxycodone-acetaminophen, rosuvastatin, sunosi, stelara, and xyrem, and the following Facility-Administered Medications: diphenhydramine, electrolyte-s (ph 7.4), fentanyl, hydromorphone (pf), lactated ringers, lactated ringers, lorazepam, ondansetron, prochlorperazine, and sodium chloride 0.9%.    Allergies:   Review of patient's allergies indicates:   Allergen Reactions    Alcohol Anaphylaxis     Drinking alcohol, RESPIRATORY DISTRESS        OBJECTIVE     SHOULDER    Impaired Shoulder: Left    Hand Dominance: The patient is right handed.    Structural/Postural Inspection/Palpation: No structural abnormalities are noted. No forward shoulders. No forward head. She has her left arm in the sling until 07/18/2024. However, she is OK to take the arm out periodically throughout the day. No significant tenderness to palpation was noted at the evaluation.       Flexibility    Muscle Left Right Comment         Upper Trapezius Minimal  Increase in tone Normal    Pectoralis Minor Minimal to Moderate Increase in tone Normal          ROM     Shoulder AROM AROM  AAROM Comment    Left Right  Left    Flexion N/T 140*  145*    Extension N/T 40*  NT    ABduction N/T 125*  95*    Medial Rotation N/T 65*  60*    Lateral Rotation N/T 65*  30*                  MMT    Shoulder   Comment    Left Right    Flexion 3/5 5/5 Left shoulder scores are based on Active    Extension 3-/5 5/5 Assisted Motion from this day.   ABduction 3-/5 5/5    Medial Rotation 3-/5 5/5    Lateral Rotation 3-/5 5/5    Upper Trapezius 4/5 5/5               Shoulder Special Tests: Are limited as the patient recently had surgery    The patient's bilateral upper extremity sensation is intact to light touch and pin prick throughout each upper extremity and into the digits of each hand.     Bicep Tests  Results Comments   Speed's Test (Sup + elbow ext then resist shld fwd flx) Negative.      Instability  Results Comments   Anterior Drawer Negative.    Posterior Drawer (120* elbow flx, * shl abd, 20-30* shl fwd flx) Negative.    Sulcus  Negative.        FUNCTIONAL MOBILITY      Balance: She has normal sitting and standing static and dynamic balance.      Gait: She has a normal gait pattern and a normal gait ruy. Obviously, she has decreased left arm swing at this time.    Transfer: She is able to perform all transfers safely and without the use of her left arm.      Limitation/Restriction for FOTO Shoulder Survey    Therapist reviewed FOTO scores for Holly Chicas on 7/12/2024.   FOTO documents entered into [employer] - see Media section.    Limitation Score: 55% Function at the evaluation.       TREATMENT     Total Treatment time (time-based codes) separate from Evaluation: 25 minutes      Holly received the treatments listed below:    -Pulley's for flexion x 4 minutes  -Pulley's for scaption x 4 minutes  -Supine passive left shoulder external rotation with dowel with 10 second hold x  10  -Supine Active Assisted shoulder flexion with dowel with 5 second hold x 10  -Standing Active Assisted left shoulder abduction with dowel with 5 second hold x 10  -scapular retraction with 5 second hold x 10  -left elbow supination and pronation 2 x 10  -Codman's shown but patient already performing at home  -Wall climbs shown but patient already performing at home.    PATIENT EDUCATION AND HOME EXERCISES     Education provided:   -The patient received her initial written home exercise program this day. She returned demo to PT. She was without questions.    Written Home Exercises Provided: yes. Exercises were reviewed and Holly was able to demonstrate them prior to the end of the session.  Holly demonstrated good  understanding of the education provided. See EMR under Patient Instructions for exercises provided during therapy sessions.  ASSESSMENT     Holly is a 53 y.o. female referred to outpatient Physical Therapy with a medical diagnosis of M67.912 (ICD-10-CM) - Disorder of left rotator cuff. She is status post surgery. She presents with left shoulder pain and weakness and decreased motion. She has decreased function. She needs patient education and a custom written home exercise program.     Patient prognosis is Good.   Patientt will benefit from skilled outpatient Physical Therapy to address the deficits stated above and in the chart below, provide patient /family education, and to maximize patientt's level of independence.     Plan of care discussed with patient: Yes  Patient's spiritual, cultural and educational needs considered and patient is agreeable to the plan of care and goals as stated below:     Anticipated Barriers for therapy: her co-morbidities.    Medical Necessity is demonstrated by the following  History  Co-morbidities and personal factors that may impact the plan of care Co-morbidities:   anxiety, coping style/mechanism, depression, diabetes, difficulty sleeping, HTN, level of  undertstanding of current condition, and prior shoulder surgery, allergies, arthritis, hyperlipidemia, hypertriglyceridemia, meningitis, narcolepsy, peripheral neuropathy, psoriasis, and wears glassed.     Personal Factors:   coping style  lifestyle  character  attitudes     high   Examination  Body Structures and Functions, activity limitations and participation restrictions that may impact the plan of care Body Regions:   upper extremities    Body Systems:    gross symmetry  ROM  strength  gross coordinated movement  motor control  motor learning  Increased muscle tone and pain control    Participation Restrictions:   none    Activity limitations:   Learning and applying knowledge  no deficits    General Tasks and Commands  undertaking multiple tasks    Communication  no deficits    Mobility  lifting and carrying objects  fine hand use (grasping/picking up)  driving (bike, car, motorcycle)    Self care  washing oneself (bathing, drying, washing hands)  dressing  looking after one's health    Domestic Life  shopping  cooking  doing house work (cleaning house, washing dishes, laundry)  assisting others    Interactions/Relationships  no deficits    Life Areas  employment    Community and Social Life  community life  recreation and leisure         high   Clinical Presentation evolving clinical presentation with changing clinical characteristics high   Decision Making/ Complexity Score: high     Goals:    STG  Weeks/Visits Date Established  Date Met   1.Decrease her max left shoulder pain to 5/10 in order to improve her quality of life.  6 weeks 7/12/2024   In Progress   2. Increase her left shoulder strength one grade from the evaluation date in order to improve her lifting ability and steering wheel use.  6 weeks 7/12/2024   In Progress   3.Decrease her left pectoralis muscle tone to minimal increase in order to improve her overhead reaching ability. 6 weeks 7/12/2024   In Progress   4.Increase her FOTO function to  >=60% in order to improve her activity of daily living ability and her job task ability.  6 weeks 7/12/2024   In Progress   5.Fair initial written home exercise program knowledge and be without questions in order to have carryover after discharge from PT.  6 weeks 7/12/2024   In Progress     LTG Weeks/Visits Date Established  Date Met   1.Decrease her max left shoulder pain to 3/10 in order to improve her quality of life.  12 weeks 7/12/2024   In Progress   2. Increase her left shoulder strength to 4+/5 overall in order to improve her lifting ability, including overhead,  and steering wheel use.  12 weeks 7/12/2024     In Progress   3.Decrease her left upper trapezius and left pectoralis muscle tone to normal in order to improve her overhead reaching ability. 12 weeks 7/12/2024   In Progress   4.Increase her FOTO function to >=65% in order to improve her activity of daily living ability and her job task ability.  12 weeks 7/12/2024   In Progress   5.Good Progressed written home exercise program knowledge and be without questions in order to have carryover after discharge from PT.  12 weeks 7/12/2024   In Progress     PLAN   Plan of care Certification: 7/12/2024 to 10/11/2024.    Outpatient Physical Therapy 1 times weekly for 4 weeks and then 2 times weekly for 8 weeks to include the following interventions: Electrical Stimulation unattended and Ukrainian, Manual Therapy, Moist Heat/ Ice, Neuromuscular Re-ed, Patient Education, Self Care, Therapeutic Activities, Therapeutic Exercise, Ultrasound, and care by a PTA.     Jim Ozuna, PT      I CERTIFY THE NEED FOR THESE SERVICES FURNISHED UNDER THIS PLAN OF TREATMENT AND WHILE UNDER MY CARE   Physician's comments:     Physician's Signature: ___________________________________________________

## 2024-07-15 ENCOUNTER — PATIENT MESSAGE (OUTPATIENT)
Dept: SLEEP MEDICINE | Facility: CLINIC | Age: 54
End: 2024-07-15
Payer: COMMERCIAL

## 2024-07-15 NOTE — PROGRESS NOTES
"[facility]   Physical Therapy Treatment Note     Name: Holly Chicas  Clinic Number: [id number]    Therapy Diagnosis:   Encounter Diagnoses   Name Primary?    Acute pain of left shoulder     Weakness of left shoulder Yes    Decreased range of motion of left shoulder     Abnormal increased muscle tone     Decreased functional mobility and endurance     Special educational needs      Physician: Joce Montanez MD    Visit Date: 7/18/2024    Physician Orders: PT Eval and Treat PT: Continue/Initiate physical therapy (weight bearing status:  Nonweightbearing), okay for passive range of motion and active assisted range of motion per protocol   Medical Diagnosis from Referral: M67.912 (ICD-10-CM) - Disorder of left rotator cuff  Evaluation Date: 7/12/2024  Authorization Period Expiration: 12/31/2024  Plan of Care Expiration: 10/11/2024 at 1x/wk x 4 weeks and then 2x/wk x 8 weeks.  Progress Note Due: 08/12/2024  Visit # / Visits authorized: 1/20   (2/20 on the plan of care)   FOTO: 1/3   PTA Visit #: 0/5      Time In: 6:59 am    Time Out: 7:52 am  Total Appointment Time (timed & untimed codes): 37 minutes + 10 minutes of cold pack     Precautions: Standard, Weightbearing, and and surgical protocol. Continue/Initiate physical therapy (weight bearing status:  Nonweightbearing), okay for passive range of motion and active assisted range of motion per protocol.  SUBJECTIVE     Pt reports:  "I ordered a cane off of the internet, so that I could take it to work and also have a better device to use at home with what you have shown me. I do the pulley's every day. I am a little sore. I'd say that I am a 4/10 today." PT acknowledges.    She was compliant with home exercise program.    Response to previous treatment: This is the patient's first PT session since the evaluation.  Functional change: This is the patient's first PT session since the evaluation.    Pain: 4/10 Upon entering this " day.  Location: The left shoulder   OBJECTIVE     Objective Measures updated at progress report unless specified.     Treatment     Holly received the treatments listed below:      Holly received therapeutic exercises to develop strength, endurance, ROM, flexibility, and posture for 12 minutes including:  -left elbow supination and pronation 2 x 10 with 2 pound dumb bell (use one pound next time)  -Supine Active Assisted shoulder flexion with dowel with 5 second hold x 5  -Supine passive left shoulder external rotation with dowel with 10 second hold x 5  -+Left upper trapezius stretch with 20 second hold x 3  -+Left levator stretch with 20 second hold x 3    Below not performed this day:  -Codman's shown but patient already performing at home  -Pulley's for flexion x 4 minutes  -Pulley's for scaption x 4 minutes  -Standing Active Assisted left shoulder abduction with dowel with 5 second hold x 10  -scapular retraction with 5 second hold x 10  -Wall climbs shown but patient already performing at home.     Holly received the following manual therapy techniques: Joint mobilizations, Myofacial release, Soft tissue Mobilization, and Friction Massage were applied to the: Left glenohumeral joint for 10 minutes, including:  -+Passive motion in all planes by PT  -+Grade 1 joint mobilization per protocol  -+scapular isometrics with PT.    Holly participated in neuromuscular re-education activities to improve: Coordination, Kinesthetic, Sense, Proprioception, and Posture for 15 minutes. The following activities were included:  -+active wrist flex and extension with 2 pound dumb bell 3 x 10 each  -+sub-max isometric scapular retraction with 2 second hold 2 x 10 (elbows into mat)  -+sub-max isometric lower trapezius contraction with 2 second hold 2 x 10 (wrists into mat with arms straight)  -+active assisted left arm ceiling punches 2 x 10  -+towel squeeze with 3 second hold x 10    Holly participated in dynamic functional  therapeutic activities to improve functional performance for 0  minutes, including:  -+[place] two handed  -+UBE on week 5  -+finger   -+bike    Holly received cold pack for 10 minutes to the left shoulder post exercises.    Patient Education and Home Exercises     Home Exercises Provided and Patient Education Provided     Education provided:   -She is independent with her original written home exercise program.  -+07/18/2024 The patient received updates to her home program. She returned demo to PT. She was without questions.    Written Home Exercises Provided: yes. Exercises were reviewed and Holly was able to demonstrate them prior to the end of the session.  Holly demonstrated good  understanding of the education provided. See EMR under Patient Instructions for exercises provided during therapy sessions.  ASSESSMENT     Holly continued her routine. She was able to safely add cervical stretches this day in order to help with her upper trapezius and levator tightness. Simple resistances were added at the wrist. She continued to use good form with her passive and active assisted motion exercises. Her cervical stretches were added to her home program. Her motion is good for this portion of her protocol. She is going to start weaning from the sling at this time. A cold pack was used this day to prevent pain. She tolerated today's PT session well.    Holly Is progressing well towards her goals.   The patient's prognosis is Good.     The patient will continue to benefit from skilled outpatient physical therapy in order to address the deficits listed in the problem list box on the initial evaluation, provide patient/family education and to maximize the patient's level of independence in the home and in the community environment.     The patient's spiritual, cultural, and educational needs were considered. The patient was agreeable to the plan of care and its goals.     Anticipated barriers to physical therapy:  her co-morbidities.     Goals:     STG  Weeks/Visits Date Established  Date Met   1.Decrease her max left shoulder pain to 5/10 in order to improve her quality of life.  6 weeks 7/12/2024    In Progress 7/18/2024     2. Increase her left shoulder strength one grade from the evaluation date in order to improve her lifting ability and steering wheel use.  6 weeks 7/12/2024    In Progress 7/18/2024     3.Decrease her left pectoralis muscle tone to minimal increase in order to improve her overhead reaching ability. 6 weeks 7/12/2024    In Progress 7/18/2024     4.Increase her FOTO function to >=60% in order to improve her activity of daily living ability and her job task ability.  6 weeks 7/12/2024    In Progress 7/18/2024     5.Fair initial written home exercise program knowledge and be without questions in order to have carryover after discharge from PT.  6 weeks 7/12/2024    In Progress 7/18/2024        LTG Weeks/Visits Date Established  Date Met   1.Decrease her max left shoulder pain to 3/10 in order to improve her quality of life.  12 weeks 7/12/2024    In Progress 7/18/2024     2. Increase her left shoulder strength to 4+/5 overall in order to improve her lifting ability, including overhead,  and steering wheel use.  12 weeks 7/12/2024       In Progress 7/18/2024     3.Decrease her left upper trapezius and left pectoralis muscle tone to normal in order to improve her overhead reaching ability. 12 weeks 7/12/2024    In Progress 7/18/2024     4.Increase her FOTO function to >=65% in order to improve her activity of daily living ability and her job task ability.  12 weeks 7/12/2024    In Progress 7/18/2024     5.Good Progressed written home exercise program knowledge and be without questions in order to have carryover after discharge from PT.  12 weeks 7/12/2024    In Progress 7/18/2024        PLAN     Plan of care Certification: 7/12/2024 to 10/11/2024. Outpatient Physical Therapy 1 times weekly for 4 weeks and  then 2 times weekly for 8 weeks. Plan to improve her left shoulder motion and strength per protocol to provide pain relief. Plan to progress her home program as she tolerates.      Jim Oznua, PT

## 2024-07-17 DIAGNOSIS — G47.411 NARCOLEPSY WITH CATAPLEXY: Primary | ICD-10-CM

## 2024-07-17 RX ORDER — SODIUM OXYBATE 0.5 G/ML
4.5 SOLUTION ORAL SEE ADMIN INSTRUCTIONS
Qty: 540 ML | Refills: 3 | Status: ACTIVE | OUTPATIENT
Start: 2024-07-17

## 2024-07-18 ENCOUNTER — CLINICAL SUPPORT (OUTPATIENT)
Dept: REHABILITATION | Facility: HOSPITAL | Age: 54
End: 2024-07-18
Payer: COMMERCIAL

## 2024-07-18 DIAGNOSIS — R29.898 WEAKNESS OF LEFT SHOULDER: Primary | ICD-10-CM

## 2024-07-18 DIAGNOSIS — M25.612 DECREASED RANGE OF MOTION OF LEFT SHOULDER: ICD-10-CM

## 2024-07-18 DIAGNOSIS — Z55.9 SPECIAL EDUCATIONAL NEEDS: ICD-10-CM

## 2024-07-18 DIAGNOSIS — M25.512 ACUTE PAIN OF LEFT SHOULDER: ICD-10-CM

## 2024-07-18 DIAGNOSIS — Z74.09 DECREASED FUNCTIONAL MOBILITY AND ENDURANCE: ICD-10-CM

## 2024-07-18 DIAGNOSIS — M62.89 ABNORMAL INCREASED MUSCLE TONE: ICD-10-CM

## 2024-07-18 PROCEDURE — 97112 NEUROMUSCULAR REEDUCATION: CPT | Mod: PN

## 2024-07-18 PROCEDURE — 97140 MANUAL THERAPY 1/> REGIONS: CPT | Mod: PN

## 2024-07-18 PROCEDURE — 97110 THERAPEUTIC EXERCISES: CPT | Mod: PN

## 2024-07-18 SDOH — SOCIAL DETERMINANTS OF HEALTH (SDOH): PROBLEMS RELATED TO EDUCATION AND LITERACY, UNSPECIFIED: Z55.9

## 2024-07-24 DIAGNOSIS — G47.411 NARCOLEPSY WITH CATAPLEXY: ICD-10-CM

## 2024-07-24 RX ORDER — SOLRIAMFETOL 150 MG/1
150 TABLET, FILM COATED ORAL DAILY
Qty: 30 TABLET | Refills: 3 | Status: SHIPPED | OUTPATIENT
Start: 2024-07-24

## 2024-07-24 RX ORDER — DEXTROAMPHETAMINE SACCHARATE, AMPHETAMINE ASPARTATE, DEXTROAMPHETAMINE SULFATE AND AMPHETAMINE SULFATE 7.5; 7.5; 7.5; 7.5 MG/1; MG/1; MG/1; MG/1
1 TABLET ORAL 2 TIMES DAILY PRN
Qty: 60 TABLET | Refills: 0 | Status: SHIPPED | OUTPATIENT
Start: 2024-07-24

## 2024-07-24 NOTE — PROGRESS NOTES
"[facility]   Physical Therapy Treatment Note     Name: Holly Chicas  Clinic Number: [id number]    Therapy Diagnosis:   Encounter Diagnoses   Name Primary?    Acute pain of left shoulder     Weakness of left shoulder Yes    Decreased range of motion of left shoulder     Abnormal increased muscle tone     Decreased functional mobility and endurance     Special educational needs      Physician: Joce Montanez MD    Visit Date: 7/25/2024    Physician Orders: PT Eval and Treat PT: Continue/Initiate physical therapy (weight bearing status:  Nonweightbearing), okay for passive range of motion and active assisted range of motion per protocol   Medical Diagnosis from Referral: M67.912 (ICD-10-CM) - Disorder of left rotator cuff  Evaluation Date: 7/12/2024  Authorization Period Expiration: 12/31/2024  Plan of Care Expiration: 10/11/2024 at 1x/wk x 4 weeks and then 2x/wk x 8 weeks.  Progress Note Due: 08/12/2024  Visit # / Visits authorized: 2/20   (3/20 on the plan of care)   FOTO: 1/3   PTA Visit #: 0/5      Time In: 6:59 am  +++PRECHARTED Sees Jim next+++++++++++++++  Time Out: 8:11 am  Total Appointment Time (timed & untimed codes): 50 minutes + 10 minutes of cold pack     Precautions: Standard, Weightbearing, and and surgical protocol. Continue/Initiate physical therapy (weight bearing status:  Nonweightbearing), okay for passive range of motion and active assisted range of motion per protocol.  SUBJECTIVE     Pt reports:   "I haven't had any new problems since being out of the sling. I really just totally weaned out yesterday. I am a little sore this morning, but not too bad. I may be a 3/10." PT acknowledges.    She was compliant with home exercise program.    Response to previous treatment: She enters with no new  Functional change: She has safely weaned out of her post surgical sling.    Pain: 3/10 Upon entering this day.  Location: The left shoulder   OBJECTIVE     Objective " Measures updated at progress report unless specified.   Surgery was 06/19/2024          She is 36 days post surgery as of 07/26/2024  Treatment     Holly received the treatments listed below:  Neuro, act, manual, ex++    Holly received therapeutic exercises to develop strength, endurance, ROM, flexibility, and posture for 10 minutes including:  -Pulley's for flexion x 2 minutes  -Pulley's for scaption x 2 minutes  -Left upper trapezius stretch with 20 second hold x 2  -Left levator stretch with 20 second hold x 2    Below not performed this day:  -left elbow supination and pronation 2 x 10 with 2 pound dumb bell (use one pound next time)  -Supine Active Assisted shoulder flexion with dowel with 5 second hold x 5  -Supine passive left shoulder external rotation with dowel with 10 second hold x 5  -Standing Active Assisted left shoulder abduction with dowel with 5 second hold x 10  -scapular retraction with 5 second hold x 10     Holly received the following manual therapy techniques: Joint mobilizations, Myofacial release, Soft tissue Mobilization, and Friction Massage were applied to the: Left glenohumeral joint for 10 minutes, including:  -Passive motion in all planes by PT  -Grade 1 joint mobilization per protocol  -scapular isometrics with PT.  ++ADD elbow Ottoman stretch when needed++++++++    Holly participated in neuromuscular re-education activities to improve: Coordination, Kinesthetic, Sense, Proprioception, and Posture for 30 minutes. The following activities were included:  -+Left shoulder isometric: flexion, extension, abduction, adduction, external rotation and internal rotation with 2 second hold 2 x 10 of each  -+Active assisted wall slides with 2 second hold x 10  -+Passive range of motion shoulder abduction on table 2 x 10 with 3 second hold  -+Passive range of motion shoulder flexion on table 2 x 10 with 3 second hold  -+Table slide scaption 2 x 10 with 3 second hold    Below not Performed this  day:  -active wrist flex and extension with 2 pound dumb bell 3 x 10 each  -sub-max isometric scapular retraction with 2 second hold 2 x 10 (elbows into mat)  -sub-max isometric lower trapezius contraction with 2 second hold 2 x 10 (wrists into mat with arms straight)  -active assisted left arm ceiling punches 2 x 10  -towel squeeze with 3 second hold x 10    Holly participated in dynamic functional therapeutic activities to improve functional performance for 0  minutes, including:  WEEK 6 or >  -+Land Mine two handed  -+Upper body ergometer on Level 1 forwards and backwards x 10 minutes  -+finger     Holly received cold pack for 10 minutes to the left shoulder post exercises.    Patient Education and Home Exercises     Home Exercises Provided and Patient Education Provided     Education provided:   -She is independent with her original written home exercise program.  -07/18/2024 The patient received updates to her home program. She returned demo to PT. She was without questions.  -+07/25/2024 The patient received updates to her home program this day. She returned demo to PT. She was without questions.     Written Home Exercises Provided: yes. Exercises were reviewed and Holly was able to demonstrate them prior to the end of the session.  Holly demonstrated good  understanding of the education provided. See EMR under Patient Instructions for exercises provided during therapy sessions.  ASSESSMENT     Holly continued her routine. As of today, she is five weeks post surgery. She has recently weaned out of her sling. She was able to progress her scapula stabilizing exercises this day. She safely added submax isometrics. She reported no pain. She also exhibited good end range passive motion. Flexion, abduction, and scaption were near normal. External rotation remains tight, but the current Protocol range of motion is achieved. Her home program was updated this day. She can benefit from skilled PT in order to  safely progress her through her Protocol. A cold pack was used this day to prevent pain. She tolerated today's PT session well.    Holly Is progressing well towards her goals.   The patient's prognosis is Good.     The patient will continue to benefit from skilled outpatient physical therapy in order to address the deficits listed in the problem list box on the initial evaluation, provide patient/family education and to maximize the patient's level of independence in the home and in the community environment.     The patient's spiritual, cultural, and educational needs were considered. The patient was agreeable to the plan of care and its goals.     Anticipated barriers to physical therapy: her co-morbidities.     Goals:     STG  Weeks/Visits Date Established  Date Met   1.Decrease her max left shoulder pain to 5/10 in order to improve her quality of life.  6 weeks 7/12/2024    In Progress 7/25/2024     2. Increase her left shoulder strength one grade from the evaluation date in order to improve her lifting ability and steering wheel use.  6 weeks 7/12/2024    In Progress 7/25/2024     3.Decrease her left pectoralis muscle tone to minimal increase in order to improve her overhead reaching ability. 6 weeks 7/12/2024    In Progress 7/25/2024     4.Increase her FOTO function to >=60% in order to improve her activity of daily living ability and her job task ability.  6 weeks 7/12/2024    In Progress 7/25/2024     5.Fair initial written home exercise program knowledge and be without questions in order to have carryover after discharge from PT.  6 weeks 7/12/2024    In Progress 7/25/2024        LTG Weeks/Visits Date Established  Date Met   1.Decrease her max left shoulder pain to 3/10 in order to improve her quality of life.  12 weeks 7/12/2024    In Progress 7/25/2024     2. Increase her left shoulder strength to 4+/5 overall in order to improve her lifting ability, including overhead,  and steering wheel use.  12 weeks  7/12/2024       In Progress 7/25/2024     3.Decrease her left upper trapezius and left pectoralis muscle tone to normal in order to improve her overhead reaching ability. 12 weeks 7/12/2024    In Progress 7/25/2024     4.Increase her FOTO function to >=65% in order to improve her activity of daily living ability and her job task ability.  12 weeks 7/12/2024    In Progress 7/25/2024     5.Good Progressed written home exercise program knowledge and be without questions in order to have carryover after discharge from PT.  12 weeks 7/12/2024    In Progress 7/25/2024        PLAN     Plan of care Certification: 7/12/2024 to 10/11/2024. Outpatient Physical Therapy 1 times weekly for 4 weeks and then 2 times weekly for 8 weeks. Plan to improve her left shoulder motion and strength per protocol to provide pain relief. Plan to continue to progress her home program as she tolerates.      Jim Ozuna, PT

## 2024-07-25 ENCOUNTER — CLINICAL SUPPORT (OUTPATIENT)
Dept: REHABILITATION | Facility: HOSPITAL | Age: 54
End: 2024-07-25
Payer: COMMERCIAL

## 2024-07-25 ENCOUNTER — TELEPHONE (OUTPATIENT)
Dept: PHARMACY | Facility: CLINIC | Age: 54
End: 2024-07-25
Payer: COMMERCIAL

## 2024-07-25 DIAGNOSIS — M25.512 ACUTE PAIN OF LEFT SHOULDER: ICD-10-CM

## 2024-07-25 DIAGNOSIS — M25.612 DECREASED RANGE OF MOTION OF LEFT SHOULDER: ICD-10-CM

## 2024-07-25 DIAGNOSIS — Z74.09 DECREASED FUNCTIONAL MOBILITY AND ENDURANCE: ICD-10-CM

## 2024-07-25 DIAGNOSIS — Z55.9 SPECIAL EDUCATIONAL NEEDS: ICD-10-CM

## 2024-07-25 DIAGNOSIS — R29.898 WEAKNESS OF LEFT SHOULDER: Primary | ICD-10-CM

## 2024-07-25 DIAGNOSIS — M62.89 ABNORMAL INCREASED MUSCLE TONE: ICD-10-CM

## 2024-07-25 PROCEDURE — 97110 THERAPEUTIC EXERCISES: CPT | Mod: PN

## 2024-07-25 PROCEDURE — 97140 MANUAL THERAPY 1/> REGIONS: CPT | Mod: PN

## 2024-07-25 PROCEDURE — 97112 NEUROMUSCULAR REEDUCATION: CPT | Mod: PN

## 2024-07-25 SDOH — SOCIAL DETERMINANTS OF HEALTH (SDOH): PROBLEMS RELATED TO EDUCATION AND LITERACY, UNSPECIFIED: Z55.9

## 2024-08-01 NOTE — PROGRESS NOTES
"[facility]   Physical Therapy Treatment Note     Name: Holly Chicas  Clinic Number: [id number]    Therapy Diagnosis:   Encounter Diagnoses   Name Primary?    Acute pain of left shoulder     Weakness of left shoulder     Decreased range of motion of left shoulder     Abnormal increased muscle tone     Decreased functional mobility and endurance Yes    Special educational needs      Physician: Joce Montanez MD    Visit Date: 8/2/2024    Physician Orders: PT Eval and Treat PT: Continue/Initiate physical therapy (weight bearing status:  Nonweightbearing), okay for passive range of motion and active assisted range of motion per protocol   Medical Diagnosis from Referral: M67.912 (ICD-10-CM) - Disorder of left rotator cuff  Evaluation Date: 7/12/2024  Authorization Period Expiration: 12/31/2024  Plan of Care Expiration: 10/11/2024 at 1x/wk x 4 weeks and then 2x/wk x 8 weeks.  Progress Note Due: 08/13/2024  Visit # / Visits authorized: 3/20   (4/20 on the plan of care)   FOTO: 1/3            +++++++do 2nd FOTO 08/13/2024+++++++++++++  PTA Visit #: 0/5      Time In: 8:58 am   Time Out: 10:11 am  Total Appointment Time (timed & untimed codes): 39 minutes + 10 minutes of cold pack     Precautions: Standard, Weightbearing, and and surgical protocol. Continue/Initiate physical therapy (weight bearing status:  Nonweightbearing), okay for passive range of motion and active assisted range of motion per protocol.  SUBJECTIVE     Pt reports:  "I am sore today. I may have slept on it wrong. I'd say that I am a 4/10. I believe that I am using it too much. I am being careful with my home program. I see Dr. Montanez after I leave here." PT acknowledges.    She was compliant with home exercise program.    Response to previous treatment: She enters with minimal left shoulder pain this day.   Functional change: Overuse of the left arm appears to have increased her pain. It appears to be more biceps tendon " related.     Pain: 4/10 Upon entering this day.  Location: The left shoulder   OBJECTIVE     Objective Measures updated at progress report unless specified.   Surgery was 06/19/2024          She is 43 days post surgery as of 08/02/2024  Treatment   +++A PT tech under the direct supervision of this PT helped in the care of this patient for 10 minutes this day.  Holly received the treatments listed below:  ACT, neuro, ex++++++++    Holly received therapeutic exercises to develop strength, endurance, ROM, flexibility, and posture for 0 minutes including:  -Pulley's for flexion x 2 minutes  -Pulley's for scaption x 2 minutes  -Left upper trapezius stretch with 20 second hold x 2  -Left levator stretch with 20 second hold x 2    Below not performed this day:  -left elbow supination and pronation 2 x 10 with 2 pound dumb bell (use one pound next time)  -Supine Active Assisted shoulder flexion with dowel with 5 second hold x 5  -Supine passive left shoulder external rotation with dowel with 10 second hold x 5  -Standing Active Assisted left shoulder abduction with dowel with 5 second hold x 10  -scapular retraction with 5 second hold x 10     Holly received the following manual therapy techniques: Joint mobilizations, Myofacial release, Soft tissue Mobilization, and Friction Massage were applied to the: Left glenohumeral joint for 10 minutes, including:  -Passive motion in all planes by PT  -Grade 1 joint mobilization per protocol    After her 08/02/2024 follow up visit:  -See if shoulder version of Ottoman stretch is beneficial    Holly participated in neuromuscular re-education activities to improve: Coordination, Kinesthetic, Sense, Proprioception, and Posture for 15 minutes. The following activities were included:  -sub-max isometric scapular retraction with 2 second hold  x 15 (elbows into mat)  -sub-max isometric lower trapezius contraction with 2 second hold x 15 (wrists into mat with arms straight)  -prone,  left shoulder: extension, abduction, and scapular retraction in pain free ranges x 10 each (no flexion to today)  -supine passive flexion with dowel x 10  -supine bench press with 3 pound dowel x 10    Below not performed this day:  -Left shoulder isometric: flexion, extension, abduction, adduction, external rotation and internal rotation with 2 second hold 2 x 10 of each  -Active assisted wall slides with 2 second hold x 10  -Table slide scaption 2 x 10 with 3 second hold  -active assisted left arm ceiling punches 2 x 10    Holly participated in dynamic functional therapeutic activities to improve functional performance for 14 minutes, including:  -+Upper body ergometer on Level 1 forwards and backwards in an active assisted manner and without pain.  -+Tip of fingers  squeeze using a towel with 2-3 second hold x 20    Can begin soon if no pain:  -Land Mine (two handed)    Holly received cold pack for 10 minutes to the left shoulder post exercises.  Patient Education and Home Exercises     Home Exercises Provided and Patient Education Provided     Education provided:   -She is independent with her original written home exercise program.  -07/18/2024 The patient received updates to her home program. She returned demo to PT. She was without questions.  -07/25/2024 The patient received updates to her home program this day. She returned demo to PT. She was without questions.     Written Home Exercises Provided: Patient instructed to cont prior HEP. Exercises were reviewed and Holly was able to demonstrate them prior to the end of the session.  Holly demonstrated good  understanding of the education provided. See EMR under Patient Instructions for exercises provided during therapy sessions.  ASSESSMENT     Holly entered in good spirits. She is now six weeks post surgery. Today, she reported shoulder soreness as she entered. PT assessed the shoulder. No new issues were noted. Her complaints seem to stim from  possible over use of the left arm within the past week. This includes isometric and active use of her left bicep. She was tender at the bicep's tendon insertion. Exercises were kept pain free this day. She sees her surgeon later this morning. She is to rest the shoulder and apply cold packs for the next 48 hours. She should be able to initiate week 7 of the protocol routine at the next PT session. She has good left shoulder motion. She needs to begin to safely progress her strengthening, including her scapula stabilizers, to help with overhead use. She can benefit from skilled PT in order to safely progress her through her Protocol. A cold pack was used this day to prevent pain. She tolerated today's PT session well.    Holly Is progressing well towards her goals.   The patient's prognosis is Good.     The patient will continue to benefit from skilled outpatient physical therapy in order to address the deficits listed in the problem list box on the initial evaluation, provide patient/family education and to maximize the patient's level of independence in the home and in the community environment.     The patient's spiritual, cultural, and educational needs were considered. The patient was agreeable to the plan of care and its goals.     Anticipated barriers to physical therapy: her co-morbidities.     Goals:     STG  Weeks/Visits Date Established  Date Met   1.Decrease her max left shoulder pain to 5/10 in order to improve her quality of life.  6 weeks 7/12/2024    In Progress 8/2/2024     2. Increase her left shoulder strength one grade from the evaluation date in order to improve her lifting ability and steering wheel use.  6 weeks 7/12/2024    In Progress 8/2/2024     3.Decrease her left pectoralis muscle tone to minimal increase in order to improve her overhead reaching ability. 6 weeks 7/12/2024    In Progress 8/2/2024     4.Increase her FOTO function to >=60% in order to improve her activity of daily living  ability and her job task ability.  6 weeks 7/12/2024    In Progress 8/2/2024     5.Fair initial written home exercise program knowledge and be without questions in order to have carryover after discharge from PT.  6 weeks 7/12/2024    In Progress 8/2/2024        LTG Weeks/Visits Date Established  Date Met   1.Decrease her max left shoulder pain to 3/10 in order to improve her quality of life.  12 weeks 7/12/2024    In Progress 8/2/2024     2. Increase her left shoulder strength to 4+/5 overall in order to improve her lifting ability, including overhead,  and steering wheel use.  12 weeks 7/12/2024       In Progress 8/2/2024     3.Decrease her left upper trapezius and left pectoralis muscle tone to normal in order to improve her overhead reaching ability. 12 weeks 7/12/2024    In Progress 8/2/2024     4.Increase her FOTO function to >=65% in order to improve her activity of daily living ability and her job task ability.  12 weeks 7/12/2024    In Progress 8/2/2024     5.Good Progressed written home exercise program knowledge and be without questions in order to have carryover after discharge from PT.  12 weeks 7/12/2024    In Progress 8/2/2024        PLAN     Plan of care Certification: 7/12/2024 to 10/11/2024. Outpatient Physical Therapy 1 times weekly for 4 weeks and then 2 times weekly for 8 weeks. Plan to improve her left shoulder motion and strength per protocol to provide pain relief. Plan to continue to progress her home program as she tolerates.      Jim Ozuna, PT

## 2024-08-02 ENCOUNTER — OFFICE VISIT (OUTPATIENT)
Dept: ORTHOPEDICS | Facility: CLINIC | Age: 54
End: 2024-08-02
Payer: COMMERCIAL

## 2024-08-02 ENCOUNTER — CLINICAL SUPPORT (OUTPATIENT)
Dept: REHABILITATION | Facility: HOSPITAL | Age: 54
End: 2024-08-02
Payer: COMMERCIAL

## 2024-08-02 VITALS — HEIGHT: 66 IN | WEIGHT: 162.94 LBS | BODY MASS INDEX: 26.19 KG/M2 | RESPIRATION RATE: 18 BRPM

## 2024-08-02 DIAGNOSIS — R29.898 WEAKNESS OF LEFT SHOULDER: ICD-10-CM

## 2024-08-02 DIAGNOSIS — M67.912 DISORDER OF LEFT ROTATOR CUFF: Primary | ICD-10-CM

## 2024-08-02 DIAGNOSIS — Z74.09 DECREASED FUNCTIONAL MOBILITY AND ENDURANCE: Primary | ICD-10-CM

## 2024-08-02 DIAGNOSIS — M62.89 ABNORMAL INCREASED MUSCLE TONE: ICD-10-CM

## 2024-08-02 DIAGNOSIS — M25.612 DECREASED RANGE OF MOTION OF LEFT SHOULDER: ICD-10-CM

## 2024-08-02 DIAGNOSIS — M25.512 ACUTE PAIN OF LEFT SHOULDER: ICD-10-CM

## 2024-08-02 DIAGNOSIS — Z55.9 SPECIAL EDUCATIONAL NEEDS: ICD-10-CM

## 2024-08-02 PROCEDURE — 97530 THERAPEUTIC ACTIVITIES: CPT | Mod: PN

## 2024-08-02 PROCEDURE — 97112 NEUROMUSCULAR REEDUCATION: CPT | Mod: PN

## 2024-08-02 PROCEDURE — 97140 MANUAL THERAPY 1/> REGIONS: CPT | Mod: PN

## 2024-08-02 PROCEDURE — 99999 PR PBB SHADOW E&M-EST. PATIENT-LVL IV: CPT | Mod: PBBFAC,,, | Performed by: ORTHOPAEDIC SURGERY

## 2024-08-02 RX ORDER — CYCLOBENZAPRINE HCL 10 MG
10 TABLET ORAL 3 TIMES DAILY PRN
Qty: 30 TABLET | Refills: 0 | Status: SHIPPED | OUTPATIENT
Start: 2024-08-02 | End: 2024-08-12

## 2024-08-02 SDOH — SOCIAL DETERMINANTS OF HEALTH (SDOH): PROBLEMS RELATED TO EDUCATION AND LITERACY, UNSPECIFIED: Z55.9

## 2024-08-02 NOTE — PROGRESS NOTES
Post-op Note    HPI    Holly Chicas is here 6 weeks s/p the following procedure:     PROCEDURE:       Arthroscopic left shoulder rotator cuff repair, supraspinatus  Arthroscopic left shoulder biceps tenodesis  Arthroscopic left shoulder subacromial decompression with acromioplasty  Arthroscopic left shoulder distal clavicle excision  Extensive arthroscopic debridement of the left shoulder including debridement of the humeral head cartilage, bursa and labrum    Overall doing well.  Doing well with PT.  Only issue is some pain in the biceps region and spasming.  No significant pain in the shoulder.    Physical Exam:     Patient is alert and oriented no acute distress.   Assistive Device:  None    Left shoulder Incision(s) are well healed.  There is no evidence of dehiscence.  There is no induration erythema or signs of infection.  Appropriate soft tissue swelling.  Compartments are soft and compressible.  Warm well-perfused extremity.  No obvious deformity of the biceps.  External rotation at the side 30°.  Passive forward flexion 130°.    Imaging:     I have personally reviewed the following imaging and these are an interpretation of my findings:     None    Assessment    Holly Chicas is 6  weeks Post-op     Plan:    Overall doing as expected.  We discussed expectations of surgery and postoperative course.     Pain: Continued postoperative pain regimen -- Tylenol/ibuprofen  DVT prophylaxis:  None  PT/OT: Continue/Initiate physical therapy (weight bearing status:  Nonweightbearing), okay for passive range of motion and active assisted range of motion her protocol.  Okay to increase strengthening per protocol  Flexeril to pharmacy    Follow-up: 8 weeks   X-rays next visit:  None

## 2024-08-08 ENCOUNTER — DOCUMENTATION ONLY (OUTPATIENT)
Dept: REHABILITATION | Facility: HOSPITAL | Age: 54
End: 2024-08-08

## 2024-08-08 ENCOUNTER — CLINICAL SUPPORT (OUTPATIENT)
Dept: REHABILITATION | Facility: HOSPITAL | Age: 54
End: 2024-08-08
Payer: COMMERCIAL

## 2024-08-08 DIAGNOSIS — M25.512 ACUTE PAIN OF LEFT SHOULDER: Primary | ICD-10-CM

## 2024-08-08 DIAGNOSIS — M25.612 DECREASED RANGE OF MOTION OF LEFT SHOULDER: ICD-10-CM

## 2024-08-08 DIAGNOSIS — R29.898 WEAKNESS OF LEFT SHOULDER: ICD-10-CM

## 2024-08-08 DIAGNOSIS — Z55.9 SPECIAL EDUCATIONAL NEEDS: ICD-10-CM

## 2024-08-08 DIAGNOSIS — M62.89 ABNORMAL INCREASED MUSCLE TONE: ICD-10-CM

## 2024-08-08 DIAGNOSIS — Z74.09 DECREASED FUNCTIONAL MOBILITY AND ENDURANCE: ICD-10-CM

## 2024-08-08 PROCEDURE — 97112 NEUROMUSCULAR REEDUCATION: CPT | Mod: KX,PN,CQ

## 2024-08-08 PROCEDURE — 97140 MANUAL THERAPY 1/> REGIONS: CPT | Mod: KX,PN,CQ

## 2024-08-08 PROCEDURE — 97110 THERAPEUTIC EXERCISES: CPT | Mod: KX,PN,CQ

## 2024-08-08 SDOH — SOCIAL DETERMINANTS OF HEALTH (SDOH): PROBLEMS RELATED TO EDUCATION AND LITERACY, UNSPECIFIED: Z55.9

## 2024-08-13 ENCOUNTER — LAB VISIT (OUTPATIENT)
Dept: LAB | Facility: HOSPITAL | Age: 54
End: 2024-08-13
Attending: STUDENT IN AN ORGANIZED HEALTH CARE EDUCATION/TRAINING PROGRAM
Payer: COMMERCIAL

## 2024-08-13 DIAGNOSIS — Z78.0 POST-MENOPAUSE: ICD-10-CM

## 2024-08-13 LAB
ALBUMIN SERPL BCP-MCNC: 3.4 G/DL (ref 3.5–5.2)
ALP SERPL-CCNC: 103 U/L (ref 55–135)
ALT SERPL W/O P-5'-P-CCNC: 38 U/L (ref 10–44)
ANION GAP SERPL CALC-SCNC: 9 MMOL/L (ref 8–16)
AST SERPL-CCNC: 27 U/L (ref 10–40)
BILIRUB SERPL-MCNC: 0.2 MG/DL (ref 0.1–1)
BUN SERPL-MCNC: 23 MG/DL (ref 6–20)
CALCIUM SERPL-MCNC: 8.9 MG/DL (ref 8.7–10.5)
CHLORIDE SERPL-SCNC: 108 MMOL/L (ref 95–110)
CO2 SERPL-SCNC: 24 MMOL/L (ref 23–29)
CREAT SERPL-MCNC: 0.6 MG/DL (ref 0.5–1.4)
EST. GFR  (NO RACE VARIABLE): >60 ML/MIN/1.73 M^2
GLUCOSE SERPL-MCNC: 121 MG/DL (ref 70–110)
POTASSIUM SERPL-SCNC: 3.6 MMOL/L (ref 3.5–5.1)
PROT SERPL-MCNC: 6.3 G/DL (ref 6–8.4)
SODIUM SERPL-SCNC: 141 MMOL/L (ref 136–145)

## 2024-08-13 PROCEDURE — 36415 COLL VENOUS BLD VENIPUNCTURE: CPT | Mod: PO | Performed by: STUDENT IN AN ORGANIZED HEALTH CARE EDUCATION/TRAINING PROGRAM

## 2024-08-13 PROCEDURE — 80053 COMPREHEN METABOLIC PANEL: CPT | Performed by: STUDENT IN AN ORGANIZED HEALTH CARE EDUCATION/TRAINING PROGRAM

## 2024-08-13 NOTE — PROGRESS NOTES
"[facility]   Physical Therapy Treatment Note/RE-ASSESSMENT     Name: Holly Chicas  Clinic Number: [id number]    Therapy Diagnosis:   Encounter Diagnoses   Name Primary?    Acute pain of left shoulder     Weakness of left shoulder     Decreased range of motion of left shoulder     Abnormal increased muscle tone     Decreased functional mobility and endurance Yes    Special educational needs      Physician: Joce Montanez MD    Visit Date: 8/15/2024    Physician Orders: PT Eval and Treat  08/02/2024 PT/OT: Continue/Initiate physical therapy (weight bearing status: Nonweightbearing), okay for passive range of motion and active assisted range of motion her protocol. Okay to increase strengthening per protocol                                   Medical Diagnosis from Referral: M67.912 (ICD-10-CM) - Disorder of left rotator cuff  Evaluation Date: 7/12/2024  Authorization Period Expiration: 12/31/2024  Plan of Care Expiration: 10/11/2024 at 1x/wk x 4 weeks and then 2x/wk x 8 weeks.  Progress Note Due: 09/15/2024  Visit # / Visits authorized: 5/20   (6/20 on the plan of care)   FOTO: 1/3            +++++++2nd FOTO done 08/15/2024+++++++++++++++++++++++    Time In: 7:04 am   Time Out: 7:49 am  Total Appointment Time (timed & untimed codes): 40 minutes     Precautions: Standard, Weightbearing, and and surgical protocol. 08/02/2024 PT/OT: Continue/Initiate physical therapy (weight bearing status: Nonweightbearing), okay for passive range of motion and active assisted range of motion her protocol. Okay to increase strengthening per protocol                     SUBJECTIVE     Pt reports: "It's about the same. I am moving it better though. Like right now, I'd say the pain is maybe a 2/10. I'd say the most pain within the past couple of days was a 9/10. It is not all the time. It has occurred when I bring a piece of candy to my mouth. Or, when I bring my phone to my ear." PT acknowledges. The " patient points to the area of the long head of the biceps muscle.  ++Patient requested to leave early this day so as to not be late for work++    She was compliant with her home exercise program.    Response to previous treatment: She is continuing to report periodic anterior shoulder joint pain.  Functional change: She is reporting improvement in her ability to move her shoulder throughout its ranges.     Pain: 2/10 Upon entering the clinic this day. Max of 9/10 within the past 3-4 days.  Location: The left shoulder   OBJECTIVE     Objective Measures updated at progress report unless specified.   Surgery was 06/19/2024        She is 55 days post surgery as of 08/15/2024     Flexibility     Muscle Left Right Comment             Upper Trapezius Minimal Increase in tone Normal     Pectoralis Minor Minimal Increase in tone > Minimal to Moderate Increase in tone Normal           ROM                Shoulder AROM AROM AAROM Comment     Left Right Left     Flexion    N/T 140* 160* > 145*     Extension    N/T 40*   NT     ABduction    N/T 125* 140* >  95*     Medial Rotation    N/T 65* 65* >  60*     Lateral Rotation    N/T 65* 45* >  30*                        MMT     Shoulder     Comment     Left Right     Flexion 3+/5 > 3/5 5/5 Left shoulder scores are based on Active    Extension 3/5 > 3-/5 5/5 Assisted Motion from this day.   ABduction 3/5 > 3-/5 5/5     Medial Rotation 3+/5 > 3-/5 5/5     Lateral Rotation 3/5 > 3-/5 5/5     Upper Trapezius    4/5 5/5                     FUNCTIONAL MOBILITY                            Limitation/Restriction for FOTO Shoulder Survey     Therapist reviewed FOTO scores for Holly Chicas on 08/13/2024.   FOTO documents entered into [employer] - see Media section.     Limitation Score: 51% Function 08/15/2024 > 55% Function at the evaluation.         Treatment     Holly received the treatments listed below:  ACT, neuro, ex++++++++     Holly received therapeutic exercises to develop strength,  endurance, ROM, flexibility, and posture for 0 minutes including:  ++added to last home program by keny: shoulder rows, and cervical motion  -scapular retraction with 5 second hold x 10  -Pulley's for flexion x 2 minutes  -Pulley's for scaption x 2 minutes  +++ADD gentle wall push ups+++    Not Performed this day:  -Left upper trapezius stretch with 20 second hold x 2+++++++++return to if needed  -Left levator stretch with 20 second hold x 2      Holly received the following manual therapy techniques: Joint mobilizations, Myofacial release, Soft tissue Mobilization, and Friction Massage were applied to the: Left glenohumeral joint for 0 minutes, including:  -Passive motion in all planes by PT  -Grade 1 glenohumeral joint mobilization per protocol  -Grade 1, Superior/inferior clavicular mobilizations grade   ++shoulder Ottoman stretch?++    Holly participated in neuromuscular re-education activities to improve: Coordination, Kinesthetic, Sense, Proprioception, muscle recruitment and Posture for 0 minutes. The following activities were included:  +++active assisted band and other exercises????+++++++++  -prone, left shoulder: extension, abduction, and scapular retraction in pain free ranges x 10 each (add flexion and 1 pound today)  -supine passive flexion with dowel x 10  -supine bench press with 3 pound dowel x 10     Below not performed this day:  -sub-max isometric scapular retraction with 2 second hold  x 15 (elbows into mat)  -sub-max isometric lower trapezius contraction with 2 second hold x 15 (wrists into mat with arms straight)  -Left shoulder isometric: flexion, extension, abduction, adduction, external rotation and internal rotation with 2 second hold 2 x 10 of each  -Active assisted wall slides with 2 second hold x 10  -Table slide scaption 2 x 10 with 3 second hold  -active assisted left arm ceiling punches 2 x 10    Holly participated in dynamic functional therapeutic activities to improve  functional performance for 40 minutes, including:  -Upper body ergometer on Level 2 forwards and backwards in an active assisted manner and without pain x 6 minutes.  -+active abduction to 90* x 10, extension x 10, and flexion to 90*x 10   -+Internal rotation stretch with towel and no pain and pulling straight across x 10 (palms up)  -Standing Assistive forward flexion to end range x 10 with dowel  - Standing Active Assistive abduction to end range with dowel x 10  -Standing active assistive external rotation with dowel x 10 with palms up  -+biceps door stretch with 30 second hold x 3 (left arm at 45*-60* of abduction  -+Active Assistive bilateral green band extension x 10 and green band trunk rotation the left x 10  -+green band walk outs for internal rotation and external rotation x 5 each  +++RE-ASSESSMENT TESTS+++  +++ADD++Land Mine (two handed)     Holly received cold pack for 10 minutes to the left shoulder post exercises. Not Performed  Patient Education and Home Exercises     Home Exercises Provided and Patient Education Provided     Education provided:   -She is independent with her original written home exercise program.  -07/18/2024 The patient received updates to her home program. She returned demo to PT. She was without questions.  -07/25/2024 The patient received updates to her home program this day. She returned demo to PT. She was without questions.   -08/08/2024 The patient received an updated home program from the PTA.    Written Home Exercises Provided: Patient instructed to cont prior HEP. Exercises were reviewed and Holly was able to demonstrate them prior to the end of the session.  Holly demonstrated good  understanding of the education provided. See EMR under Patient Instructions for exercises provided during therapy sessions.  RE-ASSESSMENT     Holly was re-assessed this day. As expected, she continues to report various intensities of left shoulder pain. For the most part, increased left  biceps use seems to be the current source. She is tender to touch at the long head of the biceps. A simple biceps stretch was added this day. She reports better ease of motion and better end range motion. She reports improved functional use of her left arm. Her FOTO from today does not indicate this, but she subjectively confirms that she is. Her end range active assisted motion is good in all directions. Her left shoulder strength is slowly increasing. Care needs to be taken to continue strengthening without causing pain. She will need to avoid impingement. She needs continued scapula strengthening to assist with this. Stretching of the upper trapezius was also emphasized this day to avoid compensation. No modality was needed this day as she had to return to work. She can benefit from continued skilled PT to safely advance her through this stage of her protocol. She works hard. She tolerated today's PT session well.      Holly Is progressing well towards her goals.   The patient's prognosis is Good.     The patient will continue to benefit from skilled outpatient physical therapy in order to address the deficits listed in the problem list box on the initial evaluation, provide patient/family education and to maximize the patient's level of independence in the home and in the community environment.     The patient's spiritual, cultural, and educational needs were considered. The patient was agreeable to the plan of care and its goals.     Anticipated barriers to physical therapy: her co-morbidities.     Goals:     STG  Weeks/Visits Date Established  Date Met   1.Decrease her max left shoulder pain to 5/10 in order to improve her quality of life.  6 weeks 7/12/2024    In Progress 8/15/2024     2. Increase her left shoulder strength one grade from the evaluation date in order to improve her lifting ability and steering wheel use.  6 weeks 7/12/2024    In Progress 8/15/2024     3.Decrease her left pectoralis muscle  tone to minimal increase in order to improve her overhead reaching ability. 6 weeks 7/12/2024    Goal Met 8/15/2024       4.Increase her FOTO function to >=60% in order to improve her activity of daily living ability and her job task ability.  6 weeks 7/12/2024    In Progress 8/15/2024  51%   5.Fair initial written home exercise program knowledge and be without questions in order to have carryover after discharge from PT.  6 weeks 7/12/2024    Goal Met 8/15/2024          LTG Weeks/Visits Date Established  Date Met   1.Decrease her max left shoulder pain to 3/10 in order to improve her quality of life.  12 weeks 7/12/2024    In Progress 8/15/2024     2. Increase her left shoulder strength to 4+/5 overall in order to improve her lifting ability, including overhead,  and steering wheel use.  12 weeks 7/12/2024       In Progress 8/15/2024     3.Decrease her left upper trapezius and left pectoralis muscle tone to normal in order to improve her overhead reaching ability. 12 weeks 7/12/2024    In Progress 8/15/2024     4.Increase her FOTO function to >=65% in order to improve her activity of daily living ability and her job task ability.  12 weeks 7/12/2024    In Progress 8/15/2024     5.Good Progressed written home exercise program knowledge and be without questions in order to have carryover after discharge from PT.  12 weeks 7/12/2024    In Progress 8/15/2024        PLAN     Plan of care Certification: 7/12/2024 to 10/11/2024. Outpatient Physical Therapy 1 times weekly for 4 weeks and then 2 times weekly for 8 weeks. Plan to continue to improve her left shoulder motion and strength per protocol to provide pain relief. Plan to continue to progress her home program as she tolerates.      Jim Ozuna, PT

## 2024-08-14 DIAGNOSIS — E78.5 HYPERLIPIDEMIA ASSOCIATED WITH TYPE 2 DIABETES MELLITUS: ICD-10-CM

## 2024-08-14 DIAGNOSIS — E11.69 HYPERLIPIDEMIA ASSOCIATED WITH TYPE 2 DIABETES MELLITUS: ICD-10-CM

## 2024-08-14 RX ORDER — ROSUVASTATIN CALCIUM 5 MG/1
5 TABLET, COATED ORAL
Qty: 90 TABLET | Refills: 1 | Status: SHIPPED | OUTPATIENT
Start: 2024-08-14

## 2024-08-14 NOTE — TELEPHONE ENCOUNTER
Refill Decision Note   Holly Chicas  is requesting a refill authorization.  Brief Assessment and Rationale for Refill:  Approve     Medication Therapy Plan:         Comments:     Note composed:7:55 AM 08/14/2024

## 2024-08-14 NOTE — TELEPHONE ENCOUNTER
No care due was identified.  [facility] Embedded Care Due Messages. Reference number: [id number].   8/14/2024 1:39:17 AM CDT

## 2024-08-15 ENCOUNTER — CLINICAL SUPPORT (OUTPATIENT)
Dept: REHABILITATION | Facility: HOSPITAL | Age: 54
End: 2024-08-15
Payer: COMMERCIAL

## 2024-08-15 DIAGNOSIS — Z55.9 SPECIAL EDUCATIONAL NEEDS: ICD-10-CM

## 2024-08-15 DIAGNOSIS — R29.898 WEAKNESS OF LEFT SHOULDER: ICD-10-CM

## 2024-08-15 DIAGNOSIS — M62.89 ABNORMAL INCREASED MUSCLE TONE: ICD-10-CM

## 2024-08-15 DIAGNOSIS — Z74.09 DECREASED FUNCTIONAL MOBILITY AND ENDURANCE: Primary | ICD-10-CM

## 2024-08-15 DIAGNOSIS — M25.612 DECREASED RANGE OF MOTION OF LEFT SHOULDER: ICD-10-CM

## 2024-08-15 DIAGNOSIS — M25.512 ACUTE PAIN OF LEFT SHOULDER: ICD-10-CM

## 2024-08-15 PROCEDURE — 97530 THERAPEUTIC ACTIVITIES: CPT | Mod: PN

## 2024-08-15 SDOH — SOCIAL DETERMINANTS OF HEALTH (SDOH): PROBLEMS RELATED TO EDUCATION AND LITERACY, UNSPECIFIED: Z55.9

## 2024-08-24 ENCOUNTER — PATIENT MESSAGE (OUTPATIENT)
Dept: SLEEP MEDICINE | Facility: CLINIC | Age: 54
End: 2024-08-24
Payer: COMMERCIAL

## 2024-08-24 DIAGNOSIS — E11.69 HYPERLIPIDEMIA ASSOCIATED WITH TYPE 2 DIABETES MELLITUS: ICD-10-CM

## 2024-08-24 DIAGNOSIS — E78.5 HYPERLIPIDEMIA ASSOCIATED WITH TYPE 2 DIABETES MELLITUS: ICD-10-CM

## 2024-08-25 NOTE — TELEPHONE ENCOUNTER
No care due was identified.  [facility] Embedded Care Due Messages. Reference number: [id number].   8/24/2024 8:26:32 PM CDT

## 2024-08-26 DIAGNOSIS — G47.411 NARCOLEPSY WITH CATAPLEXY: ICD-10-CM

## 2024-08-26 RX ORDER — ROSUVASTATIN CALCIUM 5 MG/1
5 TABLET, COATED ORAL NIGHTLY
Qty: 90 TABLET | Refills: 3 | Status: SHIPPED | OUTPATIENT
Start: 2024-08-26

## 2024-08-26 RX ORDER — DEXTROAMPHETAMINE SACCHARATE, AMPHETAMINE ASPARTATE, DEXTROAMPHETAMINE SULFATE AND AMPHETAMINE SULFATE 7.5; 7.5; 7.5; 7.5 MG/1; MG/1; MG/1; MG/1
30 TABLET ORAL 2 TIMES DAILY
Qty: 60 TABLET | Refills: 0 | Status: SHIPPED | OUTPATIENT
Start: 2024-08-26

## 2024-08-26 NOTE — PROGRESS NOTES
8/24/24  8:34 PM  I need a refill of dextroamphetamine-amphetamine 30 mg Tab. The rigo will not let me select it for a refill.

## 2024-09-10 ENCOUNTER — PATIENT MESSAGE (OUTPATIENT)
Dept: FAMILY MEDICINE | Facility: CLINIC | Age: 54
End: 2024-09-10
Payer: COMMERCIAL

## 2024-09-12 ENCOUNTER — PATIENT MESSAGE (OUTPATIENT)
Dept: FAMILY MEDICINE | Facility: CLINIC | Age: 54
End: 2024-09-12

## 2024-09-12 ENCOUNTER — OFFICE VISIT (OUTPATIENT)
Dept: FAMILY MEDICINE | Facility: CLINIC | Age: 54
End: 2024-09-12
Payer: COMMERCIAL

## 2024-09-12 DIAGNOSIS — I10 PRIMARY HYPERTENSION: Primary | ICD-10-CM

## 2024-09-12 DIAGNOSIS — Z88.9 HISTORY OF ALLERGIC REACTION: ICD-10-CM

## 2024-09-12 DIAGNOSIS — E11.9 TYPE 2 DIABETES MELLITUS WITHOUT COMPLICATION, WITHOUT LONG-TERM CURRENT USE OF INSULIN: ICD-10-CM

## 2024-09-12 DIAGNOSIS — G47.411 PRIMARY NARCOLEPSY WITH CATAPLEXY: ICD-10-CM

## 2024-09-12 PROCEDURE — 1159F MED LIST DOCD IN RCRD: CPT | Mod: CPTII,95,, | Performed by: STUDENT IN AN ORGANIZED HEALTH CARE EDUCATION/TRAINING PROGRAM

## 2024-09-12 PROCEDURE — 3066F NEPHROPATHY DOC TX: CPT | Mod: CPTII,95,, | Performed by: STUDENT IN AN ORGANIZED HEALTH CARE EDUCATION/TRAINING PROGRAM

## 2024-09-12 PROCEDURE — 99396 PREV VISIT EST AGE 40-64: CPT | Mod: 95,,, | Performed by: STUDENT IN AN ORGANIZED HEALTH CARE EDUCATION/TRAINING PROGRAM

## 2024-09-12 PROCEDURE — 2023F DILAT RTA XM W/O RTNOPTHY: CPT | Mod: CPTII,95,, | Performed by: STUDENT IN AN ORGANIZED HEALTH CARE EDUCATION/TRAINING PROGRAM

## 2024-09-12 PROCEDURE — 3044F HG A1C LEVEL LT 7.0%: CPT | Mod: CPTII,95,, | Performed by: STUDENT IN AN ORGANIZED HEALTH CARE EDUCATION/TRAINING PROGRAM

## 2024-09-12 PROCEDURE — 1160F RVW MEDS BY RX/DR IN RCRD: CPT | Mod: CPTII,95,, | Performed by: STUDENT IN AN ORGANIZED HEALTH CARE EDUCATION/TRAINING PROGRAM

## 2024-09-12 PROCEDURE — 3060F POS MICROALBUMINURIA REV: CPT | Mod: CPTII,95,, | Performed by: STUDENT IN AN ORGANIZED HEALTH CARE EDUCATION/TRAINING PROGRAM

## 2024-09-12 RX ORDER — EPINEPHRINE 0.3 MG/.3ML
1 INJECTION INTRAMUSCULAR
Qty: 2 EACH | Refills: 1 | Status: SHIPPED | OUTPATIENT
Start: 2024-09-12 | End: 2025-09-12

## 2024-09-12 RX ORDER — AMLODIPINE BESYLATE 5 MG/1
5 TABLET ORAL NIGHTLY
Qty: 90 TABLET | Refills: 1 | Status: SHIPPED | OUTPATIENT
Start: 2024-09-12 | End: 2025-03-11

## 2024-09-12 NOTE — PROGRESS NOTES
"  The patient location is:  Patient Home [place]  The chief complaint leading to consultation is:   Chief Complaint   Patient presents with    Medication Refill     Total time spent with patient: 20 min     Visit type: Virtual visit with synchronous audio and video  Each patient to whom he or she provides medical services by telemedicine is:  (1) informed of the relationship between the physician and patient and the respective role of any other health care provider with respect to management of the patient; and (2) notified that he or she may decline to receive medical services by telemedicine and may withdraw from such care at any time.    This service was not originating from a related E/M service provided within the previous 7 days nor will  to an E/M service or procedure within the next 24 hours or my soonest available appointment.  Prevailing standard of care was able to be met in this synchronous audio and video visit    Subjective:    Chief Complaint:   Chief Complaint   Patient presents with    Medication Refill     274}  HPI:  53 y.o. female with Type 2 Diabetes Mellitus, hypertension,  HLD, Psoriasis, and Narcolepsy who presents for virtual follow up and medication refill.    The HPI and pertinent ROS is included in the Diagnostic Impression Remarks section at the end of the note. Please see below for further details.     The following portions of the patient's history were reviewed and updated as appropriate: allergies, current medications, past family history, past medical history, past social history, past surgical history and problem list.  Past Medical History:   Diagnosis Date    Abnormal Pap smear 1996    "pre-cancer cells post hysterectomy"    Allergy     Anxiety     Arthritis     Depression     Diabetes mellitus     Good hypertension control 09/11/2014    Hyperlipidemia     Hypertension     Hypertriglyceridemia     Meningitis     Narcolepsy     Peripheral neuropathy 09/12/2014    PONV " (postoperative nausea and vomiting)     Psoriasis     Psoriasis 05/10/2018    Wears glasses     CONTACS     Past Surgical History:   Procedure Laterality Date    ADENOIDECTOMY      ARTHROSCOPIC REPAIR OF ROTATOR CUFF OF SHOULDER Left 2024    Procedure: REPAIR, ROTATOR CUFF, ARTHROSCOPIC;  Surgeon: Joce Montanez MD;  Location: [facility] OR;  Service: Orthopedics;  Laterality: Left;    ARTHROSCOPY OF SHOULDER WITH REMOVAL OF DISTAL CLAVICLE Left 2024    Procedure: ARTHROSCOPY, SHOULDER, WITH DISTAL CLAVICLE EXCISION;  Surgeon: Joce Montanez MD;  Location: [facility] OR;  Service: Orthopedics;  Laterality: Left;  Reginaldo notified 24 ark    ARTHROSCOPY,SHOULDER,WITH BICEPS TENODESIS Left 2024    Procedure: ARTHROSCOPY,SHOULDER,WITH BICEPS TENODESIS;  Surgeon: Joce Montanez MD;  Location: [facility] OR;  Service: Orthopedics;  Laterality: Left;    AUGMENTATION OF BREAST      BREAST SURGERY      aumentation/ reduction     BREAST SURGERY  2017    augmentation     brizilian butt lift   2017    CARPAL TUNNEL RELEASE Left 2022    Procedure: RELEASE, CARPAL TUNNEL;  Surgeon: Joce Montanez MD;  Location: [facility] OR;  Service: Orthopedics;  Laterality: Left;    CARPAL TUNNEL RELEASE Right 2023    Procedure: RELEASE, CARPAL TUNNEL;  Surgeon: Joce Montanez MD;  Location: [facility];  Service: Orthopedics;  Laterality: Right;     SECTION      CHOLECYSTECTOMY  2018    Dr SEAN [facility]    COLONOSCOPY      COLONOSCOPY N/A 2022    Procedure: COLONOSCOPY;  Surgeon: Kavita Yee MD;  Location: [facility];  Service: Endoscopy;  Laterality: N/A;    ESOPHAGOGASTRODUODENOSCOPY N/A 2022    Procedure: EGD (ESOPHAGOGASTRODUODENOSCOPY);  Surgeon: Kavita Yee MD;  Location: [facility] ENDO;  Service: Endoscopy;  Laterality: N/A;    FOOT SURGERY Left     CYST REMOVAL    GASTRIC BYPASS  2015    HERNIA REPAIR      UHR    HYSTERECTOMY      KNEE SURGERY Left      SCOPE    RELEASE OF ULNAR NERVE AT CUBITAL TUNNEL Left 12/07/2022    Procedure: RELEASE, ULNAR TUNNEL;  Surgeon: Joce Montanez MD;  Location: [facility] OR;  Service: Orthopedics;  Laterality: Left;    RELEASE OF ULNAR NERVE AT CUBITAL TUNNEL Right 05/25/2023    Procedure: RELEASE, ULNAR TUNNEL;  Surgeon: Joce Montanez MD;  Location: [facility] OR;  Service: Orthopedics;  Laterality: Right;    SHOULDER SURGERY  2000    right should surgery     SHOULDER SURGERY  01/2017    left shoulder / torn labrium     THIGH LIFT  03/03/2017    TONSILLECTOMY      TRIGGER FINGER RELEASE Right 1/11/2024    Procedure: RELEASE, TRIGGER FINGER;  Surgeon: Joce Montanez MD;  Location: [facility] OR;  Service: Orthopedics;  Laterality: Right;  thumb    tummy tuck   02/28/2016     Social History  Social History     Tobacco Use    Smoking status: Never    Smokeless tobacco: Never   Substance Use Topics    Alcohol use: No     Comment: ALLERGIC TO ALCOHOL(DRINKING) PER PATIENT    Drug use: No     Family History   Problem Relation Name Age of Onset    Cancer Mother          breast bilateral/ skin     Breast cancer Mother      Skin cancer Mother      Diabetes Father      Heart disease Father  65        CABG    Hypertension Father      Cancer Father          skin    Skin cancer Father      Gout Father      Colon polyps Father      Gout Brother 1     No Known Problems Daughter 2     No Known Problems Son 1     Cancer Maternal Aunt 3         lung, pancrease - smoker , breast    Lung cancer Maternal Aunt 3         x2    Pancreatic cancer Maternal Aunt 3     Breast cancer Maternal Aunt 3     No Known Problems Maternal Uncle 1     No Known Problems Paternal Uncle 1     Cancer Maternal Grandmother          pancrease    Pancreatic cancer Maternal Grandmother      Breast cancer Maternal Grandmother      Diabetes Paternal Grandmother      Heart disease Paternal Grandfather      Colon cancer Maternal Grandfather      Ovarian cancer Neg Hx       "Ulcerative colitis Neg Hx      Stomach cancer Neg Hx      Crohn's disease Neg Hx      Rectal cancer Neg Hx       Review of patient's allergies indicates:   Allergen Reactions    Alcohol Anaphylaxis     Drinking alcohol, RESPIRATORY DISTRESS       274}  Physical Examination  There were no vitals taken for this visit.  Wt Readings from Last 3 Encounters:   08/02/24 73.9 kg (162 lb 14.7 oz)   07/05/24 73.9 kg (162 lb 14.7 oz)   06/19/24 73.9 kg (163 lb)     BP Readings from Last 3 Encounters:   06/19/24 138/81   05/24/24 (!) 143/90   02/27/24 138/86     Estimated body mass index is 26.3 kg/m² as calculated from the following:    Height as of 8/2/24: 5' 6" (1.676 m).    Weight as of 8/2/24: 73.9 kg (162 lb 14.7 oz).       CONSTITUTIONAL: No apparent distress. Does not appear acutely ill or septic. Appears adequately hydrated.  PULM: Breathing unlabored.  PSYCHIATRIC: Alert and conversant and grossly oriented. Mood is grossly neutral. Affect appropriate. Judgment and insight grossly intact.  Integument: normal coloration and turgor, no rashes, no suspicious skin lesions noted.    Data reviewed  Previous medical records reviewed and summarized in HPI.   274}    Laboratory  I have reviewed old labs below:  Lab Results   Component Value Date    WBC 7.29 06/14/2024    HGB 11.2 (L) 06/14/2024    HCT 35.0 (L) 06/14/2024    MCV 85 06/14/2024     06/14/2024     08/13/2024    K 3.6 08/13/2024     08/13/2024    CALCIUM 8.9 08/13/2024    CO2 24 08/13/2024     (H) 08/13/2024    BUN 23 (H) 08/13/2024    CREATININE 0.6 08/13/2024    ANIONGAP 9 08/13/2024    ESTGFRAFRICA >60.0 12/02/2021    EGFRNONAA >60.0 12/02/2021    PROT 6.3 08/13/2024    ALBUMIN 3.4 (L) 08/13/2024    BILITOT 0.2 08/13/2024    ALKPHOS 103 08/13/2024    ALT 38 08/13/2024    AST 27 08/13/2024    CHOL 136 06/14/2024    TRIG 60 06/14/2024    HDL 59 06/14/2024    LDLCALC 65.0 06/14/2024    TSH 1.174 06/01/2023    HGBA1C 6.6 (H) 09/13/2024 " "    Lab reviewed by me: Particular labs of significance that I will monitor, workup, or treat to improve are mentioned below in diagnostic impression remarks.  274}  Imaging/EKG: I have reviewed the pertinent results/findings and my personal findings are noted below in diagnostic impression remarks.     Assessment/Plan  Holly Chicas is a 53 y.o. female who presents to clinic with:    1. Primary hypertension    2. Type 2 diabetes mellitus without complication, without long-term current use of insulin    3. Primary narcolepsy with cataplexy    4. History of allergic reaction         Diagnostic Impression Remarks + HPI     Documentation entered by me for this encounter may have been done in part using speech-recognition technology. Although I have made an effort to ensure accuracy, "sound like" errors may exist and should be interpreted in context.           This is the extent of the patient's complaints at this present time. She denies chest pain upon exertion, dyspnea, nausea, vomiting, diaphoresis, and syncope. No pleuritic chest pain, unilateral leg swelling, calf tenderness, or calf pain.     Holly will return to clinic in a few months for further workup and reassessment or sooner as needed. She was instructed to call the clinic or go to the emergency department if her symptoms do not improve, worsens, or if new symptoms develop. As we discussed that symptoms could worsen over the next 24 hours she was advised that if any increased swelling, pain, or numbness arise to go immediately to the ED. Patient knows to call any time if an emergency arises. Shared decision making occurred and she verbalized understanding in agreement with this plan.   274}  BMI Goal    Counseled patient on her ideal body weight, health consequences of being obese and current recommendations including weekly exercise and a heart healthy diet.  She is aware that ideal BMI < 25  Estimated body mass index is 26.3 kg/m² as calculated from the " "following:    Height as of 8/2/24: 5' 6" (1.676 m).    Weight as of 8/2/24: 73.9 kg (162 lb 14.7 oz).     She was counseled about the importance of healthy dietary habits as well as routine physical activity and exercise for better health outcomes. I also discussed the importance of cancer screening.     Medication Monitoring    In today's visit, monitoring for drug toxicity was accomplished. Proper use of medications was also discussed.     Counseling    Eat Less Unhealthy Fat   -Cut back on saturated fats and trans (also called hydrogenated) fats. A diet thats high in these fats increases your bad cholesterol. Its not enough to just cut back on foods containing cholesterol.   -Eat about 2 servings of fish per week. Most fish contain omega-3 fatty acids. These help lower blood cholesterol.   -Eat more whole grains and soluble fiber (such as oat bran). These lower overall cholesterol.   -Counseled that most cholesterol is made in the liver and only a minority comes from diet.    Be Active   -Choose an activity you enjoy. Walking, swimming, and riding a bike are some good ways to be active.   -Start at a level where you feel comfortable. Increase your time and pace a little each week.   -Work up to 30 minutes on most days. You can break this up into three 10-minute periods.   -Remember, some activity is better than none.   -If you havent been exercising regularly, start slowly. Check with your doctor to make sure the exercise plan is right for you.     Take Medication As Directed: Many patients need medication to get their LDL levels to a safe level. Medication to lower cholesterol levels is effective and safe. (But taking medication is not a substitute for exercise or watching your diet!).    I discussed imaging findings, diagnosis, possibilities, treatment options, medications, risks, and benefits. She had many questions regarding the options and long-term effects. All questions were answered. She expressed " understanding after counseling regarding the diagnosis and recommendations. She was capable and demonstrated competence with understanding of these options. Shared decision making was performed resulting in her choosing the current treatment plan.     I also discussed the importance of close follow up to discuss labs, change or modify her medications if needed, monitor side effects, and further evaluation of medical problems.     Additional workup planned: see labs ordered below.    See below for labs and meds ordered with associated diagnosis    Primary hypertension  Comments:  Reports Elevated avg daily BP readings >140-150. (atleast 3 measurements). Will start trial of Amlodipine 5mg  po qHS  Orders:  -     amLODIPine (NORVASC) 5 MG tablet; Take 1 tablet (5 mg total) by mouth every evening.  Dispense: 90 tablet; Refill: 1    Type 2 diabetes mellitus without complication, without long-term current use of insulin  -     HEMOGLOBIN A1C; Future; Expected date: 09/12/2024    Primary narcolepsy with cataplexy  Comments:  Controlled on present medication. Followed neurology. will continue to monitor    History of allergic reaction  Comments:  Refill Epi-pen  Orders:  -     EPINEPHrine (EPIPEN 2-ANGI) 0.3 mg/0.3 mL AtIn; Inject 0.3 mLs (0.3 mg total) into the muscle as needed (Allergic Reaction).  Dispense: 2 each; Refill: 1         Medication List with Changes/Refills   New Medications    AMLODIPINE (NORVASC) 5 MG TABLET    Take 1 tablet (5 mg total) by mouth every evening.    EPINEPHRINE (EPIPEN 2-ANGI) 0.3 MG/0.3 ML ATIN    Inject 0.3 mLs (0.3 mg total) into the muscle as needed (Allergic Reaction).   Current Medications    ACCU-CHEK GUIDE TEST STRIPS STRP        ACYCLOVIR (ZOVIRAX) 400 MG TABLET    Take 400 mg by mouth 3 (three) times daily.    ALBUTEROL (PROVENTIL HFA) 90 MCG/ACTUATION INHALER    Inhale 2 puffs into the lungs every 6 (six) hours as needed for Wheezing. Rescue    AZELASTINE (ASTELIN) 137 MCG (0.1 %)  NASAL SPRAY    1 spray in each nosril twice a day    DEXTROAMPHETAMINE-AMPHETAMINE 30 MG TAB    Take 1 tablet (30 mg total) by mouth 2 (two) times daily.    EPINEPHRINE (EPIPEN) 0.3 MG/0.3 ML ATIN    Inject 0.3 mLs (0.3 mg total) into the muscle once. for 1 dose    ESTRADIOL (ESTRACE) 0.01 % (0.1 MG/GRAM) VAGINAL CREAM    USE 1/2 GRAM VAGINALLY 4 TIMES WEEKLY AS DIRECTED    FLUTICASONE PROPIONATE (FLONASE) 50 MCG/ACTUATION NASAL SPRAY    1 spray in each nostril twice a day    GABAPENTIN (NEURONTIN) 600 MG TABLET    Take 1 tablet (600 mg total) by mouth 3 (three) times daily.    LEVOCETIRIZINE (XYZAL) 5 MG TABLET    Take 1 tablet (5 mg total) by mouth every evening. For allergies    MONTELUKAST (SINGULAIR) 10 MG TABLET    Take 1 tablet (10 mg total) by mouth every evening.    ONDANSETRON (ZOFRAN-ODT) 4 MG TBDL    Take 1 tablet (4 mg total) by mouth every 8 (eight) hours as needed (nausea).    ROSUVASTATIN (CRESTOR) 5 MG TABLET    Take 1 tablet (5 mg total) by mouth every evening.    SOLRIAMFETOL (SUNOSI) 150 MG TAB    Take 1 tablet by mouth once daily.    STELARA 45 MG/0.5 ML SYRG SYRINGE    Q 3 MONTHS    XYREM 500 MG/ML SOLN    every evening.   Discontinued Medications    ALLERG XT,D.FARINAE-D.PTERONYS (ODACTRA) 12 SQ-HDM SUBL    Place 1 tablet under the tongue once daily.    ASPIRIN (ECOTRIN) 81 MG EC TABLET    Take 1 tablet (81 mg total) by mouth 2 (two) times a day.    IBUPROFEN (ADVIL,MOTRIN) 200 MG TABLET    Take 200 mg by mouth every 6 (six) hours as needed for Pain.    IBUPROFEN (ADVIL,MOTRIN) 600 MG TABLET    Take 1 tablet (600 mg total) by mouth 3 (three) times daily.    ONDANSETRON (ZOFRAN-ODT) 4 MG TBDL    Take 1 tablet (4 mg total) by mouth every 8 (eight) hours as needed (nausea).    OXYCODONE-ACETAMINOPHEN (PERCOCET) 5-325 MG PER TABLET    Take 1 tablet by mouth every 6 (six) hours as needed for Pain.    SODIUM OXYBATE 500 MG/ML SOLN    Take 4.5 g by mouth As instructed.     Modified Medications    No  "medications on file       Marcy Joe DO  09/14/2024     Documentation entered by me for this encounter may have been done in part using speech-recognition technology. Although I have made an effort to ensure accuracy, "sound like" errors may exist and should be interpreted in context.  "

## 2024-09-13 ENCOUNTER — LAB VISIT (OUTPATIENT)
Dept: LAB | Facility: HOSPITAL | Age: 54
End: 2024-09-13
Attending: STUDENT IN AN ORGANIZED HEALTH CARE EDUCATION/TRAINING PROGRAM
Payer: COMMERCIAL

## 2024-09-13 DIAGNOSIS — E11.9 TYPE 2 DIABETES MELLITUS WITHOUT COMPLICATION, WITHOUT LONG-TERM CURRENT USE OF INSULIN: ICD-10-CM

## 2024-09-13 LAB
ESTIMATED AVG GLUCOSE: 143 MG/DL (ref 68–131)
HBA1C MFR BLD: 6.6 % (ref 4–5.6)

## 2024-09-13 PROCEDURE — 36415 COLL VENOUS BLD VENIPUNCTURE: CPT | Mod: PO | Performed by: STUDENT IN AN ORGANIZED HEALTH CARE EDUCATION/TRAINING PROGRAM

## 2024-09-13 PROCEDURE — 83036 HEMOGLOBIN GLYCOSYLATED A1C: CPT | Performed by: STUDENT IN AN ORGANIZED HEALTH CARE EDUCATION/TRAINING PROGRAM

## 2024-09-14 NOTE — PROGRESS NOTES
Please inform patient of the following:    Your Hemoglobin A1C has increased from the prior reading. I do recommend continuing a low carbohydrate diet, reducing consumption of sugary drinks/sweets. You can increase intake of protein, and high fiber foods. No further changes are recommended to you treatment plan at this time.     If you have any further questions please contact our office    Sincerely,    Marcy Joe, DO

## 2024-09-16 ENCOUNTER — TELEPHONE (OUTPATIENT)
Dept: FAMILY MEDICINE | Facility: CLINIC | Age: 54
End: 2024-09-16
Payer: COMMERCIAL

## 2024-09-16 NOTE — TELEPHONE ENCOUNTER
----- Message from Marcy Joe DO sent at 9/14/2024 11:29 AM CDT -----  Please inform patient of the following:    Your Hemoglobin A1C has increased from the prior reading. I do recommend continuing a low carbohydrate diet, reducing consumption of sugary drinks/sweets. You can increase intake of protein, and high fiber foods. No further changes are recommended to you treatment plan at this time.     If you have any further questions please contact our office    Sincerely,    Marcy Joe DO

## 2024-09-26 ENCOUNTER — CLINICAL SUPPORT (OUTPATIENT)
Dept: OTHER | Facility: CLINIC | Age: 54
End: 2024-09-26
Payer: COMMERCIAL

## 2024-09-26 DIAGNOSIS — Z00.8 ENCOUNTER FOR OTHER GENERAL EXAMINATION: ICD-10-CM

## 2024-09-26 DIAGNOSIS — G47.411 NARCOLEPSY WITH CATAPLEXY: ICD-10-CM

## 2024-09-26 PROCEDURE — 99401 PREV MED CNSL INDIV APPRX 15: CPT | Mod: S$GLB,,, | Performed by: INTERNAL MEDICINE

## 2024-09-26 PROCEDURE — 83036 HEMOGLOBIN GLYCOSYLATED A1C: CPT | Mod: QW,S$GLB,, | Performed by: INTERNAL MEDICINE

## 2024-09-26 PROCEDURE — 80061 LIPID PANEL: CPT | Mod: QW,S$GLB,, | Performed by: INTERNAL MEDICINE

## 2024-09-26 PROCEDURE — 82947 ASSAY GLUCOSE BLOOD QUANT: CPT | Mod: QW,S$GLB,, | Performed by: INTERNAL MEDICINE

## 2024-09-26 RX ORDER — DEXTROAMPHETAMINE SACCHARATE, AMPHETAMINE ASPARTATE, DEXTROAMPHETAMINE SULFATE AND AMPHETAMINE SULFATE 7.5; 7.5; 7.5; 7.5 MG/1; MG/1; MG/1; MG/1
30 TABLET ORAL 2 TIMES DAILY
Qty: 60 TABLET | Refills: 0 | Status: SHIPPED | OUTPATIENT
Start: 2024-09-26

## 2024-09-27 VITALS
DIASTOLIC BLOOD PRESSURE: 92 MMHG | HEIGHT: 66 IN | WEIGHT: 171 LBS | BODY MASS INDEX: 27.48 KG/M2 | SYSTOLIC BLOOD PRESSURE: 158 MMHG

## 2024-09-27 LAB
GLUCOSE SERPL-MCNC: 105 MG/DL (ref 60–140)
HBA1C MFR BLD: 5.5 %
HDLC SERPL-MCNC: 58 MG/DL
POC CHOLESTEROL, LDL (DOCK): 41 MG/DL
POC CHOLESTEROL, TOTAL: 112 MG/DL
TRIGL SERPL-MCNC: 57 MG/DL

## 2024-10-25 DIAGNOSIS — G47.411 NARCOLEPSY WITH CATAPLEXY: ICD-10-CM

## 2024-10-25 RX ORDER — DEXTROAMPHETAMINE SACCHARATE, AMPHETAMINE ASPARTATE, DEXTROAMPHETAMINE SULFATE AND AMPHETAMINE SULFATE 7.5; 7.5; 7.5; 7.5 MG/1; MG/1; MG/1; MG/1
30 TABLET ORAL 2 TIMES DAILY
Qty: 60 TABLET | Refills: 0 | Status: SHIPPED | OUTPATIENT
Start: 2024-10-25

## 2024-11-08 ENCOUNTER — HOSPITAL ENCOUNTER (OUTPATIENT)
Dept: RADIOLOGY | Facility: HOSPITAL | Age: 54
Discharge: HOME | End: 2024-11-08
Attending: STUDENT IN AN ORGANIZED HEALTH CARE EDUCATION/TRAINING PROGRAM
Payer: COMMERCIAL

## 2024-11-08 DIAGNOSIS — Z00.00 ENCOUNTER FOR WELLNESS EXAMINATION IN ADULT: ICD-10-CM

## 2024-11-08 PROCEDURE — 77067 SCR MAMMO BI INCL CAD: CPT | Mod: 26,,, | Performed by: RADIOLOGY

## 2024-11-08 PROCEDURE — 77063 BREAST TOMOSYNTHESIS BI: CPT | Mod: 26,,, | Performed by: RADIOLOGY

## 2024-11-08 PROCEDURE — 77063 BREAST TOMOSYNTHESIS BI: CPT | Mod: TC,PO

## 2024-11-12 NOTE — PROGRESS NOTES
Please inform patient of the following:    Luigi Ms Holly CASTAÑEDA Juan Francisco    Your mammogram results were normal.  It is recommended to repeat annual screening in one year.     Please contact our office if you have any further questions.     Sincerely,     Marcy Joe, DO

## 2024-11-21 DIAGNOSIS — G47.411 NARCOLEPSY WITH CATAPLEXY: ICD-10-CM

## 2024-11-21 DIAGNOSIS — R06.02 SHORTNESS OF BREATH: ICD-10-CM

## 2024-11-21 RX ORDER — LEVOCETIRIZINE DIHYDROCHLORIDE 5 MG/1
5 TABLET, FILM COATED ORAL NIGHTLY
Qty: 90 TABLET | Refills: 3 | Status: SHIPPED | OUTPATIENT
Start: 2024-11-21

## 2024-11-21 RX ORDER — DEXTROAMPHETAMINE SACCHARATE, AMPHETAMINE ASPARTATE, DEXTROAMPHETAMINE SULFATE AND AMPHETAMINE SULFATE 7.5; 7.5; 7.5; 7.5 MG/1; MG/1; MG/1; MG/1
30 TABLET ORAL 2 TIMES DAILY
Qty: 60 TABLET | Refills: 0 | Status: SHIPPED | OUTPATIENT
Start: 2024-11-21

## 2024-11-21 NOTE — TELEPHONE ENCOUNTER
Requested Prescriptions     Pending Prescriptions Disp Refills    dextroamphetamine-amphetamine 30 mg Tab 60 tablet 0     Sig: Take 1 tablet (30 mg total) by mouth 2 (two) times daily.     Lov 12/08/23

## 2024-11-21 NOTE — TELEPHONE ENCOUNTER
No care due was identified.  [facility] Embedded Care Due Messages. Reference number: [id number].   11/21/2024 3:21:24 PM CST

## 2024-11-22 NOTE — TELEPHONE ENCOUNTER
Refill Decision Note   Holly Chicas  is requesting a refill authorization.  Brief Assessment and Rationale for Refill:  Approve     Medication Therapy Plan:         Comments:     Note composed:6:56 PM 11/21/2024

## 2024-11-30 ENCOUNTER — PATIENT MESSAGE (OUTPATIENT)
Dept: SLEEP MEDICINE | Facility: CLINIC | Age: 54
End: 2024-11-30
Payer: COMMERCIAL

## 2024-12-02 DIAGNOSIS — G47.411 NARCOLEPSY WITH CATAPLEXY: ICD-10-CM

## 2024-12-02 RX ORDER — SOLRIAMFETOL 150 MG/1
150 TABLET, FILM COATED ORAL DAILY
Qty: 30 TABLET | Refills: 3 | Status: SHIPPED | OUTPATIENT
Start: 2024-12-02

## 2024-12-27 ENCOUNTER — PATIENT MESSAGE (OUTPATIENT)
Dept: SLEEP MEDICINE | Facility: CLINIC | Age: 54
End: 2024-12-27
Payer: COMMERCIAL

## 2024-12-27 DIAGNOSIS — G47.419 NARCOLEPSY WITHOUT CATAPLEXY: Primary | ICD-10-CM

## 2024-12-27 DIAGNOSIS — G47.411 NARCOLEPSY WITH CATAPLEXY: ICD-10-CM

## 2024-12-27 RX ORDER — SODIUM OXYBATE 0.5 G/ML
4.5 SOLUTION ORAL SEE ADMIN INSTRUCTIONS
Qty: 540 ML | Refills: 5 | Status: ACTIVE | OUTPATIENT
Start: 2024-12-27

## 2024-12-28 DIAGNOSIS — G47.411 NARCOLEPSY WITH CATAPLEXY: ICD-10-CM

## 2024-12-28 DIAGNOSIS — I10 PRIMARY HYPERTENSION: ICD-10-CM

## 2024-12-28 NOTE — TELEPHONE ENCOUNTER
No care due was identified.  [facility] Embedded Care Due Messages. Reference number: [id number].   12/28/2024 8:21:30 AM CST

## 2024-12-30 RX ORDER — AMLODIPINE BESYLATE 5 MG/1
5 TABLET ORAL NIGHTLY
Qty: 90 TABLET | Refills: 2 | Status: SHIPPED | OUTPATIENT
Start: 2024-12-30 | End: 2025-06-28

## 2024-12-30 NOTE — TELEPHONE ENCOUNTER
Requesting refill for    Amlodipine   Last visit 9/12/2024   Next visit  1/8/2025  Changing pharmacy's

## 2024-12-30 NOTE — TELEPHONE ENCOUNTER
Refill Routing Note   Medication(s) are not appropriate for processing by [facility] for the following reason(s):        Required vitals abnormal    [facility] action(s):  Defer             Appointments  past 12m or future 3m with PCP    Date Provider   Last Visit   9/12/2024 Myriam Joe, DO   Next Visit   1/8/2025 Myriam Joe, DO   ED visits in past 90 days: 0        Note composed:9:24 AM 12/30/2024

## 2024-12-31 RX ORDER — DEXTROAMPHETAMINE SACCHARATE, AMPHETAMINE ASPARTATE, DEXTROAMPHETAMINE SULFATE AND AMPHETAMINE SULFATE 7.5; 7.5; 7.5; 7.5 MG/1; MG/1; MG/1; MG/1
30 TABLET ORAL 2 TIMES DAILY
Qty: 60 TABLET | Refills: 0 | Status: SHIPPED | OUTPATIENT
Start: 2024-12-31

## 2024-12-31 RX ORDER — SODIUM OXYBATE 0.5 G/ML
4.5 SOLUTION ORAL SEE ADMIN INSTRUCTIONS
Qty: 540 ML | Refills: 5 | Status: SHIPPED | OUTPATIENT
Start: 2024-12-31

## 2024-12-31 NOTE — TELEPHONE ENCOUNTER
Requested Prescriptions     Pending Prescriptions Disp Refills    dextroamphetamine-amphetamine 30 mg Tab 60 tablet 0     Sig: Take 1 tablet (30 mg total) by mouth 2 (two) times daily.     Lov 12/8/23

## 2024-12-31 NOTE — TELEPHONE ENCOUNTER
Requested Prescriptions     Pending Prescriptions Disp Refills    XYREM 500 mg/mL Soln 540 mL 5     Sig: Take 4.5 g by mouth As instructed (narcolepsy). 4.5G po at bedtime then 2.5-4hr later 4.5G     Lov 12/8/23

## 2025-01-08 ENCOUNTER — TELEPHONE (OUTPATIENT)
Dept: FAMILY MEDICINE | Facility: CLINIC | Age: 55
End: 2025-01-08
Payer: COMMERCIAL

## 2025-01-08 NOTE — TELEPHONE ENCOUNTER
Rescheduled patient appt per provider request- book out. Patient states this is the 3rd time appt has been rescheduled.

## 2025-01-13 ENCOUNTER — OFFICE VISIT (OUTPATIENT)
Dept: FAMILY MEDICINE | Facility: CLINIC | Age: 55
End: 2025-01-13
Payer: COMMERCIAL

## 2025-01-13 VITALS
SYSTOLIC BLOOD PRESSURE: 130 MMHG | HEART RATE: 85 BPM | RESPIRATION RATE: 16 BRPM | HEIGHT: 66 IN | WEIGHT: 163.13 LBS | BODY MASS INDEX: 26.22 KG/M2 | OXYGEN SATURATION: 98 % | DIASTOLIC BLOOD PRESSURE: 80 MMHG

## 2025-01-13 DIAGNOSIS — R30.0 DYSURIA: Primary | ICD-10-CM

## 2025-01-13 DIAGNOSIS — R33.9 URINARY RETENTION: ICD-10-CM

## 2025-01-13 DIAGNOSIS — Z00.00 ENCOUNTER FOR ANNUAL GENERAL MEDICAL EXAMINATION WITHOUT ABNORMAL FINDINGS IN ADULT: ICD-10-CM

## 2025-01-13 LAB
BACTERIA #/AREA URNS AUTO: ABNORMAL /HPF
BILIRUB SERPL-MCNC: NEGATIVE MG/DL
BILIRUB UR QL STRIP: NEGATIVE
BLOOD URINE, POC: NEGATIVE
CLARITY UR REFRACT.AUTO: ABNORMAL
CLARITY, POC UA: NORMAL
COLOR UR AUTO: ABNORMAL
COLOR, POC UA: YELLOW
GLUCOSE UR QL STRIP: NEGATIVE
GLUCOSE UR QL STRIP: NEGATIVE
HGB UR QL STRIP: NEGATIVE
KETONES UR QL STRIP: NEGATIVE
KETONES UR QL STRIP: NEGATIVE
LEUKOCYTE ESTERASE UR QL STRIP: ABNORMAL
LEUKOCYTE ESTERASE URINE, POC: NORMAL
MICROSCOPIC COMMENT: ABNORMAL
NITRITE UR QL STRIP: NEGATIVE
NITRITE, POC UA: NORMAL
PH UR STRIP: 7 [PH] (ref 5–8)
PH, POC UA: 7
PROT UR QL STRIP: NEGATIVE
PROTEIN, POC: NEGATIVE
RBC #/AREA URNS AUTO: 2 /HPF (ref 0–4)
SP GR UR STRIP: 1.01 (ref 1–1.03)
SPECIFIC GRAVITY, POC UA: 1.02
SQUAMOUS #/AREA URNS AUTO: 4 /HPF
URN SPEC COLLECT METH UR: ABNORMAL
UROBILINOGEN, POC UA: 1
WBC #/AREA URNS AUTO: 28 /HPF (ref 0–5)

## 2025-01-13 PROCEDURE — 87086 URINE CULTURE/COLONY COUNT: CPT | Performed by: STUDENT IN AN ORGANIZED HEALTH CARE EDUCATION/TRAINING PROGRAM

## 2025-01-13 PROCEDURE — 3008F BODY MASS INDEX DOCD: CPT | Mod: CPTII,S$GLB,, | Performed by: STUDENT IN AN ORGANIZED HEALTH CARE EDUCATION/TRAINING PROGRAM

## 2025-01-13 PROCEDURE — 81002 URINALYSIS NONAUTO W/O SCOPE: CPT | Mod: S$GLB,,, | Performed by: STUDENT IN AN ORGANIZED HEALTH CARE EDUCATION/TRAINING PROGRAM

## 2025-01-13 PROCEDURE — 99999 PR PBB SHADOW E&M-EST. PATIENT-LVL V: CPT | Mod: PBBFAC,,, | Performed by: STUDENT IN AN ORGANIZED HEALTH CARE EDUCATION/TRAINING PROGRAM

## 2025-01-13 PROCEDURE — 81001 URINALYSIS AUTO W/SCOPE: CPT | Performed by: STUDENT IN AN ORGANIZED HEALTH CARE EDUCATION/TRAINING PROGRAM

## 2025-01-13 PROCEDURE — 3079F DIAST BP 80-89 MM HG: CPT | Mod: CPTII,S$GLB,, | Performed by: STUDENT IN AN ORGANIZED HEALTH CARE EDUCATION/TRAINING PROGRAM

## 2025-01-13 PROCEDURE — 1159F MED LIST DOCD IN RCRD: CPT | Mod: CPTII,S$GLB,, | Performed by: STUDENT IN AN ORGANIZED HEALTH CARE EDUCATION/TRAINING PROGRAM

## 2025-01-13 PROCEDURE — 87186 SC STD MICRODIL/AGAR DIL: CPT | Performed by: STUDENT IN AN ORGANIZED HEALTH CARE EDUCATION/TRAINING PROGRAM

## 2025-01-13 PROCEDURE — G2211 COMPLEX E/M VISIT ADD ON: HCPCS | Mod: S$GLB,,, | Performed by: STUDENT IN AN ORGANIZED HEALTH CARE EDUCATION/TRAINING PROGRAM

## 2025-01-13 PROCEDURE — 3075F SYST BP GE 130 - 139MM HG: CPT | Mod: CPTII,S$GLB,, | Performed by: STUDENT IN AN ORGANIZED HEALTH CARE EDUCATION/TRAINING PROGRAM

## 2025-01-13 PROCEDURE — 99214 OFFICE O/P EST MOD 30 MIN: CPT | Mod: S$GLB,,, | Performed by: STUDENT IN AN ORGANIZED HEALTH CARE EDUCATION/TRAINING PROGRAM

## 2025-01-13 PROCEDURE — 87088 URINE BACTERIA CULTURE: CPT | Performed by: STUDENT IN AN ORGANIZED HEALTH CARE EDUCATION/TRAINING PROGRAM

## 2025-01-13 RX ORDER — TAMSULOSIN HYDROCHLORIDE 0.4 MG/1
0.4 CAPSULE ORAL DAILY
Qty: 30 CAPSULE | Refills: 1 | Status: SHIPPED | OUTPATIENT
Start: 2025-01-13

## 2025-01-13 RX ORDER — PHENAZOPYRIDINE HYDROCHLORIDE 200 MG/1
200 TABLET, FILM COATED ORAL 3 TIMES DAILY PRN
Qty: 30 TABLET | Refills: 0 | Status: SHIPPED | OUTPATIENT
Start: 2025-01-13 | End: 2025-01-23

## 2025-01-13 RX ORDER — NITROFURANTOIN 25; 75 MG/1; MG/1
100 CAPSULE ORAL 2 TIMES DAILY
Qty: 20 CAPSULE | Refills: 0 | Status: SHIPPED | OUTPATIENT
Start: 2025-01-13 | End: 2025-01-13

## 2025-01-13 NOTE — PROGRESS NOTES
"SUBJECTIVE:    CHIEF COMPLAINT:   Chief Complaint   Patient presents with    Follow-up           274}    HISTORY OF PRESENT ILLNESS:  History of Present Illness    CHIEF COMPLAINT:  Ms. Chicas presents with urinary symptoms including dysuria and possible urinary retention.    HPI:  Ms. Chicas is a 54F with Pmhx below here for evaluation. She reports dysuria and a burning sensation during urination, with persistent pain even when not actively voiding. She describes cloudy urine but denies hematuria. She has difficulty fully emptying her bladder, with dribbling and straining to void completely. She denies a change in sensation in her pelvic area. She recently took macrobid for these symptoms approximately 1 month ago, but issues persist. She had a GYN exam last year. denies any chest pain, shortness of breath, abdominal pain, fevers, chills, nausea, vomiting, diarrhea.      SOCIAL HISTORY:  Ms. Chicas works for the [employer].      ROS:  Negative except as noted above.      PAST MEDICAL HISTORY:     274}  Past Medical History:   Diagnosis Date    Abnormal Pap smear 1996    "pre-cancer cells post hysterectomy"    Allergy     Anxiety     Arthritis     Depression     Diabetes mellitus     Good hypertension control 09/11/2014    Hyperlipidemia     Hypertension     Hypertriglyceridemia     Meningitis     Narcolepsy     Peripheral neuropathy 09/12/2014    PONV (postoperative nausea and vomiting)     Psoriasis     Psoriasis 05/10/2018    Wears glasses     CONTACS       PAST SURGICAL HISTORY:  Past Surgical History:   Procedure Laterality Date    ADENOIDECTOMY      ARTHROSCOPIC REPAIR OF ROTATOR CUFF OF SHOULDER Left 6/19/2024    Procedure: REPAIR, ROTATOR CUFF, ARTHROSCOPIC;  Surgeon: Joce Montanez MD;  Location: [facility];  Service: Orthopedics;  Laterality: Left;    ARTHROSCOPY OF SHOULDER WITH REMOVAL OF DISTAL CLAVICLE Left 6/19/2024    Procedure: ARTHROSCOPY, SHOULDER, WITH DISTAL CLAVICLE EXCISION;  Surgeon: " Joce Montanez MD;  Location: [facility] OR;  Service: Orthopedics;  Laterality: Left;  Reginaldo notified 24 ark    ARTHROSCOPY,SHOULDER,WITH BICEPS TENODESIS Left 2024    Procedure: ARTHROSCOPY,SHOULDER,WITH BICEPS TENODESIS;  Surgeon: Joce Montanez MD;  Location: [facility] OR;  Service: Orthopedics;  Laterality: Left;    AUGMENTATION OF BREAST      BREAST SURGERY      aumentation/ reduction     BREAST SURGERY  2017    augmentation     brizilian butt lift   2017    CARPAL TUNNEL RELEASE Left 2022    Procedure: RELEASE, CARPAL TUNNEL;  Surgeon: Joce Montanez MD;  Location: [facility] OR;  Service: Orthopedics;  Laterality: Left;    CARPAL TUNNEL RELEASE Right 2023    Procedure: RELEASE, CARPAL TUNNEL;  Surgeon: Joce Montanez MD;  Location: [facility] OR;  Service: Orthopedics;  Laterality: Right;     SECTION      CHOLECYSTECTOMY  2018    Dr SEAN [facility]    COLONOSCOPY      COLONOSCOPY N/A 2022    Procedure: COLONOSCOPY;  Surgeon: Kavita Yee MD;  Location: [facility] ENDO;  Service: Endoscopy;  Laterality: N/A;    ESOPHAGOGASTRODUODENOSCOPY N/A 2022    Procedure: EGD (ESOPHAGOGASTRODUODENOSCOPY);  Surgeon: Kavita Yee MD;  Location: [facility] ENDO;  Service: Endoscopy;  Laterality: N/A;    FOOT SURGERY Left     CYST REMOVAL    GASTRIC BYPASS  2015    HERNIA REPAIR      UHR    HYSTERECTOMY      KNEE SURGERY Left     SCOPE    RELEASE OF ULNAR NERVE AT CUBITAL TUNNEL Left 2022    Procedure: RELEASE, ULNAR TUNNEL;  Surgeon: Joce Montanez MD;  Location: [facility] OR;  Service: Orthopedics;  Laterality: Left;    RELEASE OF ULNAR NERVE AT CUBITAL TUNNEL Right 2023    Procedure: RELEASE, ULNAR TUNNEL;  Surgeon: Joce Montanez MD;  Location: [facility] OR;  Service: Orthopedics;  Laterality: Right;    SHOULDER SURGERY      right should surgery     SHOULDER SURGERY  2017    left shoulder / torn labrium     THIGH LIFT  2017     TONSILLECTOMY      TRIGGER FINGER RELEASE Right 1/11/2024    Procedure: RELEASE, TRIGGER FINGER;  Surgeon: Joce Montanez MD;  Location: [facility];  Service: Orthopedics;  Laterality: Right;  thumb    tummy [place]   02/28/2016       SOCIAL HISTORY:  Social History     Socioeconomic History    Marital status:    Tobacco Use    Smoking status: Never    Smokeless tobacco: Never   Substance and Sexual Activity    Alcohol use: No     Comment: ALLERGIC TO ALCOHOL(DRINKING) PER PATIENT    Drug use: No    Sexual activity: Yes     Partners: Male     Birth control/protection: Surgical     Comment: [facility]     Social Drivers of Health     Financial Resource Strain: Low Risk  (9/11/2024)    Overall Financial Resource Strain (CARDIA)     Difficulty of Paying Living Expenses: Not hard at all   Food Insecurity: No Food Insecurity (9/11/2024)    Hunger Vital Sign     Worried About Running Out of Food in the Last Year: Never true     Ran Out of Food in the Last Year: Never true   Transportation Needs: No Transportation Needs (12/10/2019)    PRAPARE - Transportation     Lack of Transportation (Medical): No     Lack of Transportation (Non-Medical): No   Physical Activity: Insufficiently Active (9/11/2024)    Exercise Vital Sign     Days of Exercise per Week: 3 days     Minutes of Exercise per Session: 30 min   Stress: Stress Concern Present (9/11/2024)    [employer] - Occupational Stress Questionnaire     Feeling of Stress : Very much   Housing Stability: Unknown (9/11/2024)    Housing Stability Vital Sign     Unable to Pay for Housing in the Last Year: No       FAMILY HISTORY:       Family History   Problem Relation Name Age of Onset    Cancer Mother          breast bilateral/ skin     Breast cancer Mother      Skin cancer Mother      Diabetes Father      Heart disease Father  65        CABG    Hypertension Father      Cancer Father          skin    Skin cancer Father      Gout Father      Colon  polyps Father      Gout Brother 1     No Known Problems Daughter 2     No Known Problems Son 1     Cancer Maternal Aunt 3         lung, pancrease - smoker , breast    Lung cancer Maternal Aunt 3         x2    Pancreatic cancer Maternal Aunt 3     Breast cancer Maternal Aunt 3     No Known Problems Maternal Uncle 1     No Known Problems Paternal Uncle 1     Cancer Maternal Grandmother          pancrease    Pancreatic cancer Maternal Grandmother      Breast cancer Maternal Grandmother      Diabetes Paternal Grandmother      Heart disease Paternal Grandfather      Colon cancer Maternal Grandfather      Ovarian cancer Neg Hx      Ulcerative colitis Neg Hx      Stomach cancer Neg Hx      Crohn's disease Neg Hx      Rectal cancer Neg Hx         ALLERGIES AND MEDICATIONS: updated and reviewed.      274}  Review of patient's allergies indicates:   Allergen Reactions    Alcohol Anaphylaxis     Drinking alcohol, RESPIRATORY DISTRESS       Medication List with Changes/Refills   New Medications    PHENAZOPYRIDINE (PYRIDIUM) 200 MG TABLET    Take 1 tablet (200 mg total) by mouth 3 (three) times daily as needed for Pain.    TAMSULOSIN (FLOMAX) 0.4 MG CAP    Take 1 capsule (0.4 mg total) by mouth once daily.   Current Medications    ACCU-CHEK GUIDE TEST STRIPS STRP        ACYCLOVIR (ZOVIRAX) 400 MG TABLET    Take 400 mg by mouth 3 (three) times daily.    ALBUTEROL (PROVENTIL HFA) 90 MCG/ACTUATION INHALER    Inhale 2 puffs into the lungs every 6 (six) hours as needed for Wheezing. Rescue    AMLODIPINE (NORVASC) 5 MG TABLET    Take 1 tablet (5 mg total) by mouth every evening.    AZELASTINE (ASTELIN) 137 MCG (0.1 %) NASAL SPRAY    1 spray in each nosril twice a day    DEXTROAMPHETAMINE-AMPHETAMINE 30 MG TAB    Take 1 tablet (30 mg total) by mouth 2 (two) times daily.    EPINEPHRINE (EPIPEN 2-ANGI) 0.3 MG/0.3 ML ATIN    Inject 0.3 mLs (0.3 mg total) into the muscle as needed (Allergic Reaction).    ESTRADIOL (ESTRACE) 0.01 % (0.1  "MG/GRAM) VAGINAL CREAM    USE 1/2 GRAM VAGINALLY 4 TIMES WEEKLY AS DIRECTED    FLUTICASONE PROPIONATE (FLONASE) 50 MCG/ACTUATION NASAL SPRAY    1 spray in each nostril twice a day    GABAPENTIN (NEURONTIN) 600 MG TABLET    Take 1 tablet (600 mg total) by mouth 3 (three) times daily.    LEVOCETIRIZINE (XYZAL) 5 MG TABLET    Take 1 tablet (5 mg total) by mouth every evening. For allergies    MONTELUKAST (SINGULAIR) 10 MG TABLET    Take 1 tablet (10 mg total) by mouth every evening.    ONDANSETRON (ZOFRAN-ODT) 4 MG TBDL    Take 1 tablet (4 mg total) by mouth every 8 (eight) hours as needed (nausea).    ROSUVASTATIN (CRESTOR) 5 MG TABLET    Take 1 tablet (5 mg total) by mouth every evening.    SOLRIAMFETOL (SUNOSI) 150 MG TAB    Take 1 tablet by mouth once daily.    STELARA 45 MG/0.5 ML SYRG SYRINGE    Q 3 MONTHS    XYREM 500 MG/ML SOLN    Take 4.5 g by mouth As instructed (narcolepsy). 4.5G po at bedtime then 2.5-4hr later 4.5G       SCREENING HISTORY:    274}  Health Maintenance         Date Due Completion Date    Pneumococcal Vaccines (Age 50+) (3 of 3 - PCV) 05/23/2015 5/23/2014    Foot Exam 03/11/2020 3/11/2019    COVID-19 Vaccine (7 - 2024-25 season) 09/01/2024 11/22/2023    Hemoglobin A1c 03/26/2025 9/26/2024    Diabetes Urine Screening 06/14/2025 6/14/2024    Diabetic Eye Exam 06/26/2025 6/26/2024    Lipid Panel 09/26/2025 9/26/2024    Mammogram 11/08/2025 11/8/2024    Low Dose Statin 01/13/2026 1/13/2025    TETANUS VACCINE 04/02/2028 4/2/2018    Colorectal Cancer Screening 04/29/2032 4/29/2022    RSV Vaccine (Age 60+ and Pregnant patients) (1 - 1-dose 75+ series) 10/08/2043 ---                  PHYSICAL EXAM:      274}  /80 (BP Location: Right arm, Patient Position: Sitting)   Pulse 85   Resp 16   Ht 5' 6" (1.676 m)   Wt 74 kg (163 lb 2.3 oz)   SpO2 98%   BMI 26.33 kg/m²   Wt Readings from Last 3 Encounters:   01/13/25 74 kg (163 lb 2.3 oz)   09/26/24 77.6 kg (171 lb)   08/02/24 73.9 kg (162 lb 14.7 " "oz)     BP Readings from Last 3 Encounters:   01/13/25 130/80   09/26/24 (!) 158/92   06/19/24 138/81     Estimated body mass index is 26.33 kg/m² as calculated from the following:    Height as of this encounter: 5' 6" (1.676 m).    Weight as of this encounter: 74 kg (163 lb 2.3 oz).       Physical Exam  Vitals reviewed.   Constitutional:       Appearance: Normal appearance. She is obese.   HENT:      Head: Normocephalic and atraumatic.      Right Ear: External ear normal.      Left Ear: External ear normal.   Eyes:      Extraocular Movements: Extraocular movements intact.      Conjunctiva/sclera: Conjunctivae normal.      Pupils: Pupils are equal, round, and reactive to light.   Cardiovascular:      Rate and Rhythm: Normal rate and regular rhythm.      Pulses: Normal pulses.      Heart sounds: Normal heart sounds.   Abdominal:      General: Bowel sounds are normal.      Palpations: Abdomen is soft.   Musculoskeletal:         General: Normal range of motion.      Cervical back: Normal range of motion.   Neurological:      Mental Status: She is alert and oriented to person, place, and time. Mental status is at baseline.   Psychiatric:         Mood and Affect: Mood normal.         Behavior: Behavior normal.         Judgment: Judgment normal.              LABS:   274}  I have reviewed old labs below:  Lab Results   Component Value Date    WBC 7.29 06/14/2024    HGB 11.2 (L) 06/14/2024    HCT 35.0 (L) 06/14/2024    MCV 85 06/14/2024     06/14/2024     08/13/2024    K 3.6 08/13/2024     08/13/2024    CALCIUM 8.9 08/13/2024    CO2 24 08/13/2024     (H) 08/13/2024    BUN 23 (H) 08/13/2024    CREATININE 0.6 08/13/2024    ANIONGAP 9 08/13/2024    ESTGFRAFRICA >60.0 12/02/2021    EGFRNONAA >60.0 12/02/2021    PROT 6.3 08/13/2024    ALBUMIN 3.4 (L) 08/13/2024    BILITOT 0.2 08/13/2024    ALKPHOS 103 08/13/2024    ALT 38 08/13/2024    AST 27 08/13/2024    CHOL 136 06/14/2024    TRIG 60 06/14/2024    " HDL 59 06/14/2024    LDLCALC 65.0 06/14/2024    TSH 1.174 06/01/2023    HGBA1C 6.6 (H) 09/13/2024       ASSESSMENT AND PLAN:  274}  1. Dysuria  -     POCT URINE DIPSTICK WITHOUT MICROSCOPE  -     phenazopyridine (PYRIDIUM) 200 MG tablet; Take 1 tablet (200 mg total) by mouth 3 (three) times daily as needed for Pain.  Dispense: 30 tablet; Refill: 0  -     Discontinue: nitrofurantoin, macrocrystal-monohydrate, (MACROBID) 100 MG capsule; Take 1 capsule (100 mg total) by mouth 2 (two) times daily.  Dispense: 20 capsule; Refill: 0  -     Urinalysis, Reflex to Urine Culture Urine, Clean Catch    2. Urinary retention  -     tamsulosin (FLOMAX) 0.4 mg Cap; Take 1 capsule (0.4 mg total) by mouth once daily.  Dispense: 30 capsule; Refill: 1  -     Urinalysis, Reflex to Urine Culture Urine, Clean Catch    3. Encounter for annual general medical examination without abnormal findings in adult  -     TSH; Future; Expected date: 04/13/2025  -     Lipid Panel; Future; Expected date: 04/13/2025  -     Hemoglobin A1C; Future; Expected date: 04/13/2025  -     Comprehensive Metabolic Panel; Future; Expected date: 01/13/2025  -     CBC Without Differential; Future; Expected date: 04/13/2025  -     Ferritin; Future; Expected date: 04/13/2025         Assessment & Plan    IMPRESSION:   Suspected UTI or urinary retention; ordered urine culture to differentiate   Considered potential pelvic floor weakness or cervical prolapse as cause of urinary symptoms   Evaluated blood pressure control; noted improvement with amlodipine   Assessed sleep management; no changes required   Considered joint pain; recommended OTC anti-inflammatory supplement    URINARY TRACT SYMPTOMS:   Explained potential causes of urinary symptoms to the patient.   Prescribed Pyridium for urinary discomfort.   Ordered a urine culture for further analysis.   Performed a dipstick test on the patient's urine sample.   Considered the possibility of urinary retention or other  issues if symptoms persist.   Instructed the patient to contact the office if urinary symptoms persist or worsen.   Noted that the patient previously took macrobid for UTI symptoms.   Explained potential causes of urinary symptoms to the patient, including retention and pelvic floor weakness.   Discussed how cervical prolapse can affect urination.   Prescribed Tamsulosin (Flomax) for potential urinary retention for a few months.   Assessed for possible pelvic floor weakness or obstruction.    GYNECOLOGY REFERRAL:   Consider referral to gynecology if urinary symptoms persist.   Inquired about the patient's last GYN exam.    HYPERTENSION:   Continued amlodipine for blood pressure control, to be taken at night.   Assessed that the patient's blood pressure numbers are good.   Noted that the patient reports feeling good with current treatment.   Informed the patient about the non-diuretic nature of amlodipine.    JOINT PAIN:   Educated the patient on the use of turmeric as an anti-inflammatory supplement.   Recommend turmeric supplement for joint pain.   Noted that the patient reports joint pain in hands and wrists.    DIABETES:   Noted that the patient reports good blood sugar.   Advised the patient to report any changes in feet.    FOLLOW UP:   Advised the patient to follow up in 6 months.   Instructed the patient to contact the office if urinary symptoms persist or worsen.         Orders Placed This Encounter   Procedures    Urinalysis, Reflex to Urine Culture Urine, Clean Catch    TSH    Lipid Panel    Hemoglobin A1C    Comprehensive Metabolic Panel    CBC Without Differential    Ferritin    POCT URINE DIPSTICK WITHOUT MICROSCOPE         This note was generated with the assistance of ambient listening technology. Verbal consent was obtained by the patient and accompanying visitor(s) for the recording of patient appointment to facilitate this note. I attest to having reviewed and edited the generated note for  accuracy, though some syntax or spelling errors may persist. Please contact the author of this note for any clarification.

## 2025-01-13 NOTE — PROGRESS NOTES
Please inform patient of the following:    Urine results were positive for UTI.  As discussed during visit, since you were recently prescribed Macrobid, we will obtain urine culture to determine the bacterial cause.    If you have any further questions please contact our office    Sincerely,    Marcy Joe, DO

## 2025-01-16 DIAGNOSIS — N30.00 ACUTE CYSTITIS WITHOUT HEMATURIA: Primary | ICD-10-CM

## 2025-01-16 LAB — BACTERIA UR CULT: ABNORMAL

## 2025-01-16 RX ORDER — SULFAMETHOXAZOLE AND TRIMETHOPRIM 800; 160 MG/1; MG/1
1 TABLET ORAL 2 TIMES DAILY
Qty: 14 TABLET | Refills: 0 | Status: SHIPPED | OUTPATIENT
Start: 2025-01-16 | End: 2025-01-23

## 2025-01-16 NOTE — PROGRESS NOTES
Please inform patient labs have been reviewed.  Results indicate a sensitivity to Bactrim most other indicated medications or dose via IV.  So I have sent Bactrim to the pharmacy.  If you have any further questions please contact our office    Sincerely,    Marcy Joe, DO

## 2025-01-26 DIAGNOSIS — G47.411 NARCOLEPSY WITH CATAPLEXY: ICD-10-CM

## 2025-01-27 RX ORDER — DEXTROAMPHETAMINE SACCHARATE, AMPHETAMINE ASPARTATE, DEXTROAMPHETAMINE SULFATE AND AMPHETAMINE SULFATE 7.5; 7.5; 7.5; 7.5 MG/1; MG/1; MG/1; MG/1
30 TABLET ORAL 2 TIMES DAILY
Qty: 60 TABLET | Refills: 0 | Status: SHIPPED | OUTPATIENT
Start: 2025-01-27

## 2025-02-04 ENCOUNTER — OFFICE VISIT (OUTPATIENT)
Dept: SLEEP MEDICINE | Facility: CLINIC | Age: 55
End: 2025-02-04
Payer: COMMERCIAL

## 2025-02-04 DIAGNOSIS — G47.411 PRIMARY NARCOLEPSY WITH CATAPLEXY: Primary | ICD-10-CM

## 2025-02-04 PROCEDURE — 98006 SYNCH AUDIO-VIDEO EST MOD 30: CPT | Mod: 95,,, | Performed by: NURSE PRACTITIONER

## 2025-02-04 NOTE — PROGRESS NOTES
The patient location is [place]  The chief complaint leading to consultation is: narcolepsy    Visit type: TELE AUDIOVISUAL:62786    Face to Face time with patient:12minutes of total time spent on the encounter, which includes face to face time and non-face to face time preparing to see the patient (eg, review of tests), Obtaining and/or reviewing separately obtained history, Documenting clinical information in the electronic or other health record, Independently interpreting results (not separately reported) and communicating results to the patient/family/caregiver, or Care coordination (not separately reported). Each patient to whom he or she provides medical services by telemedicine is:  (1) informed of the relationship between the physician and patient and the respective role of any other health care provider with respect to management of the patient; and (2) notified that he or she may decline to receive medical services by telemedicine and may withdraw from such care at any time.      Since seen she continues to take xyrem to 4.5G 2x nightly. ESS=17. 3 times mild cataplexy since seen. UTI currently /few enuresis. Continues to take sunosi and adderall 30mg am and 2nd dose afternoon (has to take/can't miss). Increased joint pain/burning hands in am at times.       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 Used bipap for some time but had mouth opening and chin strap was intolerable due to claustrophobia. Had MSLT revealing narcolepsy (see epic records). She currently talking sunosi 150mg am and nuvigil 250mg qd and Adderall 30mg bid. Takes weekend drug holidays. Remains very sleepy impacting driving, could lose her license and can fall asleep at work. ESS=23!! Has lost 70# since PSG and no longer snores. Denies witnessed apneic pauses.     ?cataplexy passed out while sitting on amusement ride [place]  Legs have buckled before/fallen-not sure if with strong emotion    12/28/21: Since seen she  increased her xyrem to 4.5G 2x nightly. Significantly helping reduce daytime sleepiness. ESS=14.  Feels bad currently , thinks she might have repeat Covid. Continues to take sunosi and nuvigil am and just 1 30mg tab adderall afternoon.   Denies cataplexy      MSTL 2018 avg SL 1min, 2/5 SOREMs  SH . [facility]     ASSESSMENT:   Narcolepsy with cataplexy, symptoms improved on max therapeutic dose Xyrem and sunosi and adderall   Hx gastric bypass  Psoriasis       PLAN:   1.Xyrem continue 4.5 2x nightly  Continue sunosi 150mg qam and adderall 30mg BID prn--plan to resume using her CPAP machine as adjunctive treatment before making any potential med changes. Notify me when backusing so I can remotely view use/data hopefully  2  RTC accordingly

## 2025-02-10 ENCOUNTER — PATIENT MESSAGE (OUTPATIENT)
Dept: SLEEP MEDICINE | Facility: CLINIC | Age: 55
End: 2025-02-10
Payer: COMMERCIAL

## 2025-02-11 DIAGNOSIS — R33.9 URINARY RETENTION: ICD-10-CM

## 2025-02-11 RX ORDER — TAMSULOSIN HYDROCHLORIDE 0.4 MG/1
0.4 CAPSULE ORAL DAILY
Qty: 90 CAPSULE | Refills: 3 | Status: SHIPPED | OUTPATIENT
Start: 2025-02-11

## 2025-02-11 NOTE — TELEPHONE ENCOUNTER
No care due was identified.  [facility] Embedded Care Due Messages. Reference number: [id number].   2/11/2025 11:43:21 AM CST

## 2025-02-11 NOTE — TELEPHONE ENCOUNTER
Refill Routing Note   Medication(s) are not appropriate for processing by [facility] for the following reason(s):        New or recently adjusted medication    [facility] action(s):  Defer             Appointments  past 12m or future 3m with PCP    Date Provider   Last Visit   1/13/2025 Myriam Joe, DO   Next Visit   7/14/2025 Myriam Joe, DO   ED visits in past 90 days: 0        Note composed:12:12 PM 02/11/2025

## 2025-03-08 DIAGNOSIS — G47.411 NARCOLEPSY WITH CATAPLEXY: ICD-10-CM

## 2025-03-10 RX ORDER — DEXTROAMPHETAMINE SACCHARATE, AMPHETAMINE ASPARTATE, DEXTROAMPHETAMINE SULFATE AND AMPHETAMINE SULFATE 7.5; 7.5; 7.5; 7.5 MG/1; MG/1; MG/1; MG/1
30 TABLET ORAL 2 TIMES DAILY
Qty: 60 TABLET | Refills: 0 | Status: SHIPPED | OUTPATIENT
Start: 2025-03-10

## 2025-04-08 DIAGNOSIS — Z88.9 HISTORY OF ALLERGIC REACTION: ICD-10-CM

## 2025-04-08 RX ORDER — EPINEPHRINE 0.3 MG/.3ML
1 INJECTION SUBCUTANEOUS
Qty: 2 EACH | Refills: 1 | Status: SHIPPED | OUTPATIENT
Start: 2025-04-08 | End: 2026-04-08

## 2025-04-11 DIAGNOSIS — G47.411 NARCOLEPSY WITH CATAPLEXY: ICD-10-CM

## 2025-04-11 RX ORDER — DEXTROAMPHETAMINE SACCHARATE, AMPHETAMINE ASPARTATE, DEXTROAMPHETAMINE SULFATE AND AMPHETAMINE SULFATE 7.5; 7.5; 7.5; 7.5 MG/1; MG/1; MG/1; MG/1
30 TABLET ORAL 2 TIMES DAILY
Qty: 60 TABLET | Refills: 0 | Status: SHIPPED | OUTPATIENT
Start: 2025-04-11

## 2025-04-11 RX ORDER — SOLRIAMFETOL 150 MG/1
150 TABLET, FILM COATED ORAL DAILY
Qty: 30 TABLET | Refills: 3 | Status: SHIPPED | OUTPATIENT
Start: 2025-04-11

## 2025-05-18 DIAGNOSIS — G47.411 NARCOLEPSY WITH CATAPLEXY: ICD-10-CM

## 2025-05-19 RX ORDER — DEXTROAMPHETAMINE SACCHARATE, AMPHETAMINE ASPARTATE, DEXTROAMPHETAMINE SULFATE AND AMPHETAMINE SULFATE 7.5; 7.5; 7.5; 7.5 MG/1; MG/1; MG/1; MG/1
30 TABLET ORAL 2 TIMES DAILY
Qty: 60 TABLET | Refills: 0 | Status: SHIPPED | OUTPATIENT
Start: 2025-05-19

## 2025-05-19 NOTE — TELEPHONE ENCOUNTER
Requested Prescriptions     Pending Prescriptions Disp Refills    dextroamphetamine-amphetamine 30 mg Tab 60 tablet 0     Sig: Take 1 tablet (30 mg total) by mouth 2 (two) times daily.    Lov 02/24/25

## 2025-06-06 DIAGNOSIS — G47.411 NARCOLEPSY WITH CATAPLEXY: ICD-10-CM

## 2025-06-09 RX ORDER — DEXTROAMPHETAMINE SACCHARATE, AMPHETAMINE ASPARTATE, DEXTROAMPHETAMINE SULFATE AND AMPHETAMINE SULFATE 7.5; 7.5; 7.5; 7.5 MG/1; MG/1; MG/1; MG/1
30 TABLET ORAL 2 TIMES DAILY
Qty: 60 TABLET | Refills: 0 | Status: SHIPPED | OUTPATIENT
Start: 2025-06-09

## 2025-06-25 DIAGNOSIS — M54.10 RADICULOPATHY OF ARM: ICD-10-CM

## 2025-06-26 ENCOUNTER — TELEPHONE (OUTPATIENT)
Dept: SLEEP MEDICINE | Facility: CLINIC | Age: 55
End: 2025-06-26
Payer: COMMERCIAL

## 2025-06-26 NOTE — TELEPHONE ENCOUNTER
Called in xywav needs new pa next month      Copied from CRM #[id number]. Topic: Medications - Medication Refill  >> Jun 26, 2025  7:10 AM Summer wrote:  Type: RX Refill Request    Who Called: JENNFIER PARDO     Have you contacted your pharmacy:yes    Refill or New Rx:refill     RX Name and Strength:XYREM 500 mg/mL Soln    How is the patient currently taking it? (ex. 1XDay):    Is this a 30 day or 90 day RX:    Preferred Pharmacy with phone number:[facility] - [address] [address]  Phone: [phone] Fax: [phone]  Hours: Not open 24 hours        Local or Mail Order:order    Ordering Provider:    Would the patient rather a call back or a response via [facility]? Call     Best Call Back Number:[phone]    Additional Information:   Unique Flap 2 Name: Myocutaneous Flap INSERT MUSCLE NAME

## 2025-07-01 RX ORDER — GABAPENTIN 600 MG/1
600 TABLET ORAL 3 TIMES DAILY
Qty: 270 TABLET | Refills: 0 | Status: SHIPPED | OUTPATIENT
Start: 2025-07-01 | End: 2025-09-29

## 2025-07-01 NOTE — TELEPHONE ENCOUNTER
This has been fully explained to the patient, who indicates understanding.  Will make PCP appt when she comes to visit on 07/14/2025.     Labs sent to [facility] per pts request.

## 2025-07-14 ENCOUNTER — OFFICE VISIT (OUTPATIENT)
Dept: FAMILY MEDICINE | Facility: CLINIC | Age: 55
End: 2025-07-14
Payer: COMMERCIAL

## 2025-07-14 VITALS
TEMPERATURE: 98 F | WEIGHT: 176.81 LBS | DIASTOLIC BLOOD PRESSURE: 62 MMHG | HEIGHT: 66 IN | OXYGEN SATURATION: 97 % | SYSTOLIC BLOOD PRESSURE: 130 MMHG | HEART RATE: 94 BPM | BODY MASS INDEX: 28.42 KG/M2

## 2025-07-14 DIAGNOSIS — E78.5 HYPERLIPIDEMIA ASSOCIATED WITH TYPE 2 DIABETES MELLITUS: ICD-10-CM

## 2025-07-14 DIAGNOSIS — J30.9 CHRONIC ALLERGIC RHINITIS: Primary | ICD-10-CM

## 2025-07-14 DIAGNOSIS — E11.9 TYPE 2 DIABETES MELLITUS WITHOUT COMPLICATION, WITHOUT LONG-TERM CURRENT USE OF INSULIN: ICD-10-CM

## 2025-07-14 DIAGNOSIS — R35.0 URINARY FREQUENCY: ICD-10-CM

## 2025-07-14 DIAGNOSIS — I15.2 HYPERTENSION ASSOCIATED WITH DIABETES: Chronic | ICD-10-CM

## 2025-07-14 DIAGNOSIS — E11.69 HYPERLIPIDEMIA ASSOCIATED WITH TYPE 2 DIABETES MELLITUS: ICD-10-CM

## 2025-07-14 DIAGNOSIS — E11.59 HYPERTENSION ASSOCIATED WITH DIABETES: Chronic | ICD-10-CM

## 2025-07-14 LAB — GLUCOSE SERPL-MCNC: 88 MG/DL (ref 70–110)

## 2025-07-14 PROCEDURE — 82962 GLUCOSE BLOOD TEST: CPT | Mod: S$GLB,,,

## 2025-07-14 PROCEDURE — 99214 OFFICE O/P EST MOD 30 MIN: CPT | Mod: S$GLB,,,

## 2025-07-14 PROCEDURE — 1159F MED LIST DOCD IN RCRD: CPT | Mod: CPTII,S$GLB,,

## 2025-07-14 PROCEDURE — 3008F BODY MASS INDEX DOCD: CPT | Mod: CPTII,S$GLB,,

## 2025-07-14 PROCEDURE — 1160F RVW MEDS BY RX/DR IN RCRD: CPT | Mod: CPTII,S$GLB,,

## 2025-07-14 PROCEDURE — 3078F DIAST BP <80 MM HG: CPT | Mod: CPTII,S$GLB,,

## 2025-07-14 PROCEDURE — 99999 PR PBB SHADOW E&M-EST. PATIENT-LVL III: CPT | Mod: PBBFAC,,,

## 2025-07-14 PROCEDURE — 3075F SYST BP GE 130 - 139MM HG: CPT | Mod: CPTII,S$GLB,,

## 2025-07-14 RX ORDER — LORATADINE 10 MG/1
10 TABLET ORAL DAILY
Qty: 90 TABLET | Refills: 0 | Status: SHIPPED | OUTPATIENT
Start: 2025-07-14 | End: 2025-10-15

## 2025-07-14 RX ORDER — OLOPATADINE HYDROCHLORIDE 1 MG/ML
1 SOLUTION OPHTHALMIC 2 TIMES DAILY
Qty: 5 ML | Refills: 0 | Status: SHIPPED | OUTPATIENT
Start: 2025-07-14 | End: 2025-08-21

## 2025-07-14 NOTE — PROGRESS NOTES
[facility]     Subjective:       Patient ID:  [id number]     Chief Complaint: Follow-up    Holly Chicas is a 54 y.o. female with a past medical history significant for HTN, HLD, T2DM, narcolepsy, s/p gastric bypass, psoriasis, and migraines who presents to the clinic for follow up.     CHIEF COMPLAINT:  Ms. Chicas presents today for follow up of sinus problems.    OCULAR SYMPTOMS:  She reports persistent eye symptoms characterized by a sensation of bugs crawling and drainage with significant itchiness but no redness. She uses Blink eye drops intermittently for symptomatic relief and has discontinued contact lens wear due to ongoing irritation and frequent rubbing. An eye doctor ruled out eye mites and recommended tea tree oil wipes. Symptoms significantly impact daily activities including makeup application and require constant eye wiping.    ALLERGIES:  Allergy testing revealed only dust mite allergy. She previously underwent sublingual dust mite treatment but discontinued for unspecified reasons. She currently takes Xyzal 10 mg daily for allergy management and denies using nasal sprays such as Astelin or Flonase due to discomfort with drying effects. She notes taking multiple allergy medications, potentially including Zyrtec in addition to Xyzal.    ENT HISTORY:  She underwent a scope and speech therapy approximately one year ago involving a camera inserted through her nose or throat, followed by speech therapy to address vocal changes characterized by hoarseness which is improved. She does report chronic congestion.     GENITOURINARY:  She reports urinary frequency and bed wetting associated with nighttime medications, which is particularly concerning during travel. She has been relatively stable over the past week with no associated urinary burning. She believe this is related to current narcolepsy medications. She has history of diabetes in which she does not take medication for.     MEDICAL  "HISTORY:  She has diabetes, hypertension, hyperlipidemia, migraines, and narcolepsy with associated cataplexy. She also has a history of torn labrum with surgical intervention and currently experiences persistent neck and shoulder pain related to the surgical site which she feels may not have healed appropriately.        Review of Systems   Constitutional:  Negative for chills and fever.   Respiratory:  Negative for shortness of breath.    Cardiovascular:  Negative for chest pain.   Gastrointestinal:  Negative for abdominal pain, diarrhea, nausea and vomiting.   Genitourinary:  Positive for enuresis and frequency. Negative for dysuria, flank pain and hematuria.        Past Medical History:   Diagnosis Date    Abnormal Pap smear 1996    "pre-cancer cells post hysterectomy"    Allergy     Anxiety     Arthritis     Depression     Diabetes mellitus     Good hypertension control 09/11/2014    Hyperlipidemia     Hypertension     Hypertriglyceridemia     Meningitis     Narcolepsy     Peripheral neuropathy 09/12/2014    PONV (postoperative nausea and vomiting)     Psoriasis     Psoriasis 05/10/2018    Wears glasses     CONTACS        Active Problem List with Overview Notes    Diagnosis Date Noted    Acute pain of left shoulder 07/12/2024    Weakness of left shoulder 07/12/2024    Decreased range of motion of left shoulder 07/12/2024    Abnormal increased muscle tone 07/12/2024    Decreased functional mobility and endurance 07/12/2024    Special educational needs 07/12/2024    Allergic rhinitis due to house dust mite 03/07/2024    Rash and nonspecific skin eruption 05/29/2023     Ongoing for 3 days. Denies any recent changes in detergent or clothing. Recommended she try Benadryl 50mg PO daily for 3 days. She is scheduled for followup with Allergist in July of 2023.      Migraine without aura and without status migrainosus, not intractable 03/11/2019    Chronic fatigue 03/11/2019    Primary narcolepsy with cataplexy " 02/16/2019    Psoriasis 05/10/2018    Type 2 diabetes mellitus without complication, without long-term current use of insulin 05/10/2018    BMI 26.0-26.9,adult 05/10/2018    Status post gastric bypass for obesity 05/10/2018    Radiculopathy of arm 09/12/2014    Hypertension associated with diabetes 12/06/2011    Hyperlipidemia associated with type 2 diabetes mellitus 12/06/2011    Sleep apnea 12/06/2011        Review of patient's allergies indicates:   Allergen Reactions    Alcohol Anaphylaxis     Drinking alcohol, RESPIRATORY DISTRESS         Current Medications[1]    Lab Results   Component Value Date    WBC 7.29 06/14/2024    HGB 11.2 (L) 06/14/2024    HCT 35.0 (L) 06/14/2024     06/14/2024    CHOL 136 06/14/2024    TRIG 60 06/14/2024    HDL 59 06/14/2024    ALT 38 08/13/2024    AST 27 08/13/2024     08/13/2024    K 3.6 08/13/2024     08/13/2024    CREATININE 0.6 08/13/2024    BUN 23 (H) 08/13/2024    CO2 24 08/13/2024    TSH 1.174 06/01/2023    HGBA1C 6.6 (H) 09/13/2024           Objective:      Physical Exam  Constitutional:       General: She is not in acute distress.     Appearance: Normal appearance. She is not toxic-appearing.   HENT:      Head: Normocephalic and atraumatic.      Right Ear: Tympanic membrane is not erythematous, retracted or bulging.      Left Ear: Tympanic membrane is not erythematous, retracted or bulging.      Nose: Congestion present.      Right Sinus: No maxillary sinus tenderness or frontal sinus tenderness.      Left Sinus: No maxillary sinus tenderness or frontal sinus tenderness.      Mouth/Throat:      Pharynx: No posterior oropharyngeal erythema or postnasal drip.   Eyes:      General: Lids are normal.         Right eye: No discharge.         Left eye: No discharge.      Conjunctiva/sclera: Conjunctivae normal.   Cardiovascular:      Rate and Rhythm: Normal rate and regular rhythm.      Pulses: Normal pulses.      Heart sounds: No murmur heard.     No friction  rub.   Pulmonary:      Effort: Pulmonary effort is normal. No respiratory distress.      Breath sounds: Normal breath sounds. No wheezing.   Musculoskeletal:      Cervical back: Normal range of motion.      Right lower leg: No edema.      Left lower leg: No edema.   Skin:     General: Skin is warm and dry.   Neurological:      General: No focal deficit present.      Mental Status: She is alert and oriented to person, place, and time.   Psychiatric:         Mood and Affect: Mood normal.         Protective Sensation (w/ 10 gram monofilament):  Right: Intact  Left: Intact    Visual Inspection:  Normal -  Bilateral    Pedal Pulses:   Right: Present  Left: Present    Posterior Tibialis Pulses:   Right:Present  Left: Present      Assessment:       1. Chronic allergic rhinitis    2. Hypertension associated with diabetes    3. Hyperlipidemia associated with type 2 diabetes mellitus    4. Type 2 diabetes mellitus without complication, without long-term current use of insulin    5. Urinary frequency          Plan:         Assessment & Plan    - Considered allergic conjunctivitis as cause of eye symptoms based on reported grittiness, itching, and drainage without redness.  - Switched antihistamine from Xyzal to Claritin due to potential tolerance build-up after long-term use.   - Recent lab work not in EMR. Will contact [facility] to fax results.  - POCT glucose within normal limits.   - Recommendations pending lab results.   - Ordered microalbumin to assess proteinuria and urinalysis to assess possible cause of urinary frequency. Symptoms improved at this time.        Holly was seen today for follow-up.    Diagnoses and all orders for this visit:    Chronic allergic rhinitis  -     olopatadine (PATANOL) 0.1 % ophthalmic solution; Place 1 drop into both eyes 2 (two) times daily.  -     loratadine (CLARITIN) 10 mg tablet; Take 1 tablet (10 mg total) by mouth once daily.  -     Recommend trial of either flonase or astelin. If not  improved, recommended follow up with ENT.     Hypertension associated with diabetes        -    Well-controlled. Continue amlodipine as prescribed.     Hyperlipidemia associated with type 2 diabetes mellitus  -     On statin.     Type 2 diabetes mellitus without complication, without long-term current use of insulin  -     POCT Glucose, Hand-Held Device  -     Microalbumin/Creatinine Ratio, Urine; Future  -     Diet controlled. Recommendations pending lab results. POCT glucose normal.     Urinary frequency  -     Urinalysis; Future  -     Urine Culture High Risk; Future  -     Improved at this time. Will obtain UA to assess for possible infection.         Follow up in about 3 months (around 10/14/2025).    Future Appointments       Date Provider Specialty Appt Notes    10/14/2025 Dangelo Francisco PA-C Family Medicine 3 months/ [facility]             Tests to Keep You Healthy    Mammogram: Met on 11/8/2024  Eye Exam: DUE  Colon Cancer Screening: Met on 4/29/2022  Last Blood Pressure <= 139/89 (7/14/2025): Yes  Last HbA1c < 8 (09/26/2024): Yes       I spent a total of 30 minutes on the day of the visit.This includes face to face time and non-face to face time preparing to see the patient (eg, review of tests), obtaining and/or reviewing separately obtained history, documenting clinical information in the electronic or other health record, independently interpreting results and communicating results to the patient/family/caregiver, or care coordinator.    This note was generated with the assistance of ambient listening technology. Verbal consent was obtained by the patient and accompanying visitor(s) for the recording of patient appointment to facilitate this note. I attest to having reviewed and edited the generated note for accuracy, though some syntax or spelling errors may persist. Please contact the author of this note for any clarification.      Lydia Steve PA-C  Family Medicine Physician Assistant             [1]   Current Outpatient Medications:     ACCU-CHEK GUIDE TEST STRIPS Strp, , Disp: , Rfl:     acyclovir (ZOVIRAX) 400 MG tablet, Take 400 mg by mouth 3 (three) times daily., Disp: , Rfl:     albuterol (PROVENTIL HFA) 90 mcg/actuation inhaler, Inhale 2 puffs into the lungs every 6 (six) hours as needed for Wheezing. Rescue, Disp: 18 g, Rfl: 3    amLODIPine (NORVASC) 5 MG tablet, Take 1 tablet (5 mg total) by mouth every evening., Disp: 90 tablet, Rfl: 2    azelastine (ASTELIN) 137 mcg (0.1 %) nasal spray, 1 spray in each nosril twice a day, Disp: 30 mL, Rfl: 5    dextroamphetamine-amphetamine 30 mg Tab, Take 1 tablet (30 mg total) by mouth 2 (two) times daily., Disp: 60 tablet, Rfl: 0    EPINEPHrine (EPIPEN 2-ANGI) 0.3 mg/0.3 mL AtIn, Inject 0.3 mLs (0.3 mg total) into the muscle as needed (Allergic Reaction)., Disp: 2 each, Rfl: 1    fluticasone propionate (FLONASE) 50 mcg/actuation nasal spray, 1 spray in each nostril twice a day, Disp: 16 g, Rfl: 5    gabapentin (NEURONTIN) 600 MG tablet, Take 1 tablet (600 mg total) by mouth 3 (three) times daily., Disp: 270 tablet, Rfl: 0    rosuvastatin (CRESTOR) 5 MG tablet, Take 1 tablet (5 mg total) by mouth every evening., Disp: 90 tablet, Rfl: 3    solriamfetoL (SUNOSI) 150 mg Tab, Take 1 tablet by mouth once daily., Disp: 30 tablet, Rfl: 3    STELARA 45 mg/0.5 mL Syrg syringe, Q 3 MONTHS, Disp: , Rfl:     tamsulosin (FLOMAX) 0.4 mg Cap, Take 1 capsule (0.4 mg total) by mouth once daily., Disp: 90 capsule, Rfl: 0    XYREM 500 mg/mL Soln, Take 4.5 g by mouth As instructed (narcolepsy). 4.5G po at bedtime then 2.5-4hr later 4.5G, Disp: 540 mL, Rfl: 5    estradioL (ESTRACE) 0.01 % (0.1 mg/gram) vaginal cream, USE 1/2 GRAM VAGINALLY 4 TIMES WEEKLY AS DIRECTED (Patient not taking: Reported on 7/14/2025), Disp: , Rfl:     loratadine (CLARITIN) 10 mg tablet, Take 1 tablet (10 mg total) by mouth once daily., Disp: 90 tablet, Rfl: 0    olopatadine (PATANOL) 0.1 % ophthalmic  solution, Place 1 drop into both eyes 2 (two) times daily., Disp: 5 mL, Rfl: 0    ondansetron (ZOFRAN-ODT) 4 MG TbDL, Take 1 tablet (4 mg total) by mouth every 8 (eight) hours as needed (nausea). (Patient not taking: Reported on 7/14/2025), Disp: 30 tablet, Rfl: 0  No current facility-administered medications for this visit.    Facility-Administered Medications Ordered in Other Visits:     diphenhydrAMINE injection 12.5 mg, 12.5 mg, Intravenous, Once PRN, Alphonso Ambrosio MD    electrolyte-S (ISOLYTE), , Intravenous, Continuous, Alphonso Ambrosio MD, Stopped at 05/25/23 0953    fentaNYL 50 mcg/mL injection 25 mcg, 25 mcg, Intravenous, Q5 Min PRN, Alphonso Ambrosio MD, 100 mcg at 06/19/24 0925    HYDROmorphone (PF) injection 0.2 mg, 0.2 mg, Intravenous, Q5 Min PRN, Alphonso Ambrosio MD    lactated ringers infusion, 10 mL/hr, Intravenous, Continuous, Alphonso Ambrosio MD    lactated ringers infusion, 500 mL, Intravenous, Once, Alphonso Ambrosio MD    LORazepam injection 0.25 mg, 0.25 mg, Intravenous, Once PRN, Alphonso Ambrosio MD    ondansetron injection 4 mg, 4 mg, Intravenous, Once PRN, Alphonso Ambrosio MD    prochlorperazine injection Soln 5 mg, 5 mg, Intravenous, Q30 Min PRN, Alphonso Ambrosio MD    sodium chloride 0.9% flush 3 mL, 3 mL, Intravenous, Q8H, Alphonso Ambrosio MD

## 2025-07-15 ENCOUNTER — PATIENT MESSAGE (OUTPATIENT)
Dept: URGENT CARE | Facility: CLINIC | Age: 55
End: 2025-07-15
Payer: COMMERCIAL

## 2025-07-15 ENCOUNTER — PATIENT MESSAGE (OUTPATIENT)
Dept: FAMILY MEDICINE | Facility: CLINIC | Age: 55
End: 2025-07-15
Payer: COMMERCIAL

## 2025-07-15 ENCOUNTER — TELEPHONE (OUTPATIENT)
Dept: FAMILY MEDICINE | Facility: CLINIC | Age: 55
End: 2025-07-15
Payer: COMMERCIAL

## 2025-07-15 DIAGNOSIS — D50.9 IRON DEFICIENCY ANEMIA, UNSPECIFIED IRON DEFICIENCY ANEMIA TYPE: Primary | ICD-10-CM

## 2025-07-15 DIAGNOSIS — E61.1 IRON DEFICIENCY: Primary | ICD-10-CM

## 2025-07-15 DIAGNOSIS — E11.9 TYPE 2 DIABETES MELLITUS WITHOUT COMPLICATION, WITHOUT LONG-TERM CURRENT USE OF INSULIN: Primary | ICD-10-CM

## 2025-07-15 RX ORDER — FERROUS SULFATE 324(65)MG
324 TABLET, DELAYED RELEASE (ENTERIC COATED) ORAL DAILY
Qty: 90 TABLET | Refills: 0 | Status: SHIPPED | OUTPATIENT
Start: 2025-07-15

## 2025-07-15 NOTE — TELEPHONE ENCOUNTER
Hi Ms. Chicas,     We have received your lab results and noted that Dr. Medrano has already placed a referral to Gastroenterology (GI) to further evaluate for any potential sources of blood loss. In the meantime, we recommend starting an iron supplement. I can send a prescription to your pharmacy. Please be aware that iron supplementation may cause constipation and dark-colored stools, which are common side effects. I recommend increasing water and fiber intake. You can take stool softeners if experiencing constipation.     Ive also placed an order for repeat iron studies in approximately 6-8 weeks to monitor your response and ensure there is no worsening. My staff will assist you with scheduling this follow-up.    Your A1C remains consistent with previous results and continues to reflect a diagnosis of diabetes. We can continue monitoring and focusing on dietary modifications. However, if you're interested in discussing medication options, we can arrange a virtual visit. There are effective treatments available, including GLP-1 receptor agonists like Ozempic or Mounjaro, which help manage blood sugar levels and may offer additional benefits. If you would like to explore this further, please let us know and well be happy to schedule a virtual follow-up.    Please let me know if you have any further questions or concerns.     Lydia Steve PA-C

## 2025-07-17 ENCOUNTER — OFFICE VISIT (OUTPATIENT)
Dept: FAMILY MEDICINE | Facility: CLINIC | Age: 55
End: 2025-07-17
Payer: COMMERCIAL

## 2025-07-17 ENCOUNTER — TELEPHONE (OUTPATIENT)
Dept: FAMILY MEDICINE | Facility: CLINIC | Age: 55
End: 2025-07-17

## 2025-07-17 DIAGNOSIS — E11.9 TYPE 2 DIABETES MELLITUS WITHOUT COMPLICATION, WITHOUT LONG-TERM CURRENT USE OF INSULIN: Primary | ICD-10-CM

## 2025-07-17 DIAGNOSIS — D50.9 IRON DEFICIENCY ANEMIA, UNSPECIFIED IRON DEFICIENCY ANEMIA TYPE: ICD-10-CM

## 2025-07-17 PROCEDURE — 98005 SYNCH AUDIO-VIDEO EST LOW 20: CPT | Mod: 95,,,

## 2025-07-17 PROCEDURE — 1160F RVW MEDS BY RX/DR IN RCRD: CPT | Mod: CPTII,95,,

## 2025-07-17 PROCEDURE — 1159F MED LIST DOCD IN RCRD: CPT | Mod: CPTII,95,,

## 2025-07-17 PROCEDURE — 3044F HG A1C LEVEL LT 7.0%: CPT | Mod: CPTII,95,,

## 2025-07-17 RX ORDER — SEMAGLUTIDE 0.68 MG/ML
0.25 INJECTION, SOLUTION SUBCUTANEOUS
Qty: 1.5 ML | Refills: 2 | Status: SHIPPED | OUTPATIENT
Start: 2025-07-17 | End: 2025-10-15

## 2025-07-17 RX ORDER — LANCETS
EACH MISCELLANEOUS
Qty: 50 EACH | Refills: 11 | Status: SHIPPED | OUTPATIENT
Start: 2025-07-17

## 2025-07-17 RX ORDER — DEXTROSE 4 G
TABLET,CHEWABLE ORAL
Qty: 1 EACH | Refills: 0 | Status: SHIPPED | OUTPATIENT
Start: 2025-07-17

## 2025-07-17 NOTE — TELEPHONE ENCOUNTER
Lydia Steve, YANICK  P [facility] Staff         Virtual Visit Follow-Up    A virtual visit has been completed. Please review the encounter for any necessary follow-ups.         Visit Disposition    Dispositions   Follow up in about 4 weeks (around 8/14/2025).

## 2025-07-17 NOTE — TELEPHONE ENCOUNTER
Appointment scheduled; patient agreed to appointment date, time, and location.  Patient specifically requested virtual appointment.  All scheduling questions reviewed with and answered by patient at time of call to ensure accuracy in scheduling.

## 2025-07-17 NOTE — PROGRESS NOTES
[facility]     Subjective:       Patient ID:  [id number]     Chief Complaint: Results (/) and GLP-1    The patient location is: [place]  The chief complaint leading to consultation is: labs and medication discussion    Visit type: audiovisual    Face to Face time with patient: 15  20 minutes of total time spent on the encounter, which includes face to face time and non-face to face time preparing to see the patient (eg, review of tests), Obtaining and/or reviewing separately obtained history, Documenting clinical information in the electronic or other health record, Independently interpreting results (not separately reported) and communicating results to the patient/family/caregiver, or Care coordination (not separately reported).       Each patient to whom he or she provides medical services by telemedicine is:  (1) informed of the relationship between the physician and patient and the respective role of any other health care provider with respect to management of the patient; and (2) notified that he or she may decline to receive medical services by telemedicine and may withdraw from such care at any time.    Notes:     Holly Chicas is a 54 y.o. female with a past medical history significant for HTN, HLD, T2DM, narcolepsy, s/p gastric bypass, psoriasis, and migraines who presents to the clinic for lab results and medication discussion.     CHIEF COMPLAINT:  Ms. Chicas presents today to discuss lab results and medications.    LABS:  Hemoglobin has dropped from 11.2 to 9.0 over the past year. Iron stores are currently depleted at 6 (normal >15). A1C is 6.7, which is slightly above the pre-diabetes threshold.    ANEMIA:  She denies history of iron deficiency anemia. She is post-menopausal with no active source of blood loss identified. She has not yet started prescribed iron supplementation. She is in the process of scheduling an appt with GI.    DIABETES:  She currently does not require medication  "for diabetes management.    GI CONCERNS:  She reports ongoing constipation which predated current medication discussion, with potential for medication-related exacerbation of symptoms.    MEDICAL HISTORY:  She underwent colonoscopy in 2022. She has had cholecystectomy.         Review of Systems   Constitutional:  Negative for chills and fever.   Respiratory:  Negative for shortness of breath.    Cardiovascular:  Negative for chest pain.        Past Medical History:   Diagnosis Date    Abnormal Pap smear 1996    "pre-cancer cells post hysterectomy"    Allergy     Anxiety     Arthritis     Depression     Diabetes mellitus     Good hypertension control 09/11/2014    Hyperlipidemia     Hypertension     Hypertriglyceridemia     Meningitis     Narcolepsy     Peripheral neuropathy 09/12/2014    PONV (postoperative nausea and vomiting)     Psoriasis     Psoriasis 05/10/2018    Wears glasses     [facility]        Active Problem List with Overview Notes    Diagnosis Date Noted    Acute pain of left shoulder 07/12/2024    Weakness of left shoulder 07/12/2024    Decreased range of motion of left shoulder 07/12/2024    Abnormal increased muscle tone 07/12/2024    Decreased functional mobility and endurance 07/12/2024    Special educational needs 07/12/2024    Allergic rhinitis due to house dust mite 03/07/2024    Rash and nonspecific skin eruption 05/29/2023     Ongoing for 3 days. Denies any recent changes in detergent or clothing. Recommended she try Benadryl 50mg PO daily for 3 days. She is scheduled for followup with Allergist in July of 2023.      Migraine without aura and without status migrainosus, not intractable 03/11/2019    Chronic fatigue 03/11/2019    Primary narcolepsy with cataplexy 02/16/2019    Psoriasis 05/10/2018    Type 2 diabetes mellitus without complication, without long-term current use of insulin 05/10/2018    BMI 26.0-26.9,adult 05/10/2018    Status post gastric bypass for obesity 05/10/2018    " Radiculopathy of arm 09/12/2014    Hypertension associated with diabetes 12/06/2011    Hyperlipidemia associated with type 2 diabetes mellitus 12/06/2011    Sleep apnea 12/06/2011        Review of patient's allergies indicates:   Allergen Reactions    Alcohol Anaphylaxis     Drinking alcohol, RESPIRATORY DISTRESS         Current Medications[1]    Lab Results   Component Value Date    WBC 5.9 07/12/2025    HGB 9.1 (L) 07/12/2025    HCT 33.6 (L) 07/12/2025     07/12/2025    CHOL 176 07/12/2025    TRIG 97 07/12/2025    HDL 57 07/12/2025    ALT 35 (H) 07/12/2025    AST 34 07/12/2025     07/12/2025    K 4.8 07/12/2025     07/12/2025    CREATININE 0.57 07/12/2025    BUN 23 07/12/2025    CO2 24 07/12/2025    TSH 1.730 07/12/2025    HGBA1C 6.7 (H) 07/12/2025           Objective:      Physical Exam  Constitutional:       Appearance: Normal appearance.   HENT:      Head: Normocephalic and atraumatic.   Eyes:      Conjunctiva/sclera: Conjunctivae normal.      Comments: As seen on virtual   Pulmonary:      Effort: Pulmonary effort is normal.   Neurological:      Mental Status: She is alert and oriented to person, place, and time.   Psychiatric:         Behavior: Behavior normal.           Assessment:       1. Type 2 diabetes mellitus without complication, without long-term current use of insulin    2. Iron deficiency anemia, unspecified iron deficiency anemia type          Plan:         Assessment & Plan    - Hemoglobin dropped from 11.2 to 9, indicating anemia.  - Depleted iron stores at 6 (normal >15).  - A1C of 6.7 indicates diabetes (>6.5).  - Started Ozempic 0.25 mg subcutaneous injection weekly for diabetes management, starting at lowest dose and titrating based on A1C response.   - Patient to follow up in 4 weeks to assess tolerance.        Holly was seen today for results and glp-1.    Diagnoses and all orders for this visit:    Type 2 diabetes mellitus without complication, without long-term current  use of insulin  -     semaglutide (OZEMPIC) 0.25 mg or 0.5 mg (2 mg/3 mL) pen injector; Inject 0.25 mg into the skin every 7 days.  -     blood-glucose meter kit; Check blood glucose 1 times daily as directed and as needed (dispense insurance preferred brand or patient choice  -     lancets Misc; Check blood glucose 1 times daily as directed and as needed (dispense insurance preferred brand or patient choice)  -     blood sugar diagnostic Strp; Check blood glucose 1 times daily as directed and as needed (dispense insurance preferred brand or patient choice)  -    Side effects discussed with patient including GI upset, constipation, pancreatitis, etc.            Iron deficiency anemia, unspecified iron deficiency anemia type         -   Patient referred to GI and started on iron supplementation.        Follow up in about 4 weeks (around 8/14/2025).    Future Appointments       Date Provider Specialty Appt Notes    10/14/2025 Dangelo Francisco PA-C Family Medicine 3 months/ [facility]             Tests to Keep You Healthy    Mammogram: Met on 11/8/2024  Eye Exam: ORDERED BUT NOT SCHEDULED  Colon Cancer Screening: Met on 4/29/2022  Last Blood Pressure <= 139/89 (7/14/2025): Yes  Last HbA1c < 8 (07/12/2025): Yes       I spent a total of 20 minutes on the day of the visit.This includes face to face time and non-face to face time preparing to see the patient (eg, review of tests), obtaining and/or reviewing separately obtained history, documenting clinical information in the electronic or other health record, independently interpreting results and communicating results to the patient/family/caregiver, or care coordinator.    This note was generated with the assistance of ambient listening technology. Verbal consent was obtained by the patient and accompanying visitor(s) for the recording of patient appointment to facilitate this note. I attest to having reviewed and edited the generated note for accuracy, though some syntax or  spelling errors may persist. Please contact the author of this note for any clarification.      Lydia Steve PA-C  Family Medicine Physician Assistant                [1]   Current Outpatient Medications:     acyclovir (ZOVIRAX) 400 MG tablet, Take 400 mg by mouth 3 (three) times daily., Disp: , Rfl:     albuterol (PROVENTIL HFA) 90 mcg/actuation inhaler, Inhale 2 puffs into the lungs every 6 (six) hours as needed for Wheezing. Rescue, Disp: 18 g, Rfl: 3    amLODIPine (NORVASC) 5 MG tablet, Take 1 tablet (5 mg total) by mouth every evening., Disp: 90 tablet, Rfl: 2    azelastine (ASTELIN) 137 mcg (0.1 %) nasal spray, 1 spray in each nosril twice a day, Disp: 30 mL, Rfl: 5    blood sugar diagnostic Strp, Check blood glucose 1 times daily as directed and as needed (dispense insurance preferred brand or patient choice), Disp: 50 each, Rfl: 11    blood-glucose meter kit, Check blood glucose 1 times daily as directed and as needed (dispense insurance preferred brand or patient choice, Disp: 1 each, Rfl: 0    dextroamphetamine-amphetamine 30 mg Tab, Take 1 tablet (30 mg total) by mouth 2 (two) times daily., Disp: 60 tablet, Rfl: 0    EPINEPHrine (EPIPEN 2-ANGI) 0.3 mg/0.3 mL AtIn, Inject 0.3 mLs (0.3 mg total) into the muscle as needed (Allergic Reaction)., Disp: 2 each, Rfl: 1    estradioL (ESTRACE) 0.01 % (0.1 mg/gram) vaginal cream, USE 1/2 GRAM VAGINALLY 4 TIMES WEEKLY AS DIRECTED (Patient not taking: Reported on 7/14/2025), Disp: , Rfl:     ferrous sulfate 324 mg (65 mg iron) TbEC, Take 1 tablet (324 mg total) by mouth once daily., Disp: 90 tablet, Rfl: 0    fluticasone propionate (FLONASE) 50 mcg/actuation nasal spray, 1 spray in each nostril twice a day, Disp: 16 g, Rfl: 5    gabapentin (NEURONTIN) 600 MG tablet, Take 1 tablet (600 mg total) by mouth 3 (three) times daily., Disp: 270 tablet, Rfl: 0    lancets Misc, Check blood glucose 1 times daily as directed and as needed (dispense insurance preferred brand  or patient choice), Disp: 50 each, Rfl: 11    loratadine (CLARITIN) 10 mg tablet, Take 1 tablet (10 mg total) by mouth once daily., Disp: 90 tablet, Rfl: 0    olopatadine (PATANOL) 0.1 % ophthalmic solution, Place 1 drop into both eyes 2 (two) times daily., Disp: 5 mL, Rfl: 0    ondansetron (ZOFRAN-ODT) 4 MG TbDL, Take 1 tablet (4 mg total) by mouth every 8 (eight) hours as needed (nausea). (Patient not taking: Reported on 7/14/2025), Disp: 30 tablet, Rfl: 0    rosuvastatin (CRESTOR) 5 MG tablet, Take 1 tablet (5 mg total) by mouth every evening., Disp: 90 tablet, Rfl: 3    semaglutide (OZEMPIC) 0.25 mg or 0.5 mg (2 mg/3 mL) pen injector, Inject 0.25 mg into the skin every 7 days., Disp: 1.5 mL, Rfl: 2    solriamfetoL (SUNOSI) 150 mg Tab, Take 1 tablet by mouth once daily., Disp: 30 tablet, Rfl: 3    STELARA 45 mg/0.5 mL Syrg syringe, Q 3 MONTHS, Disp: , Rfl:     tamsulosin (FLOMAX) 0.4 mg Cap, Take 1 capsule (0.4 mg total) by mouth once daily., Disp: 90 capsule, Rfl: 0    XYREM 500 mg/mL Soln, Take 4.5 g by mouth As instructed (narcolepsy). 4.5G po at bedtime then 2.5-4hr later 4.5G, Disp: 540 mL, Rfl: 5  No current facility-administered medications for this visit.    Facility-Administered Medications Ordered in Other Visits:     diphenhydrAMINE injection 12.5 mg, 12.5 mg, Intravenous, Once PRN, Alphonso Ambrosio MD    electrolyte-S (ISOLYTE), , Intravenous, Continuous, Alphonso Ambrosio MD, Stopped at 05/25/23 0953    fentaNYL 50 mcg/mL injection 25 mcg, 25 mcg, Intravenous, Q5 Min PRN, Alphonso Ambrosio MD, 100 mcg at 06/19/24 0925    HYDROmorphone (PF) injection 0.2 mg, 0.2 mg, Intravenous, Q5 Min PRN, Alphonso Ambrosio MD    lactated ringers infusion, 10 mL/hr, Intravenous, Continuous, Alphonso Ambrosio MD    lactated ringers infusion, 500 mL, Intravenous, Once, Alphonso Ambrosio MD    LORazepam injection 0.25 mg, 0.25 mg, Intravenous, Once PRN, Alphonso Ambrosio MD    ondansetron  injection 4 mg, 4 mg, Intravenous, Once PRN, Alphonso Ambrosio MD    prochlorperazine injection Soln 5 mg, 5 mg, Intravenous, Q30 Min PRN, Alphonso Ambrosio MD    sodium chloride 0.9% flush 3 mL, 3 mL, Intravenous, Q8H, Alphonso Ambrosio MD

## 2025-07-18 DIAGNOSIS — G47.411 NARCOLEPSY WITH CATAPLEXY: ICD-10-CM

## 2025-07-18 RX ORDER — SODIUM OXYBATE 0.5 G/ML
4.5 SOLUTION ORAL SEE ADMIN INSTRUCTIONS
Qty: 540 ML | Refills: 5 | Status: SHIPPED | OUTPATIENT
Start: 2025-07-18

## 2025-07-19 LAB
ALBUMIN/CREAT UR: 5 MG/G CREAT (ref 0–29)
APPEARANCE UR: CLEAR
BILIRUB UR QL STRIP: NEGATIVE
COLOR UR: YELLOW
CREAT UR-MCNC: 59.5 MG/DL
GLUCOSE UR QL STRIP: ABNORMAL
HGB UR QL STRIP: NEGATIVE
KETONES UR QL STRIP: ABNORMAL
LEUKOCYTE ESTERASE UR QL STRIP: NEGATIVE
MICRO URNS: ABNORMAL
MICROALBUMIN UR-MCNC: 3 UG/ML
NITRITE UR QL STRIP: NEGATIVE
PH UR STRIP: 6 [PH] (ref 5–7.5)
PROT UR QL STRIP: ABNORMAL
SP GR UR STRIP: 1.03 (ref 1–1.03)
UROBILINOGEN UR STRIP-MCNC: 0.2 MG/DL (ref 0.2–1)

## 2025-07-20 LAB
BACTERIA UR CULT: NO GROWTH
BACTERIA UR CULT: NORMAL

## 2025-07-21 ENCOUNTER — TELEPHONE (OUTPATIENT)
Dept: FAMILY MEDICINE | Facility: CLINIC | Age: 55
End: 2025-07-21
Payer: COMMERCIAL

## 2025-07-21 ENCOUNTER — RESULTS FOLLOW-UP (OUTPATIENT)
Dept: FAMILY MEDICINE | Facility: CLINIC | Age: 55
End: 2025-07-21
Payer: COMMERCIAL

## 2025-07-21 NOTE — TELEPHONE ENCOUNTER
----- Message from Lydia Steve PA-C sent at 7/21/2025 12:01 PM CDT -----  Urine culture shows no growth to date. If urinary symptoms are still occurring, please let me know.     Lydia Steve PA-C    ----- Message -----  From: Meenakshi Mcclendon  Sent: 7/20/2025  11:08 AM CDT  To: Lydia Steve PA-C

## 2025-07-22 ENCOUNTER — TELEPHONE (OUTPATIENT)
Dept: FAMILY MEDICINE | Facility: CLINIC | Age: 55
End: 2025-07-22
Payer: COMMERCIAL

## 2025-07-29 DIAGNOSIS — G47.411 NARCOLEPSY WITH CATAPLEXY: ICD-10-CM

## 2025-07-29 RX ORDER — DEXTROAMPHETAMINE SACCHARATE, AMPHETAMINE ASPARTATE, DEXTROAMPHETAMINE SULFATE AND AMPHETAMINE SULFATE 7.5; 7.5; 7.5; 7.5 MG/1; MG/1; MG/1; MG/1
30 TABLET ORAL 2 TIMES DAILY
Qty: 60 TABLET | Refills: 0 | Status: SHIPPED | OUTPATIENT
Start: 2025-07-29

## 2025-07-29 NOTE — TELEPHONE ENCOUNTER
Requested Prescriptions     Pending Prescriptions Disp Refills    dextroamphetamine-amphetamine 30 mg Tab 60 tablet 0     Sig: Take 1 tablet (30 mg total) by mouth 2 (two) times daily.    Lov 02/04/25

## 2025-08-04 ENCOUNTER — TELEPHONE (OUTPATIENT)
Dept: FAMILY MEDICINE | Facility: CLINIC | Age: 55
End: 2025-08-04
Payer: COMMERCIAL

## 2025-08-04 NOTE — TELEPHONE ENCOUNTER
Left message for pt to return call to clinic. August 14th appt canceled provider not in clinic appt changed to August 13th

## 2025-08-11 DIAGNOSIS — R33.9 URINARY RETENTION: ICD-10-CM

## 2025-08-11 RX ORDER — TAMSULOSIN HYDROCHLORIDE 0.4 MG/1
0.4 CAPSULE ORAL DAILY
Qty: 90 CAPSULE | Refills: 0 | Status: SHIPPED | OUTPATIENT
Start: 2025-08-11

## 2025-08-19 DIAGNOSIS — E11.9 TYPE 2 DIABETES MELLITUS WITHOUT COMPLICATION, WITHOUT LONG-TERM CURRENT USE OF INSULIN: ICD-10-CM

## 2025-08-19 DIAGNOSIS — G47.411 NARCOLEPSY WITH CATAPLEXY: ICD-10-CM

## 2025-08-19 RX ORDER — DEXTROAMPHETAMINE SACCHARATE, AMPHETAMINE ASPARTATE, DEXTROAMPHETAMINE SULFATE AND AMPHETAMINE SULFATE 7.5; 7.5; 7.5; 7.5 MG/1; MG/1; MG/1; MG/1
30 TABLET ORAL 2 TIMES DAILY
Qty: 60 TABLET | Refills: 0 | Status: SHIPPED | OUTPATIENT
Start: 2025-08-19

## 2025-08-19 RX ORDER — SEMAGLUTIDE 0.68 MG/ML
0.25 INJECTION, SOLUTION SUBCUTANEOUS
Qty: 1.5 ML | Refills: 2 | Status: SHIPPED | OUTPATIENT
Start: 2025-08-19 | End: 2025-11-17

## 2025-08-19 RX ORDER — SOLRIAMFETOL 150 MG/1
150 TABLET, FILM COATED ORAL DAILY
Qty: 30 TABLET | Refills: 3 | Status: SHIPPED | OUTPATIENT
Start: 2025-08-19

## 2025-08-20 ENCOUNTER — PATIENT MESSAGE (OUTPATIENT)
Dept: FAMILY MEDICINE | Facility: CLINIC | Age: 55
End: 2025-08-20
Payer: COMMERCIAL

## 2025-08-20 DIAGNOSIS — D50.9 IRON DEFICIENCY ANEMIA, UNSPECIFIED IRON DEFICIENCY ANEMIA TYPE: Primary | ICD-10-CM

## 2025-09-02 ENCOUNTER — TELEPHONE (OUTPATIENT)
Dept: FAMILY MEDICINE | Facility: CLINIC | Age: 55
End: 2025-09-02
Payer: COMMERCIAL

## (undated) DEVICE — DRAPE STERI-DRAPE 1000 17X11IN

## (undated) DEVICE — SLEEVE SCD EXPRESS KNEE MEDIUM

## (undated) DEVICE — SOL NACL IRR 1000ML BTL

## (undated) DEVICE — DRAPE INCISE IOBAN 2 23X17IN

## (undated) DEVICE — STRAP OR TABLE 5IN X 72IN

## (undated) DEVICE — SEE MEDLINE ITEM 153688

## (undated) DEVICE — DRAIN SINGLE ROUND 1/8 10F

## (undated) DEVICE — SEE MEDLINE ITEM 157216

## (undated) DEVICE — UNDERGLOVES BIOGEL PI SIZE 8

## (undated) DEVICE — PACK SIRUS BASIC V SURG STRL

## (undated) DEVICE — SPONGE DERMACEA GAUZE 4X4

## (undated) DEVICE — SYR 30CC LUER LOCK

## (undated) DEVICE — SPONGE BULKEE II ABSRB 6X6.75

## (undated) DEVICE — COVER SURG LIGHT HANDLE

## (undated) DEVICE — TOWEL OR DISP STRL BLUE 4/PK

## (undated) DEVICE — BNDG COFLEX FOAM LF2 ST 6X5YD

## (undated) DEVICE — DRAPE ORTH SPLIT 77X108IN

## (undated) DEVICE — DRESSING XEROFORM NONADH 1X8IN

## (undated) DEVICE — GLOVE SENSICARE PI GRN 8

## (undated) DEVICE — BLADE SURG #15 CARBON STEEL

## (undated) DEVICE — BANDAGE SOFFORM STER 2IN

## (undated) DEVICE — DRAPE THREE-QTR REINF 53X77IN

## (undated) DEVICE — ELECTRODE REM PLYHSV RETURN 9

## (undated) DEVICE — NDL SAFETY 21G X 1 1/2 ECLPSE

## (undated) DEVICE — DRAPE U SPLIT SHEET 54X76IN

## (undated) DEVICE — TUBING SUC UNIV W/CONN 12FT

## (undated) DEVICE — SOL NACL IRR 3000ML

## (undated) DEVICE — SUCTION FRAZIER TIP SURG 12FR

## (undated) DEVICE — Device

## (undated) DEVICE — GLOVE SENSICARE PI ALOE 8

## (undated) DEVICE — SYS LABEL CORRECT MED

## (undated) DEVICE — DRAPE INVISISHIELD TOWEL SMALL

## (undated) DEVICE — BANDAGE MATRIX HK LOOP 2IN 5YD

## (undated) DEVICE — SUT MONO 3-0 PS-2 18 PLST

## (undated) DEVICE — CORD BIPOLAR 12 FOOT

## (undated) DEVICE — GAUZE SPONGE BULKEE 6X6.75IN

## (undated) DEVICE — DRAPE STERI U-SHAPED 47X51IN

## (undated) DEVICE — APPLICATOR CHLORAPREP ORN 26ML

## (undated) DEVICE — GOWN POLY REINF BRTH SLV LG

## (undated) DEVICE — DRESSING N ADH OIL EMUL 3X3

## (undated) DEVICE — GOWN POLY REINF X-LONG XL

## (undated) DEVICE — PAD CAST 2 IN X 4YDS STERILE

## (undated) DEVICE — GLOVE SURG ULTRA TOUCH 8

## (undated) DEVICE — SYR LUER LOCK STERILE 10ML

## (undated) DEVICE — PAD CAST SPECIALIST STRL 4

## (undated) DEVICE — DRAPE STERI INSTRUMENT 1018

## (undated) DEVICE — ALCOHOL 70% ANTISEPTIC ISO 4OZ

## (undated) DEVICE — GLOVE SURG ULTRA TOUCH 7.5

## (undated) DEVICE — SYR BULB EAR/ULCER STER 3OZ

## (undated) DEVICE — DRAPE HAND STERILE

## (undated) DEVICE — PACK SET UP 190 OMC-NS

## (undated) DEVICE — NDL SAFETY 25G X 1.5 ECLIPSE

## (undated) DEVICE — PAD ABDOMINAL STERILE 8X10IN

## (undated) DEVICE — PADDING CAST 4IN SPECIALIST

## (undated) DEVICE — BANDAGE MATRIX HK LOOP 4IN 5YD

## (undated) DEVICE — NDL SPECTRUM AUTOPASS

## (undated) DEVICE — GLOVE SURGEONS ULTRA TOUCH 6.5

## (undated) DEVICE — SLING ORTHOPEDIC MEDIUM

## (undated) DEVICE — TUBE SET INFLOW/OUTFLOW

## (undated) DEVICE — MAT QUICK 40X30 FLOOR FLUID LF

## (undated) DEVICE — GOWN POLY REINF BRTH SLV XL

## (undated) DEVICE — GAUZE WOVEN STRL 12-PLY 4X4IN

## (undated) DEVICE — UNDERGLOVES BIOGEL PI SIZE 8.5

## (undated) DEVICE — BURR OVAL CUTTING 6 MM

## (undated) DEVICE — SHAVER ULTRAFFR 4.2MM

## (undated) DEVICE — SUT ETHILON 3-0 PS2 18 BLK

## (undated) DEVICE — BLADE SURG CARBON STEEL SZ11

## (undated) DEVICE — NDL SPINAL 18GX3.5 SPINOCAN

## (undated) DEVICE — KIT ASSISTARM SH KN STRL

## (undated) DEVICE — GLOVE SENSICARE PI GRN 8.5

## (undated) DEVICE — CONNECTOR TUBING STR 5 IN 1

## (undated) DEVICE — UNDERGLOVES BIOGEL PI SZ 7 LF

## (undated) DEVICE — TOURNIQUET SB QC DP 18X4IN

## (undated) DEVICE — SEE MEDLINE ITEM 157171

## (undated) DEVICE — PACK SHOULDER DRAPE POUCH

## (undated) DEVICE — GLOVE SENSICARE PI GRN 7

## (undated) DEVICE — CANNULA DRY DOC 7 X 85

## (undated) DEVICE — UNDERGLOVES BIOGEL PI SIZE 7.5

## (undated) DEVICE — SUT 3-0 VICRYL / SH (J416)

## (undated) DEVICE — SUT ETHILON 3-0 FS-1 30

## (undated) DEVICE — GLOVE BIOGEL PI ORTHO PRO 7.5

## (undated) DEVICE — TAPE MEDIPORE 4IN X 2YDS

## (undated) DEVICE — ADHESIVE MASTISOL VIAL 48/BX

## (undated) DEVICE — BANDAGE MATRIX HK LOOP 6IN 5YD

## (undated) DEVICE — BANDAGE ESMARK ELASTIC ST 4X9

## (undated) DEVICE — NDL BOX COUNTER

## (undated) DEVICE — MANIFOLD 4 PORT

## (undated) DEVICE — PENCIL ROCKER SWITCH 10FT CORD

## (undated) DEVICE — WRAP SHLDR HIP ACCU THRM PACK

## (undated) DEVICE — SOL 9P NACL IRR PIC IL

## (undated) DEVICE — EVACUATOR WOUND BULB 100CC

## (undated) DEVICE — BOWL STERILE LARGE 32OZ

## (undated) DEVICE — STRIP MEDI WND CLSR 1/2X4IN

## (undated) DEVICE — SUT VICRYL 0 CT-2 27 DYE

## (undated) DEVICE — SKINMARKER W/RULER DEVON

## (undated) DEVICE — LOOP PASSING HI-FI

## (undated) DEVICE — PROBE ARTHO ENERGY 90 DEG

## (undated) DEVICE — GLOVE SENSICARE PI ALOE 7

## (undated) DEVICE — SUT PROLENE 3-0 FS-1 MONO18

## (undated) DEVICE — CONTAINER SPECIMEN OR STER 4OZ

## (undated) DEVICE — CLOSURE SKIN STERI STRIP 1/2X4

## (undated) DEVICE — MAT SURGICAL ECOSUCTIONER

## (undated) DEVICE — SUT CTD VICRYL 2.0

## (undated) DEVICE — SPONGE GAUZE 16PLY 4X4